# Patient Record
Sex: FEMALE | Race: BLACK OR AFRICAN AMERICAN | NOT HISPANIC OR LATINO | Employment: OTHER | URBAN - METROPOLITAN AREA
[De-identification: names, ages, dates, MRNs, and addresses within clinical notes are randomized per-mention and may not be internally consistent; named-entity substitution may affect disease eponyms.]

---

## 2023-04-04 DIAGNOSIS — I10 PRIMARY HYPERTENSION: Primary | ICD-10-CM

## 2023-04-04 DIAGNOSIS — M54.50 CHRONIC LOW BACK PAIN WITHOUT SCIATICA, UNSPECIFIED BACK PAIN LATERALITY: ICD-10-CM

## 2023-04-04 DIAGNOSIS — J06.9 VIRAL UPPER RESPIRATORY TRACT INFECTION: ICD-10-CM

## 2023-04-04 DIAGNOSIS — R51.9 ACUTE NONINTRACTABLE HEADACHE, UNSPECIFIED HEADACHE TYPE: ICD-10-CM

## 2023-04-04 DIAGNOSIS — R53.83 OTHER FATIGUE: ICD-10-CM

## 2023-04-04 DIAGNOSIS — G89.29 CHRONIC LOW BACK PAIN WITHOUT SCIATICA, UNSPECIFIED BACK PAIN LATERALITY: ICD-10-CM

## 2023-04-04 RX ORDER — IBUPROFEN 400 MG/1
TABLET ORAL EVERY 6 HOURS
COMMUNITY
Start: 2022-09-29 | End: 2023-04-04 | Stop reason: SDUPTHER

## 2023-04-04 RX ORDER — FLUTICASONE PROPIONATE 50 MCG
1 SPRAY, SUSPENSION (ML) NASAL DAILY
COMMUNITY
Start: 2022-03-18 | End: 2023-04-04 | Stop reason: SDUPTHER

## 2023-04-04 RX ORDER — LIDOCAINE 50 MG/G
PATCH TOPICAL
COMMUNITY
Start: 2022-04-26 | End: 2023-04-04 | Stop reason: SDUPTHER

## 2023-04-04 RX ORDER — HYDROCHLOROTHIAZIDE 25 MG/1
1 TABLET ORAL DAILY
COMMUNITY
Start: 2013-01-07 | End: 2023-04-04 | Stop reason: SDUPTHER

## 2023-04-04 RX ORDER — MELATONIN 3 MG
CAPSULE ORAL
COMMUNITY
Start: 2022-04-26 | End: 2023-04-04 | Stop reason: SDUPTHER

## 2023-04-05 RX ORDER — HYDROCHLOROTHIAZIDE 25 MG/1
25 TABLET ORAL DAILY
Qty: 90 TABLET | Refills: 0 | Status: SHIPPED | OUTPATIENT
Start: 2023-04-05 | End: 2023-04-28 | Stop reason: SDUPTHER

## 2023-04-05 RX ORDER — IBUPROFEN 400 MG/1
400 TABLET ORAL EVERY 6 HOURS
Qty: 120 TABLET | Refills: 2 | Status: SHIPPED | OUTPATIENT
Start: 2023-04-05 | End: 2023-07-04

## 2023-04-05 RX ORDER — FLUTICASONE PROPIONATE 50 MCG
1 SPRAY, SUSPENSION (ML) NASAL DAILY
Qty: 16 G | Refills: 2 | Status: SHIPPED | OUTPATIENT
Start: 2023-04-05 | End: 2023-04-28 | Stop reason: SDUPTHER

## 2023-04-05 RX ORDER — MELATONIN 3 MG
3 CAPSULE ORAL NIGHTLY
Qty: 60 CAPSULE | Refills: 1 | Status: SHIPPED | OUTPATIENT
Start: 2023-04-05 | End: 2023-04-28 | Stop reason: SDUPTHER

## 2023-04-05 RX ORDER — LIDOCAINE 50 MG/G
PATCH TOPICAL
Qty: 30 PATCH | Refills: 3 | Status: SHIPPED | OUTPATIENT
Start: 2023-04-05 | End: 2023-04-28 | Stop reason: SDUPTHER

## 2023-04-24 DIAGNOSIS — K21.9 GASTROESOPHAGEAL REFLUX DISEASE WITHOUT ESOPHAGITIS: Primary | ICD-10-CM

## 2023-04-24 RX ORDER — PANTOPRAZOLE SODIUM 40 MG/1
40 TABLET, DELAYED RELEASE ORAL DAILY
Qty: 30 TABLET | Refills: 5 | Status: SHIPPED | OUTPATIENT
Start: 2023-04-24 | End: 2023-04-28 | Stop reason: SDUPTHER

## 2023-04-28 ENCOUNTER — OFFICE VISIT (OUTPATIENT)
Dept: PRIMARY CARE | Facility: CLINIC | Age: 58
End: 2023-04-28
Payer: COMMERCIAL

## 2023-04-28 VITALS
SYSTOLIC BLOOD PRESSURE: 96 MMHG | BODY MASS INDEX: 51.91 KG/M2 | OXYGEN SATURATION: 99 % | HEART RATE: 69 BPM | HEIGHT: 63 IN | TEMPERATURE: 96.7 F | WEIGHT: 293 LBS | DIASTOLIC BLOOD PRESSURE: 61 MMHG

## 2023-04-28 DIAGNOSIS — K21.9 GASTROESOPHAGEAL REFLUX DISEASE WITHOUT ESOPHAGITIS: ICD-10-CM

## 2023-04-28 DIAGNOSIS — I10 PRIMARY HYPERTENSION: ICD-10-CM

## 2023-04-28 DIAGNOSIS — R53.83 OTHER FATIGUE: ICD-10-CM

## 2023-04-28 DIAGNOSIS — G89.29 CHRONIC LOW BACK PAIN WITHOUT SCIATICA, UNSPECIFIED BACK PAIN LATERALITY: ICD-10-CM

## 2023-04-28 DIAGNOSIS — M54.50 CHRONIC LOW BACK PAIN WITHOUT SCIATICA, UNSPECIFIED BACK PAIN LATERALITY: ICD-10-CM

## 2023-04-28 DIAGNOSIS — J06.9 VIRAL UPPER RESPIRATORY TRACT INFECTION: ICD-10-CM

## 2023-04-28 DIAGNOSIS — R35.0 INCREASED URINARY FREQUENCY: Primary | ICD-10-CM

## 2023-04-28 LAB
POC APPEARANCE, URINE: CLEAR
POC BILIRUBIN, URINE: NEGATIVE
POC BLOOD, URINE: NEGATIVE
POC COLOR, URINE: YELLOW
POC GLUCOSE, URINE: NEGATIVE MG/DL
POC KETONES, URINE: NEGATIVE MG/DL
POC LEUKOCYTES, URINE: NEGATIVE
POC NITRITE,URINE: NEGATIVE
POC PH, URINE: 5.5 PH
POC PROTEIN, URINE: NEGATIVE MG/DL
POC SPECIFIC GRAVITY, URINE: 1.02
POC UROBILINOGEN, URINE: 0.2 EU/DL

## 2023-04-28 PROCEDURE — 99213 OFFICE O/P EST LOW 20 MIN: CPT | Performed by: STUDENT IN AN ORGANIZED HEALTH CARE EDUCATION/TRAINING PROGRAM

## 2023-04-28 PROCEDURE — 81003 URINALYSIS AUTO W/O SCOPE: CPT | Performed by: STUDENT IN AN ORGANIZED HEALTH CARE EDUCATION/TRAINING PROGRAM

## 2023-04-28 PROCEDURE — 3074F SYST BP LT 130 MM HG: CPT | Performed by: STUDENT IN AN ORGANIZED HEALTH CARE EDUCATION/TRAINING PROGRAM

## 2023-04-28 PROCEDURE — 3078F DIAST BP <80 MM HG: CPT | Performed by: STUDENT IN AN ORGANIZED HEALTH CARE EDUCATION/TRAINING PROGRAM

## 2023-04-28 PROCEDURE — 1036F TOBACCO NON-USER: CPT | Performed by: STUDENT IN AN ORGANIZED HEALTH CARE EDUCATION/TRAINING PROGRAM

## 2023-04-28 RX ORDER — PANTOPRAZOLE SODIUM 40 MG/1
40 TABLET, DELAYED RELEASE ORAL DAILY
Qty: 30 TABLET | Refills: 5 | Status: SHIPPED | OUTPATIENT
Start: 2023-04-28 | End: 2023-06-09

## 2023-04-28 RX ORDER — LIDOCAINE 50 MG/G
PATCH TOPICAL
Qty: 30 PATCH | Refills: 3 | Status: SHIPPED | OUTPATIENT
Start: 2023-04-28 | End: 2023-11-11 | Stop reason: SDUPTHER

## 2023-04-28 RX ORDER — MELATONIN 3 MG
3 CAPSULE ORAL NIGHTLY
Qty: 60 CAPSULE | Refills: 1 | Status: SHIPPED | OUTPATIENT
Start: 2023-04-28 | End: 2023-07-27

## 2023-04-28 RX ORDER — HYDROCHLOROTHIAZIDE 25 MG/1
12.5 TABLET ORAL DAILY
Qty: 45 TABLET | Refills: 2 | Status: SHIPPED | OUTPATIENT
Start: 2023-04-28 | End: 2023-09-13 | Stop reason: ALTCHOICE

## 2023-04-28 RX ORDER — FLUTICASONE PROPIONATE 50 MCG
1 SPRAY, SUSPENSION (ML) NASAL DAILY
Qty: 16 G | Refills: 2 | Status: SHIPPED | OUTPATIENT
Start: 2023-04-28 | End: 2023-06-09 | Stop reason: SDUPTHER

## 2023-04-28 RX ORDER — DICLOFENAC SODIUM 10 MG/G
4 GEL TOPICAL 4 TIMES DAILY
Qty: 200 G | Refills: 1 | Status: SHIPPED | OUTPATIENT
Start: 2023-04-28 | End: 2023-12-12 | Stop reason: SDUPTHER

## 2023-04-28 ASSESSMENT — PAIN SCALES - GENERAL: PAINLEVEL: 0-NO PAIN

## 2023-04-28 NOTE — PROGRESS NOTES
A 57 y o with hx of HFpEF, Asthma, Morbid obesity, OPAL, GERD, depression and HTN presenting for increase urinary frequency and lightheadedness.     #Increase urinary frequency   - more than bl: usually goes 2-3 per day but now has noticed at lease 4-5 times daily   - no dysuria or other urinary symptoms   - h/o UTI in Aug 2022 with complete resolution s/p abx tx    #lightheadedness/dizziness   - denies vertigo  - denies tinnitus   - drinks mostly gingerale    - h/o of presyncope years ago 2/2 dehydration   - has noticed most of her dizziness spills with position changes  - no change in medications   - no OTC medications     REVIEW OF SYSTEMS:   No fevers, chills  No skin lesions  No HA, SZ, LOC  No CP, chest pressure  No cough, SOB   No ABD pain, nausea, vomiting  No urinary urgency, dysuria, urinary frequency  No BRBPR, melena, hematuria  No bleeding      PHYSICAL EXAMINATION:   Gen: NAD, non-toxic  HEENT: WNL  Chest: no inc WOB, CTABL, no wheeze/crackles/rhonchi   CVS: RRR, no murur appreciated   Abd: soft, NT/ND, +BS  Ext: no edema, nontender  Skin: Dry, warm, good condition  Neuro: grossly normal, CN intact, SELENE x 4  Psy: Mood and affect appropriate    IO UA negative     ASSESSMENT:  Ms. Smith is a  58 y/o F with hx of HFpEF, Asthma, Morbid obesity, OPAL, GERD, depression and HTN presenting for increase urinary frequency and lightheadedness. Reassuring findings on physical examination with stable VS. Plan as below:     PLAN:  - IO UA neg, with no urinary symptoms concerning for UTI  - Will send for Hg A1C  - RFP for electrolyte imbalance check   - Will down titrate hydrochlorothiazide from 25 mg to 12.5 mg   - Medications reviewed and renewed as needed  - Precautionary returns reviewed     RTC: 2-3 week for BP follow up     Discussed with Dr. Plunkett,     Jenna Perkins MD  Family Medicine, PGY3  [facility]

## 2023-05-01 NOTE — PROGRESS NOTES
I reviewed with the resident the medical history and the resident’s findings on physical examination.  I discussed with the resident the patient’s diagnosis and concur with the treatment plan as documented in the resident note.     Edward Plunkett MD

## 2023-05-23 ENCOUNTER — APPOINTMENT (OUTPATIENT)
Dept: PRIMARY CARE | Facility: CLINIC | Age: 58
End: 2023-05-23
Payer: COMMERCIAL

## 2023-06-09 ENCOUNTER — OFFICE VISIT (OUTPATIENT)
Dept: PRIMARY CARE | Facility: CLINIC | Age: 58
End: 2023-06-09
Payer: COMMERCIAL

## 2023-06-09 ENCOUNTER — LAB (OUTPATIENT)
Dept: LAB | Facility: LAB | Age: 58
End: 2023-06-09
Payer: COMMERCIAL

## 2023-06-09 ENCOUNTER — TELEPHONE (OUTPATIENT)
Dept: PRIMARY CARE | Facility: CLINIC | Age: 58
End: 2023-06-09

## 2023-06-09 VITALS
OXYGEN SATURATION: 98 % | WEIGHT: 293 LBS | SYSTOLIC BLOOD PRESSURE: 130 MMHG | HEART RATE: 70 BPM | DIASTOLIC BLOOD PRESSURE: 81 MMHG | BODY MASS INDEX: 54.45 KG/M2

## 2023-06-09 DIAGNOSIS — I10 PRIMARY HYPERTENSION: ICD-10-CM

## 2023-06-09 DIAGNOSIS — I10 HYPERTENSION, UNSPECIFIED TYPE: ICD-10-CM

## 2023-06-09 DIAGNOSIS — J06.9 VIRAL UPPER RESPIRATORY TRACT INFECTION: ICD-10-CM

## 2023-06-09 DIAGNOSIS — K21.9 GASTROESOPHAGEAL REFLUX DISEASE, UNSPECIFIED WHETHER ESOPHAGITIS PRESENT: Primary | ICD-10-CM

## 2023-06-09 DIAGNOSIS — J30.2 SEASONAL ALLERGIES: ICD-10-CM

## 2023-06-09 LAB
ALANINE AMINOTRANSFERASE (SGPT) (U/L) IN SER/PLAS: 6 U/L (ref 7–45)
ALBUMIN (G/DL) IN SER/PLAS: 4.1 G/DL (ref 3.4–5)
ALKALINE PHOSPHATASE (U/L) IN SER/PLAS: 137 U/L (ref 33–110)
ANION GAP IN SER/PLAS: 13 MMOL/L (ref 10–20)
ASPARTATE AMINOTRANSFERASE (SGOT) (U/L) IN SER/PLAS: 9 U/L (ref 9–39)
BILIRUBIN TOTAL (MG/DL) IN SER/PLAS: 0.2 MG/DL (ref 0–1.2)
CALCIUM (MG/DL) IN SER/PLAS: 9.2 MG/DL (ref 8.6–10.6)
CARBON DIOXIDE, TOTAL (MMOL/L) IN SER/PLAS: 21 MMOL/L (ref 21–32)
CHLORIDE (MMOL/L) IN SER/PLAS: 115 MMOL/L (ref 98–107)
CREATININE (MG/DL) IN SER/PLAS: 1.77 MG/DL (ref 0.5–1.05)
ESTIMATED AVERAGE GLUCOSE FOR HBA1C: 111 MG/DL
GFR FEMALE: 33 ML/MIN/1.73M2
GLUCOSE (MG/DL) IN SER/PLAS: 85 MG/DL (ref 74–99)
HEMOGLOBIN A1C/HEMOGLOBIN TOTAL IN BLOOD: 5.5 %
PHOSPHATE (MG/DL) IN SER/PLAS: 3.7 MG/DL (ref 2.5–4.9)
POTASSIUM (MMOL/L) IN SER/PLAS: 6.8 MMOL/L (ref 3.5–5.3)
PROTEIN TOTAL: 7.4 G/DL (ref 6.4–8.2)
SODIUM (MMOL/L) IN SER/PLAS: 142 MMOL/L (ref 136–145)
UREA NITROGEN (MG/DL) IN SER/PLAS: 46 MG/DL (ref 6–23)

## 2023-06-09 PROCEDURE — 36415 COLL VENOUS BLD VENIPUNCTURE: CPT

## 2023-06-09 PROCEDURE — 80053 COMPREHEN METABOLIC PANEL: CPT

## 2023-06-09 PROCEDURE — 84100 ASSAY OF PHOSPHORUS: CPT

## 2023-06-09 PROCEDURE — 83036 HEMOGLOBIN GLYCOSYLATED A1C: CPT

## 2023-06-09 PROCEDURE — 99214 OFFICE O/P EST MOD 30 MIN: CPT | Performed by: STUDENT IN AN ORGANIZED HEALTH CARE EDUCATION/TRAINING PROGRAM

## 2023-06-09 PROCEDURE — 1036F TOBACCO NON-USER: CPT | Performed by: STUDENT IN AN ORGANIZED HEALTH CARE EDUCATION/TRAINING PROGRAM

## 2023-06-09 PROCEDURE — 3075F SYST BP GE 130 - 139MM HG: CPT | Performed by: STUDENT IN AN ORGANIZED HEALTH CARE EDUCATION/TRAINING PROGRAM

## 2023-06-09 PROCEDURE — 3079F DIAST BP 80-89 MM HG: CPT | Performed by: STUDENT IN AN ORGANIZED HEALTH CARE EDUCATION/TRAINING PROGRAM

## 2023-06-09 RX ORDER — FLUTICASONE PROPIONATE 50 MCG
1 SPRAY, SUSPENSION (ML) NASAL DAILY
Qty: 16 G | Refills: 2 | Status: SHIPPED | OUTPATIENT
Start: 2023-06-09 | End: 2023-12-12 | Stop reason: SDUPTHER

## 2023-06-09 RX ORDER — FAMOTIDINE 10 MG/1
10 TABLET ORAL 2 TIMES DAILY
Qty: 60 TABLET | Refills: 11 | Status: SHIPPED | OUTPATIENT
Start: 2023-06-09 | End: 2024-06-08

## 2023-06-09 NOTE — PROGRESS NOTES
Subjective   Patient ID: Jessica Smith is a 58 y.o. female who presents for Follow-up.    HPI     #HTN  -hydrochlorothiazide downed from 25 to 12.5 mg every day at last visit, due for repeat RFP/A1c, had polyuria at last visit, IO UA was negative at last visit, pt has been compliant with meds, took this today as well, BP well controlled  Lab Results   Component Value Date    CREATININE 0.97 10/27/2021    BUN 43 (H) 10/27/2021     10/27/2021    K 4.3 10/27/2021    CL 97 (L) 10/27/2021    CO2 26 10/27/2021        Lab Results   Component Value Date    HGBA1C 5.4 07/08/2021      #Allergic rhinitis  -Takes Flonase PRN which helps control symptoms, needs refill    #GERD  -Takes Pantoprazole 40 mg every day, needs refill, has never tried Pepcid, interested in converting   -Did not know about GERD triggers, educated on foods that can cause this        Review of Systems    Pt denies any current chest pain, SOB, nausea, vomiting     Objective   /81 (BP Location: Right arm, Patient Position: Sitting, BP Cuff Size: Adult)   Pulse 70   Wt 139 kg (307 lb 6.4 oz)   SpO2 98%   BMI 54.45 kg/m²     Physical Exam    Constitutional: Well developed, awake, alert, oriented x3  Head and Face: NCAT  Eyes: Normal external exam, clear sclera bl  ENT: Normal external inspection of ears and nose. Oropharynx normal.  Cardiovascular: RRR, S1/S2, no murmurs, rubs, or gallops, radial pulses +2, no edema of extremities Chest nontender to palpation   Pulmonary: CTAB, no respiratory distress.  Abdomen: +BS, soft, non-tender, nondistended, no guarding or rebound, no masses noted  Neuro: A&O x3, CN II-XII grossly intact, no focal neuro deficits   MSK: No joint swelling, normal movements of all extremities. Range of motion- normal.  Skin- No lesions, contusions, or erythema.  Psychiatric: Judgment intact. Appropriate mood and behavior     Assessment/Plan   Diagnoses and all orders for this visit:  Gastroesophageal reflux disease,  unspecified whether esophagitis present  -     famotidine (Pepcid AC) 10 mg tablet; Take 1 tablet (10 mg) by mouth 2 times a day.  -Educated pt about GERD specific food triggers, convert PPI to Pepcid BID due to better side effect profile   Viral upper respiratory tract infection  -     fluticasone (Flonase) 50 mcg/actuation nasal spray; Administer 1 spray into each nostril once daily.  Hypertension, unspecified type  -     Comprehensive Metabolic Panel; Future  -     Hemoglobin A1C; Future  -BP well controlled today, continue current regimen for now, due for updated labs   Seasonal allergies  -Flonase as above      Discussed with:  Dr. Dodge  Return in : 1-2 months for FUV    Portions of this note were generated using digital voice recognition software, and may contain grammatical errors       Toy Davis MD  PGY-3, Family Medicine

## 2023-06-09 NOTE — TELEPHONE ENCOUNTER
"Recent Results (from the past 24 hour(s))   Comprehensive Metabolic Panel    Collection Time: 06/09/23  1:33 PM   Result Value Ref Range    Glucose 85 74 - 99 mg/dL    Sodium 142 136 - 145 mmol/L    Potassium 6.8 (HH) 3.5 - 5.3 mmol/L    Chloride 115 (H) 98 - 107 mmol/L    Bicarbonate 21 21 - 32 mmol/L    Anion Gap 13 10 - 20 mmol/L    Urea Nitrogen 46 (H) 6 - 23 mg/dL    Creatinine 1.77 (H) 0.50 - 1.05 mg/dL    GFR Female 33 (A) >90 mL/min/1.73m2    Calcium 9.2 8.6 - 10.6 mg/dL    Albumin 4.1 3.4 - 5.0 g/dL    Alkaline Phosphatase 137 (H) 33 - 110 U/L    Total Protein 7.4 6.4 - 8.2 g/dL    AST 9 9 - 39 U/L    Total Bilirubin 0.2 0.0 - 1.2 mg/dL    ALT (SGPT) 6 (L) 7 - 45 U/L   Phosphorus    Collection Time: 06/09/23  1:33 PM   Result Value Ref Range    Phosphorus 3.7 2.5 - 4.9 mg/dL     Inpatient family medicine on-call pager ([phone]) was paged by lab services at 7:38 PM (extension [phone]).  Called at 7:41 PM, was given a critical lab notification of a potassium of 6.8.  Asked if there was any evidence of hemolysis, they said none was noted.  Verified read back.    Called patient, spoke for approximately 7 minutes.  Patient states that she has been feeling some pain in her side for the past week, her doctor had told her that it was probably from \"some spaghetti sauce, it made my gastritis worse.\"  Otherwise at baseline, denies any chest pain, palpitations, syncope.  I communicated that her blood work today showed sudden worsening in her kidney function, as well as a very high potassium level, which could be dangerous.  Recommended that she come into the emergency room today for repeat blood work and further work-up.  Patient states that she would be able to call her daughter to drive her to the ED immediately after getting off the phone with me.  Asked her what her plan B would be if daughter could not drive her, she says she would call the ambulance.  I emphasized the importance of her being seen by a medical " provider today, patient understands.  All questions answered, patient to call daughter.    Loyda Horton MD  Family Medicine PGY2  [facility]

## 2023-06-12 NOTE — PROGRESS NOTES
I reviewed with the resident the medical history and the resident’s findings on physical examination.  I discussed with the resident the patient’s diagnosis and concur with the treatment plan as documented in the resident note.     Hector Dodge MD

## 2023-06-13 DIAGNOSIS — M54.50 CHRONIC LOW BACK PAIN WITHOUT SCIATICA, UNSPECIFIED BACK PAIN LATERALITY: Primary | ICD-10-CM

## 2023-06-13 DIAGNOSIS — G89.29 CHRONIC LOW BACK PAIN WITHOUT SCIATICA, UNSPECIFIED BACK PAIN LATERALITY: Primary | ICD-10-CM

## 2023-06-21 DIAGNOSIS — D50.9 IRON DEFICIENCY ANEMIA, UNSPECIFIED IRON DEFICIENCY ANEMIA TYPE: Primary | ICD-10-CM

## 2023-06-21 RX ORDER — FERROUS SULFATE 325(65) MG
65 TABLET ORAL EVERY OTHER DAY
Qty: 15 TABLET | Refills: 3 | Status: SHIPPED | OUTPATIENT
Start: 2023-06-21 | End: 2023-12-12 | Stop reason: SDUPTHER

## 2023-06-28 DIAGNOSIS — J44.9 CHRONIC OBSTRUCTIVE PULMONARY DISEASE, UNSPECIFIED COPD TYPE (MULTI): Primary | ICD-10-CM

## 2023-06-28 RX ORDER — FLUTICASONE PROPIONATE AND SALMETEROL 250; 50 UG/1; UG/1
1 POWDER RESPIRATORY (INHALATION)
Qty: 60 EACH | Refills: 11 | Status: SHIPPED | OUTPATIENT
Start: 2023-06-28 | End: 2024-03-13 | Stop reason: SDUPTHER

## 2023-07-20 DIAGNOSIS — I10 PRIMARY HYPERTENSION: ICD-10-CM

## 2023-07-20 DIAGNOSIS — M54.50 CHRONIC LOW BACK PAIN WITHOUT SCIATICA, UNSPECIFIED BACK PAIN LATERALITY: Primary | ICD-10-CM

## 2023-07-20 DIAGNOSIS — G89.29 CHRONIC LOW BACK PAIN WITHOUT SCIATICA, UNSPECIFIED BACK PAIN LATERALITY: Primary | ICD-10-CM

## 2023-07-20 RX ORDER — IBUPROFEN 400 MG/1
400 TABLET ORAL EVERY 6 HOURS PRN
Status: CANCELLED | OUTPATIENT
Start: 2023-07-20

## 2023-07-20 RX ORDER — ACETAMINOPHEN 500 MG
1000 TABLET ORAL EVERY 6 HOURS PRN
Qty: 240 TABLET | Refills: 2 | Status: ON HOLD | OUTPATIENT
Start: 2023-07-20 | End: 2023-10-19

## 2023-07-20 RX ORDER — IBUPROFEN 400 MG/1
400 TABLET ORAL EVERY 6 HOURS PRN
COMMUNITY
End: 2023-07-20

## 2023-07-20 NOTE — TELEPHONE ENCOUNTER
No ibuprofen d/t kidney function; sent tylenol instead.    Loyda Horton MD  Family Medicine PGY-3  [facility]

## 2023-07-24 ENCOUNTER — TELEPHONE (OUTPATIENT)
Dept: PRIMARY CARE | Facility: CLINIC | Age: 58
End: 2023-07-24
Payer: COMMERCIAL

## 2023-09-08 ENCOUNTER — TELEPHONE (OUTPATIENT)
Dept: PRIMARY CARE | Facility: CLINIC | Age: 58
End: 2023-09-08
Payer: COMMERCIAL

## 2023-09-08 DIAGNOSIS — R79.89 INCREASE IN SERUM CREATININE FROM PRIOR MEASUREMENT: ICD-10-CM

## 2023-09-08 DIAGNOSIS — J30.2 SEASONAL ALLERGIES: Primary | ICD-10-CM

## 2023-09-08 NOTE — TELEPHONE ENCOUNTER
Patient is requesting refill of ibuprofen and robitussin. Patient made aware notes from previous encounter stating ibuprofen not recommended for pt current kidney function. Pt stated not aware of any kidney issues. Needs provider education related to these issues, and refill, if indicated. Provider notified.

## 2023-09-13 PROBLEM — E87.6 HYPOKALEMIA: Status: RESOLVED | Noted: 2023-09-13 | Resolved: 2023-09-13

## 2023-09-13 PROBLEM — Z98.84 HISTORY OF ROUX-EN-Y GASTRIC BYPASS: Status: ACTIVE | Noted: 2022-04-03

## 2023-09-13 PROBLEM — T81.30XA WOUND DEHISCENCE: Status: RESOLVED | Noted: 2023-09-13 | Resolved: 2023-09-13

## 2023-09-13 PROBLEM — F41.9 ANXIETY AND DEPRESSION: Status: ACTIVE | Noted: 2023-09-13

## 2023-09-13 PROBLEM — U07.1 COVID-19: Status: RESOLVED | Noted: 2021-03-19 | Resolved: 2023-09-13

## 2023-09-13 PROBLEM — K55.9 ISCHEMIC BOWEL DISEASE (MULTI): Status: RESOLVED | Noted: 2021-04-01 | Resolved: 2023-09-13

## 2023-09-13 PROBLEM — K56.609 SBO (SMALL BOWEL OBSTRUCTION) (MULTI): Status: RESOLVED | Noted: 2023-09-13 | Resolved: 2023-09-13

## 2023-09-13 PROBLEM — K66.0 ABDOMINAL ADHESIONS: Status: RESOLVED | Noted: 2021-08-08 | Resolved: 2023-09-13

## 2023-09-13 PROBLEM — L03.311 CELLULITIS OF ABDOMINAL WALL: Status: RESOLVED | Noted: 2023-09-13 | Resolved: 2023-09-13

## 2023-09-13 PROBLEM — K59.09 CHRONIC CONSTIPATION: Status: ACTIVE | Noted: 2023-09-13

## 2023-09-13 PROBLEM — E66.813 OBESITY, CLASS III, BMI 40-49.9 (MORBID OBESITY): Chronic | Status: ACTIVE | Noted: 2018-10-22

## 2023-09-13 PROBLEM — R94.31 ABNORMAL QT INTERVAL PRESENT ON ELECTROCARDIOGRAM: Status: RESOLVED | Noted: 2021-03-18 | Resolved: 2023-09-13

## 2023-09-13 PROBLEM — B97.7 HIGH RISK HPV INFECTION: Status: RESOLVED | Noted: 2023-09-13 | Resolved: 2023-09-13

## 2023-09-13 PROBLEM — E66.01 OBESITY, CLASS III, BMI 40-49.9 (MORBID OBESITY) (MULTI): Chronic | Status: ACTIVE | Noted: 2018-10-22

## 2023-09-13 PROBLEM — M19.90 UNSPECIFIED OSTEOARTHRITIS, UNSPECIFIED SITE: Status: ACTIVE | Noted: 2023-09-13

## 2023-09-13 PROBLEM — L30.4 INTERTRIGO: Status: RESOLVED | Noted: 2023-09-13 | Resolved: 2023-09-13

## 2023-09-13 PROBLEM — I89.0 LYMPHEDEMA OF BOTH LOWER EXTREMITIES: Status: ACTIVE | Noted: 2023-09-13

## 2023-09-13 PROBLEM — K21.00 GASTROESOPHAGEAL REFLUX DISEASE WITH ESOPHAGITIS: Status: ACTIVE | Noted: 2022-04-21

## 2023-09-13 PROBLEM — I50.32 CHRONIC HEART FAILURE WITH PRESERVED EJECTION FRACTION (MULTI): Status: ACTIVE | Noted: 2022-04-21

## 2023-09-13 PROBLEM — G47.00 INSOMNIA: Status: ACTIVE | Noted: 2023-09-13

## 2023-09-13 PROBLEM — M79.2 NEUROPATHIC PAIN: Status: ACTIVE | Noted: 2022-04-21

## 2023-09-13 PROBLEM — J44.89 ASTHMA WITH CHRONIC OBSTRUCTIVE PULMONARY DISEASE (COPD) (MULTI): Status: ACTIVE | Noted: 2022-04-21

## 2023-09-13 PROBLEM — F32.A ANXIETY AND DEPRESSION: Status: ACTIVE | Noted: 2023-09-13

## 2023-09-13 PROBLEM — J30.2 SEASONAL ALLERGIES: Status: ACTIVE | Noted: 2023-09-13

## 2023-09-13 PROBLEM — N39.46 MIXED INCONTINENCE: Status: ACTIVE | Noted: 2023-09-13

## 2023-09-13 PROBLEM — J32.9 CHRONIC SINUSITIS: Status: RESOLVED | Noted: 2023-09-13 | Resolved: 2023-09-13

## 2023-09-13 RX ORDER — GUAIFENESIN 1200 MG/1
1200 TABLET, EXTENDED RELEASE ORAL EVERY 12 HOURS
COMMUNITY
Start: 2021-10-25 | End: 2023-09-13 | Stop reason: SDUPTHER

## 2023-09-13 RX ORDER — SUCRALFATE 1 G/10ML
1 SUSPENSION ORAL
COMMUNITY
Start: 2020-04-13 | End: 2023-12-12 | Stop reason: SDUPTHER

## 2023-09-13 RX ORDER — PANTOPRAZOLE SODIUM 40 MG/1
1 TABLET, DELAYED RELEASE ORAL DAILY
COMMUNITY
Start: 2019-07-11 | End: 2023-11-04 | Stop reason: SDUPTHER

## 2023-09-13 RX ORDER — TALC
1 POWDER (GRAM) TOPICAL NIGHTLY
COMMUNITY
Start: 2022-04-25 | End: 2023-12-12 | Stop reason: SDUPTHER

## 2023-09-13 RX ORDER — ALBUTEROL SULFATE 90 UG/1
2 AEROSOL, METERED RESPIRATORY (INHALATION) EVERY 6 HOURS PRN
COMMUNITY
Start: 2019-04-29 | End: 2023-11-07 | Stop reason: SDUPTHER

## 2023-09-13 RX ORDER — GUAIFENESIN 1200 MG/1
1200 TABLET, EXTENDED RELEASE ORAL EVERY 12 HOURS PRN
Qty: 60 TABLET | Refills: 1 | Status: SHIPPED | OUTPATIENT
Start: 2023-09-13 | End: 2023-10-27

## 2023-09-13 RX ORDER — GABAPENTIN 300 MG/1
300 CAPSULE ORAL NIGHTLY
COMMUNITY
Start: 2022-04-01 | End: 2023-12-12 | Stop reason: SDUPTHER

## 2023-09-13 RX ORDER — AMLODIPINE BESYLATE 10 MG/1
10 TABLET ORAL DAILY
COMMUNITY
Start: 2023-06-13 | End: 2023-12-12 | Stop reason: SDUPTHER

## 2023-10-10 ENCOUNTER — HOSPITAL ENCOUNTER (OUTPATIENT)
Facility: HOSPITAL | Age: 58
Setting detail: OBSERVATION
Discharge: NURSING, REHAB OR LONG-TERM CARE | End: 2023-10-19
Attending: EMERGENCY MEDICINE | Admitting: FAMILY MEDICINE
Payer: COMMERCIAL

## 2023-10-10 ENCOUNTER — APPOINTMENT (OUTPATIENT)
Dept: RADIOLOGY | Facility: HOSPITAL | Age: 58
End: 2023-10-10
Payer: COMMERCIAL

## 2023-10-10 DIAGNOSIS — R10.84 GENERALIZED ABDOMINAL PAIN: Chronic | ICD-10-CM

## 2023-10-10 DIAGNOSIS — E87.5 HYPERKALEMIA: ICD-10-CM

## 2023-10-10 DIAGNOSIS — N17.9 ACUTE RENAL FAILURE SUPERIMPOSED ON CHRONIC KIDNEY DISEASE, UNSPECIFIED ACUTE RENAL FAILURE TYPE, UNSPECIFIED CKD STAGE (CMS-HCC): ICD-10-CM

## 2023-10-10 DIAGNOSIS — N18.9 ACUTE RENAL FAILURE SUPERIMPOSED ON CHRONIC KIDNEY DISEASE, UNSPECIFIED ACUTE RENAL FAILURE TYPE, UNSPECIFIED CKD STAGE (CMS-HCC): ICD-10-CM

## 2023-10-10 DIAGNOSIS — Z98.84 HISTORY OF ROUX-EN-Y GASTRIC BYPASS: ICD-10-CM

## 2023-10-10 DIAGNOSIS — M54.50 CHRONIC LOW BACK PAIN WITHOUT SCIATICA, UNSPECIFIED BACK PAIN LATERALITY: ICD-10-CM

## 2023-10-10 DIAGNOSIS — R10.9 ABDOMINAL PAIN, UNSPECIFIED ABDOMINAL LOCATION: Primary | ICD-10-CM

## 2023-10-10 DIAGNOSIS — G89.29 CHRONIC LOW BACK PAIN WITHOUT SCIATICA, UNSPECIFIED BACK PAIN LATERALITY: ICD-10-CM

## 2023-10-10 DIAGNOSIS — K59.09 CHRONIC CONSTIPATION: ICD-10-CM

## 2023-10-10 LAB
BASOPHILS # BLD AUTO: 0.05 X10*3/UL (ref 0–0.1)
BASOPHILS NFR BLD AUTO: 0.4 %
BNP SERPL-MCNC: 22 PG/ML (ref 0–99)
CARDIAC TROPONIN I PNL SERPL HS: 5 NG/L (ref 0–34)
EOSINOPHIL # BLD AUTO: 0.16 X10*3/UL (ref 0–0.7)
EOSINOPHIL NFR BLD AUTO: 1.3 %
ERYTHROCYTE [DISTWIDTH] IN BLOOD BY AUTOMATED COUNT: 16.2 % (ref 11.5–14.5)
HCT VFR BLD AUTO: 31.8 % (ref 36–46)
HGB BLD-MCNC: 10 G/DL (ref 12–16)
IMM GRANULOCYTES # BLD AUTO: 0.04 X10*3/UL (ref 0–0.7)
IMM GRANULOCYTES NFR BLD AUTO: 0.3 % (ref 0–0.9)
LACTATE SERPL-SCNC: 1.1 MMOL/L (ref 0.4–2)
LYMPHOCYTES # BLD AUTO: 1.91 X10*3/UL (ref 1.2–4.8)
LYMPHOCYTES NFR BLD AUTO: 15.8 %
MCH RBC QN AUTO: 28.7 PG (ref 26–34)
MCHC RBC AUTO-ENTMCNC: 31.4 G/DL (ref 32–36)
MCV RBC AUTO: 91 FL (ref 80–100)
MONOCYTES # BLD AUTO: 1.15 X10*3/UL (ref 0.1–1)
MONOCYTES NFR BLD AUTO: 9.5 %
NEUTROPHILS # BLD AUTO: 8.77 X10*3/UL (ref 1.2–7.7)
NEUTROPHILS NFR BLD AUTO: 72.7 %
NRBC BLD-RTO: 0 /100 WBCS (ref 0–0)
PLATELET # BLD AUTO: 271 X10*3/UL (ref 150–450)
PMV BLD AUTO: 10.8 FL (ref 7.5–11.5)
RBC # BLD AUTO: 3.48 X10*6/UL (ref 4–5.2)
WBC # BLD AUTO: 12.1 X10*3/UL (ref 4.4–11.3)

## 2023-10-10 PROCEDURE — 73590 X-RAY EXAM OF LOWER LEG: CPT | Mod: LT,FY

## 2023-10-10 PROCEDURE — 83880 ASSAY OF NATRIURETIC PEPTIDE: CPT

## 2023-10-10 PROCEDURE — 83690 ASSAY OF LIPASE: CPT

## 2023-10-10 PROCEDURE — 73564 X-RAY EXAM KNEE 4 OR MORE: CPT | Mod: LT,FY

## 2023-10-10 PROCEDURE — 85025 COMPLETE CBC W/AUTO DIFF WBC: CPT

## 2023-10-10 PROCEDURE — 2500000001 HC RX 250 WO HCPCS SELF ADMINISTERED DRUGS (ALT 637 FOR MEDICARE OP): Mod: SE

## 2023-10-10 PROCEDURE — 73522 X-RAY EXAM HIPS BI 3-4 VIEWS: CPT | Mod: FY

## 2023-10-10 PROCEDURE — 99285 EMERGENCY DEPT VISIT HI MDM: CPT | Performed by: EMERGENCY MEDICINE

## 2023-10-10 PROCEDURE — 83735 ASSAY OF MAGNESIUM: CPT

## 2023-10-10 PROCEDURE — 84484 ASSAY OF TROPONIN QUANT: CPT

## 2023-10-10 PROCEDURE — 83605 ASSAY OF LACTIC ACID: CPT

## 2023-10-10 PROCEDURE — 36415 COLL VENOUS BLD VENIPUNCTURE: CPT

## 2023-10-10 PROCEDURE — 81003 URINALYSIS AUTO W/O SCOPE: CPT | Performed by: EMERGENCY MEDICINE

## 2023-10-10 PROCEDURE — 93010 ELECTROCARDIOGRAM REPORT: CPT | Performed by: EMERGENCY MEDICINE

## 2023-10-10 PROCEDURE — 80053 COMPREHEN METABOLIC PANEL: CPT

## 2023-10-10 PROCEDURE — 73522 X-RAY EXAM HIPS BI 3-4 VIEWS: CPT | Mod: BILATERAL PROCEDURE | Performed by: RADIOLOGY

## 2023-10-10 PROCEDURE — 73564 X-RAY EXAM KNEE 4 OR MORE: CPT | Mod: LEFT SIDE | Performed by: RADIOLOGY

## 2023-10-10 PROCEDURE — 73590 X-RAY EXAM OF LOWER LEG: CPT | Mod: LEFT SIDE | Performed by: RADIOLOGY

## 2023-10-10 RX ORDER — LIDOCAINE HYDROCHLORIDE 20 MG/ML
15 SOLUTION OROPHARYNGEAL ONCE
Status: COMPLETED | OUTPATIENT
Start: 2023-10-10 | End: 2023-10-10

## 2023-10-10 RX ORDER — ALUMINUM HYDROXIDE, MAGNESIUM HYDROXIDE, AND SIMETHICONE 1200; 120; 1200 MG/30ML; MG/30ML; MG/30ML
30 SUSPENSION ORAL ONCE
Status: COMPLETED | OUTPATIENT
Start: 2023-10-10 | End: 2023-10-10

## 2023-10-10 RX ORDER — PANTOPRAZOLE SODIUM 40 MG/10ML
40 INJECTION, POWDER, LYOPHILIZED, FOR SOLUTION INTRAVENOUS ONCE
Status: COMPLETED | OUTPATIENT
Start: 2023-10-10 | End: 2023-10-11

## 2023-10-10 RX ADMIN — ALUMINUM HYDROXIDE, MAGNESIUM HYDROXIDE, AND DIMETHICONE 30 ML: 200; 20; 200 SUSPENSION ORAL at 22:15

## 2023-10-10 RX ADMIN — LIDOCAINE HYDROCHLORIDE 15 ML: 20 SOLUTION ORAL at 22:15

## 2023-10-10 ASSESSMENT — COLUMBIA-SUICIDE SEVERITY RATING SCALE - C-SSRS
2. HAVE YOU ACTUALLY HAD ANY THOUGHTS OF KILLING YOURSELF?: NO
1. IN THE PAST MONTH, HAVE YOU WISHED YOU WERE DEAD OR WISHED YOU COULD GO TO SLEEP AND NOT WAKE UP?: NO
6. HAVE YOU EVER DONE ANYTHING, STARTED TO DO ANYTHING, OR PREPARED TO DO ANYTHING TO END YOUR LIFE?: NO

## 2023-10-10 ASSESSMENT — PAIN SCALES - GENERAL: PAINLEVEL_OUTOF10: 8

## 2023-10-10 ASSESSMENT — PAIN - FUNCTIONAL ASSESSMENT: PAIN_FUNCTIONAL_ASSESSMENT: 0-10

## 2023-10-11 ENCOUNTER — APPOINTMENT (OUTPATIENT)
Dept: RADIOLOGY | Facility: HOSPITAL | Age: 58
End: 2023-10-11
Payer: COMMERCIAL

## 2023-10-11 ENCOUNTER — APPOINTMENT (OUTPATIENT)
Dept: CARDIOLOGY | Facility: HOSPITAL | Age: 58
End: 2023-10-11
Payer: COMMERCIAL

## 2023-10-11 PROBLEM — N17.9 ACUTE KIDNEY INJURY (CMS-HCC): Status: ACTIVE | Noted: 2023-10-11

## 2023-10-11 LAB
ALBUMIN SERPL BCP-MCNC: 3.6 G/DL (ref 3.4–5)
ALBUMIN SERPL BCP-MCNC: 4.1 G/DL (ref 3.4–5)
ALP SERPL-CCNC: 124 U/L (ref 33–110)
ALT SERPL W P-5'-P-CCNC: 7 U/L (ref 7–45)
ANION GAP SERPL CALC-SCNC: 11 MMOL/L (ref 10–20)
ANION GAP SERPL CALC-SCNC: 13 MMOL/L (ref 10–20)
APPEARANCE UR: ABNORMAL
AST SERPL W P-5'-P-CCNC: 8 U/L (ref 9–39)
ATRIAL RATE: 67 BPM
ATRIAL RATE: 69 BPM
BILIRUB SERPL-MCNC: 0.3 MG/DL (ref 0–1.2)
BILIRUB UR STRIP.AUTO-MCNC: NEGATIVE MG/DL
BUN SERPL-MCNC: 28 MG/DL (ref 6–23)
BUN SERPL-MCNC: 36 MG/DL (ref 6–23)
CALCIUM SERPL-MCNC: 8.5 MG/DL (ref 8.6–10.6)
CALCIUM SERPL-MCNC: 8.7 MG/DL (ref 8.6–10.6)
CHLORIDE SERPL-SCNC: 111 MMOL/L (ref 98–107)
CHLORIDE SERPL-SCNC: 111 MMOL/L (ref 98–107)
CO2 SERPL-SCNC: 18 MMOL/L (ref 21–32)
CO2 SERPL-SCNC: 19 MMOL/L (ref 21–32)
COLOR UR: YELLOW
CREAT SERPL-MCNC: 1.43 MG/DL (ref 0.5–1.05)
CREAT SERPL-MCNC: 1.65 MG/DL (ref 0.5–1.05)
GFR SERPL CREATININE-BSD FRML MDRD: 36 ML/MIN/1.73M*2
GFR SERPL CREATININE-BSD FRML MDRD: 43 ML/MIN/1.73M*2
GLUCOSE BLD MANUAL STRIP-MCNC: 143 MG/DL (ref 74–99)
GLUCOSE SERPL-MCNC: 112 MG/DL (ref 74–99)
GLUCOSE SERPL-MCNC: 118 MG/DL (ref 74–99)
GLUCOSE UR STRIP.AUTO-MCNC: NEGATIVE MG/DL
HOLD SPECIMEN: NORMAL
KETONES UR STRIP.AUTO-MCNC: NEGATIVE MG/DL
LEUKOCYTE ESTERASE UR QL STRIP.AUTO: NEGATIVE
LIPASE SERPL-CCNC: 29 U/L (ref 9–82)
MAGNESIUM SERPL-MCNC: 2.2 MG/DL (ref 1.6–2.4)
NITRITE UR QL STRIP.AUTO: NEGATIVE
P AXIS: 75 DEGREES
P AXIS: 78 DEGREES
P OFFSET: 170 MS
P OFFSET: 176 MS
P ONSET: 122 MS
P ONSET: 123 MS
PH UR STRIP.AUTO: 5 [PH]
PHOSPHATE SERPL-MCNC: 3.1 MG/DL (ref 2.5–4.9)
POTASSIUM SERPL-SCNC: 5.2 MMOL/L (ref 3.5–5.3)
POTASSIUM SERPL-SCNC: 5.6 MMOL/L (ref 3.5–5.3)
POTASSIUM SERPL-SCNC: 5.7 MMOL/L (ref 3.5–5.3)
POTASSIUM SERPL-SCNC: 6 MMOL/L (ref 3.5–5.3)
PR INTERVAL: 190 MS
PR INTERVAL: 194 MS
PROT SERPL-MCNC: 7.6 G/DL (ref 6.4–8.2)
PROT UR STRIP.AUTO-MCNC: NEGATIVE MG/DL
Q ONSET: 218 MS
Q ONSET: 219 MS
QRS COUNT: 11 BEATS
QRS COUNT: 11 BEATS
QRS DURATION: 82 MS
QRS DURATION: 88 MS
QT INTERVAL: 272 MS
QT INTERVAL: 406 MS
QTC CALCULATION(BAZETT): 291 MS
QTC CALCULATION(BAZETT): 429 MS
QTC FREDERICIA: 285 MS
QTC FREDERICIA: 421 MS
R AXIS: 62 DEGREES
R AXIS: 68 DEGREES
RBC # UR STRIP.AUTO: NEGATIVE /UL
SODIUM SERPL-SCNC: 135 MMOL/L (ref 136–145)
SODIUM SERPL-SCNC: 136 MMOL/L (ref 136–145)
SP GR UR STRIP.AUTO: 1.02
T AXIS: -28 DEGREES
T AXIS: 15 DEGREES
T OFFSET: 355 MS
T OFFSET: 421 MS
UROBILINOGEN UR STRIP.AUTO-MCNC: <2 MG/DL
VENTRICULAR RATE: 67 BPM
VENTRICULAR RATE: 69 BPM

## 2023-10-11 PROCEDURE — C9113 INJ PANTOPRAZOLE SODIUM, VIA: HCPCS | Mod: SE

## 2023-10-11 PROCEDURE — 84132 ASSAY OF SERUM POTASSIUM: CPT | Mod: 59

## 2023-10-11 PROCEDURE — 2500000005 HC RX 250 GENERAL PHARMACY W/O HCPCS: Mod: SE

## 2023-10-11 PROCEDURE — 2580000001 HC RX 258 IV SOLUTIONS: Mod: SE

## 2023-10-11 PROCEDURE — 36415 COLL VENOUS BLD VENIPUNCTURE: CPT

## 2023-10-11 PROCEDURE — 2500000001 HC RX 250 WO HCPCS SELF ADMINISTERED DRUGS (ALT 637 FOR MEDICARE OP): Mod: SE

## 2023-10-11 PROCEDURE — 96365 THER/PROPH/DIAG IV INF INIT: CPT | Performed by: EMERGENCY MEDICINE

## 2023-10-11 PROCEDURE — 2500000002 HC RX 250 W HCPCS SELF ADMINISTERED DRUGS (ALT 637 FOR MEDICARE OP, ALT 636 FOR OP/ED): Mod: SE

## 2023-10-11 PROCEDURE — 96375 TX/PRO/DX INJ NEW DRUG ADDON: CPT | Performed by: EMERGENCY MEDICINE

## 2023-10-11 PROCEDURE — 2500000004 HC RX 250 GENERAL PHARMACY W/ HCPCS (ALT 636 FOR OP/ED): Mod: SE

## 2023-10-11 PROCEDURE — G0378 HOSPITAL OBSERVATION PER HR: HCPCS

## 2023-10-11 PROCEDURE — 80069 RENAL FUNCTION PANEL: CPT

## 2023-10-11 PROCEDURE — 82947 ASSAY GLUCOSE BLOOD QUANT: CPT | Mod: 59

## 2023-10-11 PROCEDURE — 74176 CT ABD & PELVIS W/O CONTRAST: CPT

## 2023-10-11 PROCEDURE — 93005 ELECTROCARDIOGRAM TRACING: CPT

## 2023-10-11 PROCEDURE — 96361 HYDRATE IV INFUSION ADD-ON: CPT | Performed by: EMERGENCY MEDICINE

## 2023-10-11 PROCEDURE — 93005 ELECTROCARDIOGRAM TRACING: CPT | Mod: 59

## 2023-10-11 PROCEDURE — 74176 CT ABD & PELVIS W/O CONTRAST: CPT | Performed by: RADIOLOGY

## 2023-10-11 PROCEDURE — 99222 1ST HOSP IP/OBS MODERATE 55: CPT | Performed by: FAMILY MEDICINE

## 2023-10-11 RX ORDER — FAMOTIDINE 20 MG/1
20 TABLET, FILM COATED ORAL 2 TIMES DAILY
Status: DISCONTINUED | OUTPATIENT
Start: 2023-10-11 | End: 2023-10-12

## 2023-10-11 RX ORDER — FAMOTIDINE 20 MG/1
10 TABLET, FILM COATED ORAL 2 TIMES DAILY
Status: DISCONTINUED | OUTPATIENT
Start: 2023-10-11 | End: 2023-10-11

## 2023-10-11 RX ORDER — PANTOPRAZOLE SODIUM 40 MG/1
40 TABLET, DELAYED RELEASE ORAL DAILY
Status: DISCONTINUED | OUTPATIENT
Start: 2023-10-11 | End: 2023-10-11

## 2023-10-11 RX ORDER — DEXTROSE 50 % IN WATER (D50W) INTRAVENOUS SYRINGE
25 ONCE
Status: COMPLETED | OUTPATIENT
Start: 2023-10-11 | End: 2023-10-11

## 2023-10-11 RX ORDER — CALCIUM GLUCONATE 20 MG/ML
2 INJECTION, SOLUTION INTRAVENOUS ONCE
Status: COMPLETED | OUTPATIENT
Start: 2023-10-11 | End: 2023-10-11

## 2023-10-11 RX ORDER — ESCITALOPRAM OXALATE 20 MG/1
20 TABLET ORAL DAILY
COMMUNITY
End: 2023-11-04 | Stop reason: SDUPTHER

## 2023-10-11 RX ORDER — ALBUTEROL SULFATE 0.83 MG/ML
10 SOLUTION RESPIRATORY (INHALATION) ONCE
Status: COMPLETED | OUTPATIENT
Start: 2023-10-11 | End: 2023-10-11

## 2023-10-11 RX ORDER — TALC
3 POWDER (GRAM) TOPICAL NIGHTLY PRN
Status: DISCONTINUED | OUTPATIENT
Start: 2023-10-11 | End: 2023-10-19 | Stop reason: HOSPADM

## 2023-10-11 RX ORDER — ENOXAPARIN SODIUM 100 MG/ML
60 INJECTION SUBCUTANEOUS EVERY 12 HOURS SCHEDULED
Status: DISCONTINUED | OUTPATIENT
Start: 2023-10-11 | End: 2023-10-11

## 2023-10-11 RX ORDER — ALBUTEROL SULFATE 90 UG/1
2 AEROSOL, METERED RESPIRATORY (INHALATION) EVERY 6 HOURS PRN
Status: DISCONTINUED | OUTPATIENT
Start: 2023-10-11 | End: 2023-10-19 | Stop reason: HOSPADM

## 2023-10-11 RX ORDER — FERROUS SULFATE 325(65) MG
65 TABLET ORAL EVERY OTHER DAY
Status: DISCONTINUED | OUTPATIENT
Start: 2023-10-12 | End: 2023-10-19 | Stop reason: HOSPADM

## 2023-10-11 RX ORDER — PANTOPRAZOLE SODIUM 40 MG/10ML
40 INJECTION, POWDER, LYOPHILIZED, FOR SOLUTION INTRAVENOUS DAILY
Status: DISCONTINUED | OUTPATIENT
Start: 2023-10-11 | End: 2023-10-19 | Stop reason: HOSPADM

## 2023-10-11 RX ORDER — DEXTROSE MONOHYDRATE 25 G/50ML
25 INJECTION, SOLUTION INTRAVENOUS ONCE
Status: DISCONTINUED | OUTPATIENT
Start: 2023-10-11 | End: 2023-10-11

## 2023-10-11 RX ORDER — AMLODIPINE BESYLATE 10 MG/1
10 TABLET ORAL DAILY
Status: DISCONTINUED | OUTPATIENT
Start: 2023-10-11 | End: 2023-10-19 | Stop reason: HOSPADM

## 2023-10-11 RX ORDER — FLUTICASONE PROPIONATE 50 MCG
1 SPRAY, SUSPENSION (ML) NASAL DAILY
Status: DISCONTINUED | OUTPATIENT
Start: 2023-10-11 | End: 2023-10-19 | Stop reason: HOSPADM

## 2023-10-11 RX ORDER — HEPARIN SODIUM 5000 [USP'U]/ML
7500 INJECTION, SOLUTION INTRAVENOUS; SUBCUTANEOUS EVERY 8 HOURS SCHEDULED
Status: DISCONTINUED | OUTPATIENT
Start: 2023-10-12 | End: 2023-10-19 | Stop reason: HOSPADM

## 2023-10-11 RX ORDER — POLYETHYLENE GLYCOL 3350 17 G/17G
17 POWDER, FOR SOLUTION ORAL DAILY
Status: DISCONTINUED | OUTPATIENT
Start: 2023-10-11 | End: 2023-10-14

## 2023-10-11 RX ORDER — SUCRALFATE 1 G/10ML
1 SUSPENSION ORAL
Status: DISCONTINUED | OUTPATIENT
Start: 2023-10-11 | End: 2023-10-19 | Stop reason: HOSPADM

## 2023-10-11 RX ORDER — DEXTROSE 50 % IN WATER (D50W) INTRAVENOUS SYRINGE
Status: COMPLETED
Start: 2023-10-11 | End: 2023-10-11

## 2023-10-11 RX ORDER — GABAPENTIN 300 MG/1
300 CAPSULE ORAL NIGHTLY
Status: DISCONTINUED | OUTPATIENT
Start: 2023-10-11 | End: 2023-10-19 | Stop reason: HOSPADM

## 2023-10-11 RX ORDER — FLUTICASONE FUROATE AND VILANTEROL 200; 25 UG/1; UG/1
1 POWDER RESPIRATORY (INHALATION)
Status: DISCONTINUED | OUTPATIENT
Start: 2023-10-11 | End: 2023-10-19 | Stop reason: HOSPADM

## 2023-10-11 RX ORDER — SODIUM POLYSTYRENE SULFONATE 15 G/60ML
15 SUSPENSION ORAL; RECTAL ONCE
Status: DISCONTINUED | OUTPATIENT
Start: 2023-10-11 | End: 2023-10-17

## 2023-10-11 RX ORDER — ALBUTEROL SULFATE 5 MG/ML
10 SOLUTION RESPIRATORY (INHALATION) ONCE
Status: DISCONTINUED | OUTPATIENT
Start: 2023-10-11 | End: 2023-10-11

## 2023-10-11 RX ORDER — DICLOFENAC SODIUM 10 MG/G
4 GEL TOPICAL 4 TIMES DAILY PRN
Status: DISCONTINUED | OUTPATIENT
Start: 2023-10-11 | End: 2023-10-19 | Stop reason: HOSPADM

## 2023-10-11 RX ORDER — CALCIUM GLUCONATE 20 MG/ML
2 INJECTION, SOLUTION INTRAVENOUS ONCE
Status: DISCONTINUED | OUTPATIENT
Start: 2023-10-11 | End: 2023-10-17

## 2023-10-11 RX ORDER — ALBUTEROL SULFATE 0.83 MG/ML
SOLUTION RESPIRATORY (INHALATION)
Status: COMPLETED
Start: 2023-10-11 | End: 2023-10-11

## 2023-10-11 RX ORDER — LIDOCAINE 50 MG/G
1 PATCH TOPICAL DAILY
Status: DISCONTINUED | OUTPATIENT
Start: 2023-10-11 | End: 2023-10-11

## 2023-10-11 RX ORDER — LIDOCAINE 560 MG/1
1 PATCH PERCUTANEOUS; TOPICAL; TRANSDERMAL DAILY
Status: DISCONTINUED | OUTPATIENT
Start: 2023-10-11 | End: 2023-10-19 | Stop reason: HOSPADM

## 2023-10-11 RX ORDER — SODIUM POLYSTYRENE SULFONATE 15 G/60ML
15 SUSPENSION ORAL; RECTAL ONCE
Status: COMPLETED | OUTPATIENT
Start: 2023-10-11 | End: 2023-10-11

## 2023-10-11 RX ORDER — GUAIFENESIN 600 MG/1
1200 TABLET, EXTENDED RELEASE ORAL EVERY 12 HOURS PRN
Status: DISCONTINUED | OUTPATIENT
Start: 2023-10-11 | End: 2023-10-19 | Stop reason: HOSPADM

## 2023-10-11 RX ORDER — ACETAMINOPHEN 325 MG/1
975 TABLET ORAL EVERY 8 HOURS PRN
Status: DISCONTINUED | OUTPATIENT
Start: 2023-10-11 | End: 2023-10-19 | Stop reason: HOSPADM

## 2023-10-11 RX ADMIN — ALBUTEROL SULFATE 2.5 MG: 0.83 SOLUTION RESPIRATORY (INHALATION) at 02:46

## 2023-10-11 RX ADMIN — PANTOPRAZOLE SODIUM 40 MG: 40 TABLET, DELAYED RELEASE ORAL at 14:24

## 2023-10-11 RX ADMIN — AMLODIPINE BESYLATE 10 MG: 10 TABLET ORAL at 14:24

## 2023-10-11 RX ADMIN — PANTOPRAZOLE SODIUM 40 MG: 40 INJECTION, POWDER, FOR SOLUTION INTRAVENOUS at 02:01

## 2023-10-11 RX ADMIN — ACETAMINOPHEN 975 MG: 325 TABLET ORAL at 14:39

## 2023-10-11 RX ADMIN — ALBUTEROL SULFATE 2 PUFF: 90 AEROSOL, METERED RESPIRATORY (INHALATION) at 14:44

## 2023-10-11 RX ADMIN — CALCIUM GLUCONATE 2 G: 20 INJECTION, SOLUTION INTRAVENOUS at 01:59

## 2023-10-11 RX ADMIN — SODIUM CHLORIDE, POTASSIUM CHLORIDE, SODIUM LACTATE AND CALCIUM CHLORIDE 1000 ML: 600; 310; 30; 20 INJECTION, SOLUTION INTRAVENOUS at 14:23

## 2023-10-11 RX ADMIN — INSULIN HUMAN 5 UNITS: 100 INJECTION, SOLUTION PARENTERAL at 06:20

## 2023-10-11 RX ADMIN — GABAPENTIN 300 MG: 300 CAPSULE ORAL at 20:08

## 2023-10-11 RX ADMIN — FAMOTIDINE 10 MG: 20 TABLET, FILM COATED ORAL at 14:25

## 2023-10-11 RX ADMIN — DICLOFENAC 1 APPLICATION: 10 GEL TOPICAL at 14:08

## 2023-10-11 RX ADMIN — FLUTICASONE PROPIONATE 1 SPRAY: 50 SPRAY, METERED NASAL at 14:24

## 2023-10-11 RX ADMIN — FLUTICASONE FUROATE AND VILANTEROL TRIFENATATE 1 PUFF: 200; 25 POWDER RESPIRATORY (INHALATION) at 14:47

## 2023-10-11 RX ADMIN — ENOXAPARIN SODIUM 60 MG: 60 INJECTION SUBCUTANEOUS at 13:20

## 2023-10-11 RX ADMIN — FAMOTIDINE 20 MG: 40 TABLET ORAL at 21:00

## 2023-10-11 RX ADMIN — SODIUM POLYSTYRENE SULFONATE 15 G: 15 SUSPENSION ORAL; RECTAL at 21:09

## 2023-10-11 RX ADMIN — ALBUTEROL SULFATE 2.5 MG: 2.5 SOLUTION RESPIRATORY (INHALATION) at 02:46

## 2023-10-11 RX ADMIN — LIDOCAINE 1 PATCH: 4 PATCH TOPICAL at 14:25

## 2023-10-11 RX ADMIN — DEXTROSE MONOHYDRATE 50 ML: 25 INJECTION, SOLUTION INTRAVENOUS at 02:46

## 2023-10-11 RX ADMIN — DEXTROSE 50 % IN WATER (D50W) INTRAVENOUS SYRINGE 50 ML: at 02:46

## 2023-10-11 RX ADMIN — INSULIN HUMAN 5 UNITS: 100 INJECTION, SOLUTION PARENTERAL at 02:47

## 2023-10-11 RX ADMIN — SUCRALFATE 1 G: 1 SUSPENSION ORAL at 14:23

## 2023-10-11 RX ADMIN — Medication 3 MG: at 20:08

## 2023-10-11 ASSESSMENT — PAIN DESCRIPTION - LOCATION: LOCATION: KNEE

## 2023-10-11 ASSESSMENT — PAIN DESCRIPTION - ONSET: ONSET: SUDDEN

## 2023-10-11 ASSESSMENT — PAIN DESCRIPTION - ORIENTATION: ORIENTATION: LEFT

## 2023-10-11 ASSESSMENT — PAIN SCALES - GENERAL: PAINLEVEL_OUTOF10: 3

## 2023-10-11 ASSESSMENT — PAIN DESCRIPTION - PAIN TYPE: TYPE: ACUTE PAIN

## 2023-10-11 ASSESSMENT — PAIN DESCRIPTION - FREQUENCY: FREQUENCY: INTERMITTENT

## 2023-10-11 ASSESSMENT — PAIN DESCRIPTION - PROGRESSION: CLINICAL_PROGRESSION: GRADUALLY IMPROVING

## 2023-10-11 ASSESSMENT — PAIN DESCRIPTION - DESCRIPTORS: DESCRIPTORS: ACHING

## 2023-10-11 NOTE — PROGRESS NOTES
Addended by: NATASHA MARSHALL on: 3/13/2020 10:11 AM     Modules accepted: Orders     Pharmacy Medication History Review    Jessica Smith is a 58 y.o. female admitted for Acute kidney injury (CMS/[facility]). Pharmacy reviewed the patient's [id number] medications and allergies for accuracy.    The list below reflects the updated PTA list. Please review each medication in order reconciliation for additional clarification and justification.  Prior to Admission Medications   Prescriptions Last Dose Informant   acetaminophen (Tylenol Extra Strength) 500 mg tablet 10/10/2023 Self   Sig: Take 2 tablets (1,000 mg) by mouth every 6 hours if needed for mild pain (1 - 3).   albuterol 90 mcg/actuation inhaler 10/10/2023 Self   Sig: Inhale 2 puffs every 6 hours if needed for wheezing or shortness of breath.   amLODIPine (Norvasc) 10 mg tablet 10/10/2023 Self   Sig: Take 1 tablet (10 mg) by mouth once daily.   diclofenac sodium (Arthritis Pain, diclofenac,) 1 % gel gel 10/10/2023 Self   Sig: Apply 1 Application topically 4 times a day.   escitalopram (Lexapro) 20 mg tablet  Self   Sig: Take 1 tablet (20 mg) by mouth once daily.   famotidine (Pepcid AC) 10 mg tablet 10/10/2023 Self   Sig: Take 1 tablet (10 mg) by mouth 2 times a day.   ferrous sulfate (iron) 325 (65 Fe) MG tablet 10/10/2023 Self   Sig: Take 1 tablet (65 mg of iron) by mouth every other day.   fluticasone (Flonase) 50 mcg/actuation nasal spray     Sig: Administer 1 spray into each nostril once daily.   fluticasone propion-salmeteroL (Advair Diskus) 250-50 mcg/dose diskus inhaler  Self   Sig: Inhale 1 puff 2 times a day. Rinse mouth with water after use to reduce aftertaste and incidence of candidiasis. Do not swallow.   gabapentin (Neurontin) 300 mg capsule 10/10/2023 Self   Sig: Take 1 capsule (300 mg) by mouth once daily at bedtime.   guaiFENesin (Mucinex) 1,200 mg tablet extended release 12hr 10/10/2023 Self   Sig: Take 1 tablet (1,200 mg) by mouth every 12 hours if needed (coughing).   lidocaine (Lidoderm) 5 % patch 10/10/2023 Self   Sig:  APPLY 1 PATCH TO THE AFFECTED AREA (legs and back) AND LEAVE IN PLACE FOR 12 HOURS, THEN REMOVE AND LEAVE OFF FOR 12 HOURS. Strength: 5 %   melatonin 3 mg tablet 10/10/2023 Self   Sig: Take 1 tablet (3 mg) by mouth once daily at bedtime.   pantoprazole (ProtoNix) 40 mg EC tablet 10/10/2023 Self   Sig: Take 1 tablet (40 mg) by mouth once daily.   sucralfate (Carafate) 100 mg/mL suspension 10/10/2023 Self   Sig: Take 10 mL (1 g) by mouth 4 times a day with meals.             The list below reflects the updated allergy list. Please review each documented allergy for additional clarification and justification.  Allergies  Reviewed by Tegan Pablo PharmD on 10/11/2023        Severity Reactions Comments    Aspirin Not Specified Diarrhea, GI Upset           Sources: PCP telephone encounter 9/13/23,  discharge summary 6/13/23, pharmacy fill history, patient interview (poor historian)     Home Pharmacy: [facility] mail order and [facility] ([facility])     Additional Comments:  Patient could only tell me she takes 6 pills in the morning and 3 pills in the evening   Per 6/13/23 discharge, furosemide 20mg, hydrochlorothiazide 25mg and lisinopril 30mg were stopped and amlodipine 10mg was started   [facility] has been consistently filling the hydrochlorothiazide and lisinopril since June (both last filled 9/25/23 for 30ds) while amlodipine was last filled on 6/13/23 for 30 ds. Patient likely still taking the discontinued meds  Patient states she uses sucralfate suspension BID (written for QID) and it was last filled in February 2023   Patient also reports she uses Advair prn     Tegan Pablo PharmD  Transitions of Care Pharmacist  Medication reconciliation complete  Please reach out via Epic Chat for questions, or if no response call [employer] or [employer]   Meds [facility]

## 2023-10-11 NOTE — ED NOTES
Pt presents to the ED with the complaint of abdominal/back/left knee pain. Pt initially called EMS this morning d/t abdominal pain that radiates to her lower back. Pt states she fell out of the ambulance upon arrival to ED and c/o left knee pain.      Melisa Varela RN  10/10/23 [id number]

## 2023-10-11 NOTE — H&P
History Of Present Illness  Jessica Smith is a 58 y.o. female presenting to the ED with abdominal pain. She woke up on 10/10 with intractable pain that was not resolved with tylenol, for which she called EMS and was brought to the ED. The pain is sharp and located in the epigastric area without radiation. She endorses reduced oral intake for the past several days as she is trying to lose weight. The pain was not resolved by tylenol and she presented to the ED. However, by the time of being seen by the FM team, her pain had resolved. Denies nausea and vomiting, diarrhea, and constipation. No fevers, or close contacts with similar symptoms. On arrival to the ED, she also had a mechanical fall on exiting the ambulance, resulting in persisting knee pain. She does also endorse lower back pain, which is chronic and unchanged.     ED course significant for initial BP of 105/63, but otherwise HDS. Left knee and LE X-ray ordered for fall without concern for acute process.  CT abdomen pelvis without contrast (ordered due to concern for SPENSER) WNL.  Initial potassium of 5.7 with repeat of 6 --> calcium, 5 units of insulin, albuterol and Kayexalate administered, with a repeat of 5.2. EKG wnl. CMP otherwise significant for sCr of 1.65 with previous baseline of 1-1.2. ALP also elevated but appears to be chronic and stable. Lipase, BNP, troponin, and lactate all normal.      Past Medical History  Past Medical History:   Diagnosis Date    Abdominal adhesions 08/08/2021    Abnormal QT interval present on electrocardiogram 03/18/2021    Formatting of this note might be different from the original. Formatting of this note might be different from the original. -EKG: NSR. QTc 500 Formatting of this note might be different from the original. -EKG: NSR. QTc 500    Acute gastric ulcer without hemorrhage or perforation 01/13/2016    Gastric ulcer, acute    Acute gastrojejunal ulcer 09/07/2007    Formatting of this note might be different  from the original. 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    Hypokalemia 2023    Intertrigo 2023    Ischemic bowel disease (CMS/Formerly [facility]) 2021    Personal history of other diseases of the musculoskeletal system and connective tissue 2015    History of arthritis    Personal history of other diseases of the respiratory system 2015    History of bronchitis    Personal history of other diseases of the respiratory system 2015    History of sinusitis    Personal history of other diseases of the respiratory system 2015    History of acute bronchitis    Personal history of other infectious and parasitic diseases 2015    History of Helicobacter infection    Personal history of other specified conditions 2016    History of diarrhea    Personal history of other specified conditions 2015    History of chest pain    Personal history of other specified conditions 01/10/2014    History of chest pain    Personal history of peptic ulcer disease 2015    History of gastric ulcer    SBO (small bowel obstruction) (CMS/Formerly [facility]) 2023    Sebaceous cyst 2002    Tension headache 2009    Urinary tract infection, site not specified 04/15/2015    UTI (lower urinary tract infection)    Wound dehiscence 2023       Surgical History  Past Surgical History:   Procedure Laterality Date    APPENDECTOMY  2014    Appendectomy     SECTION, CLASSIC  2014     Section    COLONOSCOPY   "07/25/2018    Colonoscopy (Fiberoptic)    ESOPHAGOGASTRODUODENOSCOPY  07/25/2018    Diagnostic Esophagogastroduodenoscopy    GASTRIC BYPASS  03/07/2017    Gastric Surgery For Morbid Obesity Gastric Bypass    GASTRIC RESTRICTION SURGERY  06/05/2015    Gastric Surgery For Morbid Obesity    OTHER SURGICAL HISTORY  05/26/2020    Ventral hernia repair    OTHER SURGICAL HISTORY  11/05/2014    Cholecystotomy    TUBAL LIGATION  11/05/2014    Tubal Ligation    UMBILICAL HERNIA REPAIR  11/05/2014    Umbilical Hernia Repair        Social History  She reports that she has quit smoking. Her smoking use included cigarettes. She has never used smokeless tobacco. She reports that she does not currently use alcohol. She reports that she does not use drugs.    Family History  No family history on file.     Allergies  Aspirin    Review of Systems  Ten point review of systems negative unless specified in HPI.     Physical Exam  General:  obese; Alert and no acute distress.  Skin:  Warm, dry. normal skin turgor. no rashes. Diffuse flaky skin  Head: NCAT  EENT: MMM  Cardiovascular:  Regular rate and rhythm, normal S1 and S2, no gallops, no murmurs and no pericardial rub.  Pulmonary:  CTAB in all fields  Abdomen:  (+) BS. soft. Diffuse tenderness without guarding. non-distended, no rigidity. no abdominal masses.   Neurologic:  grossly intact  Musculoskeletal: moves all extremities spontaneously  Extremities: DP pulses 2+ BL, no pitting edema  Psychiatric:  appropriate mood and affect, lesions, or ulcers    Last Recorded Vitals  Blood pressure 105/75, pulse 75, temperature 36.4 °C (97.5 °F), resp. rate 14, height 1.6 m (5' 3\"), weight 143 kg (315 lb), SpO2 91 %.    Relevant Results  Scheduled medications  amLODIPine, 10 mg, oral, Daily  calcium gluconate, 2 g, intravenous, Once  enoxaparin, 60 mg, subcutaneous, q12h SUNSHINE  famotidine, 20 mg, oral, BID  [START ON 10/12/2023] ferrous sulfate, 65 mg of iron, oral, Every other day  fluticasone, " 1 spray, Each Nostril, Daily  fluticasone furoate-vilanteroL, 1 puff, inhalation, Daily  gabapentin, 300 mg, oral, Nightly  lactated Ringer's, 1,000 mL, intravenous, Once  lidocaine, 1 patch, transdermal, Daily  pantoprazole, 40 mg, intravenous, Daily  polyethylene glycol, 17 g, oral, Daily  sodium polystyrene, 15 g, oral, Once  sucralfate, 1 g, oral, With meals & nightly      Continuous medications     PRN medications  PRN medications: acetaminophen, albuterol, diclofenac sodium, guaiFENesin, melatonin    Results for orders placed or performed during the hospital encounter of 10/10/23 (from the past 24 hour(s))   CBC and Auto Differential   Result Value Ref Range    WBC 12.1 (H) 4.4 - 11.3 x10*3/uL    nRBC 0.0 0.0 - 0.0 /100 WBCs    RBC 3.48 (L) 4.00 - 5.20 x10*6/uL    Hemoglobin 10.0 (L) 12.0 - 16.0 g/dL    Hematocrit 31.8 (L) 36.0 - 46.0 %    MCV 91 80 - 100 fL    MCH 28.7 26.0 - 34.0 pg    MCHC 31.4 (L) 32.0 - 36.0 g/dL    RDW 16.2 (H) 11.5 - 14.5 %    Platelets 271 150 - 450 x10*3/uL    MPV 10.8 7.5 - 11.5 fL    Neutrophils % 72.7 40.0 - 80.0 %    Immature Granulocytes %, Automated 0.3 0.0 - 0.9 %    Lymphocytes % 15.8 13.0 - 44.0 %    Monocytes % 9.5 2.0 - 10.0 %    Eosinophils % 1.3 0.0 - 6.0 %    Basophils % 0.4 0.0 - 2.0 %    Neutrophils Absolute 8.77 (H) 1.20 - 7.70 x10*3/uL    Immature Granulocytes Absolute, Automated 0.04 0.00 - 0.70 x10*3/uL    Lymphocytes Absolute 1.91 1.20 - 4.80 x10*3/uL    Monocytes Absolute 1.15 (H) 0.10 - 1.00 x10*3/uL    Eosinophils Absolute 0.16 0.00 - 0.70 x10*3/uL    Basophils Absolute 0.05 0.00 - 0.10 x10*3/uL   Comprehensive metabolic panel   Result Value Ref Range    Glucose 112 (H) 74 - 99 mg/dL    Sodium 136 136 - 145 mmol/L    Potassium 5.7 (H) 3.5 - 5.3 mmol/L    Chloride 111 (H) 98 - 107 mmol/L    Bicarbonate 18 (L) 21 - 32 mmol/L    Anion Gap 13 10 - 20 mmol/L    Urea Nitrogen 36 (H) 6 - 23 mg/dL    Creatinine 1.65 (H) 0.50 - 1.05 mg/dL    eGFR 36 (L) >60  mL/min/1.73m*2    Calcium 8.7 8.6 - 10.6 mg/dL    Albumin 4.1 3.4 - 5.0 g/dL    Alkaline Phosphatase 124 (H) 33 - 110 U/L    Total Protein 7.6 6.4 - 8.2 g/dL    AST 8 (L) 9 - 39 U/L    Bilirubin, Total 0.3 0.0 - 1.2 mg/dL    ALT 7 7 - 45 U/L   Lactate   Result Value Ref Range    Lactate 1.1 0.4 - 2.0 mmol/L   Magnesium   Result Value Ref Range    Magnesium 2.20 1.60 - 2.40 mg/dL   Lipase   Result Value Ref Range    Lipase 29 9 - 82 U/L   Troponin I, High Sensitivity   Result Value Ref Range    Troponin I, High Sensitivity 5 0 - 34 ng/L   B-Type Natriuretic Peptide   Result Value Ref Range    BNP 22 0 - 99 pg/mL   Urinalysis with Reflex Microscopic   Result Value Ref Range    Color, Urine Yellow Straw, Yellow    Appearance, Urine Hazy (N) Clear    Specific Gravity, Urine 1.017 1.005 - 1.035    pH, Urine 5.0 5.0, 5.5, 6.0, 6.5, 7.0, 7.5, 8.0    Protein, Urine NEGATIVE NEGATIVE mg/dL    Glucose, Urine NEGATIVE NEGATIVE mg/dL    Blood, Urine NEGATIVE NEGATIVE    Ketones, Urine NEGATIVE NEGATIVE mg/dL    Bilirubin, Urine NEGATIVE NEGATIVE    Urobilinogen, Urine <2.0 <2.0 mg/dL    Nitrite, Urine NEGATIVE NEGATIVE    Leukocyte Esterase, Urine NEGATIVE NEGATIVE   Urine Gray Tube   Result Value Ref Range    Extra Tube Hold for add-ons.    ECG 12 lead   Result Value Ref Range    Ventricular Rate 69 BPM    Atrial Rate 69 BPM    VT Interval 194 ms    QRS Duration 88 ms    QT Interval 272 ms    QTC Calculation(Bazett) 291 ms    P Axis 78 degrees    R Axis 68 degrees    T Axis 15 degrees    QRS Count 11 beats    Q Onset 219 ms    P Onset 122 ms    P Offset 176 ms    T Offset 355 ms    QTC Fredericia 285 ms   ECG 12 lead   Result Value Ref Range    Ventricular Rate 67 BPM    Atrial Rate 67 BPM    VT Interval 190 ms    QRS Duration 82 ms    QT Interval 406 ms    QTC Calculation(Bazett) 429 ms    P Axis 75 degrees    R Axis 62 degrees    T Axis -28 degrees    QRS Count 11 beats    Q Onset 218 ms    P Onset 123 ms    P Offset 170 ms     T Offset 421 ms    QTC Fredericia 421 ms   Potassium   Result Value Ref Range    Potassium 6.0 (H) 3.5 - 5.3 mmol/L   Potassium   Result Value Ref Range    Potassium 5.2 3.5 - 5.3 mmol/L   POCT GLUCOSE   Result Value Ref Range    POCT Glucose 143 (H) 74 - 99 mg/dL     CT abdomen pelvis wo IV contrast    Result Date: 10/11/2023  Interpreted By:  Chang Mcmillan,  and Rey Wilson STUDY: CT ABDOMEN PELVIS WO IV CONTRAST;  10/11/2023 6:48 am   INDICATION: Signs/Symptoms:hx of javier en y, epigastric pain.   COMPARISON: None   ACCESSION NUMBER(S): [id number]   ORDERING CLINICIAN: LILLIAM HAYWOOD   TECHNIQUE: CT of the abdomen and pelvis was performed. Contiguous axial images were obtained at 3 mm slice thickness through the abdomen and pelvis. Coronal and sagittal reconstructions at 3 mm slice thickness were performed.  No intravenous or oral contrast agents were administered.   FINDINGS: Please note that the evaluation of vessels, lymph nodes and organs is limited without intravenous contrast.   LOWER CHEST: The visualized lung base is unremarkable. The heart is normal in size without evidence of pericardial effusion. No pleural effusion is present. Visualized distal esophagus appears normal.   ABDOMEN:   LIVER: The liver is normal in size without evidence of focal liver lesions.   BILE DUCTS: The intrahepatic and extrahepatic ducts are not dilated.   GALLBLADDER: Surgically absent.   PANCREAS: The pancreas appears unremarkable without evidence of ductal dilatation or masses.   SPLEEN: The spleen is normal in size.   ADRENAL GLANDS: Bilateral adrenal glands appear normal.   KIDNEYS AND URETERS: The kidneys are normal in size and unremarkable in appearance.  No hydroureteronephrosis or nephroureterolithiasis is identified.   PELVIS:   BLADDER: The urinary bladder appears normal without abnormal wall thickening.   REPRODUCTIVE ORGANS: No pelvic masses. Calcified uterine fibroid similar compared to prior imaging.    BOWEL: Postsurgical changes are noted consistent with Sherita-en-Y gastric bypass. Minimal dilatation at the gastrojejunal anastomosis. Otherwise the small and large bowel are normal in caliber and demonstrate no wall thickening.  Normal appendix.   VESSELS: The aorta and IVC appear normal.   PERITONEUM/RETROPERITONEUM/LYMPH NODES: No ascites or free air, no fluid collection.  No enlarged mesenteric lymph nodes.   ABDOMINAL WALL: The abdominal wall soft tissues within normal limits reflecting post laparotomy changes..   BONES: No suspicious osseous lesions are identified. Degenerative discogenic disease is noted in the lower thoracic and lumbar spine.       1.  Postsurgical changes status post Sherita-en-Y gastric bypass with minimal dilatation of the gastrojejunal anastomosis otherwise no significant abnormality involving the gastrointestinal tract. 2. No acute findings involving the abdomen, pelvis. 3. Additional chronic findings as detailed above.   I personally reviewed the images/study and I agree with the findings as stated. This study was interpreted at [place], [place].   Signed by: Chang Mcmillan 10/11/2023 7:06 AM Dictation workstation:   [id number]    ECG 12 lead    Result Date: 10/11/2023  Please see ED Provider Note for formal interpretation Confirmed by Onesimo Lua ([id number]) on 10/11/2023 2:35:52 AM    ECG 12 lead    Result Date: 10/11/2023  Please see ED Provider Note for formal interpretation Confirmed by Onesimo Lua ([id number]) on 10/11/2023 2:06:07 AM    XR hips bilateral 3 or 4 VW w or wo pelvis    Result Date: 10/10/2023  Interpreted By:  Jayesh Church and Stephens Katherine STUDY: XR HIPS BILATERAL 3 OR 4 VW WITH OR WITHOUT PELVIS; ;  10/10/2023 11:09 pm   INDICATION: Signs/Symptoms:fall from ambulance.   COMPARISON: None.   ACCESSION NUMBER(S): [id number]   ORDERING CLINICIAN: LILLIAM HAYWOOD   FINDINGS: AP view of the pelvis and two views of  the hips. Diffuse osteopenia limits evaluation for subtle fractures.   Right hip: No acute fracture or dislocation. Moderate degenerative changes of the femoroacetabular joint with joint space narrowing, osteophytosis, and subchondral sclerosis. No radiopaque foreign body or soft tissue gas.   Left hip: No acute fracture or dislocation. Mild degenerative changes of the femoroacetabular joint with joint space narrowing and osteophytosis. No radiopaque foreign body or soft tissue gas.       1. No acute osseous injury is evident. 2. Moderate right and mild left osteoarthrosis of the hips. 3. Diffuse osteopenia limits evaluation for subtle fractures. If there is strong clinical suspicion for fracture, consider CT or MRI.     I personally reviewed the images/study and I agree with the findings as stated. This study was interpreted at [place], [place].   MACRO: None   Signed by: Jayesh Church 10/10/2023 11:14 PM Dictation workstation:   [id number]    XR knee left 4+ views    Result Date: 10/10/2023  Interpreted By:  Jayesh Church and Stephens Katherine STUDY: XR TIBIA FIBULA LEFT 2 VIEWS; XR KNEE LEFT 4+ VIEWS; ;  10/10/2023 11:09 pm   INDICATION: Signs/Symptoms:fall with LLE pain; Signs/Symptoms:fall from ambulance.   COMPARISON: None.   ACCESSION NUMBER(S): [id number]; [id number]   ORDERING CLINICIAN: MARY BERNARD; LILLIAM HAYWOOD   FINDINGS: Four views of the left knee and four views of the left tibia/fibula.   No acute fracture or dislocation. Moderate tricompartmental degenerative changes of the knee with joint space narrowing and osteophytosis most pronounced in the patellofemoral and medial compartments. Small knee joint effusion. No radiopaque foreign body or soft tissue gas.       1. No acute osseous injury is evident. 2. Small knee joint effusion. 3. Moderate osteoarthrosis of the knee.     I personally reviewed the images/study and I agree with  the findings as stated. This study was interpreted at [place], [place].   MACRO: None   Signed by: Jayesh Church 10/10/2023 11:12 PM Dictation workstation:   [id number]    XR tibia fibula left 2 views    Result Date: 10/10/2023  Interpreted By:  Jayesh Church  and Nery Javier STUDY: XR TIBIA FIBULA LEFT 2 VIEWS; XR KNEE LEFT 4+ VIEWS; ;  10/10/2023 11:09 pm   INDICATION: Signs/Symptoms:fall with LLE pain; Signs/Symptoms:fall from ambulance.   COMPARISON: None.   ACCESSION NUMBER(S): [id number]; [id number]   ORDERING CLINICIAN: MARY HAYWOOD   FINDINGS: Four views of the left knee and four views of the left tibia/fibula.   No acute fracture or dislocation. Moderate tricompartmental degenerative changes of the knee with joint space narrowing and osteophytosis most pronounced in the patellofemoral and medial compartments. Small knee joint effusion. No radiopaque foreign body or soft tissue gas.       1. No acute osseous injury is evident. 2. Small knee joint effusion. 3. Moderate osteoarthrosis of the knee.     I personally reviewed the images/study and I agree with the findings as stated. This study was interpreted at [place], [place].   MACRO: None   Signed by: Jayesh Church 10/10/2023 11:12 PM Dictation workstation:   [id number]     Assessment/Plan   Principal Problem:    Acute kidney injury (CMS/HCC)      59 yo female with h/o HFpEF (EF 60-65% 10/2021), anxiety/depression, asthma, HTN, anemia, javier-en-Y, GERD, morbid obesity, OPAL, arthritis, and vitamin D deficiency presenting to  ED on 10/10 for abdominal pain admitted for SPENSER and abdominal pain. Patient was determined to be hemodynamically stable and appropriate for RNF, admitted to Family Medicine for further management. Problem based assessment and plan as follows:     # Hyperkalemia  # SPENSER  :: baseline in 2021 1.1-1.2,  however admission in 6/2023 for similar presentation  :: K 6.0 on arrival -> calcium gluconate 2g IV x1, 25gm D50 IV x1, regular insulin 5U IV x1, Kayexalate 15g PO x1 -> K 5.2  :: EKG in ED without peaked T waves or other changes  - A1c 6/2023 - 5.5  - 1L of LR ordered over 2h iso HFpEF --> plan for repeat RFP in PM  - CT abd/pel d/t epigastric tenderness and extensive prior abdominal surgeries w/o evidence of acute process  - renally dose medications; avoid nephrotoxic agents  - admit to tele    #Abdominal Pain: resolved  - s/p protonix 40 mg x 1 and oral xylocaine in ED  - CT abd/pelvis without evidence of acute process   - on review with reading room, some evidence of small bowel fat stranding with possible developing inflammatory changes, however unable to adequately interpret without contrast     # Anemia  - baseline Hgb 8-10  - no evidence of acute bleed  - likely ANSHUL based on previous workup   - c/w home ferrous sulfate     # HTN  - continue home amlodipine 10 mg daily     # Anxiety/Depression  - c/w home escitalopram 20mg daily     # Asthma  - c/w home albuterol as needed  - home fluticasone-salmeterol converted to formulary Breo Elipta while inpatient     # Allergic rhinitis  - c/w home flonase daily     # GERD  - c/w home sucralfate 1g QID  - pepcid BID  - pantoprazole 40 mg IV daily     # Arthritis  - continue home gabapentin 300 mg HS  - c/w home diclofenac gel, lidocaine patches, and tylenol     Fluids: bolus as needed  Diet: regular  DVT ppx: heparin SQ q8h  GI ppx: home PPI  Abx: none  Code Status: FULL  NOK: José Acuña (Fiance) [id number]/Rosalba Smith (daughter) [phone]     Attending Supervision: seen and discussed with attending physician, Dr. Maryana Cantu.    Fawn Mathis MD  Family Medicine, PGY-2  [id number]      I saw and evaluated the patient. I personally obtained the key and critical portions of the history and physical exam or was physically present for key and critical portions  performed by the resident/fellow. I reviewed the resident/fellow's documentation and discussed the patient with the resident/fellow. I agree with the resident/fellow's medical decision making as documented in the note.    Maryana Cantu MD

## 2023-10-11 NOTE — PROGRESS NOTES
Emergency Medicine Transition of Care Note.    I received Jessica Smith in signout from outgoing staff.  Please see the previous ED provider note for all HPI, PE and MDM up to the time of signout at 0700. Assumed care for the patient at this time. This is in addition to the primary record.    In brief Jessica Smith is an 58 y.o. female presenting for   Chief Complaint   Patient presents with    Abdominal Pain    Back Pain    Leg Pain     C/o abd pain, back pain that started last night. H/o gastritis and ulcers. Also c/o leg pain after getting out of ambulance      At the time of signout we were awaiting:    ED Course as of 10/11/23 1319   Tue Oct 10, 2023   2208 EKG reviewed.  Limited interpretation secondary to artifact however normal sinus rhythm rate 69, per interval monitor 94 ms, QRS 88 ms, QTc 2091 ms, no overt ST segment elevations or depressions or new bundle branch block [SA]   Wed Oct 11, 2023   0025 Hyperkalemia of 5.7 noted, no overt EKG changes, patient does have a history of asymptomatic hyperkalemia, treated with calcium, 5 units  , Albuterol.  UA negative for infection, BMP normal slight leukocytosis of 12, hemoglobin stable at 10.  Creatinine 1.65 with reduced GFR 36 likely in the setting of known kidney disease [SA]   0027 XR reviewed, neg for acute pathology, no concern for fracture [SA]      ED Course User Index  [SA] Joanna Africawala, DO     CT abdomen pelvis wo IV contrast   Final Result   1.  Postsurgical changes status post Sherita-en-Y gastric bypass with   minimal dilatation of the gastrojejunal anastomosis otherwise no   significant abnormality involving the gastrointestinal tract.   2. No acute findings involving the abdomen, pelvis.   3. Additional chronic findings as detailed above.        I personally reviewed the images/study and I agree with the findings   as stated. This study was interpreted at [facility], [place], [place].        Signed by: Chang  Hipolito 10/11/2023 7:06 AM   Dictation workstation:   [id number]      XR knee left 4+ views   Final Result   1. No acute osseous injury is evident.   2. Small knee joint effusion.   3. Moderate osteoarthrosis of the knee.             I personally reviewed the images/study and I agree with the findings   as stated. This study was interpreted at [place], [place].        MACRO:   None        Signed by: Jayesh Church 10/10/2023 11:12 PM   Dictation workstation:   [id number]      XR hips bilateral 3 or 4 VW w or wo pelvis   Final Result   1. No acute osseous injury is evident.   2. Moderate right and mild left osteoarthrosis of the hips.   3. Diffuse osteopenia limits evaluation for subtle fractures. If   there is strong clinical suspicion for fracture, consider CT or MRI.             I personally reviewed the images/study and I agree with the findings   as stated. This study was interpreted at [place], [place].        MACRO:   None        Signed by: Jayesh Church 10/10/2023 11:14 PM   Dictation workstation:   [id number]      XR tibia fibula left 2 views   Final Result   1. No acute osseous injury is evident.   2. Small knee joint effusion.   3. Moderate osteoarthrosis of the knee.             I personally reviewed the images/study and I agree with the findings   as stated. This study was interpreted at [place], [place].        MACRO:   None        Signed by: Jayesh Church 10/10/2023 11:12 PM   Dictation workstation:   [id number]         Medical Decision Making  Patient had hyperkalemia and was treated with Calcium Gluconate, Humulin R insulin 5 units, Albuterol aerosol, and Kayexalate. Plan was to admit the patient to Family Medicine after repeat potassium was obtained and CT scan of her abdomen and pelvis was results. Repeat potassium was 5.2. CT scan  of her abdomen and pelvis shows postsurgical changes status post Sherita-en-Y gastric bypass with minimal dilatation of the gastrojejunal anastomosis otherwise no significant abnormality involving the gastrointestinal tract. At this point there is no further clinical intervention needed. Case was discussed with Family Medicine, who agrees to admit the patient for further evaluation and care. Will be transferred to the medical floor in stable condition.     Diagnostic Impression:    1. Acute abdominal pain    2. Hyperkalemia    3. Acute n chronic renal failure        Final diagnoses:   None           Procedure  Procedures    Leobardo Plaza, APRN-CNP

## 2023-10-11 NOTE — ED PROVIDER NOTES
HPI   Chief Complaint   Patient presents with    Abdominal Pain    Back Pain    Leg Pain     C/o abd pain, back pain that started last night. H/o gastritis and ulcers. Also c/o leg pain after getting out of ambulance        HPI     Patient is a 58-year-old female with history of morbid obesity, Sherita-en-Y gastric bypass in 2006, gastritis, heart failure with preserved EF who presents with abdominal pain and back pain that started last night.  Patient also states she called the ambulance this morning due to the pain and she reports that while getting out of the ambulance, her left leg gave out and she fell and endorses left knee pain.  She denies any head injury or loss of consciousness. She has had difficulty ambulating since then.  She denies any blood thinner use.  She notes epigastric abdominal pain that radiates to her back but denies any chest pain.  She also denies any fevers, chills, headache, dizziness, vision changes, neck pain, nausea, vomiting, changes in bowel or bladder habits, chest pain, or shortness of breath. No sick contacts.                No data recorded                Patient History   Past Medical History:   Diagnosis Date    Abdominal adhesions 08/08/2021    Abnormal QT interval present on electrocardiogram 03/18/2021    Formatting of this note might be different from the original. Formatting of this note might be different from the original. -EKG: NSR. QTc 500 Formatting of this note might be different from the original. -EKG: NSR. QTc 500    Acute gastric ulcer without hemorrhage or perforation 01/13/2016    Gastric ulcer, acute    Acute gastrojejunal ulcer 09/07/2007    Formatting of this note might be different from the original. 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    Hypokalemia 2023    Intertrigo 2023    Ischemic bowel disease (CMS/Formerly [facility]) 2021    Personal history of other diseases of the musculoskeletal system and connective tissue 2015    History of arthritis    Personal history of other diseases of the respiratory system 2015    History of bronchitis    Personal history of other diseases of the respiratory system 2015    History of sinusitis    Personal history of other diseases of the respiratory system 2015    History of acute bronchitis    Personal history of other infectious and parasitic diseases 2015    History of Helicobacter infection    Personal history of other specified conditions 2016    History of diarrhea    Personal history of other specified conditions 2015    History of chest pain    Personal history of other specified conditions 01/10/2014    History of chest pain    Personal history of peptic ulcer disease 2015    History of gastric ulcer    SBO (small bowel obstruction) (CMS/Formerly [facility]) 2023    Sebaceous cyst 2002    Tension headache 2009    Urinary tract infection, site not specified 04/15/2015    UTI (lower urinary tract infection)    Wound dehiscence 2023     Past Surgical History:   Procedure Laterality Date    APPENDECTOMY  2014    Appendectomy     SECTION, CLASSIC  2014     Section    COLONOSCOPY  2018    Colonoscopy (Fiberoptic)    ESOPHAGOGASTRODUODENOSCOPY  2018    Diagnostic Esophagogastroduodenoscopy    GASTRIC BYPASS  2017    Gastric Surgery For Morbid Obesity Gastric Bypass    GASTRIC RESTRICTION SURGERY  2015    Gastric Surgery For Morbid Obesity    OTHER SURGICAL  HISTORY  05/26/2020    Ventral hernia repair    OTHER SURGICAL HISTORY  11/05/2014    Cholecystotomy    TUBAL LIGATION  11/05/2014    Tubal Ligation    UMBILICAL HERNIA REPAIR  11/05/2014    Umbilical Hernia Repair     No family history on file.  Social History     Tobacco Use    Smoking status: Former     Types: Cigarettes    Smokeless tobacco: Never   Substance Use Topics    Alcohol use: Not Currently    Drug use: Never       Physical Exam   ED Triage Vitals   Temp Heart Rate Resp BP   10/10/23 0925 10/10/23 0925 10/10/23 0925 10/10/23 0926   36.4 °C (97.5 °F) 71 16 105/63      SpO2 Temp src Heart Rate Source Patient Position   10/10/23 0925 -- -- --   96 %         BP Location FiO2 (%)     -- --             Physical Exam  Vitals and nursing note reviewed.   Constitutional:       General: She is not in acute distress.     Appearance: Normal appearance.   HENT:      Head: Normocephalic and atraumatic.   Eyes:      Conjunctiva/sclera: Conjunctivae normal.   Cardiovascular:      Rate and Rhythm: Normal rate and regular rhythm.   Pulmonary:      Effort: Pulmonary effort is normal. No respiratory distress.   Abdominal:      General: Abdomen is flat.      Palpations: Abdomen is soft.   Musculoskeletal:         General: Normal range of motion.      Cervical back: Normal range of motion and neck supple.      Comments: Tenderness to palpation of left knee, negative logroll, no midline C, T, L-spine tenderness palpation, difficult to assess if effusion is present secondary to obesity   Skin:     General: Skin is warm and dry.   Neurological:      General: No focal deficit present.      Mental Status: She is alert and oriented to person, place, and time. Mental status is at baseline.   Psychiatric:         Mood and Affect: Mood normal.         Behavior: Behavior normal.         ED Course & MDM   ED Course as of 10/11/23 0619   Tue Oct 10, 2023   2208 EKG reviewed.  Limited interpretation secondary to artifact however normal  sinus rhythm rate 69, per interval monitor 94 ms, QRS 88 ms, QTc 2091 ms, no overt ST segment elevations or depressions or new bundle branch block [SA]   Wed Oct 11, 2023   0025 Hyperkalemia of 5.7 noted, no overt EKG changes, patient does have a history of asymptomatic hyperkalemia, treated with calcium, 5 units  , Albuterol.  UA negative for infection, BMP normal slight leukocytosis of 12, hemoglobin stable at 10.  Creatinine 1.65 with reduced GFR 36 likely in the setting of known kidney disease [SA]   0027 XR reviewed, neg for acute pathology, no concern for fracture [SA]      ED Course User Index  [SA] Joanna Elias DO       Medical Decision Making  Patient is a 58-year-old female presents with abdominal pain.  Vital signs notable for hypertension, otherwise hemodynamically stable. Patient is afebrile. Patient does not appear septic or peritonitic, but does have abdominal tenderness in the epigastric region. Her abdomen is soft. Given patient's history differential includes but not limited to PUD, GERD, gastritis, biliary disease/infection, ACS, pancreatitis, PNA, bowel ischemia or obstruction, esophageal rupture, atypical appendicitis, or other acute abdominal process.  Lower extremity films obtained as well due to fall to rule out fracture or dislocation.  No signs of overt trauma on examination.  Patient has no pulse deficits and no focal deficits therefore low suspicion for aortic dissection.  On reassessment, CT abdomen pelvis ordered without contrast due to GFR, body habitus to evaluate for underlying intra-abdominal pathology.  Repeat potassium 6, additional calcium, 5 units of insulin, albuterol and Kayexalate administered.  Patient signed out in stable condition to oncoming ED team pending CT results and reassessment.  Patient will likely need to be admitted for hyperkalemia and further management and monitoring.    Procedure  Procedures     Joanna Elias DO  Resident  10/11/23 0621        Kari Falcon MD  10/11/23 [id number]

## 2023-10-11 NOTE — ED PROVIDER NOTES
HPI   Chief Complaint   Patient presents with    Abdominal Pain    Back Pain    Leg Pain     C/o abd pain, back pain that started last night. H/o gastritis and ulcers. Also c/o leg pain after getting out of ambulance        HPI                    [place] Coma Scale Score: 15                  Patient History   Past Medical History:   Diagnosis Date    Abdominal adhesions 08/08/2021    Abnormal QT interval present on electrocardiogram 03/18/2021    Formatting of this note might be different from the original. Formatting of this note might be different from the original. -EKG: NSR. QTc 500 Formatting of this note might be different from the original. -EKG: NSR. QTc 500    Acute gastric ulcer without hemorrhage or perforation 01/13/2016    Gastric ulcer, acute    Acute gastrojejunal ulcer 09/07/2007    Formatting of this note might be different from the original. IMO4.1.23    Acute upper respiratory infection, unspecified 12/15/2016    Viral URI with cough    Carpal tunnel syndrome, unspecified upper limb 07/22/2013    Carpal tunnel syndrome    Cellulitis of abdominal wall 09/13/2023    Chronic sinusitis 09/13/2023    Congenital malformations of corpus callosum (CMS/Formerly [facility]) 08/07/2017    Agenesis of corpus callosum    COVID-19 03/19/2021    Gastritis, unspecified, without bleeding 03/07/2017    Gastritis, Helicobacter pylori    High risk HPV infection 09/13/2023    Hypokalemia 09/13/2023    Intertrigo 09/13/2023    Ischemic bowel disease (CMS/Formerly [facility]) 04/01/2021    Personal history of other diseases of the musculoskeletal system and connective tissue 12/08/2015    History of arthritis    Personal history of other diseases of the respiratory system 12/08/2015    History of bronchitis    Personal history of other diseases of the respiratory system 12/08/2015    History of sinusitis    Personal history of other diseases of the respiratory system 12/08/2015    History of acute bronchitis    Personal history of other infectious and  parasitic diseases 2015    History of Helicobacter infection    Personal history of other specified conditions 2016    History of diarrhea    Personal history of other specified conditions 2015    History of chest pain    Personal history of other specified conditions 01/10/2014    History of chest pain    Personal history of peptic ulcer disease 2015    History of gastric ulcer    SBO (small bowel obstruction) (CMS/HCC) 2023    Sebaceous cyst 2002    Tension headache 2009    Urinary tract infection, site not specified 04/15/2015    UTI (lower urinary tract infection)    Wound dehiscence 2023     Past Surgical History:   Procedure Laterality Date    APPENDECTOMY  2014    Appendectomy     SECTION, CLASSIC  2014     Section    COLONOSCOPY  2018    Colonoscopy (Fiberoptic)    ESOPHAGOGASTRODUODENOSCOPY  2018    Diagnostic Esophagogastroduodenoscopy    GASTRIC BYPASS  2017    Gastric Surgery For Morbid Obesity Gastric Bypass    GASTRIC RESTRICTION SURGERY  2015    Gastric Surgery For Morbid Obesity    OTHER SURGICAL HISTORY  2020    Ventral hernia repair    OTHER SURGICAL HISTORY  2014    Cholecystotomy    TUBAL LIGATION  2014    Tubal Ligation    UMBILICAL HERNIA REPAIR  2014    Umbilical Hernia Repair     No family history on file.  Social History     Tobacco Use    Smoking status: Former     Types: Cigarettes    Smokeless tobacco: Never   Substance Use Topics    Alcohol use: Not Currently    Drug use: Never       Physical Exam   ED Triage Vitals   Temp Heart Rate Resp BP   10/10/23 0925 10/10/23 0925 10/10/23 0925 10/10/23 0926   36.4 °C (97.5 °F) 71 16 105/63      SpO2 Temp src Heart Rate Source Patient Position   10/10/23 0925 -- -- --   96 %         BP Location FiO2 (%)     -- --             Physical Exam    ED Course & MDM   ED Course as of 10/11/23 1259   Tue Oct 10, 2023   2603 EKG  reviewed.  Limited interpretation secondary to artifact however normal sinus rhythm rate 69, per interval monitor 94 ms, QRS 88 ms, QTc 2091 ms, no overt ST segment elevations or depressions or new bundle branch block [SA]   Wed Oct 11, 2023   0025 Hyperkalemia of 5.7 noted, no overt EKG changes, patient does have a history of asymptomatic hyperkalemia, treated with calcium, 5 units  , Albuterol.  UA negative for infection, BMP normal slight leukocytosis of 12, hemoglobin stable at 10.  Creatinine 1.65 with reduced GFR 36 likely in the setting of known kidney disease [SA]   0027 XR reviewed, neg for acute pathology, no concern for fracture [SA]      ED Course User Index  [SA] Joanna Elias,        Medical Decision Making      Procedure  Procedures

## 2023-10-12 LAB
ALBUMIN SERPL BCP-MCNC: 3.9 G/DL (ref 3.4–5)
ALP SERPL-CCNC: 131 U/L (ref 33–110)
ALT SERPL W P-5'-P-CCNC: 5 U/L (ref 7–45)
ANION GAP SERPL CALC-SCNC: 16 MMOL/L (ref 10–20)
AST SERPL W P-5'-P-CCNC: 13 U/L (ref 9–39)
BASOPHILS # BLD AUTO: 0.06 X10*3/UL (ref 0–0.1)
BASOPHILS NFR BLD AUTO: 0.6 %
BILIRUB SERPL-MCNC: 0.3 MG/DL (ref 0–1.2)
BUN SERPL-MCNC: 21 MG/DL (ref 6–23)
CALCIUM SERPL-MCNC: 8.7 MG/DL (ref 8.6–10.6)
CHLORIDE SERPL-SCNC: 109 MMOL/L (ref 98–107)
CO2 SERPL-SCNC: 18 MMOL/L (ref 21–32)
CREAT SERPL-MCNC: 1.49 MG/DL (ref 0.5–1.05)
EOSINOPHIL # BLD AUTO: 0.16 X10*3/UL (ref 0–0.7)
EOSINOPHIL NFR BLD AUTO: 1.7 %
ERYTHROCYTE [DISTWIDTH] IN BLOOD BY AUTOMATED COUNT: 16 % (ref 11.5–14.5)
GFR SERPL CREATININE-BSD FRML MDRD: 41 ML/MIN/1.73M*2
GLUCOSE SERPL-MCNC: 101 MG/DL (ref 74–99)
HCT VFR BLD AUTO: 34.7 % (ref 36–46)
HGB BLD-MCNC: 9.8 G/DL (ref 12–16)
IMM GRANULOCYTES # BLD AUTO: 0.05 X10*3/UL (ref 0–0.7)
IMM GRANULOCYTES NFR BLD AUTO: 0.5 % (ref 0–0.9)
LYMPHOCYTES # BLD AUTO: 1.51 X10*3/UL (ref 1.2–4.8)
LYMPHOCYTES NFR BLD AUTO: 15.9 %
MCH RBC QN AUTO: 29.8 PG (ref 26–34)
MCHC RBC AUTO-ENTMCNC: 28.2 G/DL (ref 32–36)
MCV RBC AUTO: 106 FL (ref 80–100)
MONOCYTES # BLD AUTO: 0.67 X10*3/UL (ref 0.1–1)
MONOCYTES NFR BLD AUTO: 7 %
NEUTROPHILS # BLD AUTO: 7.07 X10*3/UL (ref 1.2–7.7)
NEUTROPHILS NFR BLD AUTO: 74.3 %
NRBC BLD-RTO: 0 /100 WBCS (ref 0–0)
PLATELET # BLD AUTO: 177 X10*3/UL (ref 150–450)
PMV BLD AUTO: 11.2 FL (ref 7.5–11.5)
POTASSIUM SERPL-SCNC: 5.5 MMOL/L (ref 3.5–5.3)
PROT SERPL-MCNC: 6.9 G/DL (ref 6.4–8.2)
RBC # BLD AUTO: 3.29 X10*6/UL (ref 4–5.2)
SODIUM SERPL-SCNC: 137 MMOL/L (ref 136–145)
WBC # BLD AUTO: 9.5 X10*3/UL (ref 4.4–11.3)

## 2023-10-12 PROCEDURE — 80053 COMPREHEN METABOLIC PANEL: CPT | Mod: CMCLAB

## 2023-10-12 PROCEDURE — 2500000001 HC RX 250 WO HCPCS SELF ADMINISTERED DRUGS (ALT 637 FOR MEDICARE OP): Mod: SE

## 2023-10-12 PROCEDURE — 36415 COLL VENOUS BLD VENIPUNCTURE: CPT

## 2023-10-12 PROCEDURE — 2500000005 HC RX 250 GENERAL PHARMACY W/O HCPCS: Mod: SE

## 2023-10-12 PROCEDURE — 96372 THER/PROPH/DIAG INJ SC/IM: CPT

## 2023-10-12 PROCEDURE — G0378 HOSPITAL OBSERVATION PER HR: HCPCS

## 2023-10-12 PROCEDURE — 96375 TX/PRO/DX INJ NEW DRUG ADDON: CPT | Performed by: EMERGENCY MEDICINE

## 2023-10-12 PROCEDURE — 85025 COMPLETE CBC W/AUTO DIFF WBC: CPT

## 2023-10-12 PROCEDURE — 2500000004 HC RX 250 GENERAL PHARMACY W/ HCPCS (ALT 636 FOR OP/ED): Mod: SE,MUE

## 2023-10-12 PROCEDURE — C9113 INJ PANTOPRAZOLE SODIUM, VIA: HCPCS | Mod: SE

## 2023-10-12 PROCEDURE — 96376 TX/PRO/DX INJ SAME DRUG ADON: CPT | Performed by: EMERGENCY MEDICINE

## 2023-10-12 RX ORDER — DEXTROSE, SODIUM CHLORIDE, SODIUM LACTATE, POTASSIUM CHLORIDE, AND CALCIUM CHLORIDE 5; .6; .31; .03; .02 G/100ML; G/100ML; G/100ML; G/100ML; G/100ML
175 INJECTION, SOLUTION INTRAVENOUS CONTINUOUS
Status: DISPENSED | OUTPATIENT
Start: 2023-10-12 | End: 2023-10-13

## 2023-10-12 RX ORDER — ONDANSETRON HYDROCHLORIDE 2 MG/ML
4 INJECTION, SOLUTION INTRAVENOUS EVERY 6 HOURS PRN
Status: DISCONTINUED | OUTPATIENT
Start: 2023-10-12 | End: 2023-10-19 | Stop reason: HOSPADM

## 2023-10-12 RX ORDER — FAMOTIDINE 10 MG/ML
40 INJECTION INTRAVENOUS EVERY 12 HOURS SCHEDULED
Status: DISCONTINUED | OUTPATIENT
Start: 2023-10-12 | End: 2023-10-14

## 2023-10-12 RX ORDER — MORPHINE SULFATE 2 MG/ML
1 INJECTION, SOLUTION INTRAMUSCULAR; INTRAVENOUS EVERY 4 HOURS PRN
Status: DISCONTINUED | OUTPATIENT
Start: 2023-10-12 | End: 2023-10-17

## 2023-10-12 RX ADMIN — FERROUS SULFATE TAB 325 MG (65 MG ELEMENTAL FE) 65 MG OF IRON: 325 (65 FE) TAB at 08:52

## 2023-10-12 RX ADMIN — HEPARIN SODIUM 7500 UNITS: 5000 INJECTION INTRAVENOUS; SUBCUTANEOUS at 15:41

## 2023-10-12 RX ADMIN — POLYETHYLENE GLYCOL 3350 17 G: 17 POWDER, FOR SOLUTION ORAL at 08:52

## 2023-10-12 RX ADMIN — HEPARIN SODIUM 7500 UNITS: 5000 INJECTION INTRAVENOUS; SUBCUTANEOUS at 21:34

## 2023-10-12 RX ADMIN — AMLODIPINE BESYLATE 10 MG: 10 TABLET ORAL at 08:52

## 2023-10-12 RX ADMIN — HEPARIN SODIUM 7500 UNITS: 5000 INJECTION INTRAVENOUS; SUBCUTANEOUS at 08:52

## 2023-10-12 RX ADMIN — FLUTICASONE PROPIONATE 1 SPRAY: 50 SPRAY, METERED NASAL at 08:50

## 2023-10-12 RX ADMIN — GABAPENTIN 300 MG: 300 CAPSULE ORAL at 21:34

## 2023-10-12 RX ADMIN — FAMOTIDINE 40 MG: 10 INJECTION INTRAVENOUS at 21:32

## 2023-10-12 RX ADMIN — FAMOTIDINE 20 MG: 40 TABLET ORAL at 08:54

## 2023-10-12 RX ADMIN — ACETAMINOPHEN 975 MG: 325 TABLET ORAL at 08:50

## 2023-10-12 RX ADMIN — MORPHINE SULFATE 1 MG: 2 INJECTION, SOLUTION INTRAMUSCULAR; INTRAVENOUS at 12:15

## 2023-10-12 RX ADMIN — PANTOPRAZOLE SODIUM 40 MG: 40 INJECTION, POWDER, FOR SOLUTION INTRAVENOUS at 08:54

## 2023-10-12 RX ADMIN — SODIUM CHLORIDE, SODIUM LACTATE, POTASSIUM CHLORIDE, CALCIUM CHLORIDE AND DEXTROSE MONOHYDRATE 175 ML/HR: 5; 600; 310; 30; 20 INJECTION, SOLUTION INTRAVENOUS at 18:22

## 2023-10-12 RX ADMIN — Medication 3 MG: at 21:31

## 2023-10-12 RX ADMIN — SUCRALFATE 1 G: 1 SUSPENSION ORAL at 08:52

## 2023-10-12 RX ADMIN — SUCRALFATE 1 G: 1 SUSPENSION ORAL at 21:32

## 2023-10-12 RX ADMIN — LIDOCAINE 1 PATCH: 4 PATCH TOPICAL at 08:51

## 2023-10-12 SDOH — SOCIAL STABILITY: SOCIAL INSECURITY: WERE YOU ABLE TO COMPLETE ALL THE BEHAVIORAL HEALTH SCREENINGS?: YES

## 2023-10-12 SDOH — SOCIAL STABILITY: SOCIAL INSECURITY: HAS ANYONE EVER THREATENED TO HURT YOUR FAMILY OR YOUR PETS?: NO

## 2023-10-12 SDOH — SOCIAL STABILITY: SOCIAL INSECURITY: DOES ANYONE TRY TO KEEP YOU FROM HAVING/CONTACTING OTHER FRIENDS OR DOING THINGS OUTSIDE YOUR HOME?: NO

## 2023-10-12 SDOH — SOCIAL STABILITY: SOCIAL INSECURITY: ARE THERE ANY APPARENT SIGNS OF INJURIES/BEHAVIORS THAT COULD BE RELATED TO ABUSE/NEGLECT?: NO

## 2023-10-12 SDOH — SOCIAL STABILITY: SOCIAL INSECURITY: ABUSE: ADULT

## 2023-10-12 SDOH — SOCIAL STABILITY: SOCIAL INSECURITY: ARE YOU OR HAVE YOU BEEN THREATENED OR ABUSED PHYSICALLY, EMOTIONALLY, OR SEXUALLY BY ANYONE?: NO

## 2023-10-12 SDOH — SOCIAL STABILITY: SOCIAL INSECURITY: DO YOU FEEL ANYONE HAS EXPLOITED OR TAKEN ADVANTAGE OF YOU FINANCIALLY OR OF YOUR PERSONAL PROPERTY?: NO

## 2023-10-12 SDOH — SOCIAL STABILITY: SOCIAL INSECURITY: HAVE YOU HAD THOUGHTS OF HARMING ANYONE ELSE?: NO

## 2023-10-12 SDOH — SOCIAL STABILITY: SOCIAL INSECURITY: DO YOU FEEL UNSAFE GOING BACK TO THE PLACE WHERE YOU ARE LIVING?: NO

## 2023-10-12 ASSESSMENT — PATIENT HEALTH QUESTIONNAIRE - PHQ9
SUM OF ALL RESPONSES TO PHQ9 QUESTIONS 1 & 2: 4
1. LITTLE INTEREST OR PLEASURE IN DOING THINGS: MORE THAN HALF THE DAYS
2. FEELING DOWN, DEPRESSED OR HOPELESS: MORE THAN HALF THE DAYS

## 2023-10-12 ASSESSMENT — COGNITIVE AND FUNCTIONAL STATUS - GENERAL
MOVING TO AND FROM BED TO CHAIR: A LOT
HELP NEEDED FOR BATHING: A LITTLE
MOVING FROM LYING ON BACK TO SITTING ON SIDE OF FLAT BED WITH BEDRAILS: A LITTLE
PATIENT BASELINE BEDBOUND: NO
MOBILITY SCORE: 12
STANDING UP FROM CHAIR USING ARMS: A LOT
WALKING IN HOSPITAL ROOM: A LITTLE
PERSONAL GROOMING: A LITTLE
STANDING UP FROM CHAIR USING ARMS: A LITTLE
TOILETING: A LITTLE
TURNING FROM BACK TO SIDE WHILE IN FLAT BAD: A LITTLE
DRESSING REGULAR UPPER BODY CLOTHING: A LITTLE
WALKING IN HOSPITAL ROOM: TOTAL
CLIMB 3 TO 5 STEPS WITH RAILING: A LITTLE
MOBILITY SCORE: 20
CLIMB 3 TO 5 STEPS WITH RAILING: TOTAL
DRESSING REGULAR LOWER BODY CLOTHING: A LITTLE
MOVING TO AND FROM BED TO CHAIR: A LITTLE
DAILY ACTIVITIY SCORE: 19

## 2023-10-12 ASSESSMENT — ACTIVITIES OF DAILY LIVING (ADL)
PATIENT'S MEMORY ADEQUATE TO SAFELY COMPLETE DAILY ACTIVITIES?: YES
LACK_OF_TRANSPORTATION: NO
GROOMING: NEEDS ASSISTANCE
ASSISTIVE_DEVICE: WALKER
DRESSING YOURSELF: NEEDS ASSISTANCE
ADEQUATE_TO_COMPLETE_ADL: YES
HEARING - RIGHT EAR: FUNCTIONAL
FEEDING YOURSELF: INDEPENDENT
HEARING - LEFT EAR: FUNCTIONAL
WALKS IN HOME: NEEDS ASSISTANCE
BATHING: NEEDS ASSISTANCE
JUDGMENT_ADEQUATE_SAFELY_COMPLETE_DAILY_ACTIVITIES: YES
TOILETING: NEEDS ASSISTANCE

## 2023-10-12 ASSESSMENT — LIFESTYLE VARIABLES
HOW OFTEN DO YOU HAVE 6 OR MORE DRINKS ON ONE OCCASION: NEVER
HOW MANY STANDARD DRINKS CONTAINING ALCOHOL DO YOU HAVE ON A TYPICAL DAY: PATIENT DOES NOT DRINK
AUDIT-C TOTAL SCORE: 0
SKIP TO QUESTIONS 9-10: 1
HOW OFTEN DO YOU HAVE A DRINK CONTAINING ALCOHOL: NEVER
AUDIT-C TOTAL SCORE: 0

## 2023-10-12 ASSESSMENT — PAIN SCALES - GENERAL
PAINLEVEL_OUTOF10: 3
PAINLEVEL_OUTOF10: 0 - NO PAIN
PAINLEVEL_OUTOF10: 9

## 2023-10-12 NOTE — PROGRESS NOTES
"Jessica Smith is a 58 y.o. female on day 0 of admission presenting with Acute kidney injury (CMS/HCC).    Subjective   Recurrence of abdominal pain in AM with nausea and vomiting, although she did tolerate diet yesterday with BM in the afternoon. Pain is again noted as epigastric and sharp and 10/10 without radiation.      Objective     Physical Exam  General:  Well developed. Alert. No acute distress.  Skin:  Warm, dry. normal skin turgor. no rashes. no lesions.   Head: NCAT  EENT: MMM  Cardiovascular:  Regular rate and rhythm, normal S1 and S2, no gallops, no murmurs and no pericardial rub.  Pulmonary:  CTAB in all fields  Abdomen:  (+) BS. Soft and non-distended. Diffuse tenderness, most notable in epigastric region with some mild unintentional abdominal guarding  Neurologic:  grossly intact  Musculoskeletal: moves all extremities spontaneously  Psychiatric:  appropriate mood and affect    Last Recorded Vitals  Blood pressure 123/62, pulse 68, temperature 36.2 °C (97.2 °F), resp. rate 17, height 1.6 m (5' 3\"), weight 143 kg (315 lb), SpO2 100 %.  Intake/Output last 3 Shifts:  No intake/output data recorded.    Relevant Results  Scheduled medications  amLODIPine, 10 mg, oral, Daily  calcium gluconate, 2 g, intravenous, Once  famotidine, 40 mg, intravenous, q12h SUNSHINE  ferrous sulfate, 65 mg of iron, oral, Every other day  fluticasone, 1 spray, Each Nostril, Daily  fluticasone furoate-vilanteroL, 1 puff, inhalation, Daily  gabapentin, 300 mg, oral, Nightly  heparin (porcine), 7,500 Units, subcutaneous, q8h SUNSHINE  lidocaine, 1 patch, transdermal, Daily  pantoprazole, 40 mg, intravenous, Daily  polyethylene glycol, 17 g, oral, Daily  sodium polystyrene, 15 g, oral, Once  sucralfate, 1 g, oral, With meals & nightly      Continuous medications     PRN medications  PRN medications: acetaminophen, albuterol, diclofenac sodium, guaiFENesin, melatonin, morphine, ondansetron    Results for orders placed or performed during the " hospital encounter of 10/10/23 (from the past 24 hour(s))   Renal function panel   Result Value Ref Range    Glucose 118 (H) 74 - 99 mg/dL    Sodium 135 (L) 136 - 145 mmol/L    Potassium 5.6 (H) 3.5 - 5.3 mmol/L    Chloride 111 (H) 98 - 107 mmol/L    Bicarbonate 19 (L) 21 - 32 mmol/L    Anion Gap 11 10 - 20 mmol/L    Urea Nitrogen 28 (H) 6 - 23 mg/dL    Creatinine 1.43 (H) 0.50 - 1.05 mg/dL    eGFR 43 (L) >60 mL/min/1.73m*2    Calcium 8.5 (L) 8.6 - 10.6 mg/dL    Phosphorus 3.1 2.5 - 4.9 mg/dL    Albumin 3.6 3.4 - 5.0 g/dL   CBC and Auto Differential   Result Value Ref Range    WBC 9.5 4.4 - 11.3 x10*3/uL    nRBC 0.0 0.0 - 0.0 /100 WBCs    RBC 3.29 (L) 4.00 - 5.20 x10*6/uL    Hemoglobin 9.8 (L) 12.0 - 16.0 g/dL    Hematocrit 34.7 (L) 36.0 - 46.0 %     (H) 80 - 100 fL    MCH 29.8 26.0 - 34.0 pg    MCHC 28.2 (L) 32.0 - 36.0 g/dL    RDW 16.0 (H) 11.5 - 14.5 %    Platelets 177 150 - 450 x10*3/uL    MPV 11.2 7.5 - 11.5 fL    Neutrophils % 74.3 40.0 - 80.0 %    Immature Granulocytes %, Automated 0.5 0.0 - 0.9 %    Lymphocytes % 15.9 13.0 - 44.0 %    Monocytes % 7.0 2.0 - 10.0 %    Eosinophils % 1.7 0.0 - 6.0 %    Basophils % 0.6 0.0 - 2.0 %    Neutrophils Absolute 7.07 1.20 - 7.70 x10*3/uL    Immature Granulocytes Absolute, Automated 0.05 0.00 - 0.70 x10*3/uL    Lymphocytes Absolute 1.51 1.20 - 4.80 x10*3/uL    Monocytes Absolute 0.67 0.10 - 1.00 x10*3/uL    Eosinophils Absolute 0.16 0.00 - 0.70 x10*3/uL    Basophils Absolute 0.06 0.00 - 0.10 x10*3/uL   Comprehensive metabolic panel   Result Value Ref Range    Glucose 101 (H) 74 - 99 mg/dL    Sodium 137 136 - 145 mmol/L    Potassium 5.5 (H) 3.5 - 5.3 mmol/L    Chloride 109 (H) 98 - 107 mmol/L    Bicarbonate 18 (L) 21 - 32 mmol/L    Anion Gap 16 10 - 20 mmol/L    Urea Nitrogen 21 6 - 23 mg/dL    Creatinine 1.49 (H) 0.50 - 1.05 mg/dL    eGFR 41 (L) >60 mL/min/1.73m*2    Calcium 8.7 8.6 - 10.6 mg/dL    Albumin 3.9 3.4 - 5.0 g/dL    Alkaline Phosphatase 131 (H) 33 - 110  U/L    Total Protein 6.9 6.4 - 8.2 g/dL    AST 13 9 - 39 U/L    Bilirubin, Total 0.3 0.0 - 1.2 mg/dL    ALT 5 (L) 7 - 45 U/L            This patient currently has cardiac telemetry ordered; if you would like to modify or discontinue the telemetry order, click here to go to the orders activity to modify/discontinue the order.                 Assessment/Plan   Principal Problem:    Acute kidney injury (CMS/HCC)     59 yo female with h/o HFpEF (EF 60-65% 10/2021), anxiety/depression, asthma, HTN, anemia, javier-en-Y, GERD, morbid obesity, OPAL, arthritis, and vitamin D deficiency presenting to  ED on 10/10 for abdominal pain admitted for SPENSER and abdominal pain. Patient was determined to be hemodynamically stable and appropriate for RNF, admitted to Family Medicine for further management. Problem based assessment and plan as follows:    10/12  - sCr 1.3-1.5  - made NPO due to repeated emesis and recurrence of abdominal pain    #Hyperkalemia  #SPENSER  :: baseline in 2021 1.1-1.2, however admission in 6/2023 for similar presentation  :: K 6.0 on arrival -> calcium gluconate 2g IV x1, 25gm D50 IV x1, regular insulin 5U IV x1, Kayexalate 15g PO x1 -> K 5.2  :: EKG in ED without peaked T waves or other changes  - A1c 6/2023 - 5.5  - 1L of LR ordered over 2h iso HFpEF --> sCr 1.3  - CT abd/pel d/t epigastric tenderness and extensive prior abdominal surgeries w/o evidence of acute process  - renally dose medications; avoid nephrotoxic agents  - on tele     #Abdominal Pain  - s/p protonix 40 mg x 1 and oral xylocaine in ED  - CT abd/pelvis without evidence of acute process              - on review with reading room, some evidence of small bowel fat stranding with possible developing inflammatory changes, however unable to adequately interpret without contrast  -      # Anemia  - baseline Hgb 8-10  - no evidence of acute bleed  - likely ANSHUL based on previous workup   - c/w home ferrous sulfate     # HTN  - continue home amlodipine 10  mg daily     # Anxiety/Depression  - c/w home escitalopram 20mg daily     # Asthma  - c/w home albuterol as needed  - home fluticasone-salmeterol converted to formulary Randa Elidinah while inpatient     # Allergic rhinitis  - c/w home flonase daily     # GERD  - c/w home sucralfate 1g QID  - pepcid BID  - pantoprazole 40 mg IV daily     # Arthritis  - continue home gabapentin 300 mg HS  - c/w home diclofenac gel, lidocaine patches, and tylenol     Fluids: bolus as needed  Diet: regular  DVT ppx: heparin SQ q8h  GI ppx: home PPI  Abx: none  Code Status: FULL  NOK: José Domenico (Fiance) [phone]/Rosalba Smith (daughter) [phone]        Attending Supervision: seen and discussed with attending physician, Dr. Maryana Cantu.     Fawn Mathis MD  Family Medicine, PGY-2  [id number]

## 2023-10-13 LAB
ALBUMIN SERPL BCP-MCNC: 3.4 G/DL (ref 3.4–5)
ALP SERPL-CCNC: 108 U/L (ref 33–110)
ALT SERPL W P-5'-P-CCNC: 5 U/L (ref 7–45)
ANION GAP SERPL CALC-SCNC: 13 MMOL/L (ref 10–20)
APPEARANCE UR: ABNORMAL
AST SERPL W P-5'-P-CCNC: 11 U/L (ref 9–39)
BASOPHILS # BLD AUTO: 0.07 X10*3/UL (ref 0–0.1)
BASOPHILS NFR BLD AUTO: 0.8 %
BILIRUB SERPL-MCNC: 0.3 MG/DL (ref 0–1.2)
BILIRUB UR STRIP.AUTO-MCNC: NEGATIVE MG/DL
BUN SERPL-MCNC: 18 MG/DL (ref 6–23)
CALCIUM SERPL-MCNC: 8.5 MG/DL (ref 8.6–10.6)
CHLORIDE SERPL-SCNC: 109 MMOL/L (ref 98–107)
CO2 SERPL-SCNC: 21 MMOL/L (ref 21–32)
COLOR UR: YELLOW
CREAT SERPL-MCNC: 1.2 MG/DL (ref 0.5–1.05)
EOSINOPHIL # BLD AUTO: 0.25 X10*3/UL (ref 0–0.7)
EOSINOPHIL NFR BLD AUTO: 2.9 %
ERYTHROCYTE [DISTWIDTH] IN BLOOD BY AUTOMATED COUNT: 15.9 % (ref 11.5–14.5)
GFR SERPL CREATININE-BSD FRML MDRD: 53 ML/MIN/1.73M*2
GLUCOSE SERPL-MCNC: 91 MG/DL (ref 74–99)
GLUCOSE UR STRIP.AUTO-MCNC: NEGATIVE MG/DL
HCT VFR BLD AUTO: 28.4 % (ref 36–46)
HGB BLD-MCNC: 8.6 G/DL (ref 12–16)
IMM GRANULOCYTES # BLD AUTO: 0.03 X10*3/UL (ref 0–0.7)
IMM GRANULOCYTES NFR BLD AUTO: 0.3 % (ref 0–0.9)
KETONES UR STRIP.AUTO-MCNC: NEGATIVE MG/DL
LEUKOCYTE ESTERASE UR QL STRIP.AUTO: NEGATIVE
LYMPHOCYTES # BLD AUTO: 2.28 X10*3/UL (ref 1.2–4.8)
LYMPHOCYTES NFR BLD AUTO: 26.6 %
MCH RBC QN AUTO: 28.9 PG (ref 26–34)
MCHC RBC AUTO-ENTMCNC: 30.3 G/DL (ref 32–36)
MCV RBC AUTO: 95 FL (ref 80–100)
MONOCYTES # BLD AUTO: 0.85 X10*3/UL (ref 0.1–1)
MONOCYTES NFR BLD AUTO: 9.9 %
NEUTROPHILS # BLD AUTO: 5.1 X10*3/UL (ref 1.2–7.7)
NEUTROPHILS NFR BLD AUTO: 59.5 %
NITRITE UR QL STRIP.AUTO: NEGATIVE
NRBC BLD-RTO: 0 /100 WBCS (ref 0–0)
PH UR STRIP.AUTO: 5 [PH]
PLATELET # BLD AUTO: 254 X10*3/UL (ref 150–450)
PMV BLD AUTO: 10.4 FL (ref 7.5–11.5)
POTASSIUM SERPL-SCNC: 4.7 MMOL/L (ref 3.5–5.3)
PROT SERPL-MCNC: 6.1 G/DL (ref 6.4–8.2)
PROT UR STRIP.AUTO-MCNC: NEGATIVE MG/DL
RBC # BLD AUTO: 2.98 X10*6/UL (ref 4–5.2)
RBC # UR STRIP.AUTO: NEGATIVE /UL
SODIUM SERPL-SCNC: 138 MMOL/L (ref 136–145)
SP GR UR STRIP.AUTO: 1.01
UROBILINOGEN UR STRIP.AUTO-MCNC: <2 MG/DL
WBC # BLD AUTO: 8.6 X10*3/UL (ref 4.4–11.3)

## 2023-10-13 PROCEDURE — 2500000004 HC RX 250 GENERAL PHARMACY W/ HCPCS (ALT 636 FOR OP/ED): Mod: SE

## 2023-10-13 PROCEDURE — 96376 TX/PRO/DX INJ SAME DRUG ADON: CPT | Performed by: EMERGENCY MEDICINE

## 2023-10-13 PROCEDURE — 96372 THER/PROPH/DIAG INJ SC/IM: CPT

## 2023-10-13 PROCEDURE — 81003 URINALYSIS AUTO W/O SCOPE: CPT | Mod: CMCLAB

## 2023-10-13 PROCEDURE — 2500000005 HC RX 250 GENERAL PHARMACY W/O HCPCS: Mod: SE

## 2023-10-13 PROCEDURE — 97161 PT EVAL LOW COMPLEX 20 MIN: CPT | Mod: GP | Performed by: PHYSICAL THERAPIST

## 2023-10-13 PROCEDURE — 97530 THERAPEUTIC ACTIVITIES: CPT | Mod: GO

## 2023-10-13 PROCEDURE — 2500000001 HC RX 250 WO HCPCS SELF ADMINISTERED DRUGS (ALT 637 FOR MEDICARE OP): Mod: SE

## 2023-10-13 PROCEDURE — 97530 THERAPEUTIC ACTIVITIES: CPT | Mod: GP | Performed by: PHYSICAL THERAPIST

## 2023-10-13 PROCEDURE — 36415 COLL VENOUS BLD VENIPUNCTURE: CPT | Mod: CMCLAB

## 2023-10-13 PROCEDURE — 85025 COMPLETE CBC W/AUTO DIFF WBC: CPT | Mod: CMCLAB

## 2023-10-13 PROCEDURE — 99232 SBSQ HOSP IP/OBS MODERATE 35: CPT | Performed by: FAMILY MEDICINE

## 2023-10-13 PROCEDURE — 80053 COMPREHEN METABOLIC PANEL: CPT | Mod: CMCLAB

## 2023-10-13 PROCEDURE — 36415 COLL VENOUS BLD VENIPUNCTURE: CPT

## 2023-10-13 PROCEDURE — G0378 HOSPITAL OBSERVATION PER HR: HCPCS

## 2023-10-13 PROCEDURE — 97165 OT EVAL LOW COMPLEX 30 MIN: CPT | Mod: GO

## 2023-10-13 PROCEDURE — C9113 INJ PANTOPRAZOLE SODIUM, VIA: HCPCS | Mod: SE

## 2023-10-13 RX ADMIN — SUCRALFATE 1 G: 1 SUSPENSION ORAL at 11:58

## 2023-10-13 RX ADMIN — POLYETHYLENE GLYCOL 3350 17 G: 17 POWDER, FOR SOLUTION ORAL at 09:50

## 2023-10-13 RX ADMIN — AMLODIPINE BESYLATE 10 MG: 10 TABLET ORAL at 09:50

## 2023-10-13 RX ADMIN — SUCRALFATE 1 G: 1 SUSPENSION ORAL at 22:16

## 2023-10-13 RX ADMIN — HEPARIN SODIUM 7500 UNITS: 5000 INJECTION INTRAVENOUS; SUBCUTANEOUS at 22:14

## 2023-10-13 RX ADMIN — FLUTICASONE PROPIONATE 1 SPRAY: 50 SPRAY, METERED NASAL at 09:50

## 2023-10-13 RX ADMIN — HEPARIN SODIUM 7500 UNITS: 5000 INJECTION INTRAVENOUS; SUBCUTANEOUS at 15:44

## 2023-10-13 RX ADMIN — SUCRALFATE 1 G: 1 SUSPENSION ORAL at 19:05

## 2023-10-13 RX ADMIN — LIDOCAINE 1 PATCH: 4 PATCH TOPICAL at 09:50

## 2023-10-13 RX ADMIN — ACETAMINOPHEN 975 MG: 325 TABLET ORAL at 22:13

## 2023-10-13 RX ADMIN — SUCRALFATE 1 G: 1 SUSPENSION ORAL at 09:51

## 2023-10-13 RX ADMIN — Medication 3 MG: at 22:13

## 2023-10-13 RX ADMIN — ALBUTEROL SULFATE 2 PUFF: 90 AEROSOL, METERED RESPIRATORY (INHALATION) at 09:50

## 2023-10-13 RX ADMIN — HEPARIN SODIUM 7500 UNITS: 5000 INJECTION INTRAVENOUS; SUBCUTANEOUS at 06:29

## 2023-10-13 RX ADMIN — PANTOPRAZOLE SODIUM 40 MG: 40 INJECTION, POWDER, FOR SOLUTION INTRAVENOUS at 09:50

## 2023-10-13 RX ADMIN — FAMOTIDINE 40 MG: 10 INJECTION INTRAVENOUS at 11:58

## 2023-10-13 RX ADMIN — GABAPENTIN 300 MG: 300 CAPSULE ORAL at 22:14

## 2023-10-13 RX ADMIN — FAMOTIDINE 40 MG: 10 INJECTION INTRAVENOUS at 22:14

## 2023-10-13 ASSESSMENT — COGNITIVE AND FUNCTIONAL STATUS - GENERAL
TURNING FROM BACK TO SIDE WHILE IN FLAT BAD: A LITTLE
CLIMB 3 TO 5 STEPS WITH RAILING: TOTAL
TOILETING: A LITTLE
TURNING FROM BACK TO SIDE WHILE IN FLAT BAD: A LOT
MOVING FROM LYING ON BACK TO SITTING ON SIDE OF FLAT BED WITH BEDRAILS: A LITTLE
PERSONAL GROOMING: A LITTLE
CLIMB 3 TO 5 STEPS WITH RAILING: A LOT
MOVING TO AND FROM BED TO CHAIR: A LITTLE
DAILY ACTIVITIY SCORE: 19
DRESSING REGULAR LOWER BODY CLOTHING: A LITTLE
MOVING FROM LYING ON BACK TO SITTING ON SIDE OF FLAT BED WITH BEDRAILS: A LITTLE
DRESSING REGULAR UPPER BODY CLOTHING: A LITTLE
MOBILITY SCORE: 16
HELP NEEDED FOR BATHING: A LITTLE
STANDING UP FROM CHAIR USING ARMS: A LITTLE
MOBILITY SCORE: 13
MOVING TO AND FROM BED TO CHAIR: A LITTLE
STANDING UP FROM CHAIR USING ARMS: A LITTLE
WALKING IN HOSPITAL ROOM: A LOT
WALKING IN HOSPITAL ROOM: TOTAL

## 2023-10-13 ASSESSMENT — ACTIVITIES OF DAILY LIVING (ADL)
BATHING_ASSISTANCE: MINIMAL
HOME_MANAGEMENT_TIME_ENTRY: 5
ADL_ASSISTANCE: INDEPENDENT
ADL_ASSISTANCE: INDEPENDENT

## 2023-10-13 ASSESSMENT — PAIN - FUNCTIONAL ASSESSMENT
PAIN_FUNCTIONAL_ASSESSMENT: 0-10
PAIN_FUNCTIONAL_ASSESSMENT: 0-10

## 2023-10-13 ASSESSMENT — PAIN SCALES - GENERAL
PAINLEVEL_OUTOF10: 10 - WORST POSSIBLE PAIN
PAINLEVEL_OUTOF10: 9
PAINLEVEL_OUTOF10: 9
PAINLEVEL_OUTOF10: 8

## 2023-10-13 NOTE — PROGRESS NOTES
Occupational Therapy    Evaluation/Treatment    Patient Name: Jessica Smith  MRN: [id number]  : 1965  Today's Date: 10/13/23  Time Calculation  Start Time: 1210  Stop Time: 1238  Time Calculation (min): 28 min       Assessment:  Prognosis: Good  Barriers to Discharge: Inaccessible home environment  Evaluation/Treatment Tolerance: Patient tolerated treatment well  End of Session Communication: PCT/NA/CTA  End of Session Patient Position: Up in chair, Alarm off, not on at start of session  OT Assessment Results: Decreased ADL status, Decreased endurance, Decreased functional mobility, Decreased IADLs (increased L knee pain)  Prognosis: Good  Barriers to Discharge: Inaccessible home environment  Evaluation/Treatment Tolerance: Patient tolerated treatment well  Plan:  Treatment Interventions: ADL retraining, Functional transfer training, Endurance training, Equipment evaluation/education, Compensatory technique education  OT Frequency: 3 times per week  OT Discharge Recommendations: Moderate intensity level of continued care  OT - OK to Discharge: Yes  Treatment Interventions: ADL retraining, Functional transfer training, Endurance training, Equipment evaluation/education, Compensatory technique education    Subjective   Current Problem:  1. Abdominal pain, unspecified abdominal location        2. Hyperkalemia        3. Acute renal failure superimposed on chronic kidney disease, unspecified acute renal failure type, unspecified CKD stage (CMS/HCC)          General:   OT Received On: 10/13/23  General  Reason for Referral: Abdominal pain, SPENSER, L knee pain s/p fall  Past Medical History Relevant to Rehab: HFpEF (EF 60-65% 10/2021), anxiety/depression, asthma, HTN, anemia, javier-en-Y, GERD, morbid obesity, OPAL, arthritis, and vitamin D deficiency  Prior to Session Communication: Bedside nurse  Patient Position Received: Up in chair, Alarm off, not on at start of session  General Comment: Pt recetpive to OT,  participates fully. Limited by L knee pain.  Precautions:  Medical Precautions: Fall precautions  Vital Signs:  Heart Rate: 71  Pain:  Pain Assessment  Pain Assessment: 0-10  Pain Score: 10 - Worst possible pain  Pain Location:  (L knee)    Objective   Cognition:  Overall Cognitive Status: Within Functional Limits  Attention: Within Functional Limits  Insight: Within function limits           Home Living:  Type of Home: House  Lives With: Significant other ((works during the day) +2 dogs and cat)  Home Adaptive Equipment: Cane (shower chair)  Home Layout: Two level (lives on 2nd floor of 2 level home)  Home Access: Stairs to enter with rails  Entrance Stairs-Rails: Both  Entrance Stairs-Number of Steps: 5 ZECHARIAH + 15 steps to 2nd floor  Bathroom Shower/Tub: Tub/shower unit  Bathroom Equipment: Shower chair with back  Prior Function:  Level of [place]: Independent with ADLs and functional transfers, Independent with homemaking with ambulation  ADL Assistance: Independent  Homemaking Assistance: Independent  Ambulatory Assistance:  (Mod I with cane)  Hand Dominance: Right  IADL History:  IADL Comments: - drives  ADL:  Eating Assistance: Independent  Grooming Assistance:  (Set up A)  Bathing Assistance: Minimal  Bathing Deficit: Perineal area, Buttocks  UE Dressing Assistance: Stand by  UE Dressing Deficit: Setup, Supervision/safety  LE Dressing Assistance:  (CGA)  LE Dressing Deficit: Steadying, Don/doff R sock, Don/doff L sock  Toileting Assistance with Device: Minimal (anticipated)  Toileting Deficit: Perineal hygiene  Functional Assistance: Minimal  Functional Deficit: Steadying, Supervision/safety  Activities of Daily Living:         UE Bathing  UE Bathing Level of Assistance: Close supervision  UE Bathing Where Assessed:  (sitting in chair)  UE Bathing Comments: sponge bath       UE Dressing  UE Dressing Level of Assistance:  (SBA)  UE Dressing Where Assessed: Chair  UE Dressing Comments: doff/don hospital  gown    LE Dressing  LE Dressing:  (Pt able to don/doff socks with CGA however reports increased strain at back. Verbal education on AE to increase ease & ind. Pt may benefit from continued education/practice in subsequent sessions.)            Bed Mobility/Transfers:      Transfers  Transfer: Yes  Transfer 1  Transfer From 1: Sit to  Transfer to 1: Stand  Transfer Device 1: Walker  Transfer Level of Assistance 1: Minimum assistance, Minimal verbal cues  Trials/Comments 1: x3 from chair, x from EOB  Transfers 2  Transfer From 2: Stand to  Transfer to 2: Sit  Transfer Device 2: Walker  Transfer Level of Assistance 2: Minimum assistance, Minimal verbal cues  Trials/Comments 2: cued for hand placement  Transfers 3  Transfer From 3: Chair with arms to  Transfer to 3: Bed  Technique 3: Stand pivot  Transfer Device 3: Walker  Transfer Level of Assistance 3: Minimum assistance, Minimal verbal cues  Transfers 4  Transfer From 4: Bed to  Transfer to 4: Chair with arms  Technique 4: Stand pivot  Transfer Device 4: Walker  Transfer Level of Assistance 4: Minimum assistance        Vision:Vision - Basic Assessment  Current Vision: Wears glasses only for reading  Sensation:  Sensation Comment: denies numbness/tingling  Strength:  Strength Comments: YASIR UEs WFL      Hand Function:  Hand Function  Gross Grasp: Functional  Coordination: Functional  Extremities: RUE   RUE : Within Functional Limits and LUE   LUE: Within Functional Limits    Outcome Measures: [facility] Daily Activity  Putting on and taking off regular lower body clothing: A little  Bathing (including washing, rinsing, drying): A little  Putting on and taking off regular upper body clothing: A little  Toileting, which includes using toilet, bedpan or urinal: A little  Taking care of personal grooming such as brushing teeth: A little  Eating Meals: None  Daily Activity - Total Score: 19     and Brief Confusion Assessment Method (bCAM)  Feature 1: Altered Mental Status or  Fluctuating Course: No  CAM Result: CAM -    Education Documentation  Precautions, taught by Sondra Kauffman OT at 10/13/2023  4:14 PM.  Learner: Patient  Readiness: Acceptance  Method: Explanation, Demonstration  Response: Verbalizes Understanding, Needs Reinforcement    ADL Training, taught by Sondra Kauffman OT at 10/13/2023  4:14 PM.  Learner: Patient  Readiness: Acceptance  Method: Explanation, Demonstration  Response: Verbalizes Understanding, Needs Reinforcement    Education Comments  No comments found.      Goals:  Encounter Problems       Encounter Problems (Active)       ADLs       Patient will perform UB and LB bathing with modified independent level of assistance and shower chair and long-handled sponge. (Progressing)       Start:  10/13/23    Expected End:  10/27/23            Patient with complete upper body dressing with independent level of assistance donning and doffing all UE clothes with no adaptive equipment while edge of bed  (Progressing)       Start:  10/13/23    Expected End:  10/27/23            Patient with complete lower body dressing with modified independent level of assistance donning and doffing all LE clothes  with PRN adaptive equipment while edge of bed  and standing (Progressing)       Start:  10/13/23    Expected End:  10/27/23            Patient will complete daily grooming tasks brushing teeth and washing face/hair with independent level of assistance and PRN adaptive equipment while standing. (Progressing)       Start:  10/13/23    Expected End:  10/27/23            Patient will complete toileting including hygiene clothing management/hygiene with modified independent level of assistance and grab bars. (Progressing)       Start:  10/13/23    Expected End:  10/27/23               BALANCE       Pt will maintain dynamic standing balance during ADL task with modified independent level of assistance in order to demonstrate decreased risk of falling and improved postural control.  (Progressing)       Start:  10/13/23    Expected End:  10/27/23               MOBILITY       Patient will perform Functional mobility mod  Household distances/Community Distances with modified independent level of assistance and least restrictive device in order to improve safety and functional mobility. (Progressing)       Start:  10/13/23    Expected End:  10/27/23               TRANSFERS       Patient will complete functional transfers with least restrictive device with modified independent level of assistance. (Progressing)       Start:  10/13/23    Expected End:  10/27/23

## 2023-10-13 NOTE — PROGRESS NOTES
This RN attempted to speak with pt to discuss discharge planning needs, therapist at the bedside.

## 2023-10-13 NOTE — PROGRESS NOTES
"Jessica Smith is a 58 y.o. female on day 0 of admission presenting with Acute kidney injury (CMS/HCC).    Subjective   Doing well today. Denies abdominal pain or N/V. Had BM yesterday and passing flattus. Endorses new burning with urination. No fevers, flank pain, or hematuria.       Objective     Physical Exam  General:  Well developed. Alert. No acute distress.  Skin:  Warm, dry. normal skin turgor. no rashes. no lesions.   Head: NCAT  EENT: MMM  Cardiovascular:  Regular rate and rhythm, normal S1 and S2, no gallops, no murmurs and no pericardial rub.  Pulmonary:  CTAB in all fields  Abdomen:  (+) BS. Soft and non-distended. Suprapubic tenderness  Neurologic:  grossly intact  Musculoskeletal: moves all extremities spontaneously  Psychiatric:  appropriate mood and affect    Last Recorded Vitals  Blood pressure 121/63, pulse 63, temperature 36.9 °C (98.4 °F), resp. rate 17, height 1.6 m (5' 3\"), weight 143 kg (315 lb), SpO2 99 %.  Intake/Output last 3 Shifts:  I/O last 3 completed shifts:  In: - (0 mL/kg)   Out: 175 (1.2 mL/kg) [Emesis/NG output:175]  Weight: 142.9 kg     Relevant Results  Scheduled medications  amLODIPine, 10 mg, oral, Daily  calcium gluconate, 2 g, intravenous, Once  famotidine, 40 mg, intravenous, q12h SUNSHINE  ferrous sulfate, 65 mg of iron, oral, Every other day  fluticasone, 1 spray, Each Nostril, Daily  fluticasone furoate-vilanteroL, 1 puff, inhalation, Daily  gabapentin, 300 mg, oral, Nightly  heparin (porcine), 7,500 Units, subcutaneous, q8h SUNSHINE  lidocaine, 1 patch, transdermal, Daily  pantoprazole, 40 mg, intravenous, Daily  polyethylene glycol, 17 g, oral, Daily  sodium polystyrene, 15 g, oral, Once  sucralfate, 1 g, oral, With meals & nightly      Continuous medications     PRN medications  PRN medications: acetaminophen, albuterol, diclofenac sodium, guaiFENesin, melatonin, morphine, ondansetron    Results for orders placed or performed during the hospital encounter of 10/10/23 (from the " past 24 hour(s))   CBC and Auto Differential   Result Value Ref Range    WBC 8.6 4.4 - 11.3 x10*3/uL    nRBC 0.0 0.0 - 0.0 /100 WBCs    RBC 2.98 (L) 4.00 - 5.20 x10*6/uL    Hemoglobin 8.6 (L) 12.0 - 16.0 g/dL    Hematocrit 28.4 (L) 36.0 - 46.0 %    MCV 95 80 - 100 fL    MCH 28.9 26.0 - 34.0 pg    MCHC 30.3 (L) 32.0 - 36.0 g/dL    RDW 15.9 (H) 11.5 - 14.5 %    Platelets 254 150 - 450 x10*3/uL    MPV 10.4 7.5 - 11.5 fL    Neutrophils % 59.5 40.0 - 80.0 %    Immature Granulocytes %, Automated 0.3 0.0 - 0.9 %    Lymphocytes % 26.6 13.0 - 44.0 %    Monocytes % 9.9 2.0 - 10.0 %    Eosinophils % 2.9 0.0 - 6.0 %    Basophils % 0.8 0.0 - 2.0 %    Neutrophils Absolute 5.10 1.20 - 7.70 x10*3/uL    Immature Granulocytes Absolute, Automated 0.03 0.00 - 0.70 x10*3/uL    Lymphocytes Absolute 2.28 1.20 - 4.80 x10*3/uL    Monocytes Absolute 0.85 0.10 - 1.00 x10*3/uL    Eosinophils Absolute 0.25 0.00 - 0.70 x10*3/uL    Basophils Absolute 0.07 0.00 - 0.10 x10*3/uL   Comprehensive metabolic panel   Result Value Ref Range    Glucose 91 74 - 99 mg/dL    Sodium 138 136 - 145 mmol/L    Potassium 4.7 3.5 - 5.3 mmol/L    Chloride 109 (H) 98 - 107 mmol/L    Bicarbonate 21 21 - 32 mmol/L    Anion Gap 13 10 - 20 mmol/L    Urea Nitrogen 18 6 - 23 mg/dL    Creatinine 1.20 (H) 0.50 - 1.05 mg/dL    eGFR 53 (L) >60 mL/min/1.73m*2    Calcium 8.5 (L) 8.6 - 10.6 mg/dL    Albumin 3.4 3.4 - 5.0 g/dL    Alkaline Phosphatase 108 33 - 110 U/L    Total Protein 6.1 (L) 6.4 - 8.2 g/dL    AST 11 9 - 39 U/L    Bilirubin, Total 0.3 0.0 - 1.2 mg/dL    ALT 5 (L) 7 - 45 U/L       Assessment/Plan   Principal Problem:    Acute kidney injury (CMS/HCC)     59 yo female with h/o HFpEF (EF 60-65% 10/2021), anxiety/depression, asthma, HTN, anemia, javier-en-Y, GERD, morbid obesity, OPAL, arthritis, and vitamin D deficiency presenting to  ED on 10/10 for abdominal pain admitted for SPENSER and abdominal pain. Patient was determined to be hemodynamically stable and appropriate for  RNF, admitted to [facility] for further management. Problem based assessment and plan as follows:    10/13  - sCr 1.2  - K 4.3, bicarb 21  - advanced to full liquid diet, advance as tolerated  - pending PT/OT evaluation, will continue to monitor with RFPs    #Hyperkalemia  #SPENSER  :: baseline in 2021 1.1-1.2, however admission in 6/2023 for similar presentation  :: K 6.0 on arrival -> calcium gluconate 2g IV x1, 25gm D50 IV x1, regular insulin 5U IV x1, Kayexalate 15g PO x1 -> K 5.2  :: EKG in ED without peaked T waves or other changes  - A1c 6/2023 - 5.5  - 1L of LR ordered over 2h iso HFpEF --> sCr 1.3 --> 1.2 10/13  - CT abd/pel d/t epigastric tenderness and extensive prior abdominal surgeries w/o evidence of acute process  - renally dose medications; avoid nephrotoxic agents  - on tele     #Abdominal Pain  - s/p protonix 40 mg x 1 and oral xylocaine in ED  - CT abd/pelvis without evidence of acute process              - on review with reading room, some evidence of small bowel fat stranding with possible developing inflammatory changes, however unable to adequately interpret without contrast  -      # Anemia  - baseline Hgb 8-10  - no evidence of acute bleed  - likely ANSHUL based on previous workup   - c/w home ferrous sulfate     # HTN  - continue home amlodipine 10 mg daily     # Anxiety/Depression  - c/w home escitalopram 20mg daily     # Asthma  - c/w home albuterol as needed  - home fluticasone-salmeterol converted to formulary Breo Elipta while inpatient     # Allergic rhinitis  - c/w home flonase daily     # GERD  - c/w home sucralfate 1g QID  - pepcid BID  - pantoprazole 40 mg IV daily     # Arthritis  - continue home gabapentin 300 mg HS  - c/w home diclofenac gel, lidocaine patches, and tylenol     Fluids: bolus as needed  Diet: regular  DVT ppx: heparin SQ q8h  GI ppx: home PPI  Abx: none  Code Status: FULL  NOK: José Acuña (Fiance) [phone]/Rosalba Smith (daughter) [phone]         Attending Supervision: seen and discussed with attending physician, Dr. Anand Arnett.     Fawn Mathis MD  Family Medicine, PGY-2  [id number]

## 2023-10-13 NOTE — CARE PLAN
Problem: Mobility  Goal: Pt will ambulate 50 ft with SBA with LRAD  Outcome: Progressing  Goal: Pt will ascend/descend 15 steps with 1 handrail with Demetris  Outcome: Progressing     Problem: Transfers  Goal: Patient will complete sit to supine and supine to sit with SBA  Outcome: Progressing  Goal: Patient will transfer sit to stand/stand to sit and bed to chair/chair to bed with SBA with LRAD  Outcome: Progressing

## 2023-10-13 NOTE — PROGRESS NOTES
Physical Therapy    Physical Therapy Evaluation & Treatment    Patient Name: Jessica Smith  MRN: [id number]  Today's Date: 10/13/2023   Time Calculation  Start Time: 1240  Stop Time: 1306  Time Calculation (min): 26 min    Assessment/Plan   PT Assessment  PT Assessment Results: Decreased strength, Decreased range of motion, Decreased endurance, Impaired balance, Decreased mobility, Decreased safety awareness, Pain  Rehab Prognosis: Good  End of Session Communication: Bedside nurse  End of Session Patient Position: Bed, 3 rail up, Alarm off, not on at start of session  IP OR SWING BED PT PLAN  Inpatient or Swing Bed: Inpatient  PT Plan  Treatment/Interventions: Bed mobility, Transfer training, Gait training, Stair training, Balance training, Strengthening, Endurance training, Range of motion, Therapeutic exercise, Therapeutic activity  PT Plan: Skilled PT  PT Frequency: 3 times per week  PT Discharge Recommendations: Moderate intensity level of continued care  Equipment Recommended upon Discharge: Wheeled walker  PT Recommended Transfer Status: Assist x1 (walker)  PT - OK to Discharge: Yes      Subjective     General Visit Information:  General  Reason for Referral: Abdominal pain, SPENSER, L knee pain s/p fall  Past Medical History Relevant to Rehab: HFpEF (EF 60-65% 10/2021), anxiety/depression, asthma, HTN, anemia, javier-en-Y, GERD, morbid obesity, OPAL, arthritis, and vitamin D deficiency  Prior to Session Communication: Bedside nurse  Patient Position Received: Up in chair  General Comment: Pt seated in chair upon arrival and agreeable to participate in Therapy session. Limited by L knee pain  Home Living:  Home Living  Type of Home: House  Lives With: Significant other (+ 2 dogs, cat)  Home Adaptive Equipment: Cane  Home Layout:  (lives on 2nd floor of 2 level home)  Home Access: Stairs to enter with rails  Entrance Stairs-Number of Steps: 5 ZECHARIAH with B rails apart and 15 to second floor with 1 handrail  Bathroom  Shower/Tub: Tub/shower unit  Prior Level of Function:  Prior Function Per Pt/Caregiver Report  Level of [place]:  (Indepdent with all mobility, uses cane for ambulation)  ADL Assistance: Independent  Hand Dominance: Right  Precautions:  Precautions  Medical Precautions: Fall precautions  Vital Signs:  Vital Signs  Heart Rate: 69  SpO2: 97 %  Patient Position: Sitting    Objective   Pain:  Pain Assessment  Pain Assessment: 0-10  Pain Score: 8  Pain Type:  (L knee)  Cognition:  Cognition  Overall Cognitive Status: Within Functional Limits    General Assessments:                     Sensation  Sensation Comment: denies numbness/tingling    Static Sitting Balance  Static Sitting-Level of Assistance:  (Supervision)  Dynamic Sitting Balance  Dynamic Sitting-Balance:  (SBA)    Static Standing Balance  Static Standing-Level of Assistance:  (CGA with walker)  Dynamic Standing Balance  Dynamic Standing-Balance:  (Demetris with walker)  Functional Assessments:  Bed Mobility  Bed Mobility: Yes  Bed Mobility 1  Bed Mobility 1: Sitting to supine  Level of Assistance 1: Moderate assistance  Bed Mobility Comments 1: assist for BLE's    Transfers  Transfer: Yes  Transfer 1  Transfer From 1: Sit to  Transfer to 1: Stand  Transfer Device 1: Walker  Transfer Level of Assistance 1: Minimum assistance  Trials/Comments 1: x2 trials, cues for sequencing and safe hand placement  Transfers 2  Transfer From 2: Stand to  Transfer to 2: Sit  Transfer Device 2: Walker  Trials/Comments 2: cues for safe hand placement  Transfers 3  Transfer From 3: Chair with arms to  Transfer to 3: Bed  Technique 3: Stand pivot  Transfer Device 3: Walker  Transfer Level of Assistance 3: Minimum assistance    Ambulation/Gait Training  Ambulation/Gait Training Performed: Yes  Ambulation/Gait Training 1  Device 1: Rolling walker  Assistance 1: Minimum assistance  Comments/Distance (ft) 1: 3 ft  Ambulation/Gait Training 2  Device 2: Rolling walker  Assistance 2:  Minimum assistance  Comments/Distance (ft) 2: 5 ft. Cues for sequencing increased WB through BUEs and RLE  Extremity/Trunk Assessments:  RLE   RLE : Within Functional Limits  LLE   LLE :  (LLE WFL except L hip F and LAQ, limited 2/2 pain)  Treatments:  Therapeutic Exercise  Therapeutic Exercise Performed: Yes  Therapeutic Exercise Activity 1: Seated BLE AP, Hip Abd 1x10 reps and RLE Hip F and LAQ 1x10 reps. Limited on LLE 2/2 pain. Cued for proper technques.    Ambulation/Gait Training 2  Device 2: Rolling walker  Assistance 2: Minimum assistance  Comments/Distance (ft) 2: 5 ft. Cues for sequencing increased WB through BUEs and RLE  Transfers  Transfer: Yes  Transfer 1  Transfer From 1: Sit to  Transfer to 1: Stand  Transfer Device 1: Walker  Transfer Level of Assistance 1: Minimum assistance  Trials/Comments 1: x2 trials, cues for sequencing and safe hand placement  Transfers 2  Transfer From 2: Stand to  Transfer to 2: Sit  Transfer Device 2: Walker  Trials/Comments 2: cues for safe hand placement    [facility] Basic Mobility  Turning from your back to your side while in a flat bed without using bedrails: A little  Moving from lying on your back to sitting on the side of a flat bed without using bedrails: A lot  Moving to and from bed to chair (including a wheelchair): A little  Standing up from a chair using your arms (e.g. wheelchair or bedside chair): A little  To walk in hospital room: Total  Climbing 3-5 steps with railing: Total  Basic Mobility - Total Score: 13    Encounter Problems       Encounter Problems (Active)       Mobility       Pt will ambulate 50 ft with SBA with LRAD (Progressing)       Start:  10/13/23    Expected End:  10/27/23            Pt will ascend/descend 15 steps with 1 handrail with Demetris (Progressing)       Start:  10/13/23    Expected End:  10/27/23               Transfers       Patient will complete sit to supine and supine to sit with SBA (Progressing)       Start:  10/13/23    Expected  End:  10/27/23            Patient will transfer sit to stand/stand to sit and bed to chair/chair to bed with SBA with LRAD (Progressing)       Start:  10/13/23    Expected End:  10/27/23                   Education Documentation  Precautions, taught by Cris Baeza PT at 10/13/2023  4:41 PM.  Learner: Patient  Readiness: Acceptance  Method: Explanation, Demonstration  Response: Verbalizes Understanding, Needs Reinforcement        Education Comments  No comments found.

## 2023-10-13 NOTE — CARE PLAN
Problem: ADLs  Goal: Patient will perform UB and LB bathing with modified independent level of assistance and shower chair and long-handled sponge.  Outcome: Progressing  Goal: Patient with complete upper body dressing with independent level of assistance donning and doffing all UE clothes with no adaptive equipment while edge of bed   Outcome: Progressing  Goal: Patient with complete lower body dressing with modified independent level of assistance donning and doffing all LE clothes  with PRN adaptive equipment while edge of bed  and standing  Outcome: Progressing  Goal: Patient will complete daily grooming tasks brushing teeth and washing face/hair with independent level of assistance and PRN adaptive equipment while standing.  Outcome: Progressing  Goal: Patient will complete toileting including hygiene clothing management/hygiene with modified independent level of assistance and grab bars.  Outcome: Progressing     Problem: TRANSFERS  Goal: Patient will complete functional transfers with least restrictive device with modified independent level of assistance.  Outcome: Progressing     Problem: MOBILITY  Goal: Patient will perform Functional mobility mod  Household distances/Community Distances with modified independent level of assistance and least restrictive device in order to improve safety and functional mobility.  Outcome: Progressing     Problem: BALANCE  Goal: Pt will maintain dynamic standing balance during ADL task with modified independent level of assistance in order to demonstrate decreased risk of falling and improved postural control.  Outcome: Progressing

## 2023-10-14 LAB
ALBUMIN SERPL BCP-MCNC: 3.4 G/DL (ref 3.4–5)
ALP SERPL-CCNC: 116 U/L (ref 33–110)
ALT SERPL W P-5'-P-CCNC: 5 U/L (ref 7–45)
ANION GAP SERPL CALC-SCNC: 13 MMOL/L (ref 10–20)
AST SERPL W P-5'-P-CCNC: 11 U/L (ref 9–39)
BASOPHILS # BLD AUTO: 0.06 X10*3/UL (ref 0–0.1)
BASOPHILS NFR BLD AUTO: 0.7 %
BILIRUB SERPL-MCNC: 0.3 MG/DL (ref 0–1.2)
BUN SERPL-MCNC: 16 MG/DL (ref 6–23)
CALCIUM SERPL-MCNC: 8.2 MG/DL (ref 8.6–10.6)
CHLORIDE SERPL-SCNC: 106 MMOL/L (ref 98–107)
CO2 SERPL-SCNC: 19 MMOL/L (ref 21–32)
CREAT SERPL-MCNC: 1.22 MG/DL (ref 0.5–1.05)
EOSINOPHIL # BLD AUTO: 0.31 X10*3/UL (ref 0–0.7)
EOSINOPHIL NFR BLD AUTO: 3.8 %
ERYTHROCYTE [DISTWIDTH] IN BLOOD BY AUTOMATED COUNT: 15.7 % (ref 11.5–14.5)
GFR SERPL CREATININE-BSD FRML MDRD: 52 ML/MIN/1.73M*2
GLUCOSE SERPL-MCNC: 88 MG/DL (ref 74–99)
HCT VFR BLD AUTO: 30.1 % (ref 36–46)
HGB BLD-MCNC: 8.9 G/DL (ref 12–16)
IMM GRANULOCYTES # BLD AUTO: 0.02 X10*3/UL (ref 0–0.7)
IMM GRANULOCYTES NFR BLD AUTO: 0.2 % (ref 0–0.9)
LYMPHOCYTES # BLD AUTO: 2.35 X10*3/UL (ref 1.2–4.8)
LYMPHOCYTES NFR BLD AUTO: 28.8 %
MCH RBC QN AUTO: 28.9 PG (ref 26–34)
MCHC RBC AUTO-ENTMCNC: 29.6 G/DL (ref 32–36)
MCV RBC AUTO: 98 FL (ref 80–100)
MONOCYTES # BLD AUTO: 0.75 X10*3/UL (ref 0.1–1)
MONOCYTES NFR BLD AUTO: 9.2 %
NEUTROPHILS # BLD AUTO: 4.67 X10*3/UL (ref 1.2–7.7)
NEUTROPHILS NFR BLD AUTO: 57.3 %
NRBC BLD-RTO: 0 /100 WBCS (ref 0–0)
PLATELET # BLD AUTO: 240 X10*3/UL (ref 150–450)
PMV BLD AUTO: 10.7 FL (ref 7.5–11.5)
POTASSIUM SERPL-SCNC: 5.2 MMOL/L (ref 3.5–5.3)
PROT SERPL-MCNC: 5.9 G/DL (ref 6.4–8.2)
RBC # BLD AUTO: 3.08 X10*6/UL (ref 4–5.2)
SODIUM SERPL-SCNC: 133 MMOL/L (ref 136–145)
WBC # BLD AUTO: 8.2 X10*3/UL (ref 4.4–11.3)

## 2023-10-14 PROCEDURE — 96372 THER/PROPH/DIAG INJ SC/IM: CPT

## 2023-10-14 PROCEDURE — 2500000005 HC RX 250 GENERAL PHARMACY W/O HCPCS: Mod: SE

## 2023-10-14 PROCEDURE — C9113 INJ PANTOPRAZOLE SODIUM, VIA: HCPCS | Mod: SE

## 2023-10-14 PROCEDURE — 2500000002 HC RX 250 W HCPCS SELF ADMINISTERED DRUGS (ALT 637 FOR MEDICARE OP, ALT 636 FOR OP/ED): Mod: SE

## 2023-10-14 PROCEDURE — 2500000004 HC RX 250 GENERAL PHARMACY W/ HCPCS (ALT 636 FOR OP/ED): Mod: SE

## 2023-10-14 PROCEDURE — 94640 AIRWAY INHALATION TREATMENT: CPT

## 2023-10-14 PROCEDURE — 36415 COLL VENOUS BLD VENIPUNCTURE: CPT

## 2023-10-14 PROCEDURE — 80053 COMPREHEN METABOLIC PANEL: CPT

## 2023-10-14 PROCEDURE — 2500000001 HC RX 250 WO HCPCS SELF ADMINISTERED DRUGS (ALT 637 FOR MEDICARE OP): Mod: SE

## 2023-10-14 PROCEDURE — 99232 SBSQ HOSP IP/OBS MODERATE 35: CPT | Performed by: STUDENT IN AN ORGANIZED HEALTH CARE EDUCATION/TRAINING PROGRAM

## 2023-10-14 PROCEDURE — 85025 COMPLETE CBC W/AUTO DIFF WBC: CPT | Mod: CMCLAB

## 2023-10-14 PROCEDURE — G0378 HOSPITAL OBSERVATION PER HR: HCPCS

## 2023-10-14 PROCEDURE — 96376 TX/PRO/DX INJ SAME DRUG ADON: CPT | Performed by: EMERGENCY MEDICINE

## 2023-10-14 RX ORDER — AMOXICILLIN 250 MG
1 CAPSULE ORAL 2 TIMES DAILY
Status: DISCONTINUED | OUTPATIENT
Start: 2023-10-14 | End: 2023-10-19 | Stop reason: HOSPADM

## 2023-10-14 RX ORDER — POLYETHYLENE GLYCOL 3350 17 G/17G
17 POWDER, FOR SOLUTION ORAL 2 TIMES DAILY
Status: DISCONTINUED | OUTPATIENT
Start: 2023-10-14 | End: 2023-10-19 | Stop reason: HOSPADM

## 2023-10-14 RX ORDER — AMOXICILLIN 250 MG
1 CAPSULE ORAL NIGHTLY
Status: DISCONTINUED | OUTPATIENT
Start: 2023-10-14 | End: 2023-10-14

## 2023-10-14 RX ADMIN — HEPARIN SODIUM 10000 UNITS: 5000 INJECTION INTRAVENOUS; SUBCUTANEOUS at 21:53

## 2023-10-14 RX ADMIN — GABAPENTIN 300 MG: 300 CAPSULE ORAL at 20:26

## 2023-10-14 RX ADMIN — LIDOCAINE 1 PATCH: 4 PATCH TOPICAL at 09:06

## 2023-10-14 RX ADMIN — Medication 3 MG: at 20:27

## 2023-10-14 RX ADMIN — HEPARIN SODIUM 7500 UNITS: 5000 INJECTION INTRAVENOUS; SUBCUTANEOUS at 06:35

## 2023-10-14 RX ADMIN — PANTOPRAZOLE SODIUM 40 MG: 40 INJECTION, POWDER, FOR SOLUTION INTRAVENOUS at 09:07

## 2023-10-14 RX ADMIN — SUCRALFATE 1 G: 1 SUSPENSION ORAL at 09:07

## 2023-10-14 RX ADMIN — HEPARIN SODIUM 7500 UNITS: 5000 INJECTION INTRAVENOUS; SUBCUTANEOUS at 14:21

## 2023-10-14 RX ADMIN — AMLODIPINE BESYLATE 10 MG: 10 TABLET ORAL at 09:07

## 2023-10-14 RX ADMIN — SUCRALFATE 1 G: 1 SUSPENSION ORAL at 17:52

## 2023-10-14 RX ADMIN — ACETAMINOPHEN 975 MG: 325 TABLET ORAL at 20:28

## 2023-10-14 RX ADMIN — FLUTICASONE FUROATE AND VILANTEROL TRIFENATATE 1 PUFF: 200; 25 POWDER RESPIRATORY (INHALATION) at 14:42

## 2023-10-14 RX ADMIN — FERROUS SULFATE TAB 325 MG (65 MG ELEMENTAL FE) 65 MG OF IRON: 325 (65 FE) TAB at 09:07

## 2023-10-14 RX ADMIN — POLYETHYLENE GLYCOL 3350 17 G: 17 POWDER, FOR SOLUTION ORAL at 09:07

## 2023-10-14 RX ADMIN — SUCRALFATE 1 G: 1 SUSPENSION ORAL at 20:26

## 2023-10-14 RX ADMIN — FLUTICASONE PROPIONATE 1 SPRAY: 50 SPRAY, METERED NASAL at 09:07

## 2023-10-14 RX ADMIN — SUCRALFATE 1 G: 1 SUSPENSION ORAL at 12:33

## 2023-10-14 ASSESSMENT — COGNITIVE AND FUNCTIONAL STATUS - GENERAL
EATING MEALS: A LITTLE
MOVING FROM LYING ON BACK TO SITTING ON SIDE OF FLAT BED WITH BEDRAILS: A LITTLE
CLIMB 3 TO 5 STEPS WITH RAILING: A LOT
TOILETING: A LITTLE
DRESSING REGULAR UPPER BODY CLOTHING: A LITTLE
HELP NEEDED FOR BATHING: A LITTLE
STANDING UP FROM CHAIR USING ARMS: A LITTLE
MOVING TO AND FROM BED TO CHAIR: A LITTLE
DAILY ACTIVITIY SCORE: 18
DRESSING REGULAR LOWER BODY CLOTHING: A LITTLE
MOBILITY SCORE: 16
WALKING IN HOSPITAL ROOM: A LOT
PERSONAL GROOMING: A LITTLE
TURNING FROM BACK TO SIDE WHILE IN FLAT BAD: A LITTLE

## 2023-10-14 ASSESSMENT — PAIN SCALES - GENERAL
PAINLEVEL_OUTOF10: 9
PAINLEVEL_OUTOF10: 7
PAINLEVEL_OUTOF10: 9

## 2023-10-14 NOTE — PROGRESS NOTES
"Jessica Smith is a 58 y.o. female on day 0 of admission presenting with Acute kidney injury (CMS/HCC).    Subjective   Continues to have sharp shooting abdominal pain, that is intermittent and last 45 seconds in duration at max. NO known triggers or inciting events.    Denies N/V, Passing Flatulence but no BM since Tuesday.       Objective     Physical Exam  Constitutional:       General: She is in acute distress.      Appearance: Normal appearance. She is obese. She is ill-appearing and toxic-appearing.   HENT:      Head: Normocephalic and atraumatic.      Nose: Nose normal.   Cardiovascular:      Rate and Rhythm: Normal rate and regular rhythm.      Pulses: Normal pulses.      Heart sounds: Normal heart sounds.   Pulmonary:      Effort: Pulmonary effort is normal. No respiratory distress.      Breath sounds: Normal breath sounds.   Abdominal:      Tenderness: There is abdominal tenderness in the right upper quadrant, right lower quadrant, epigastric area, periumbilical area, left upper quadrant and left lower quadrant. There is no right CVA tenderness or left CVA tenderness.   Neurological:      Mental Status: She is alert.           Last Recorded Vitals  Blood pressure 123/66, pulse 66, temperature (!) 65.5 °C (149.9 °F), resp. rate 16, height 1.6 m (5' 3\"), weight 143 kg (315 lb), SpO2 97 %.  Intake/Output last 3 Shifts:  I/O last 3 completed shifts:  In: - (0 mL/kg)   Out: 850 (5.9 mL/kg) [Urine:850 (0.2 mL/kg/hr)]  Weight: 142.9 kg     Relevant Results           This patient currently has cardiac telemetry ordered; if you would like to modify or discontinue the telemetry order, click here to go to the orders activity to modify/discontinue the order.         Assessment/Plan   Principal Problem:    Acute kidney injury (CMS/HCC)    59 yo female with h/o HFpEF (EF 60-65% 10/2021), anxiety/depression, asthma, HTN, anemia, javier-en-Y, GERD, morbid obesity, OPAL, arthritis, and vitamin D deficiency presenting to  ED " on 10/10 for abdominal pain admitted for SPENSER and abdominal pain. Patient was determined to be hemodynamically stable and appropriate for RNF, admitted to [facility] for further management. Problem based assessment and plan as follows:     10/14  - sCr 1.2, 1.22  - Abdominal Pain of unknown etiology. See if there is improvement w/ defecation.    - Bowel Regiment started see below     #Hyperkalemia  #SPENSER  :: baseline in 2021 1.1-1.2, however admission in 6/2023 for similar presentation  :: K 6.0 on arrival -> calcium gluconate 2g IV x1, 25gm D50 IV x1, regular insulin 5U IV x1, Kayexalate 15g PO x1 -> K 5.2  :: EKG in ED without peaked T waves or other changes  - A1c 6/2023 - 5.5  - 1L of LR ordered over 2h iso HFpEF --> sCr 1.3 --> 1.2 10/13 -> 1.22 10/14  - CT abd/pel d/t epigastric tenderness and extensive prior abdominal surgeries w/o evidence of acute process  - renally dose medications; avoid nephrotoxic agents  - on tele     #Abdominal Pain  - s/p protonix 40 mg x 1 and oral xylocaine in ED  - CT abd/pelvis without evidence of acute process              - on review with reading room, some evidence of small bowel fat stranding with possible developing inflammatory changes, however unable to adequately interpret without contrast  - NO BM since yesterday  - Bowel Regiment,   -> Miralax BID,   -> Sennosides-docusate 8.6 ->50 mg BID,       # Anemia  - baseline Hgb 8-10  - no evidence of acute bleed  - likely ANSHUL based on previous workup   - c/w home ferrous sulfate     # HTN  - continue home amlodipine 10 mg daily     # Anxiety/Depression  - c/w home escitalopram 20mg daily     # Asthma  - c/w home albuterol as needed  - home fluticasone-salmeterol converted to formulary Kianao Elipta while inpatient     # Allergic rhinitis  - c/w home flonase daily     # GERD  - c/w home sucralfate 1g QID  - pantoprazole 40 mg IV daily  - discontinue pepcid BID,      # Arthritis  - continue home gabapentin 300 mg HS  - c/w home  diclofenac gel, lidocaine patches, and tylenol     Fluids: bolus as needed  Diet: regular  DVT ppx: heparin SQ q8h  GI ppx: home PPI  Abx: none  Code Status: FULL  NOK: José Acuña (Fiance) [id number]/Rosalba Smith (daughter) [phone]     Patient seen and discussed Dr. Arnett.      Wayne Nickerson MD  FM & PM Resident           Wayne Nickerson MD

## 2023-10-15 LAB
ALBUMIN SERPL BCP-MCNC: 3.4 G/DL (ref 3.4–5)
ANION GAP SERPL CALC-SCNC: 14 MMOL/L (ref 10–20)
BUN SERPL-MCNC: 21 MG/DL (ref 6–23)
CALCIUM SERPL-MCNC: 8.4 MG/DL (ref 8.6–10.6)
CHLORIDE SERPL-SCNC: 109 MMOL/L (ref 98–107)
CO2 SERPL-SCNC: 21 MMOL/L (ref 21–32)
CREAT SERPL-MCNC: 1.36 MG/DL (ref 0.5–1.05)
ERYTHROCYTE [DISTWIDTH] IN BLOOD BY AUTOMATED COUNT: 16 % (ref 11.5–14.5)
GFR SERPL CREATININE-BSD FRML MDRD: 45 ML/MIN/1.73M*2
GLUCOSE SERPL-MCNC: 85 MG/DL (ref 74–99)
HCT VFR BLD AUTO: 29 % (ref 36–46)
HGB BLD-MCNC: 8.7 G/DL (ref 12–16)
MAGNESIUM SERPL-MCNC: 1.87 MG/DL (ref 1.6–2.4)
MCH RBC QN AUTO: 29.3 PG (ref 26–34)
MCHC RBC AUTO-ENTMCNC: 30 G/DL (ref 32–36)
MCV RBC AUTO: 98 FL (ref 80–100)
NRBC BLD-RTO: 0 /100 WBCS (ref 0–0)
PHOSPHATE SERPL-MCNC: 3.3 MG/DL (ref 2.5–4.9)
PLATELET # BLD AUTO: 235 X10*3/UL (ref 150–450)
PMV BLD AUTO: 10.9 FL (ref 7.5–11.5)
POTASSIUM SERPL-SCNC: 4.9 MMOL/L (ref 3.5–5.3)
RBC # BLD AUTO: 2.97 X10*6/UL (ref 4–5.2)
SODIUM SERPL-SCNC: 139 MMOL/L (ref 136–145)
WBC # BLD AUTO: 9.6 X10*3/UL (ref 4.4–11.3)

## 2023-10-15 PROCEDURE — 96376 TX/PRO/DX INJ SAME DRUG ADON: CPT | Performed by: EMERGENCY MEDICINE

## 2023-10-15 PROCEDURE — 85027 COMPLETE CBC AUTOMATED: CPT | Mod: CMCLAB | Performed by: STUDENT IN AN ORGANIZED HEALTH CARE EDUCATION/TRAINING PROGRAM

## 2023-10-15 PROCEDURE — 2500000005 HC RX 250 GENERAL PHARMACY W/O HCPCS: Mod: SE

## 2023-10-15 PROCEDURE — 36415 COLL VENOUS BLD VENIPUNCTURE: CPT | Performed by: STUDENT IN AN ORGANIZED HEALTH CARE EDUCATION/TRAINING PROGRAM

## 2023-10-15 PROCEDURE — C9113 INJ PANTOPRAZOLE SODIUM, VIA: HCPCS | Mod: SE

## 2023-10-15 PROCEDURE — 96372 THER/PROPH/DIAG INJ SC/IM: CPT

## 2023-10-15 PROCEDURE — 80069 RENAL FUNCTION PANEL: CPT | Performed by: STUDENT IN AN ORGANIZED HEALTH CARE EDUCATION/TRAINING PROGRAM

## 2023-10-15 PROCEDURE — 83735 ASSAY OF MAGNESIUM: CPT | Performed by: STUDENT IN AN ORGANIZED HEALTH CARE EDUCATION/TRAINING PROGRAM

## 2023-10-15 PROCEDURE — G0378 HOSPITAL OBSERVATION PER HR: HCPCS

## 2023-10-15 PROCEDURE — 2500000004 HC RX 250 GENERAL PHARMACY W/ HCPCS (ALT 636 FOR OP/ED): Mod: SE

## 2023-10-15 PROCEDURE — 2500000001 HC RX 250 WO HCPCS SELF ADMINISTERED DRUGS (ALT 637 FOR MEDICARE OP): Mod: SE

## 2023-10-15 PROCEDURE — 99232 SBSQ HOSP IP/OBS MODERATE 35: CPT

## 2023-10-15 RX ADMIN — ACETAMINOPHEN 975 MG: 325 TABLET ORAL at 17:41

## 2023-10-15 RX ADMIN — SUCRALFATE 1 G: 1 SUSPENSION ORAL at 08:40

## 2023-10-15 RX ADMIN — HEPARIN SODIUM 7500 UNITS: 5000 INJECTION INTRAVENOUS; SUBCUTANEOUS at 14:40

## 2023-10-15 RX ADMIN — PANTOPRAZOLE SODIUM 40 MG: 40 INJECTION, POWDER, FOR SOLUTION INTRAVENOUS at 08:40

## 2023-10-15 RX ADMIN — SUCRALFATE 1 G: 1 SUSPENSION ORAL at 12:18

## 2023-10-15 RX ADMIN — SUCRALFATE 1 G: 1 SUSPENSION ORAL at 17:41

## 2023-10-15 RX ADMIN — SUCRALFATE 1 G: 1 SUSPENSION ORAL at 20:48

## 2023-10-15 RX ADMIN — HEPARIN SODIUM 7500 UNITS: 5000 INJECTION INTRAVENOUS; SUBCUTANEOUS at 22:23

## 2023-10-15 RX ADMIN — ACETAMINOPHEN 975 MG: 325 TABLET ORAL at 08:46

## 2023-10-15 RX ADMIN — MORPHINE SULFATE 1 MG: 2 INJECTION, SOLUTION INTRAMUSCULAR; INTRAVENOUS at 20:54

## 2023-10-15 RX ADMIN — Medication 3 MG: at 20:48

## 2023-10-15 RX ADMIN — HEPARIN SODIUM 10000 UNITS: 5000 INJECTION INTRAVENOUS; SUBCUTANEOUS at 06:01

## 2023-10-15 RX ADMIN — AMLODIPINE BESYLATE 10 MG: 10 TABLET ORAL at 08:40

## 2023-10-15 RX ADMIN — GABAPENTIN 300 MG: 300 CAPSULE ORAL at 20:49

## 2023-10-15 RX ADMIN — LIDOCAINE 1 PATCH: 4 PATCH TOPICAL at 12:18

## 2023-10-15 ASSESSMENT — PAIN - FUNCTIONAL ASSESSMENT
PAIN_FUNCTIONAL_ASSESSMENT: WONG-BAKER FACES
PAIN_FUNCTIONAL_ASSESSMENT: 0-10

## 2023-10-15 ASSESSMENT — PAIN SCALES - GENERAL
PAINLEVEL_OUTOF10: 9
PAINLEVEL_OUTOF10: 10 - WORST POSSIBLE PAIN
PAINLEVEL_OUTOF10: 9
PAINLEVEL_OUTOF10: 7
PAINLEVEL_OUTOF10: 7

## 2023-10-15 ASSESSMENT — PAIN SCALES - WONG BAKER: WONGBAKER_NUMERICALRESPONSE: NO HURT

## 2023-10-15 NOTE — PROGRESS NOTES
"Jessica Smith is a 58 y.o. female on day 0 of admission presenting with Acute kidney injury (CMS/HCC).    Subjective   Continued non-specific abdominal pain. Resolution of n/v, and did have BM yesterday. Denies worsening or improving factors to pain. Tolerating diet.     Objective   General:  Well developed. Alert. No acute distress.  Skin:  Warm, dry. normal skin turgor. no rashes. no lesions.   Head: NCAT  EENT: MMM  Cardiovascular:  Regular rate and rhythm, normal S1 and S2, no gallops, no murmurs and no pericardial rub.  Pulmonary:  CTAB in all fields  Abdomen:  (+) BS. soft. non-tender. non-distended. No abdominal masses. no guarding or rigidity.  Neurologic:  grossly intact  Musculoskeletal: moves all extremities spontaneously  Psychiatric:  appropriate mood and affect      Last Recorded Vitals  Blood pressure 109/64, pulse 68, temperature 36.6 °C (97.9 °F), resp. rate 18, height 1.6 m (5' 3\"), weight 143 kg (315 lb), SpO2 97 %.  Intake/Output last 3 Shifts:  No intake/output data recorded.    Relevant Results  Scheduled medications  amLODIPine, 10 mg, oral, Daily  calcium gluconate, 2 g, intravenous, Once  ferrous sulfate, 65 mg of iron, oral, Every other day  fluticasone, 1 spray, Each Nostril, Daily  fluticasone furoate-vilanteroL, 1 puff, inhalation, Daily  gabapentin, 300 mg, oral, Nightly  heparin (porcine), 7,500 Units, subcutaneous, q8h SUNSHINE  lidocaine, 1 patch, transdermal, Daily  pantoprazole, 40 mg, intravenous, Daily  polyethylene glycol, 17 g, oral, BID  sennosides-docusate sodium, 1 tablet, oral, BID  sodium polystyrene, 15 g, oral, Once  sucralfate, 1 g, oral, With meals & nightly      Continuous medications     PRN medications  PRN medications: acetaminophen, albuterol, diclofenac sodium, guaiFENesin, melatonin, morphine, ondansetron    Results for orders placed or performed during the hospital encounter of 10/10/23 (from the past 24 hour(s))   Magnesium   Result Value Ref Range    Magnesium " 1.87 1.60 - 2.40 mg/dL   Renal Function Panel   Result Value Ref Range    Glucose 85 74 - 99 mg/dL    Sodium 139 136 - 145 mmol/L    Potassium 4.9 3.5 - 5.3 mmol/L    Chloride 109 (H) 98 - 107 mmol/L    Bicarbonate 21 21 - 32 mmol/L    Anion Gap 14 10 - 20 mmol/L    Urea Nitrogen 21 6 - 23 mg/dL    Creatinine 1.36 (H) 0.50 - 1.05 mg/dL    eGFR 45 (L) >60 mL/min/1.73m*2    Calcium 8.4 (L) 8.6 - 10.6 mg/dL    Phosphorus 3.3 2.5 - 4.9 mg/dL    Albumin 3.4 3.4 - 5.0 g/dL   CBC   Result Value Ref Range    WBC 9.6 4.4 - 11.3 x10*3/uL    nRBC 0.0 0.0 - 0.0 /100 WBCs    RBC 2.97 (L) 4.00 - 5.20 x10*6/uL    Hemoglobin 8.7 (L) 12.0 - 16.0 g/dL    Hematocrit 29.0 (L) 36.0 - 46.0 %    MCV 98 80 - 100 fL    MCH 29.3 26.0 - 34.0 pg    MCHC 30.0 (L) 32.0 - 36.0 g/dL    RDW 16.0 (H) 11.5 - 14.5 %    Platelets 235 150 - 450 x10*3/uL    MPV 10.9 7.5 - 11.5 fL     Assessment/Plan   Principal Problem:    Acute kidney injury (CMS/HCC)    59 yo female with h/o HFpEF (EF 60-65% 10/2021), anxiety/depression, asthma, HTN, anemia, javier-en-Y, GERD, morbid obesity, OPAL, arthritis, and vitamin D deficiency presenting to  ED on 10/10 for abdominal pain admitted for SPENSER and abdominal pain. Patient was determined to be hemodynamically stable and appropriate for RNF, admitted to Family Medicine for further management. Problem based assessment and plan as follows:     #Hyperkalemia - resolved  #SPENSER - resolved  :: baseline in 2021 1.1-1.2, however admission in 6/2023 for similar presentation  :: K 6.0 on arrival -> calcium gluconate 2g IV x1, 25gm D50 IV x1, regular insulin 5U IV x1, Kayexalate 15g PO x1 -> K 5.2  :: EKG in ED without peaked T waves or other changes  - A1c 6/2023 - 5.5  - CT abd/pel d/t epigastric tenderness and extensive prior abdominal surgeries w/o evidence of acute process  - renally dose medications; avoid nephrotoxic agents  - daily RFP, bolussed IVF prn   - on tele     #Abdominal Pain  - s/p protonix 40 mg x 1 and oral  xylocaine in ED  - CT abd/pelvis without evidence of acute process              - on review with reading room, some evidence of small bowel fat stranding with possible developing inflammatory changes, however unable to adequately interpret without contrast  - bowel regimen in place  - advance diet as tolerated     # Anemia  - baseline Hgb 8-10  - no evidence of acute bleed  - likely ANSHUL based on previous workup   - c/w home ferrous sulfate     # HTN  - continue home amlodipine 10 mg daily     # Anxiety/Depression  - c/w home escitalopram 20mg daily     # Asthma  - c/w home albuterol as needed  - home fluticasone-salmeterol converted to formulary Breo Elipta while inpatient     # Allergic rhinitis  - c/w home flonase daily     # GERD  - c/w home sucralfate 1g QID  - pantoprazole 40 mg IV daily while inpatient  - plan to discharge with pepcid BID (concern for PPI nephrotoxicity)    # Arthritis  - continue home gabapentin 300 mg HS  - c/w home diclofenac gel, lidocaine patches, and tylenol     Fluids: bolus as needed  Diet: regular  DVT ppx: heparin SQ q8h  GI ppx: home PPI  Abx: none  Code Status: FULL  NOK: Carteretadyd Acuña (Fiance) [phone]/Rosalba Smith (daughter) [phone]     Patient seen and discussed with attending physician, Dr. Arnett.    Fawn Mathis MD  PGY-2, Family Medicine.

## 2023-10-16 LAB
ALBUMIN SERPL BCP-MCNC: 3.4 G/DL (ref 3.4–5)
ANION GAP SERPL CALC-SCNC: 13 MMOL/L (ref 10–20)
BASOPHILS # BLD AUTO: 0.05 X10*3/UL (ref 0–0.1)
BASOPHILS NFR BLD AUTO: 0.5 %
BUN SERPL-MCNC: 19 MG/DL (ref 6–23)
CALCIUM SERPL-MCNC: 8.5 MG/DL (ref 8.6–10.6)
CHLORIDE SERPL-SCNC: 110 MMOL/L (ref 98–107)
CO2 SERPL-SCNC: 21 MMOL/L (ref 21–32)
CREAT SERPL-MCNC: 1.2 MG/DL (ref 0.5–1.05)
EOSINOPHIL # BLD AUTO: 0.21 X10*3/UL (ref 0–0.7)
EOSINOPHIL NFR BLD AUTO: 2.3 %
ERYTHROCYTE [DISTWIDTH] IN BLOOD BY AUTOMATED COUNT: 15.5 % (ref 11.5–14.5)
GFR SERPL CREATININE-BSD FRML MDRD: 53 ML/MIN/1.73M*2
GLUCOSE SERPL-MCNC: 83 MG/DL (ref 74–99)
HCT VFR BLD AUTO: 27.9 % (ref 36–46)
HGB BLD-MCNC: 8.5 G/DL (ref 12–16)
IMM GRANULOCYTES # BLD AUTO: 0.04 X10*3/UL (ref 0–0.7)
IMM GRANULOCYTES NFR BLD AUTO: 0.4 % (ref 0–0.9)
LYMPHOCYTES # BLD AUTO: 2.26 X10*3/UL (ref 1.2–4.8)
LYMPHOCYTES NFR BLD AUTO: 24.2 %
MAGNESIUM SERPL-MCNC: 1.81 MG/DL (ref 1.6–2.4)
MCH RBC QN AUTO: 28.9 PG (ref 26–34)
MCHC RBC AUTO-ENTMCNC: 30.5 G/DL (ref 32–36)
MCV RBC AUTO: 95 FL (ref 80–100)
MONOCYTES # BLD AUTO: 0.79 X10*3/UL (ref 0.1–1)
MONOCYTES NFR BLD AUTO: 8.5 %
NEUTROPHILS # BLD AUTO: 5.98 X10*3/UL (ref 1.2–7.7)
NEUTROPHILS NFR BLD AUTO: 64.1 %
NRBC BLD-RTO: 0 /100 WBCS (ref 0–0)
PHOSPHATE SERPL-MCNC: 3.6 MG/DL (ref 2.5–4.9)
PLATELET # BLD AUTO: 229 X10*3/UL (ref 150–450)
PMV BLD AUTO: 10.7 FL (ref 7.5–11.5)
POTASSIUM SERPL-SCNC: 4.8 MMOL/L (ref 3.5–5.3)
RBC # BLD AUTO: 2.94 X10*6/UL (ref 4–5.2)
SODIUM SERPL-SCNC: 139 MMOL/L (ref 136–145)
WBC # BLD AUTO: 9.3 X10*3/UL (ref 4.4–11.3)

## 2023-10-16 PROCEDURE — 83735 ASSAY OF MAGNESIUM: CPT

## 2023-10-16 PROCEDURE — 2500000004 HC RX 250 GENERAL PHARMACY W/ HCPCS (ALT 636 FOR OP/ED): Mod: SE

## 2023-10-16 PROCEDURE — 2500000001 HC RX 250 WO HCPCS SELF ADMINISTERED DRUGS (ALT 637 FOR MEDICARE OP): Mod: SE

## 2023-10-16 PROCEDURE — 96372 THER/PROPH/DIAG INJ SC/IM: CPT

## 2023-10-16 PROCEDURE — 99232 SBSQ HOSP IP/OBS MODERATE 35: CPT | Performed by: STUDENT IN AN ORGANIZED HEALTH CARE EDUCATION/TRAINING PROGRAM

## 2023-10-16 PROCEDURE — 85025 COMPLETE CBC W/AUTO DIFF WBC: CPT | Mod: CMCLAB

## 2023-10-16 PROCEDURE — C9113 INJ PANTOPRAZOLE SODIUM, VIA: HCPCS | Mod: SE

## 2023-10-16 PROCEDURE — G0378 HOSPITAL OBSERVATION PER HR: HCPCS

## 2023-10-16 PROCEDURE — 36415 COLL VENOUS BLD VENIPUNCTURE: CPT

## 2023-10-16 PROCEDURE — 96376 TX/PRO/DX INJ SAME DRUG ADON: CPT | Performed by: EMERGENCY MEDICINE

## 2023-10-16 PROCEDURE — 2500000002 HC RX 250 W HCPCS SELF ADMINISTERED DRUGS (ALT 637 FOR MEDICARE OP, ALT 636 FOR OP/ED): Mod: SE

## 2023-10-16 PROCEDURE — 2500000005 HC RX 250 GENERAL PHARMACY W/O HCPCS: Mod: SE

## 2023-10-16 PROCEDURE — 80069 RENAL FUNCTION PANEL: CPT | Mod: CMCLAB

## 2023-10-16 RX ADMIN — HEPARIN SODIUM 10000 UNITS: 5000 INJECTION INTRAVENOUS; SUBCUTANEOUS at 23:14

## 2023-10-16 RX ADMIN — FLUTICASONE FUROATE AND VILANTEROL TRIFENATATE 1 PUFF: 200; 25 POWDER RESPIRATORY (INHALATION) at 08:59

## 2023-10-16 RX ADMIN — ACETAMINOPHEN 975 MG: 325 TABLET ORAL at 20:51

## 2023-10-16 RX ADMIN — FERROUS SULFATE TAB 325 MG (65 MG ELEMENTAL FE) 65 MG OF IRON: 325 (65 FE) TAB at 09:20

## 2023-10-16 RX ADMIN — LIDOCAINE 1 PATCH: 4 PATCH TOPICAL at 09:21

## 2023-10-16 RX ADMIN — PANTOPRAZOLE SODIUM 40 MG: 40 INJECTION, POWDER, FOR SOLUTION INTRAVENOUS at 09:21

## 2023-10-16 RX ADMIN — HEPARIN SODIUM 7500 UNITS: 5000 INJECTION INTRAVENOUS; SUBCUTANEOUS at 16:14

## 2023-10-16 RX ADMIN — Medication 3 MG: at 20:44

## 2023-10-16 RX ADMIN — SUCRALFATE 1 G: 1 SUSPENSION ORAL at 17:00

## 2023-10-16 RX ADMIN — AMLODIPINE BESYLATE 10 MG: 10 TABLET ORAL at 09:20

## 2023-10-16 RX ADMIN — SUCRALFATE 1 G: 1 SUSPENSION ORAL at 09:21

## 2023-10-16 RX ADMIN — ACETAMINOPHEN 975 MG: 325 TABLET ORAL at 09:21

## 2023-10-16 RX ADMIN — HEPARIN SODIUM 7500 UNITS: 5000 INJECTION INTRAVENOUS; SUBCUTANEOUS at 06:59

## 2023-10-16 RX ADMIN — GABAPENTIN 300 MG: 300 CAPSULE ORAL at 20:44

## 2023-10-16 RX ADMIN — SUCRALFATE 1 G: 1 SUSPENSION ORAL at 13:31

## 2023-10-16 RX ADMIN — FLUTICASONE PROPIONATE 1 SPRAY: 50 SPRAY, METERED NASAL at 13:31

## 2023-10-16 ASSESSMENT — PAIN SCALES - GENERAL
PAINLEVEL_OUTOF10: 7
PAINLEVEL_OUTOF10: 9
PAINLEVEL_OUTOF10: 9

## 2023-10-16 ASSESSMENT — PAIN - FUNCTIONAL ASSESSMENT: PAIN_FUNCTIONAL_ASSESSMENT: 0-10

## 2023-10-16 NOTE — CARE PLAN
The patient's goals for the shift include To be comfortable and free of pain.    The clinical goals for the shift include Pt will continue to increase her mobility.

## 2023-10-16 NOTE — PROGRESS NOTES
"Jessica Smith is a 58 y.o. female on day 0 of admission presenting with Acute kidney injury (CMS/HCC).    Subjective   Doing well. Denies overnight events. Last BM yesterday. Admits to flatus. Denies nausea, vomiting, diarrhea, fever, chills and chest pain. Is agreeable to rehab. Would like to go to [facility].        Objective     Physical Exam  Vitals and nursing note reviewed.   Constitutional:       Appearance: Normal appearance. She is obese.   HENT:      Head: Normocephalic.   Cardiovascular:      Rate and Rhythm: Normal rate and regular rhythm.   Pulmonary:      Effort: Pulmonary effort is normal.      Breath sounds: Normal breath sounds.   Abdominal:      General: Bowel sounds are normal.      Palpations: Abdomen is soft.      Comments: Mild generalized tenderness to abdomen.    Musculoskeletal:         General: Normal range of motion.      Cervical back: Normal range of motion.   Neurological:      General: No focal deficit present.      Mental Status: She is alert.   Psychiatric:         Mood and Affect: Mood normal.         Last Recorded Vitals  Blood pressure 149/69, pulse 71, temperature 36.6 °C (97.9 °F), resp. rate 20, height 1.6 m (5' 3\"), weight 143 kg (315 lb), SpO2 96 %.  Intake/Output last 3 Shifts:  No intake/output data recorded.    Relevant Results            Scheduled medications  amLODIPine, 10 mg, oral, Daily  calcium gluconate, 2 g, intravenous, Once  ferrous sulfate, 65 mg of iron, oral, Every other day  fluticasone, 1 spray, Each Nostril, Daily  fluticasone furoate-vilanteroL, 1 puff, inhalation, Daily  gabapentin, 300 mg, oral, Nightly  heparin (porcine), 7,500 Units, subcutaneous, q8h SUNSHINE  lidocaine, 1 patch, transdermal, Daily  pantoprazole, 40 mg, intravenous, Daily  polyethylene glycol, 17 g, oral, BID  sennosides-docusate sodium, 1 tablet, oral, BID  sodium polystyrene, 15 g, oral, Once  sucralfate, 1 g, oral, With meals & nightly      Continuous medications     PRN " medications  PRN medications: acetaminophen, albuterol, diclofenac sodium, guaiFENesin, melatonin, morphine, ondansetron   LABS:    Results for orders placed or performed during the hospital encounter of 10/10/23 (from the past 24 hour(s))   CBC and Auto Differential   Result Value Ref Range    WBC 9.3 4.4 - 11.3 x10*3/uL    nRBC 0.0 0.0 - 0.0 /100 WBCs    RBC 2.94 (L) 4.00 - 5.20 x10*6/uL    Hemoglobin 8.5 (L) 12.0 - 16.0 g/dL    Hematocrit 27.9 (L) 36.0 - 46.0 %    MCV 95 80 - 100 fL    MCH 28.9 26.0 - 34.0 pg    MCHC 30.5 (L) 32.0 - 36.0 g/dL    RDW 15.5 (H) 11.5 - 14.5 %    Platelets 229 150 - 450 x10*3/uL    MPV 10.7 7.5 - 11.5 fL    Neutrophils % 64.1 40.0 - 80.0 %    Immature Granulocytes %, Automated 0.4 0.0 - 0.9 %    Lymphocytes % 24.2 13.0 - 44.0 %    Monocytes % 8.5 2.0 - 10.0 %    Eosinophils % 2.3 0.0 - 6.0 %    Basophils % 0.5 0.0 - 2.0 %    Neutrophils Absolute 5.98 1.20 - 7.70 x10*3/uL    Immature Granulocytes Absolute, Automated 0.04 0.00 - 0.70 x10*3/uL    Lymphocytes Absolute 2.26 1.20 - 4.80 x10*3/uL    Monocytes Absolute 0.79 0.10 - 1.00 x10*3/uL    Eosinophils Absolute 0.21 0.00 - 0.70 x10*3/uL    Basophils Absolute 0.05 0.00 - 0.10 x10*3/uL   Renal function panel   Result Value Ref Range    Glucose 83 74 - 99 mg/dL    Sodium 139 136 - 145 mmol/L    Potassium 4.8 3.5 - 5.3 mmol/L    Chloride 110 (H) 98 - 107 mmol/L    Bicarbonate 21 21 - 32 mmol/L    Anion Gap 13 10 - 20 mmol/L    Urea Nitrogen 19 6 - 23 mg/dL    Creatinine 1.20 (H) 0.50 - 1.05 mg/dL    eGFR 53 (L) >60 mL/min/1.73m*2    Calcium 8.5 (L) 8.6 - 10.6 mg/dL    Phosphorus 3.6 2.5 - 4.9 mg/dL    Albumin 3.4 3.4 - 5.0 g/dL   Magnesium   Result Value Ref Range    Magnesium 1.81 1.60 - 2.40 mg/dL                 Assessment/Plan   Principal Problem:    Acute kidney injury (CMS/HCC)    57 yo female with h/o HFpEF (EF 60-65% 10/2021), anxiety/depression, asthma, HTN, anemia, javier-en-Y, GERD, morbid obesity, OPAL, arthritis, and vitamin D  deficiency presenting to  ED on 10/10 for abdominal pain admitted for SPENSER and abdominal pain. Patient was determined to be hemodynamically stable and appropriate for RNF, admitted to Family Medicine for further management. Problem based assessment and plan as follows:     Updates 10/16/23:  -Dispo pending SNF  -Will need outpatient GI follow up (patient aware)  -Cont regular diet (thickened #3)       #Abdominal Pain (likely functional vs scar tissue (due to multiple C-sections, abdominal surgeries, chronic pain that comes and goes)  - s/p protonix 40 mg x 1 and oral xylocaine in ED  - CT abd/pelvis without evidence of acute process              - on review with reading room, some evidence of small bowel fat stranding with possible developing inflammatory changes, however unable to adequately interpret without contrast  - bowel regimen in place  - advance diet as tolerated (on regular diet thickened #3)  - refer to GI on discharge for further evaluation and management    #Hyperkalemia - resolved  #SPENSER - resolved  :: baseline in 2021 1.1-1.2, however admission in 6/2023 for similar presentation  :: K 6.0 on arrival -> s/p hyperK cocktail -> K 5.2  :: EKG in ED without peaked T waves or other changes  - A1c 6/2023 - 5.5  - renally dose medications; avoid nephrotoxic agents  - daily RFP, bolus IVF prn      # Anemia  - baseline Hgb 8-10 w/o evidence of acute bleed  - likely ANSHUL based on previous workup   - c/w home ferrous sulfate     # HTN  - continue home amlodipine 10 mg daily     # Anxiety/Depression  - c/w home escitalopram 20mg daily     # Asthma  - c/w home albuterol as needed  - home fluticasone-salmeterol converted to formulary Breo Elipta while inpatient     # Allergic rhinitis  - c/w home flonase daily     # GERD  - c/w home sucralfate 1g QID  - pantoprazole 40 mg IV daily while inpatient  - plan to discharge with pepcid BID (concern for PPI nephrotoxicity)     # Arthritis  - continue home gabapentin 300 mg  HS  - c/w home diclofenac gel, lidocaine patches, and tylenol     Fluids: bolus as needed  Diet: regular  DVT ppx: heparin SQ q8h  GI ppx: home PPI  Abx: none  Code Status: FULL  NOK: José Acuña (Fiance) [id number]/Rosalba Smith (daughter) [phone]     Patient seen and discussed with attending Dr Arnett.     Paulette Dubois MD   Family Medicine, PGY1  [place]            Paulette Dubois MD

## 2023-10-16 NOTE — PROGRESS NOTES
10/16/23 PCN note:   per [facility] pt's [facility] is [address], PCN asked for precert to be initiated and asked CNC to complete PASRR in HENS for precert.     Yasmine Crabtree

## 2023-10-17 LAB
ALBUMIN SERPL BCP-MCNC: 3.5 G/DL (ref 3.4–5)
ANION GAP SERPL CALC-SCNC: 11 MMOL/L (ref 10–20)
BASOPHILS # BLD AUTO: 0.04 X10*3/UL (ref 0–0.1)
BASOPHILS NFR BLD AUTO: 0.4 %
BUN SERPL-MCNC: 20 MG/DL (ref 6–23)
CALCIUM SERPL-MCNC: 8.3 MG/DL (ref 8.6–10.6)
CHLORIDE SERPL-SCNC: 110 MMOL/L (ref 98–107)
CO2 SERPL-SCNC: 21 MMOL/L (ref 21–32)
CREAT SERPL-MCNC: 1.18 MG/DL (ref 0.5–1.05)
EOSINOPHIL # BLD AUTO: 0.21 X10*3/UL (ref 0–0.7)
EOSINOPHIL NFR BLD AUTO: 2.1 %
ERYTHROCYTE [DISTWIDTH] IN BLOOD BY AUTOMATED COUNT: 15.9 % (ref 11.5–14.5)
GFR SERPL CREATININE-BSD FRML MDRD: 54 ML/MIN/1.73M*2
GLUCOSE BLD MANUAL STRIP-MCNC: 102 MG/DL (ref 74–99)
GLUCOSE SERPL-MCNC: 138 MG/DL (ref 74–99)
HCT VFR BLD AUTO: 28.6 % (ref 36–46)
HGB BLD-MCNC: 8.7 G/DL (ref 12–16)
IMM GRANULOCYTES # BLD AUTO: 0.03 X10*3/UL (ref 0–0.7)
IMM GRANULOCYTES NFR BLD AUTO: 0.3 % (ref 0–0.9)
LYMPHOCYTES # BLD AUTO: 1.92 X10*3/UL (ref 1.2–4.8)
LYMPHOCYTES NFR BLD AUTO: 19.6 %
MAGNESIUM SERPL-MCNC: 1.86 MG/DL (ref 1.6–2.4)
MCH RBC QN AUTO: 29.4 PG (ref 26–34)
MCHC RBC AUTO-ENTMCNC: 30.4 G/DL (ref 32–36)
MCV RBC AUTO: 97 FL (ref 80–100)
MONOCYTES # BLD AUTO: 0.56 X10*3/UL (ref 0.1–1)
MONOCYTES NFR BLD AUTO: 5.7 %
NEUTROPHILS # BLD AUTO: 7.06 X10*3/UL (ref 1.2–7.7)
NEUTROPHILS NFR BLD AUTO: 71.9 %
NRBC BLD-RTO: 0 /100 WBCS (ref 0–0)
PHOSPHATE SERPL-MCNC: 3 MG/DL (ref 2.5–4.9)
PLATELET # BLD AUTO: 233 X10*3/UL (ref 150–450)
PMV BLD AUTO: 10.4 FL (ref 7.5–11.5)
POTASSIUM SERPL-SCNC: 4.4 MMOL/L (ref 3.5–5.3)
RBC # BLD AUTO: 2.96 X10*6/UL (ref 4–5.2)
SODIUM SERPL-SCNC: 138 MMOL/L (ref 136–145)
WBC # BLD AUTO: 9.8 X10*3/UL (ref 4.4–11.3)

## 2023-10-17 PROCEDURE — 83735 ASSAY OF MAGNESIUM: CPT | Performed by: STUDENT IN AN ORGANIZED HEALTH CARE EDUCATION/TRAINING PROGRAM

## 2023-10-17 PROCEDURE — C9113 INJ PANTOPRAZOLE SODIUM, VIA: HCPCS | Mod: SE | Performed by: STUDENT IN AN ORGANIZED HEALTH CARE EDUCATION/TRAINING PROGRAM

## 2023-10-17 PROCEDURE — 97530 THERAPEUTIC ACTIVITIES: CPT | Mod: GO

## 2023-10-17 PROCEDURE — 96376 TX/PRO/DX INJ SAME DRUG ADON: CPT | Performed by: EMERGENCY MEDICINE

## 2023-10-17 PROCEDURE — 96372 THER/PROPH/DIAG INJ SC/IM: CPT

## 2023-10-17 PROCEDURE — 2500000001 HC RX 250 WO HCPCS SELF ADMINISTERED DRUGS (ALT 637 FOR MEDICARE OP): Mod: SE | Performed by: STUDENT IN AN ORGANIZED HEALTH CARE EDUCATION/TRAINING PROGRAM

## 2023-10-17 PROCEDURE — 96372 THER/PROPH/DIAG INJ SC/IM: CPT | Performed by: STUDENT IN AN ORGANIZED HEALTH CARE EDUCATION/TRAINING PROGRAM

## 2023-10-17 PROCEDURE — 2500000002 HC RX 250 W HCPCS SELF ADMINISTERED DRUGS (ALT 637 FOR MEDICARE OP, ALT 636 FOR OP/ED): Mod: SE | Performed by: STUDENT IN AN ORGANIZED HEALTH CARE EDUCATION/TRAINING PROGRAM

## 2023-10-17 PROCEDURE — 97116 GAIT TRAINING THERAPY: CPT | Mod: GP,CQ

## 2023-10-17 PROCEDURE — 85025 COMPLETE CBC W/AUTO DIFF WBC: CPT | Performed by: STUDENT IN AN ORGANIZED HEALTH CARE EDUCATION/TRAINING PROGRAM

## 2023-10-17 PROCEDURE — 99232 SBSQ HOSP IP/OBS MODERATE 35: CPT | Performed by: STUDENT IN AN ORGANIZED HEALTH CARE EDUCATION/TRAINING PROGRAM

## 2023-10-17 PROCEDURE — 2500000004 HC RX 250 GENERAL PHARMACY W/ HCPCS (ALT 636 FOR OP/ED): Mod: SE | Performed by: STUDENT IN AN ORGANIZED HEALTH CARE EDUCATION/TRAINING PROGRAM

## 2023-10-17 PROCEDURE — 80069 RENAL FUNCTION PANEL: CPT | Mod: CMCLAB | Performed by: STUDENT IN AN ORGANIZED HEALTH CARE EDUCATION/TRAINING PROGRAM

## 2023-10-17 PROCEDURE — G0378 HOSPITAL OBSERVATION PER HR: HCPCS

## 2023-10-17 PROCEDURE — 36415 COLL VENOUS BLD VENIPUNCTURE: CPT | Performed by: STUDENT IN AN ORGANIZED HEALTH CARE EDUCATION/TRAINING PROGRAM

## 2023-10-17 PROCEDURE — 94640 AIRWAY INHALATION TREATMENT: CPT

## 2023-10-17 PROCEDURE — 2500000005 HC RX 250 GENERAL PHARMACY W/O HCPCS: Mod: SE | Performed by: STUDENT IN AN ORGANIZED HEALTH CARE EDUCATION/TRAINING PROGRAM

## 2023-10-17 PROCEDURE — 82947 ASSAY GLUCOSE BLOOD QUANT: CPT | Mod: 59,CMCLAB

## 2023-10-17 PROCEDURE — 97535 SELF CARE MNGMENT TRAINING: CPT | Mod: GO

## 2023-10-17 PROCEDURE — 2500000004 HC RX 250 GENERAL PHARMACY W/ HCPCS (ALT 636 FOR OP/ED): Mod: SE

## 2023-10-17 RX ORDER — ESCITALOPRAM OXALATE 10 MG/1
20 TABLET ORAL DAILY
Status: DISCONTINUED | OUTPATIENT
Start: 2023-10-17 | End: 2023-10-19 | Stop reason: HOSPADM

## 2023-10-17 RX ADMIN — SUCRALFATE 1 G: 1 SUSPENSION ORAL at 17:56

## 2023-10-17 RX ADMIN — ACETAMINOPHEN 975 MG: 325 TABLET ORAL at 19:23

## 2023-10-17 RX ADMIN — LIDOCAINE 1 PATCH: 4 PATCH TOPICAL at 08:28

## 2023-10-17 RX ADMIN — AMLODIPINE BESYLATE 10 MG: 10 TABLET ORAL at 08:28

## 2023-10-17 RX ADMIN — FLUTICASONE FUROATE AND VILANTEROL TRIFENATATE 1 PUFF: 200; 25 POWDER RESPIRATORY (INHALATION) at 15:20

## 2023-10-17 RX ADMIN — Medication 3 MG: at 21:02

## 2023-10-17 RX ADMIN — SUCRALFATE 1 G: 1 SUSPENSION ORAL at 08:28

## 2023-10-17 RX ADMIN — SUCRALFATE 1 G: 1 SUSPENSION ORAL at 12:14

## 2023-10-17 RX ADMIN — ACETAMINOPHEN 975 MG: 325 TABLET ORAL at 08:28

## 2023-10-17 RX ADMIN — HEPARIN SODIUM 7500 UNITS: 5000 INJECTION INTRAVENOUS; SUBCUTANEOUS at 06:00

## 2023-10-17 RX ADMIN — HEPARIN SODIUM 7500 UNITS: 5000 INJECTION INTRAVENOUS; SUBCUTANEOUS at 14:12

## 2023-10-17 RX ADMIN — FLUTICASONE PROPIONATE 1 SPRAY: 50 SPRAY, METERED NASAL at 08:30

## 2023-10-17 RX ADMIN — PANTOPRAZOLE SODIUM 40 MG: 40 INJECTION, POWDER, FOR SOLUTION INTRAVENOUS at 08:28

## 2023-10-17 RX ADMIN — GABAPENTIN 300 MG: 300 CAPSULE ORAL at 20:58

## 2023-10-17 RX ADMIN — ESCITALOPRAM OXALATE 20 MG: 10 TABLET ORAL at 08:28

## 2023-10-17 RX ADMIN — SUCRALFATE 1 G: 1 SUSPENSION ORAL at 20:58

## 2023-10-17 RX ADMIN — HEPARIN SODIUM 10000 UNITS: 5000 INJECTION INTRAVENOUS; SUBCUTANEOUS at 21:45

## 2023-10-17 RX ADMIN — DICLOFENAC 1 APPLICATION: 10 GEL TOPICAL at 14:12

## 2023-10-17 ASSESSMENT — ACTIVITIES OF DAILY LIVING (ADL)
HOME_MANAGEMENT_TIME_ENTRY: 12
BATHING_LEVEL_OF_ASSISTANCE: CONTACT GUARD
LACK_OF_TRANSPORTATION: NO

## 2023-10-17 ASSESSMENT — PAIN SCALES - GENERAL
PAINLEVEL_OUTOF10: 9
PAINLEVEL_OUTOF10: 5 - MODERATE PAIN
PAINLEVEL_OUTOF10: 9
PAINLEVEL_OUTOF10: 9

## 2023-10-17 ASSESSMENT — COGNITIVE AND FUNCTIONAL STATUS - GENERAL
MOVING TO AND FROM BED TO CHAIR: A LITTLE
WALKING IN HOSPITAL ROOM: A LITTLE
MOBILITY SCORE: 19
CLIMB 3 TO 5 STEPS WITH RAILING: A LITTLE
PERSONAL GROOMING: A LITTLE
DAILY ACTIVITIY SCORE: 20
HELP NEEDED FOR BATHING: A LOT
DRESSING REGULAR LOWER BODY CLOTHING: A LITTLE
TURNING FROM BACK TO SIDE WHILE IN FLAT BAD: A LITTLE
STANDING UP FROM CHAIR USING ARMS: A LITTLE
TURNING FROM BACK TO SIDE WHILE IN FLAT BAD: A LITTLE
DRESSING REGULAR UPPER BODY CLOTHING: A LITTLE
DRESSING REGULAR UPPER BODY CLOTHING: A LITTLE
STANDING UP FROM CHAIR USING ARMS: A LITTLE
TOILETING: A LITTLE
CLIMB 3 TO 5 STEPS WITH RAILING: A LOT
HELP NEEDED FOR BATHING: A LITTLE
TOILETING: A LOT
WALKING IN HOSPITAL ROOM: A LITTLE
MOVING TO AND FROM BED TO CHAIR: A LITTLE
MOBILITY SCORE: 18
DRESSING REGULAR LOWER BODY CLOTHING: A LOT
DAILY ACTIVITIY SCORE: 16

## 2023-10-17 ASSESSMENT — PAIN - FUNCTIONAL ASSESSMENT
PAIN_FUNCTIONAL_ASSESSMENT: 0-10

## 2023-10-17 ASSESSMENT — PAIN DESCRIPTION - DESCRIPTORS: DESCRIPTORS: ACHING

## 2023-10-17 NOTE — PROGRESS NOTES
"Jessica Smiht is a 58 y.o. female on day 7 of admission presenting with Acute kidney injury (CMS/HCC).    Subjective   Doing well. Denies overnight events. Eating well. Denies fever, chills, nausea and vomiting.        Objective     Physical Exam  Vitals and nursing note reviewed.   Constitutional:       Appearance: Normal appearance.   HENT:      Head: Normocephalic.   Cardiovascular:      Rate and Rhythm: Normal rate and regular rhythm.   Pulmonary:      Effort: Pulmonary effort is normal.      Breath sounds: Normal breath sounds.   Abdominal:      General: Bowel sounds are normal.      Palpations: Abdomen is soft.      Comments: Slight tender to palpation Right lower and upper quadrants.    Musculoskeletal:         General: Normal range of motion.      Cervical back: Normal range of motion.   Skin:     General: Skin is warm.   Neurological:      General: No focal deficit present.      Mental Status: She is alert and oriented to person, place, and time.   Psychiatric:         Mood and Affect: Mood normal.       Last Recorded Vitals  Blood pressure 133/65, pulse 71, temperature 36.1 °C (97 °F), resp. rate 18, height 1.6 m (5' 3\"), weight 143 kg (315 lb), SpO2 96 %.  Intake/Output last 3 Shifts:  No intake/output data recorded.    Relevant Results              Scheduled medications  amLODIPine, 10 mg, oral, Daily  escitalopram, 20 mg, oral, Daily  ferrous sulfate, 65 mg of iron, oral, Every other day  fluticasone, 1 spray, Each Nostril, Daily  fluticasone furoate-vilanteroL, 1 puff, inhalation, Daily  gabapentin, 300 mg, oral, Nightly  heparin (porcine), 7,500 Units, subcutaneous, q8h SUNSHINE  lidocaine, 1 patch, transdermal, Daily  pantoprazole, 40 mg, intravenous, Daily  polyethylene glycol, 17 g, oral, BID  sennosides-docusate sodium, 1 tablet, oral, BID  sucralfate, 1 g, oral, With meals & nightly      Continuous medications     PRN medications  PRN medications: acetaminophen, albuterol, diclofenac sodium, " guaiFENesin, melatonin, ondansetron     Results for orders placed or performed during the hospital encounter of 10/10/23 (from the past 24 hour(s))   CBC and Auto Differential   Result Value Ref Range    WBC 9.8 4.4 - 11.3 x10*3/uL    nRBC 0.0 0.0 - 0.0 /100 WBCs    RBC 2.96 (L) 4.00 - 5.20 x10*6/uL    Hemoglobin 8.7 (L) 12.0 - 16.0 g/dL    Hematocrit 28.6 (L) 36.0 - 46.0 %    MCV 97 80 - 100 fL    MCH 29.4 26.0 - 34.0 pg    MCHC 30.4 (L) 32.0 - 36.0 g/dL    RDW 15.9 (H) 11.5 - 14.5 %    Platelets 233 150 - 450 x10*3/uL    MPV 10.4 7.5 - 11.5 fL    Neutrophils % 71.9 40.0 - 80.0 %    Immature Granulocytes %, Automated 0.3 0.0 - 0.9 %    Lymphocytes % 19.6 13.0 - 44.0 %    Monocytes % 5.7 2.0 - 10.0 %    Eosinophils % 2.1 0.0 - 6.0 %    Basophils % 0.4 0.0 - 2.0 %    Neutrophils Absolute 7.06 1.20 - 7.70 x10*3/uL    Immature Granulocytes Absolute, Automated 0.03 0.00 - 0.70 x10*3/uL    Lymphocytes Absolute 1.92 1.20 - 4.80 x10*3/uL    Monocytes Absolute 0.56 0.10 - 1.00 x10*3/uL    Eosinophils Absolute 0.21 0.00 - 0.70 x10*3/uL    Basophils Absolute 0.04 0.00 - 0.10 x10*3/uL   Renal function panel   Result Value Ref Range    Glucose 138 (H) 74 - 99 mg/dL    Sodium 138 136 - 145 mmol/L    Potassium 4.4 3.5 - 5.3 mmol/L    Chloride 110 (H) 98 - 107 mmol/L    Bicarbonate 21 21 - 32 mmol/L    Anion Gap 11 10 - 20 mmol/L    Urea Nitrogen 20 6 - 23 mg/dL    Creatinine 1.18 (H) 0.50 - 1.05 mg/dL    eGFR 54 (L) >60 mL/min/1.73m*2    Calcium 8.3 (L) 8.6 - 10.6 mg/dL    Phosphorus 3.0 2.5 - 4.9 mg/dL    Albumin 3.5 3.4 - 5.0 g/dL   Magnesium   Result Value Ref Range    Magnesium 1.86 1.60 - 2.40 mg/dL             Assessment/Plan   Principal Problem:    Acute kidney injury (CMS/HCC)    59 yo female with h/o HFpEF (EF 60-65% 10/2021), anxiety/depression, asthma, HTN, anemia, javier-en-Y, GERD, morbid obesity, OPAL, arthritis, and vitamin D deficiency presenting to  ED on 10/10 for abdominal pain admitted for SPENSER and abdominal pain.  Patient was determined to be hemodynamically stable and appropriate for RNF, admitted to [facility] for further management. Problem based assessment and plan as follows:     Updates 10/17/23:  -Dispo pending SNF ([facility]); awaiting pre-cert  -Will need outpatient GI follow up (patient aware)  -Added NutraSource fiber to patient's meals to increase fiber intake     #Abdominal Pain (likely functional vs scar tissue (due to multiple C-sections, abdominal surgeries, chronic pain that comes and goes)  - CT abd/pelvis without evidence of acute process  - bowel regimen in place  - refer to GI on discharge for further evaluation and management  - increase fiber with Nutra[employer] fiber supplement     #Hyperkalemia - resolved  #SPENSER - resolved  :: baseline in 2021 1.1-1.2, however admission in 6/2023 for similar presentation  :: K 6.0 on arrival -> s/p hyperK cocktail -> K 5.2  :: EKG in ED without peaked T waves or other changes  - renally dose medications; avoid nephrotoxic agents  - daily RFP, bolus IVF prn     # Anemia  - baseline Hgb 8-10 w/o evidence of acute bleed  - likely ANSHUL based on previous workup  - c/w home ferrous sulfate     # HTN  - continue home amlodipine 10 mg daily     # Anxiety/Depression  - c/w home escitalopram 20mg daily     # Asthma  - c/w home albuterol as needed  - home fluticasone-salmeterol converted to formulary Breo Elipta while inpatient     # Allergic rhinitis  - c/w home flonase daily     # GERD  - c/w home sucralfate 1g QID  - pantoprazole 40 mg IV daily while inpatient  - plan to discharge with pepcid BID (concern for PPI nephrotoxicity)     # Arthritis  - continue home gabapentin 300 mg HS  - c/w home diclofenac gel, lidocaine patches, and tylenol     Fluids: bolus as needed  Diet: regular  DVT ppx: heparin SQ q8h  GI ppx: IV protonix 40 mg daily   Abx: none  Code Status: FULL  NOK: José Acuña (Fiance) [phone]/Rosalba Smith (daughter) [phone]     Patient  seen and discussed with attending Dr Arnett.      Paulette Dubois MD   Family Medicine, PGY1  [facility]      Patient seen and evaluated with intern. Agree with assessment above, edits made within text.    Minor Johnson MD   PGY-3

## 2023-10-17 NOTE — PROGRESS NOTES
10/17/23 0844   Discharge Planning   Living Arrangements Spouse/significant other   Support Systems Spouse/significant other;Children   Assistance Needed Yes, assistance needed   Type of Residence Private residence   Number of Stairs Within Residence 15   Do you have animals or pets at home? No   Who is requesting discharge planning? Provider   Home or Post Acute Services Post acute facilities (Rehab/SNF/etc)   Type of Post Acute Facility Services Skilled nursing   Patient expects to be discharged to: SNF   Does the patient need discharge transport arranged? Yes   Financial Resource Strain   How hard is it for you to pay for the very basics like food, housing, medical care, and heating? Not very   Housing Stability   In the last 12 months, was there a time when you were not able to pay the mortgage or rent on time? N   In the last 12 months, how many places have you lived? 1   In the last 12 months, was there a time when you did not have a steady place to sleep or slept in a shelter (including now)? N   Transportation Needs   In the past 12 months, has lack of transportation kept you from medical appointments or from getting medications? no   In the past 12 months, has lack of transportation kept you from meetings, work, or from getting things needed for daily living? No   Patient Choice   Provider Choice list and CMS website (https://medicare.gov/care-compare#search) for post-acute Quality and Resource Measure Data were provided and reviewed with: Patient   Patient / Family choosing to utilize agency / facility established prior to hospitalization Yes       Transitional Care Coordination Progress Note:  Plan per Medical/Surgical team: Pt admitted with abdominal pain, now medically stable.  Payor source: [facility]  Discharge disposition: ?[facility]  Potential Barriers: precert  ADOD: 10/18  Met with pt and introduced myself as Care Coordinator with Care Transitions Team for Discharge Planning. Pt made  aware of pt/ot recommendation or SNF to which she is agreeable.  Per pt she has been to [facility] and this is her FOC. This RN requested that PCN submit referral.   Lourdes Noyola RN Transitional Care Coordinator

## 2023-10-17 NOTE — PROGRESS NOTES
Occupational Therapy    Occupational Therapy Treatment    Name: Jessica Smith  MRN: [id number]  : 1965  Date: 10/17/23  Time Calculation  Start Time: 1124  Stop Time: 1148  Time Calculation (min): 24 min    Assessment:  Prognosis: Good  Barriers to Discharge: Inaccessible home environment  Evaluation/Treatment Tolerance: Patient limited by pain  Medical Staff Made Aware: Yes (RN)  End of Session Communication: Bedside nurse  End of Session Patient Position: Bed, 3 rail up, Alarm off, not on at start of session  Plan:  Treatment Interventions: ADL retraining, Functional transfer training, Endurance training, Equipment evaluation/education, Compensatory technique education  OT Frequency: 3 times per week  OT Discharge Recommendations: Moderate intensity level of continued care  OT - OK to Discharge: Yes    Subjective   General:  OT Last Visit  OT Received On: 10/17/23  General  Reason for Referral: Abdominal pain, SPENSER, L knee pain s/p fall  Past Medical History Relevant to Rehab: HFpEF (EF 60-65% 10/2021), anxiety/depression, asthma, HTN, anemia, javier-en-Y, GERD, morbid obesity, OPAL, arthritis, and vitamin D deficiency  Prior to Session Communication: Bedside nurse  Patient Position Received: Bed, 2 rail up, Alarm off, not on at start of session  General Comment: Pt supine in bed asleep upon arrival. Pt woke easily and agreeable to therapy after encouragement.    Pain Assessment:  Pain Assessment  Pain Assessment: 0-10  Pain Score: 9  Pain Location: Knee  Pain Orientation: Left     Objective   Activities of Daily Living: Grooming  Grooming Level of Assistance: Close supervision  Grooming Where Assessed: Edge of bed  Grooming Comments:  (face hygiene, supervision for safety at EOB)    UE Bathing  UE Bathing Level of Assistance: Close supervision  UE Bathing Where Assessed: Edge of bed  UE Bathing Comments: sponge bath    LE Bathing  LE Bathing Level of Assistance: Contact guard  LE Bathing Where Assessed:  (EOB,  standing at FWW)  LE Bathing Comments: score for dread/buttocks region only. Completed in both sitting/standing, CGA for steadying assist/safety in standing.    UE Dressing  UE Dressing Level of Assistance: Close supervision  UE Dressing Where Assessed: Edge of bed  UE Dressing Comments: don/[facility] gowns x2 (front and back)     Bed Mobility/Transfers: Bed Mobility  Bed Mobility: Yes  Bed Mobility 1  Bed Mobility 1: Sitting to supine  Level of Assistance 1: Close supervision  Bed Mobility Comments 1: HOB elevated  Bed Mobility 2  Bed Mobility  2: Sitting to supine  Level of Assistance 2: Moderate assistance  Bed Mobility Comments 2: assist to elevate BLEs onto bed    Transfers  Transfer: Yes  Transfer 1  Transfer From 1: Sit to  Transfer to 1: Stand  Transfer Device 1: Walker  Transfer Level of Assistance 1: Minimum assistance  Trials/Comments 1: x3 from EOB, cued for hand placement and walker safety  Transfers 2  Transfer From 2: Stand to  Transfer to 2: Sit  Transfer Device 2: Walker  Transfer Level of Assistance 2: Contact guard  Trials/Comments 2: cued for hand placement  Transfers 3  Transfer From 3: Chair with arms to  Transfer to 3: Bed  Technique 3: Stand pivot  Transfer Device 3: Walker  Transfer Level of Assistance 3: Minimum assistance, Minimal verbal cues  Transfers 4  Transfer From 4: Bed to  Transfer to 4: Chair with arms  Technique 4: Stand pivot  Transfer Device 4: Walker  Transfer Level of Assistance 4: Minimum assistance    Therapy/Activity: Therapeutic Activity  Therapeutic Activity Performed: Yes  Therapeutic Activity 1: To increase functional balance/walker safety needed for ADLs, patient performed R lateral side-steps at EOB using FWW with Min Ax1.       Outcome Measures:  [facility] Daily Activity  Putting on and taking off regular lower body clothing: A lot  Bathing (including washing, rinsing, drying): A lot  Putting on and taking off regular upper body clothing: A little  Toileting, which  includes using toilet, bedpan or urinal: A lot  Taking care of personal grooming such as brushing teeth: A little  Eating Meals: None  Daily Activity - Total Score: 16      Education Documentation  Body Mechanics, taught by Sondra Kauffman OT at 10/17/2023  2:01 PM.  Learner: Patient  Readiness: Acceptance  Method: Explanation  Response: Verbalizes Understanding, Needs Reinforcement    Precautions, taught by Sondra Kauffman OT at 10/17/2023  2:01 PM.  Learner: Patient  Readiness: Acceptance  Method: Explanation  Response: Verbalizes Understanding, Needs Reinforcement    ADL Training, taught by Sondra Kauffman OT at 10/17/2023  2:01 PM.  Learner: Patient  Readiness: Acceptance  Method: Explanation  Response: Verbalizes Understanding, Needs Reinforcement    Education Comments  No comments found.      Goals:  Encounter Problems       Encounter Problems (Active)       ADLs       Patient will perform UB and LB bathing with modified independent level of assistance and shower chair and long-handled sponge. (Progressing)       Start:  10/13/23    Expected End:  10/27/23            Patient with complete upper body dressing with independent level of assistance donning and doffing all UE clothes with no adaptive equipment while edge of bed  (Progressing)       Start:  10/13/23    Expected End:  10/27/23            Patient with complete lower body dressing with modified independent level of assistance donning and doffing all LE clothes  with PRN adaptive equipment while edge of bed  and standing (Progressing)       Start:  10/13/23    Expected End:  10/27/23            Patient will complete daily grooming tasks brushing teeth and washing face/hair with independent level of assistance and PRN adaptive equipment while standing. (Progressing)       Start:  10/13/23    Expected End:  10/27/23            Patient will complete toileting including hygiene clothing management/hygiene with modified independent level of assistance and  grab bars. (Progressing)       Start:  10/13/23    Expected End:  10/27/23               BALANCE       Pt will maintain dynamic standing balance during ADL task with modified independent level of assistance in order to demonstrate decreased risk of falling and improved postural control. (Progressing)       Start:  10/13/23    Expected End:  10/27/23               MOBILITY       Patient will perform Functional mobility mod  Household distances/Community Distances with modified independent level of assistance and least restrictive device in order to improve safety and functional mobility. (Progressing)       Start:  10/13/23    Expected End:  10/27/23                 TRANSFERS       Patient will complete functional transfers with least restrictive device with modified independent level of assistance. (Progressing)       Start:  10/13/23    Expected End:  10/27/23

## 2023-10-17 NOTE — PROGRESS NOTES
10/17/23 PCN note:   updated PT/OT notes sent to [facility] for initiation of precgraciela, PASRR also completed in [facility] by [facility].    Yasmine Crabtree

## 2023-10-18 PROBLEM — N17.9 ACUTE KIDNEY INJURY (CMS-HCC): Status: RESOLVED | Noted: 2023-10-11 | Resolved: 2023-10-18

## 2023-10-18 LAB
ALBUMIN SERPL BCP-MCNC: 3.5 G/DL (ref 3.4–5)
ANION GAP SERPL CALC-SCNC: 12 MMOL/L (ref 10–20)
BASOPHILS # BLD AUTO: 0.05 X10*3/UL (ref 0–0.1)
BASOPHILS NFR BLD AUTO: 0.5 %
BUN SERPL-MCNC: 20 MG/DL (ref 6–23)
CALCIUM SERPL-MCNC: 8.5 MG/DL (ref 8.6–10.6)
CHLORIDE SERPL-SCNC: 110 MMOL/L (ref 98–107)
CO2 SERPL-SCNC: 22 MMOL/L (ref 21–32)
CREAT SERPL-MCNC: 1.16 MG/DL (ref 0.5–1.05)
EOSINOPHIL # BLD AUTO: 0.19 X10*3/UL (ref 0–0.7)
EOSINOPHIL NFR BLD AUTO: 2 %
ERYTHROCYTE [DISTWIDTH] IN BLOOD BY AUTOMATED COUNT: 15.5 % (ref 11.5–14.5)
GFR SERPL CREATININE-BSD FRML MDRD: 55 ML/MIN/1.73M*2
GLUCOSE SERPL-MCNC: 94 MG/DL (ref 74–99)
HCT VFR BLD AUTO: 27.4 % (ref 36–46)
HGB BLD-MCNC: 8.2 G/DL (ref 12–16)
IMM GRANULOCYTES # BLD AUTO: 0.03 X10*3/UL (ref 0–0.7)
IMM GRANULOCYTES NFR BLD AUTO: 0.3 % (ref 0–0.9)
LYMPHOCYTES # BLD AUTO: 1.98 X10*3/UL (ref 1.2–4.8)
LYMPHOCYTES NFR BLD AUTO: 21.2 %
MAGNESIUM SERPL-MCNC: 1.88 MG/DL (ref 1.6–2.4)
MCH RBC QN AUTO: 28.4 PG (ref 26–34)
MCHC RBC AUTO-ENTMCNC: 29.9 G/DL (ref 32–36)
MCV RBC AUTO: 95 FL (ref 80–100)
MONOCYTES # BLD AUTO: 0.74 X10*3/UL (ref 0.1–1)
MONOCYTES NFR BLD AUTO: 7.9 %
NEUTROPHILS # BLD AUTO: 6.35 X10*3/UL (ref 1.2–7.7)
NEUTROPHILS NFR BLD AUTO: 68.1 %
NRBC BLD-RTO: 0 /100 WBCS (ref 0–0)
PHOSPHATE SERPL-MCNC: 3.4 MG/DL (ref 2.5–4.9)
PLATELET # BLD AUTO: 235 X10*3/UL (ref 150–450)
PMV BLD AUTO: 10.6 FL (ref 7.5–11.5)
POTASSIUM SERPL-SCNC: 4.8 MMOL/L (ref 3.5–5.3)
RBC # BLD AUTO: 2.89 X10*6/UL (ref 4–5.2)
SODIUM SERPL-SCNC: 139 MMOL/L (ref 136–145)
WBC # BLD AUTO: 9.3 X10*3/UL (ref 4.4–11.3)

## 2023-10-18 PROCEDURE — 2500000001 HC RX 250 WO HCPCS SELF ADMINISTERED DRUGS (ALT 637 FOR MEDICARE OP): Mod: SE | Performed by: STUDENT IN AN ORGANIZED HEALTH CARE EDUCATION/TRAINING PROGRAM

## 2023-10-18 PROCEDURE — 85025 COMPLETE CBC W/AUTO DIFF WBC: CPT | Mod: CMCLAB | Performed by: PODIATRIST

## 2023-10-18 PROCEDURE — 2500000005 HC RX 250 GENERAL PHARMACY W/O HCPCS: Mod: SE | Performed by: STUDENT IN AN ORGANIZED HEALTH CARE EDUCATION/TRAINING PROGRAM

## 2023-10-18 PROCEDURE — 36415 COLL VENOUS BLD VENIPUNCTURE: CPT | Performed by: PODIATRIST

## 2023-10-18 PROCEDURE — 83735 ASSAY OF MAGNESIUM: CPT | Mod: CMCLAB | Performed by: PODIATRIST

## 2023-10-18 PROCEDURE — 2500000002 HC RX 250 W HCPCS SELF ADMINISTERED DRUGS (ALT 637 FOR MEDICARE OP, ALT 636 FOR OP/ED): Mod: SE | Performed by: STUDENT IN AN ORGANIZED HEALTH CARE EDUCATION/TRAINING PROGRAM

## 2023-10-18 PROCEDURE — G0378 HOSPITAL OBSERVATION PER HR: HCPCS

## 2023-10-18 PROCEDURE — 96372 THER/PROPH/DIAG INJ SC/IM: CPT | Performed by: STUDENT IN AN ORGANIZED HEALTH CARE EDUCATION/TRAINING PROGRAM

## 2023-10-18 PROCEDURE — 80069 RENAL FUNCTION PANEL: CPT | Mod: CMCLAB | Performed by: PODIATRIST

## 2023-10-18 PROCEDURE — C9113 INJ PANTOPRAZOLE SODIUM, VIA: HCPCS | Mod: SE | Performed by: STUDENT IN AN ORGANIZED HEALTH CARE EDUCATION/TRAINING PROGRAM

## 2023-10-18 PROCEDURE — 2500000004 HC RX 250 GENERAL PHARMACY W/ HCPCS (ALT 636 FOR OP/ED): Mod: SE | Performed by: STUDENT IN AN ORGANIZED HEALTH CARE EDUCATION/TRAINING PROGRAM

## 2023-10-18 PROCEDURE — 99232 SBSQ HOSP IP/OBS MODERATE 35: CPT | Performed by: STUDENT IN AN ORGANIZED HEALTH CARE EDUCATION/TRAINING PROGRAM

## 2023-10-18 PROCEDURE — 96376 TX/PRO/DX INJ SAME DRUG ADON: CPT | Performed by: EMERGENCY MEDICINE

## 2023-10-18 RX ORDER — POLYETHYLENE GLYCOL 3350 17 G/17G
17 POWDER, FOR SOLUTION ORAL 2 TIMES DAILY
Start: 2023-10-18 | End: 2023-12-12 | Stop reason: SDUPTHER

## 2023-10-18 RX ORDER — AMOXICILLIN 250 MG
1 CAPSULE ORAL 2 TIMES DAILY
Start: 2023-10-18 | End: 2023-12-12 | Stop reason: SDUPTHER

## 2023-10-18 RX ADMIN — SUCRALFATE 1 G: 1 SUSPENSION ORAL at 17:09

## 2023-10-18 RX ADMIN — FLUTICASONE PROPIONATE 1 SPRAY: 50 SPRAY, METERED NASAL at 08:42

## 2023-10-18 RX ADMIN — SUCRALFATE 1 G: 1 SUSPENSION ORAL at 21:04

## 2023-10-18 RX ADMIN — HEPARIN SODIUM 7500 UNITS: 5000 INJECTION INTRAVENOUS; SUBCUTANEOUS at 14:10

## 2023-10-18 RX ADMIN — FLUTICASONE FUROATE AND VILANTEROL TRIFENATATE 1 PUFF: 200; 25 POWDER RESPIRATORY (INHALATION) at 10:21

## 2023-10-18 RX ADMIN — Medication 3 MG: at 20:17

## 2023-10-18 RX ADMIN — AMLODIPINE BESYLATE 10 MG: 10 TABLET ORAL at 08:41

## 2023-10-18 RX ADMIN — GABAPENTIN 300 MG: 300 CAPSULE ORAL at 20:18

## 2023-10-18 RX ADMIN — LIDOCAINE 1 PATCH: 4 PATCH TOPICAL at 08:41

## 2023-10-18 RX ADMIN — ACETAMINOPHEN 975 MG: 325 TABLET ORAL at 08:41

## 2023-10-18 RX ADMIN — ACETAMINOPHEN 975 MG: 325 TABLET ORAL at 20:17

## 2023-10-18 RX ADMIN — HEPARIN SODIUM 10000 UNITS: 5000 INJECTION INTRAVENOUS; SUBCUTANEOUS at 06:35

## 2023-10-18 RX ADMIN — SUCRALFATE 1 G: 1 SUSPENSION ORAL at 12:00

## 2023-10-18 RX ADMIN — PANTOPRAZOLE SODIUM 40 MG: 40 INJECTION, POWDER, FOR SOLUTION INTRAVENOUS at 08:42

## 2023-10-18 RX ADMIN — HEPARIN SODIUM 7500 UNITS: 5000 INJECTION INTRAVENOUS; SUBCUTANEOUS at 21:04

## 2023-10-18 RX ADMIN — ESCITALOPRAM OXALATE 20 MG: 10 TABLET ORAL at 08:41

## 2023-10-18 RX ADMIN — FERROUS SULFATE TAB 325 MG (65 MG ELEMENTAL FE) 65 MG OF IRON: 325 (65 FE) TAB at 08:41

## 2023-10-18 RX ADMIN — SUCRALFATE 1 G: 1 SUSPENSION ORAL at 08:00

## 2023-10-18 ASSESSMENT — COGNITIVE AND FUNCTIONAL STATUS - GENERAL
DAILY ACTIVITIY SCORE: 17
CLIMB 3 TO 5 STEPS WITH RAILING: A LOT
TOILETING: A LOT
MOVING TO AND FROM BED TO CHAIR: A LITTLE
HELP NEEDED FOR BATHING: A LITTLE
MOBILITY SCORE: 18
WALKING IN HOSPITAL ROOM: A LITTLE
DRESSING REGULAR LOWER BODY CLOTHING: A LOT
DRESSING REGULAR UPPER BODY CLOTHING: A LITTLE
STANDING UP FROM CHAIR USING ARMS: A LITTLE
TURNING FROM BACK TO SIDE WHILE IN FLAT BAD: A LITTLE
PERSONAL GROOMING: A LITTLE

## 2023-10-18 ASSESSMENT — PAIN SCALES - GENERAL
PAINLEVEL_OUTOF10: 9
PAINLEVEL_OUTOF10: 9
PAINLEVEL_OUTOF10: 7
PAINLEVEL_OUTOF10: 3
PAINLEVEL_OUTOF10: 2

## 2023-10-18 ASSESSMENT — PAIN - FUNCTIONAL ASSESSMENT
PAIN_FUNCTIONAL_ASSESSMENT: 0-10
PAIN_FUNCTIONAL_ASSESSMENT: 0-10

## 2023-10-18 ASSESSMENT — PAIN DESCRIPTION - DESCRIPTORS: DESCRIPTORS: ACHING

## 2023-10-18 NOTE — PROGRESS NOTES
10/18/23 PCN note:   request sent to direct submit team for precert to be escalated for [facility].     Yasmine Crabtree

## 2023-10-18 NOTE — PROGRESS NOTES
"Jessica Smith is a 58 y.o. female on day 7 of admission presenting with Acute kidney injury (CMS/HCC).    Subjective   Doing well. Denies overnight events. Eating well. Denies fever, chills, nausea and vomiting.        Objective     Physical Exam  Vitals and nursing note reviewed.   Constitutional:       Appearance: Normal appearance.   HENT:      Head: Normocephalic.   Cardiovascular:      Rate and Rhythm: Normal rate and regular rhythm.   Pulmonary:      Effort: Pulmonary effort is normal.      Breath sounds: Normal breath sounds.   Abdominal:      General: Bowel sounds are normal.      Palpations: Abdomen is soft.      Comments: Slight tender to palpation Right lower and upper quadrants.    Musculoskeletal:         General: Normal range of motion.      Cervical back: Normal range of motion.   Skin:     General: Skin is warm.   Neurological:      General: No focal deficit present.      Mental Status: She is alert and oriented to person, place, and time.   Psychiatric:         Mood and Affect: Mood normal.       Last Recorded Vitals  Blood pressure 118/65, pulse 59, temperature 36.5 °C (97.7 °F), resp. rate 18, height 1.6 m (5' 3\"), weight 143 kg (315 lb), SpO2 100 %.  Intake/Output last 3 Shifts:  No intake/output data recorded.    Relevant Results              Scheduled medications  amLODIPine, 10 mg, oral, Daily  escitalopram, 20 mg, oral, Daily  ferrous sulfate, 65 mg of iron, oral, Every other day  fluticasone, 1 spray, Each Nostril, Daily  fluticasone furoate-vilanteroL, 1 puff, inhalation, Daily  gabapentin, 300 mg, oral, Nightly  heparin (porcine), 7,500 Units, subcutaneous, q8h SUNSHINE  lidocaine, 1 patch, transdermal, Daily  pantoprazole, 40 mg, intravenous, Daily  polyethylene glycol, 17 g, oral, BID  sennosides-docusate sodium, 1 tablet, oral, BID  sucralfate, 1 g, oral, With meals & nightly      Continuous medications     PRN medications  PRN medications: acetaminophen, albuterol, diclofenac sodium, " guaiFENesin, melatonin, ondansetron     Results for orders placed or performed during the hospital encounter of 10/10/23 (from the past 24 hour(s))   POCT GLUCOSE   Result Value Ref Range    POCT Glucose 102 (H) 74 - 99 mg/dL   CBC and Auto Differential   Result Value Ref Range    WBC 9.3 4.4 - 11.3 x10*3/uL    nRBC 0.0 0.0 - 0.0 /100 WBCs    RBC 2.89 (L) 4.00 - 5.20 x10*6/uL    Hemoglobin 8.2 (L) 12.0 - 16.0 g/dL    Hematocrit 27.4 (L) 36.0 - 46.0 %    MCV 95 80 - 100 fL    MCH 28.4 26.0 - 34.0 pg    MCHC 29.9 (L) 32.0 - 36.0 g/dL    RDW 15.5 (H) 11.5 - 14.5 %    Platelets 235 150 - 450 x10*3/uL    MPV 10.6 7.5 - 11.5 fL    Neutrophils % 68.1 40.0 - 80.0 %    Immature Granulocytes %, Automated 0.3 0.0 - 0.9 %    Lymphocytes % 21.2 13.0 - 44.0 %    Monocytes % 7.9 2.0 - 10.0 %    Eosinophils % 2.0 0.0 - 6.0 %    Basophils % 0.5 0.0 - 2.0 %    Neutrophils Absolute 6.35 1.20 - 7.70 x10*3/uL    Immature Granulocytes Absolute, Automated 0.03 0.00 - 0.70 x10*3/uL    Lymphocytes Absolute 1.98 1.20 - 4.80 x10*3/uL    Monocytes Absolute 0.74 0.10 - 1.00 x10*3/uL    Eosinophils Absolute 0.19 0.00 - 0.70 x10*3/uL    Basophils Absolute 0.05 0.00 - 0.10 x10*3/uL   Renal function panel   Result Value Ref Range    Glucose 94 74 - 99 mg/dL    Sodium 139 136 - 145 mmol/L    Potassium 4.8 3.5 - 5.3 mmol/L    Chloride 110 (H) 98 - 107 mmol/L    Bicarbonate 22 21 - 32 mmol/L    Anion Gap 12 10 - 20 mmol/L    Urea Nitrogen 20 6 - 23 mg/dL    Creatinine 1.16 (H) 0.50 - 1.05 mg/dL    eGFR 55 (L) >60 mL/min/1.73m*2    Calcium 8.5 (L) 8.6 - 10.6 mg/dL    Phosphorus 3.4 2.5 - 4.9 mg/dL    Albumin 3.5 3.4 - 5.0 g/dL   Magnesium   Result Value Ref Range    Magnesium 1.88 1.60 - 2.40 mg/dL             Assessment/Plan   Active Problems:  There are no active Hospital Problems.    57 yo female with h/o HFpEF (EF 60-65% 10/2021), anxiety/depression, asthma, HTN, anemia, javier-en-Y, GERD, morbid obesity, OPAL, arthritis, and vitamin D deficiency  presenting to  ED on 10/10 for abdominal pain admitted for SPENSER and abdominal pain. Patient was determined to be hemodynamically stable and appropriate for RNF, admitted to Family Medicine for further management. Problem based assessment and plan as follows:     Updates 10/18/23:  -Dispo pending SNF ([facility]); awaiting pre-cert  -Will need outpatient GI follow up (patient aware)  -Added NutraSource fiber to patient's meals to increase fiber intake     #Abdominal Pain (likely functional vs scar tissue (due to multiple C-sections, abdominal surgeries, chronic pain that comes and goes)  - CT abd/pelvis without evidence of acute process  - bowel regimen in place  - refer to GI on discharge for further evaluation and management  - increase fiber with NutraSource fiber supplement     #Hyperkalemia - resolved  #SPENSER - resolved  :: baseline in 2021 1.1-1.2, however admission in 6/2023 for similar presentation  :: K 6.0 on arrival -> s/p hyperK cocktail -> K 5.2  :: EKG in ED without peaked T waves or other changes  - renally dose medications; avoid nephrotoxic agents  - daily RFP, bolus IVF prn     # Anemia  - baseline Hgb 8-10 w/o evidence of acute bleed  - likely ANSHUL based on previous workup  - c/w home ferrous sulfate     # HTN  - continue home amlodipine 10 mg daily     # Anxiety/Depression  - c/w home escitalopram 20mg daily     # Asthma  - c/w home albuterol as needed  - home fluticasone-salmeterol converted to formulary Breo Elipta while inpatient     # Allergic rhinitis  - c/w home flonase daily     # GERD  - c/w home sucralfate 1g QID  - pantoprazole 40 mg IV daily while inpatient  - plan to discharge with pepcid BID (concern for PPI nephrotoxicity)     # Arthritis  - continue home gabapentin 300 mg HS  - c/w home diclofenac gel, lidocaine patches, and tylenol     Fluids: bolus as needed  Diet: regular  DVT ppx: heparin SQ q8h  GI ppx: IV protonix 40 mg daily   Abx: none  Code Status: FULL  NOK: José  Domenico (Fiance) [phone]/Rosalba Smith (daughter) [phone]     Patient seen and discussed with attending Dr Arnett.      Erasto Xiong  Family Medicine, PGY-3  [place]

## 2023-10-19 VITALS
RESPIRATION RATE: 18 BRPM | HEART RATE: 60 BPM | OXYGEN SATURATION: 100 % | DIASTOLIC BLOOD PRESSURE: 67 MMHG | HEIGHT: 63 IN | SYSTOLIC BLOOD PRESSURE: 128 MMHG | WEIGHT: 293 LBS | BODY MASS INDEX: 51.91 KG/M2 | TEMPERATURE: 97.7 F

## 2023-10-19 PROBLEM — E87.5 HYPERKALEMIA: Status: RESOLVED | Noted: 2023-10-19 | Resolved: 2023-10-19

## 2023-10-19 PROBLEM — N18.9 ACUTE RENAL FAILURE SUPERIMPOSED ON CHRONIC KIDNEY DISEASE (CMS-HCC): Status: ACTIVE | Noted: 2023-10-11

## 2023-10-19 PROBLEM — N17.9 ACUTE RENAL FAILURE SUPERIMPOSED ON CHRONIC KIDNEY DISEASE (CMS-HCC): Status: RESOLVED | Noted: 2023-10-11 | Resolved: 2023-10-19

## 2023-10-19 PROBLEM — E87.5 HYPERKALEMIA: Status: ACTIVE | Noted: 2023-10-19

## 2023-10-19 PROBLEM — N18.9 ACUTE RENAL FAILURE SUPERIMPOSED ON CHRONIC KIDNEY DISEASE (CMS-HCC): Status: RESOLVED | Noted: 2023-10-11 | Resolved: 2023-10-19

## 2023-10-19 PROBLEM — R10.9 ABDOMINAL PAIN: Chronic | Status: ACTIVE | Noted: 2023-10-19

## 2023-10-19 LAB
ALBUMIN SERPL BCP-MCNC: 3.7 G/DL (ref 3.4–5)
ANION GAP SERPL CALC-SCNC: 11 MMOL/L (ref 10–20)
BASOPHILS # BLD AUTO: 0.05 X10*3/UL (ref 0–0.1)
BASOPHILS NFR BLD AUTO: 0.5 %
BUN SERPL-MCNC: 22 MG/DL (ref 6–23)
CALCIUM SERPL-MCNC: 8.6 MG/DL (ref 8.6–10.6)
CHLORIDE SERPL-SCNC: 110 MMOL/L (ref 98–107)
CO2 SERPL-SCNC: 22 MMOL/L (ref 21–32)
CREAT SERPL-MCNC: 1.13 MG/DL (ref 0.5–1.05)
EOSINOPHIL # BLD AUTO: 0.2 X10*3/UL (ref 0–0.7)
EOSINOPHIL NFR BLD AUTO: 2 %
ERYTHROCYTE [DISTWIDTH] IN BLOOD BY AUTOMATED COUNT: 15.7 % (ref 11.5–14.5)
GFR SERPL CREATININE-BSD FRML MDRD: 57 ML/MIN/1.73M*2
GLUCOSE SERPL-MCNC: 89 MG/DL (ref 74–99)
HCT VFR BLD AUTO: 28.4 % (ref 36–46)
HGB BLD-MCNC: 8.5 G/DL (ref 12–16)
IMM GRANULOCYTES # BLD AUTO: 0.03 X10*3/UL (ref 0–0.7)
IMM GRANULOCYTES NFR BLD AUTO: 0.3 % (ref 0–0.9)
LYMPHOCYTES # BLD AUTO: 2.14 X10*3/UL (ref 1.2–4.8)
LYMPHOCYTES NFR BLD AUTO: 21.6 %
MCH RBC QN AUTO: 29.1 PG (ref 26–34)
MCHC RBC AUTO-ENTMCNC: 29.9 G/DL (ref 32–36)
MCV RBC AUTO: 97 FL (ref 80–100)
MONOCYTES # BLD AUTO: 0.81 X10*3/UL (ref 0.1–1)
MONOCYTES NFR BLD AUTO: 8.2 %
NEUTROPHILS # BLD AUTO: 6.67 X10*3/UL (ref 1.2–7.7)
NEUTROPHILS NFR BLD AUTO: 67.4 %
NRBC BLD-RTO: 0 /100 WBCS (ref 0–0)
PHOSPHATE SERPL-MCNC: 3.3 MG/DL (ref 2.5–4.9)
PLATELET # BLD AUTO: 241 X10*3/UL (ref 150–450)
PMV BLD AUTO: 10.7 FL (ref 7.5–11.5)
POTASSIUM SERPL-SCNC: 4.9 MMOL/L (ref 3.5–5.3)
RBC # BLD AUTO: 2.92 X10*6/UL (ref 4–5.2)
SODIUM SERPL-SCNC: 138 MMOL/L (ref 136–145)
WBC # BLD AUTO: 9.9 X10*3/UL (ref 4.4–11.3)

## 2023-10-19 PROCEDURE — 2500000001 HC RX 250 WO HCPCS SELF ADMINISTERED DRUGS (ALT 637 FOR MEDICARE OP): Mod: SE | Performed by: STUDENT IN AN ORGANIZED HEALTH CARE EDUCATION/TRAINING PROGRAM

## 2023-10-19 PROCEDURE — 85025 COMPLETE CBC W/AUTO DIFF WBC: CPT | Mod: CMCLAB | Performed by: PODIATRIST

## 2023-10-19 PROCEDURE — 96376 TX/PRO/DX INJ SAME DRUG ADON: CPT | Performed by: EMERGENCY MEDICINE

## 2023-10-19 PROCEDURE — 36415 COLL VENOUS BLD VENIPUNCTURE: CPT | Mod: CMCLAB | Performed by: PODIATRIST

## 2023-10-19 PROCEDURE — 2500000004 HC RX 250 GENERAL PHARMACY W/ HCPCS (ALT 636 FOR OP/ED): Mod: SE | Performed by: STUDENT IN AN ORGANIZED HEALTH CARE EDUCATION/TRAINING PROGRAM

## 2023-10-19 PROCEDURE — 99238 HOSP IP/OBS DSCHRG MGMT 30/<: CPT | Performed by: STUDENT IN AN ORGANIZED HEALTH CARE EDUCATION/TRAINING PROGRAM

## 2023-10-19 PROCEDURE — G0378 HOSPITAL OBSERVATION PER HR: HCPCS

## 2023-10-19 PROCEDURE — 2500000005 HC RX 250 GENERAL PHARMACY W/O HCPCS: Mod: SE | Performed by: STUDENT IN AN ORGANIZED HEALTH CARE EDUCATION/TRAINING PROGRAM

## 2023-10-19 PROCEDURE — 94640 AIRWAY INHALATION TREATMENT: CPT

## 2023-10-19 PROCEDURE — 2500000002 HC RX 250 W HCPCS SELF ADMINISTERED DRUGS (ALT 637 FOR MEDICARE OP, ALT 636 FOR OP/ED): Mod: SE | Performed by: STUDENT IN AN ORGANIZED HEALTH CARE EDUCATION/TRAINING PROGRAM

## 2023-10-19 PROCEDURE — C9113 INJ PANTOPRAZOLE SODIUM, VIA: HCPCS | Mod: SE | Performed by: STUDENT IN AN ORGANIZED HEALTH CARE EDUCATION/TRAINING PROGRAM

## 2023-10-19 PROCEDURE — 96372 THER/PROPH/DIAG INJ SC/IM: CPT | Performed by: STUDENT IN AN ORGANIZED HEALTH CARE EDUCATION/TRAINING PROGRAM

## 2023-10-19 PROCEDURE — 80069 RENAL FUNCTION PANEL: CPT | Performed by: PODIATRIST

## 2023-10-19 PROCEDURE — 97110 THERAPEUTIC EXERCISES: CPT | Mod: GP,CQ

## 2023-10-19 PROCEDURE — 36415 COLL VENOUS BLD VENIPUNCTURE: CPT | Performed by: PODIATRIST

## 2023-10-19 PROCEDURE — 97116 GAIT TRAINING THERAPY: CPT | Mod: GP,CQ

## 2023-10-19 RX ORDER — ACETAMINOPHEN 500 MG
1000 TABLET ORAL EVERY 6 HOURS PRN
Qty: 30 TABLET | Refills: 0
Start: 2023-10-19 | End: 2023-12-12 | Stop reason: SDUPTHER

## 2023-10-19 RX ORDER — GUAR GUM
1 PACKET (EA) ORAL 3 TIMES DAILY
Refills: 0
Start: 2023-10-19 | End: 2024-05-31

## 2023-10-19 RX ORDER — ONDANSETRON 4 MG/1
4 TABLET, ORALLY DISINTEGRATING ORAL EVERY 8 HOURS PRN
Status: DISCONTINUED | OUTPATIENT
Start: 2023-10-19 | End: 2023-10-19 | Stop reason: HOSPADM

## 2023-10-19 RX ADMIN — FLUTICASONE FUROATE AND VILANTEROL TRIFENATATE 1 PUFF: 200; 25 POWDER RESPIRATORY (INHALATION) at 08:25

## 2023-10-19 RX ADMIN — ACETAMINOPHEN 975 MG: 325 TABLET ORAL at 09:39

## 2023-10-19 RX ADMIN — SUCRALFATE 1 G: 1 SUSPENSION ORAL at 09:38

## 2023-10-19 RX ADMIN — SUCRALFATE 1 G: 1 SUSPENSION ORAL at 12:58

## 2023-10-19 RX ADMIN — LIDOCAINE 1 PATCH: 4 PATCH TOPICAL at 10:30

## 2023-10-19 RX ADMIN — AMLODIPINE BESYLATE 10 MG: 10 TABLET ORAL at 09:42

## 2023-10-19 RX ADMIN — GUAIFENESIN 1200 MG: 600 TABLET ORAL at 09:41

## 2023-10-19 RX ADMIN — PANTOPRAZOLE SODIUM 40 MG: 40 INJECTION, POWDER, FOR SOLUTION INTRAVENOUS at 09:42

## 2023-10-19 RX ADMIN — ACETAMINOPHEN 975 MG: 325 TABLET ORAL at 15:38

## 2023-10-19 RX ADMIN — SENNOSIDES AND DOCUSATE SODIUM 1 TABLET: 50; 8.6 TABLET ORAL at 09:40

## 2023-10-19 RX ADMIN — HEPARIN SODIUM 7500 UNITS: 5000 INJECTION INTRAVENOUS; SUBCUTANEOUS at 05:33

## 2023-10-19 RX ADMIN — FLUTICASONE PROPIONATE 1 SPRAY: 50 SPRAY, METERED NASAL at 09:48

## 2023-10-19 RX ADMIN — DICLOFENAC 1 APPLICATION: 10 GEL TOPICAL at 09:48

## 2023-10-19 RX ADMIN — ESCITALOPRAM OXALATE 20 MG: 10 TABLET ORAL at 09:40

## 2023-10-19 ASSESSMENT — COGNITIVE AND FUNCTIONAL STATUS - GENERAL
MOVING TO AND FROM BED TO CHAIR: A LITTLE
STANDING UP FROM CHAIR USING ARMS: A LITTLE
WALKING IN HOSPITAL ROOM: A LITTLE
MOVING FROM LYING ON BACK TO SITTING ON SIDE OF FLAT BED WITH BEDRAILS: A LITTLE
TURNING FROM BACK TO SIDE WHILE IN FLAT BAD: A LOT
CLIMB 3 TO 5 STEPS WITH RAILING: A LOT
MOBILITY SCORE: 16

## 2023-10-19 ASSESSMENT — PAIN SCALES - GENERAL
PAINLEVEL_OUTOF10: 9
PAINLEVEL_OUTOF10: 9

## 2023-10-19 NOTE — NURSING NOTE
Assumed care at approximately 1940. PT resting in bed, claims to have ABD pain of 10 out of 10 and requests Tylenol. V/S stable, on R/A. PT appears to be free of distress watching TV and eating snacks. PT denies needs at this time. Call light and belongings within reach. TM.

## 2023-10-19 NOTE — PROGRESS NOTES
10/19/23 PCN note:   Precert has been obtained for [facility], pt is medically ready for dc, [facility] transport set for 2:30pm , TCC, pt and bed side RN notified and DC slip with report # provided.     Yasmine Crabtree

## 2023-10-19 NOTE — DISCHARGE SUMMARY
Discharge Diagnosis  Acute renal failure superimposed on chronic kidney disease (CMS/HCC)    Issues Requiring Follow-Up  Consult to gastroenterology for evaluation and management of chronic abdominal pain.    Test Results Pending At Discharge  Pending Labs       Order Current Status    Urine culture Collected (10/13/23 1209)            Hospital Course     Ms Jessica Smith is a 57 yo female with a PMHx of  HFpEF (EF 60-65% 10/2021), anxiety/depression, asthma, HTN, anemia, javier-en-Y, GERD, morbid obesity, OPAL, arthritis, and vitamin D deficiency who presented to the ED on 10/10 for abdominal pain admitted for SPENSER and abdominal pain. CT abd/pelvis showed Postsurgical changes status post Javier-en-Y gastric bypass with minimal dilatation of the gastrojejunal anastomosis otherwise no significant abnormality involving the gastrointestinal tract. No acute findings involving the abdomen, pelvis. Baseline Cr around 1.1 -1.2, admitted Cr was 1.3-1.5. Her SPENSER resolved with lactated ringers. She was started on a bowel regimen for abdominal pain (Miralax BID and sennosides-docusate BID). She was started on NutraSource fiber supplements with meals. Her abdominal pain (likely functional vs adhesions (due to multiple C-sections, abdominal surgeries)) is chronic pain that comes and goes and didn't appear to be caused by anything specific nor any certain movement. Patient agreeable to discharge with outpatient GI consult. PT/OT saw the patient and recommended moderate intensity rehab. Patient was agreeable to go to [facility] for rehab.     DAY OF DISCHARGE:    On the day of discharge, the patient was seen and evaluated by the Family Medicine team and deemed suitable for discharge to SNF/[facility].   There were no significant events overnight.  Vitals were reviewed and within normal limits. Labs were stable at discharge.  Plan of care for the day included ensuring that the patient had good p.o. intake, and was stable.            Pertinent Physical Exam At Time of Discharge  Physical Exam  Constitutional:       Appearance: Normal appearance. She is obese.   HENT:      Head: Normocephalic.   Cardiovascular:      Rate and Rhythm: Normal rate and regular rhythm.   Pulmonary:      Effort: Pulmonary effort is normal.      Breath sounds: Normal breath sounds.   Abdominal:      General: Bowel sounds are normal. There is no distension.      Palpations: Abdomen is soft. There is no mass.      Tenderness: There is no guarding or rebound.      Comments: Slight tender to palpation central/mid-epigastric region.    Musculoskeletal:         General: Normal range of motion.      Cervical back: Normal range of motion.   Skin:     General: Skin is warm and dry.   Neurological:      General: No focal deficit present.      Mental Status: She is alert and oriented to person, place, and time.   Psychiatric:         Mood and Affect: Mood normal.         Behavior: Behavior normal.         Home Medications     Medication List      START taking these medications     Nutrisource Fiber packet; Generic drug: guar gum; Take 1 packet by mouth   3 times a day.   polyethylene glycol packet; Commonly known as: Glycolax, Miralax; Take   17 g by mouth 2 times a day.   sennosides-docusate sodium 8.6-50 mg tablet; Commonly known as:   Marilee-Colace; Take 1 tablet by mouth 2 times a day.     CONTINUE taking these medications     acetaminophen 500 mg tablet; Commonly known as: Tylenol Extra Strength;   Take 2 tablets (1,000 mg) by mouth every 6 hours if needed for mild pain   (1 - 3).   albuterol 90 mcg/actuation inhaler   amLODIPine 10 mg tablet; Commonly known as: Norvasc   diclofenac sodium 1 % gel gel; Commonly known as: Arthritis Pain   (diclofenac); Apply 1 Application topically 4 times a day.   escitalopram 20 mg tablet; Commonly known as: Lexapro   famotidine 10 mg tablet; Commonly known as: Pepcid AC; Take 1 tablet (10   mg) by mouth 2 times a day.   ferrous sulfate  325 (65 Fe) MG tablet; Commonly known as: iron; Take 1   tablet (65 mg of iron) by mouth every other day.   fluticasone 50 mcg/actuation nasal spray; Commonly known as: Flonase;   Administer 1 spray into each nostril once daily.   fluticasone propion-salmeteroL 250-50 mcg/dose diskus inhaler; Commonly   known as: Advair Diskus; Inhale 1 puff 2 times a day. Rinse mouth with   water after use to reduce aftertaste and incidence of candidiasis. Do not   swallow.   gabapentin 300 mg capsule; Commonly known as: Neurontin   guaiFENesin 1,200 mg tablet extended release 12hr; Commonly known as:   Mucinex; Take 1 tablet (1,200 mg) by mouth every 12 hours if needed   (coughing).   lidocaine 5 % patch; Commonly known as: Lidoderm; APPLY 1 PATCH TO THE   AFFECTED AREA AND LEAVE IN PLACE FOR 12 HOURS, THEN REMOVE AND LEAVE OFF   FOR 12 HOURS. Strength: 5 %   melatonin 3 mg tablet   pantoprazole 40 mg EC tablet; Commonly known as: ProtoNix   sucralfate 100 mg/mL suspension; Commonly known as: Carafate   walker misc; 1 each once daily. Use daily as needed for ambulation   assistance       Outpatient Follow-Up  Gastroenterology - referral placed; appointment to be scheduled after discharge    Paulette Dubois MD    Patient seen and evaluated with intern. Agree with assessment above, edits made within text.    Minor Johnson MD   PGY-3

## 2023-10-19 NOTE — PROGRESS NOTES
Physical Therapy    Physical Therapy Treatment    Patient Name: Jessica Smith  MRN: [id number]  Today's Date: 10/19/2023  Time Calculation  Start Time: 1205  Stop Time: 1238  Time Calculation (min): 33 min       Assessment/Plan   PT Assessment  PT Assessment Results: Decreased strength, Decreased endurance, Impaired balance, Decreased mobility  Rehab Prognosis: Good  End of Session Communication: Bedside nurse  Assessment Comment: Pt able to ambulate increased distances, but limited by overall weakness and fatigue. Pt remains appropriate for continued therapy upon discharge at a moderate intensity level to focus on improved functional mobility, balance and activity tolerance.  End of Session Patient Position:  (sitting on EOB)     PT Plan  Treatment/Interventions: Bed mobility, Transfer training, Gait training, Stair training, Balance training, Strengthening, Endurance training, Range of motion, Therapeutic exercise, Therapeutic activity  PT Plan: Skilled PT  PT Frequency: 3 times per week  PT Discharge Recommendations: Moderate intensity level of continued care  Equipment Recommended upon Discharge: Wheeled walker  PT Recommended Transfer Status: Assist x1 (walker)  PT - OK to Discharge: Yes    General Visit Information:   PT  Visit  PT Received On: 10/19/23  General  Reason for Referral: Abdominal pain, SPENSER, L knee pain s/p fall  Past Medical History Relevant to Rehab: HFpEF (EF 60-65% 10/2021), anxiety/depression, asthma, HTN, anemia, javier-en-Y, GERD, morbid obesity, OPAL, arthritis, and vitamin D deficiency  Patient Position Received: Bed, 3 rail up  General Comment: Pt pleasant and agreeable to engage in PT session.    Subjective   Precautions:  Precautions  Medical Precautions: Fall precautions    Objective     Cognition:  Cognition  Overall Cognitive Status: Within Functional Limits  Activity Tolerance:  Activity Tolerance  Endurance: Tolerates less than 10 min exercise, no significant change in vital  signs  Treatments:  Therapeutic Exercise  Therapeutic Exercise Performed: Yes  Therapeutic Exercise Activity 1: Seated Bilateral LE: ankle pumps, LAQ, marching, Hip ABD/ADD, GS x10 each    Bed Mobility 1  Bed Mobility 1: Supine to sitting  Level of Assistance 1: Close supervision    Ambulation/Gait Training  Ambulation/Gait Training Performed: Yes  Ambulation/Gait Training 1  Surface 1: Level tile  Device 1: Rolling walker  Assistance 1: Contact guard  Comments/Distance (ft) 1: 25 feet (Pt complained of fatigue [facility] through bout.)  Transfers  Transfer: Yes  Transfer 1  Transfer From 1: Sit to  Transfer to 1: Stand  Transfer Device 1: Walker  Transfer Level of Assistance 1: Contact guard  Transfers 2  Transfer From 2: Stand to  Transfer to 2: Sit  Transfer Device 2: Walker  Transfer Level of Assistance 2: Contact guard    Outcome Measures:  [facility] Basic Mobility  Turning from your back to your side while in a flat bed without using bedrails: A little  Moving from lying on your back to sitting on the side of a flat bed without using bedrails: A lot  Moving to and from bed to chair (including a wheelchair): A little  Standing up from a chair using your arms (e.g. wheelchair or bedside chair): A little  To walk in hospital room: A little  Climbing 3-5 steps with railing: A lot  Basic Mobility - Total Score: 16    Education Documentation  Mobility Training, taught by Suma Sam PTA at 10/19/2023 12:53 PM.  Learner: Patient  Readiness: Acceptance  Method: Explanation  Response: Verbalizes Understanding    Education Comments  No comments found.        OP EDUCATION:       Encounter Problems       Encounter Problems (Active)       Mobility       Pt will ambulate 50 ft with SBA with LRAD (Progressing)       Start:  10/13/23    Expected End:  10/27/23            Pt will ascend/descend 15 steps with 1 handrail with Demetris (Progressing)       Start:  10/13/23    Expected End:  10/27/23               Transfers       Patient  will complete sit to supine and supine to sit with SBA (Progressing)       Start:  10/13/23    Expected End:  10/27/23            Patient will transfer sit to stand/stand to sit and bed to chair/chair to bed with SBA with LRAD (Progressing)       Start:  10/13/23    Expected End:  10/27/23

## 2023-10-25 DIAGNOSIS — J30.2 SEASONAL ALLERGIES: ICD-10-CM

## 2023-10-27 RX ORDER — GUAIFENESIN 1200 MG/1
TABLET, EXTENDED RELEASE ORAL
Qty: 60 TABLET | Refills: 10 | Status: SHIPPED | OUTPATIENT
Start: 2023-10-27 | End: 2024-06-03 | Stop reason: HOSPADM

## 2023-11-04 DIAGNOSIS — K21.00 GASTROESOPHAGEAL REFLUX DISEASE WITH ESOPHAGITIS WITHOUT HEMORRHAGE: Primary | ICD-10-CM

## 2023-11-04 DIAGNOSIS — F32.A ANXIETY AND DEPRESSION: ICD-10-CM

## 2023-11-04 DIAGNOSIS — F41.9 ANXIETY AND DEPRESSION: ICD-10-CM

## 2023-11-04 RX ORDER — PANTOPRAZOLE SODIUM 40 MG/1
40 TABLET, DELAYED RELEASE ORAL DAILY
Qty: 90 TABLET | Refills: 0 | Status: SHIPPED | OUTPATIENT
Start: 2023-11-04 | End: 2023-12-12 | Stop reason: SDUPTHER

## 2023-11-04 RX ORDER — ESCITALOPRAM OXALATE 20 MG/1
20 TABLET ORAL DAILY
Qty: 90 TABLET | Refills: 0 | Status: SHIPPED | OUTPATIENT
Start: 2023-11-04 | End: 2023-12-12 | Stop reason: SDUPTHER

## 2023-11-07 DIAGNOSIS — F32.A ANXIETY AND DEPRESSION: ICD-10-CM

## 2023-11-07 DIAGNOSIS — J44.89 ASTHMA WITH CHRONIC OBSTRUCTIVE PULMONARY DISEASE (COPD) (MULTI): Primary | ICD-10-CM

## 2023-11-07 DIAGNOSIS — F41.9 ANXIETY AND DEPRESSION: ICD-10-CM

## 2023-11-07 RX ORDER — ALBUTEROL SULFATE 90 UG/1
2 AEROSOL, METERED RESPIRATORY (INHALATION) EVERY 6 HOURS PRN
Qty: 18 G | Refills: 1 | Status: SHIPPED | OUTPATIENT
Start: 2023-11-07 | End: 2023-12-12 | Stop reason: SDUPTHER

## 2023-11-11 DIAGNOSIS — G89.29 CHRONIC LOW BACK PAIN WITHOUT SCIATICA, UNSPECIFIED BACK PAIN LATERALITY: ICD-10-CM

## 2023-11-11 DIAGNOSIS — M54.50 CHRONIC LOW BACK PAIN WITHOUT SCIATICA, UNSPECIFIED BACK PAIN LATERALITY: ICD-10-CM

## 2023-11-11 RX ORDER — LIDOCAINE 50 MG/G
PATCH TOPICAL
Qty: 30 PATCH | Refills: 1 | Status: SHIPPED | OUTPATIENT
Start: 2023-11-11 | End: 2023-12-12 | Stop reason: SDUPTHER

## 2023-12-12 DIAGNOSIS — M54.50 CHRONIC LOW BACK PAIN WITHOUT SCIATICA, UNSPECIFIED BACK PAIN LATERALITY: ICD-10-CM

## 2023-12-12 DIAGNOSIS — I10 BENIGN ESSENTIAL HYPERTENSION: Chronic | ICD-10-CM

## 2023-12-12 DIAGNOSIS — F32.A ANXIETY AND DEPRESSION: ICD-10-CM

## 2023-12-12 DIAGNOSIS — J44.89 ASTHMA WITH CHRONIC OBSTRUCTIVE PULMONARY DISEASE (COPD) (MULTI): ICD-10-CM

## 2023-12-12 DIAGNOSIS — F41.9 ANXIETY AND DEPRESSION: ICD-10-CM

## 2023-12-12 DIAGNOSIS — G89.29 CHRONIC LOW BACK PAIN WITHOUT SCIATICA, UNSPECIFIED BACK PAIN LATERALITY: ICD-10-CM

## 2023-12-12 DIAGNOSIS — J06.9 VIRAL UPPER RESPIRATORY TRACT INFECTION: ICD-10-CM

## 2023-12-12 DIAGNOSIS — K21.00 GASTROESOPHAGEAL REFLUX DISEASE WITH ESOPHAGITIS WITHOUT HEMORRHAGE: ICD-10-CM

## 2023-12-12 DIAGNOSIS — R10.84 GENERALIZED ABDOMINAL PAIN: Chronic | ICD-10-CM

## 2023-12-12 DIAGNOSIS — D50.9 IRON DEFICIENCY ANEMIA, UNSPECIFIED IRON DEFICIENCY ANEMIA TYPE: ICD-10-CM

## 2023-12-12 DIAGNOSIS — G47.00 INSOMNIA, UNSPECIFIED TYPE: ICD-10-CM

## 2023-12-12 DIAGNOSIS — M79.2 NEUROPATHIC PAIN: Primary | ICD-10-CM

## 2023-12-12 DIAGNOSIS — K59.09 CHRONIC CONSTIPATION: ICD-10-CM

## 2023-12-12 RX ORDER — GUAR GUM
1 PACKET (EA) ORAL 3 TIMES DAILY
Refills: 0 | Status: CANCELLED | OUTPATIENT
Start: 2023-12-12

## 2023-12-12 NOTE — TELEPHONE ENCOUNTER
Pt called stating [facility] spoke with her and informed her all meds need refills. Noted refills remain for mucinex, Advair, famotidine. All other meds pended and routed to provider for refills.

## 2023-12-13 RX ORDER — LIDOCAINE 50 MG/G
PATCH TOPICAL
Qty: 30 PATCH | Refills: 1 | Status: SHIPPED | OUTPATIENT
Start: 2023-12-13 | End: 2024-03-13 | Stop reason: SDUPTHER

## 2023-12-13 RX ORDER — FLUTICASONE PROPIONATE 50 MCG
1 SPRAY, SUSPENSION (ML) NASAL DAILY
Qty: 16 G | Refills: 0 | Status: SHIPPED | OUTPATIENT
Start: 2023-12-13 | End: 2024-01-12

## 2023-12-13 RX ORDER — FERROUS SULFATE 325(65) MG
65 TABLET ORAL EVERY OTHER DAY
Qty: 15 TABLET | Refills: 0 | Status: SHIPPED | OUTPATIENT
Start: 2023-12-13 | End: 2024-01-03

## 2023-12-13 RX ORDER — ACETAMINOPHEN 500 MG
1000 TABLET ORAL EVERY 6 HOURS PRN
Qty: 30 TABLET | Refills: 0 | Status: SHIPPED | OUTPATIENT
Start: 2023-12-13 | End: 2024-01-03

## 2023-12-13 RX ORDER — POLYETHYLENE GLYCOL 3350 17 G/17G
17 POWDER, FOR SOLUTION ORAL 2 TIMES DAILY
Qty: 30 PACKET | Refills: 0 | Status: SHIPPED | OUTPATIENT
Start: 2023-12-13 | End: 2024-03-13 | Stop reason: SDUPTHER

## 2023-12-13 RX ORDER — GABAPENTIN 300 MG/1
300 CAPSULE ORAL NIGHTLY
Qty: 30 CAPSULE | Refills: 0 | Status: SHIPPED | OUTPATIENT
Start: 2023-12-13 | End: 2024-03-28 | Stop reason: SDUPTHER

## 2023-12-13 RX ORDER — TALC
3 POWDER (GRAM) TOPICAL NIGHTLY
Qty: 30 TABLET | Refills: 0 | Status: SHIPPED | OUTPATIENT
Start: 2023-12-13 | End: 2024-03-13 | Stop reason: SDUPTHER

## 2023-12-13 RX ORDER — PANTOPRAZOLE SODIUM 40 MG/1
40 TABLET, DELAYED RELEASE ORAL DAILY
Qty: 30 TABLET | Refills: 0 | Status: SHIPPED | OUTPATIENT
Start: 2023-12-13 | End: 2024-03-13 | Stop reason: SDUPTHER

## 2023-12-13 RX ORDER — ESCITALOPRAM OXALATE 20 MG/1
20 TABLET ORAL DAILY
Qty: 90 TABLET | Refills: 0 | Status: SHIPPED | OUTPATIENT
Start: 2023-12-13 | End: 2024-03-13 | Stop reason: SDUPTHER

## 2023-12-13 RX ORDER — ALBUTEROL SULFATE 90 UG/1
2 AEROSOL, METERED RESPIRATORY (INHALATION) EVERY 6 HOURS PRN
Qty: 18 G | Refills: 11 | Status: SHIPPED | OUTPATIENT
Start: 2023-12-13 | End: 2024-03-13 | Stop reason: SDUPTHER

## 2023-12-13 RX ORDER — SUCRALFATE 1 G/10ML
1 SUSPENSION ORAL
Qty: 1200 ML | Refills: 0 | Status: SHIPPED | OUTPATIENT
Start: 2023-12-13

## 2023-12-13 RX ORDER — DICLOFENAC SODIUM 10 MG/G
4 GEL TOPICAL 4 TIMES DAILY
Qty: 200 G | Refills: 11 | Status: SHIPPED | OUTPATIENT
Start: 2023-12-13 | End: 2024-05-08 | Stop reason: SDUPTHER

## 2023-12-13 RX ORDER — AMOXICILLIN 250 MG
1 CAPSULE ORAL 2 TIMES DAILY
Qty: 60 TABLET | Refills: 0 | Status: SHIPPED | OUTPATIENT
Start: 2023-12-13 | End: 2024-01-03

## 2023-12-13 RX ORDER — AMLODIPINE BESYLATE 10 MG/1
10 TABLET ORAL DAILY
Qty: 30 TABLET | Refills: 0 | Status: SHIPPED | OUTPATIENT
Start: 2023-12-13 | End: 2024-01-10

## 2023-12-27 ENCOUNTER — APPOINTMENT (OUTPATIENT)
Dept: PRIMARY CARE | Facility: CLINIC | Age: 58
End: 2023-12-27
Payer: COMMERCIAL

## 2023-12-28 DIAGNOSIS — M54.50 CHRONIC LOW BACK PAIN WITHOUT SCIATICA, UNSPECIFIED BACK PAIN LATERALITY: ICD-10-CM

## 2023-12-28 DIAGNOSIS — K59.09 CHRONIC CONSTIPATION: ICD-10-CM

## 2023-12-28 DIAGNOSIS — G89.29 CHRONIC LOW BACK PAIN WITHOUT SCIATICA, UNSPECIFIED BACK PAIN LATERALITY: ICD-10-CM

## 2023-12-28 DIAGNOSIS — D50.9 IRON DEFICIENCY ANEMIA, UNSPECIFIED IRON DEFICIENCY ANEMIA TYPE: ICD-10-CM

## 2024-01-03 DIAGNOSIS — K59.09 CHRONIC CONSTIPATION: ICD-10-CM

## 2024-01-03 DIAGNOSIS — D50.9 IRON DEFICIENCY ANEMIA, UNSPECIFIED IRON DEFICIENCY ANEMIA TYPE: ICD-10-CM

## 2024-01-03 DIAGNOSIS — G89.29 CHRONIC LOW BACK PAIN WITHOUT SCIATICA, UNSPECIFIED BACK PAIN LATERALITY: ICD-10-CM

## 2024-01-03 DIAGNOSIS — M54.50 CHRONIC LOW BACK PAIN WITHOUT SCIATICA, UNSPECIFIED BACK PAIN LATERALITY: ICD-10-CM

## 2024-01-03 RX ORDER — ACETAMINOPHEN 500 MG
1000 TABLET ORAL EVERY 6 HOURS PRN
Qty: 30 TABLET | Refills: 0 | Status: SHIPPED | OUTPATIENT
Start: 2024-01-03 | End: 2024-05-08

## 2024-01-03 RX ORDER — SUCRALFATE 1 G/10ML
1 SUSPENSION ORAL
Refills: 10 | OUTPATIENT
Start: 2024-01-03

## 2024-01-03 RX ORDER — DOCUSATE SODIUM 50MG AND SENNOSIDES 8.6MG 8.6; 5 MG/1; MG/1
1 TABLET, FILM COATED ORAL 2 TIMES DAILY
Qty: 60 TABLET | Refills: 0 | Status: SHIPPED | OUTPATIENT
Start: 2024-01-03 | End: 2024-05-30 | Stop reason: SDUPTHER

## 2024-01-03 RX ORDER — FERROUS SULFATE TAB 325 MG (65 MG ELEMENTAL FE) 325 (65 FE) MG
1 TAB ORAL EVERY OTHER DAY
Qty: 15 TABLET | Refills: 0 | Status: SHIPPED | OUTPATIENT
Start: 2024-01-03 | End: 2024-03-13 | Stop reason: SDUPTHER

## 2024-01-09 DIAGNOSIS — I10 BENIGN ESSENTIAL HYPERTENSION: Chronic | ICD-10-CM

## 2024-01-09 RX ORDER — SUCRALFATE 1 G/10ML
1 SUSPENSION ORAL
Refills: 10 | OUTPATIENT
Start: 2024-01-09

## 2024-01-09 RX ORDER — ACETAMINOPHEN 500 MG
1000 TABLET ORAL EVERY 6 HOURS PRN
Qty: 30 TABLET | Refills: 10 | OUTPATIENT
Start: 2024-01-09

## 2024-01-09 RX ORDER — DOCUSATE SODIUM 50MG AND SENNOSIDES 8.6MG 8.6; 5 MG/1; MG/1
1 TABLET, FILM COATED ORAL 2 TIMES DAILY
Qty: 60 TABLET | Refills: 10 | OUTPATIENT
Start: 2024-01-09

## 2024-01-09 RX ORDER — FERROUS SULFATE TAB 325 MG (65 MG ELEMENTAL FE) 325 (65 FE) MG
1 TAB ORAL EVERY OTHER DAY
Qty: 15 TABLET | Refills: 10 | OUTPATIENT
Start: 2024-01-09

## 2024-01-10 RX ORDER — AMLODIPINE BESYLATE 10 MG/1
10 TABLET ORAL DAILY
Qty: 60 TABLET | Refills: 0 | Status: SHIPPED | OUTPATIENT
Start: 2024-01-10 | End: 2024-03-13 | Stop reason: SDUPTHER

## 2024-03-13 ENCOUNTER — TELEPHONE (OUTPATIENT)
Dept: PRIMARY CARE | Facility: CLINIC | Age: 59
End: 2024-03-13

## 2024-03-13 ENCOUNTER — LAB (OUTPATIENT)
Dept: LAB | Facility: LAB | Age: 59
End: 2024-03-13
Payer: COMMERCIAL

## 2024-03-13 ENCOUNTER — OFFICE VISIT (OUTPATIENT)
Dept: PRIMARY CARE | Facility: CLINIC | Age: 59
End: 2024-03-13
Payer: COMMERCIAL

## 2024-03-13 VITALS
DIASTOLIC BLOOD PRESSURE: 77 MMHG | HEIGHT: 63 IN | SYSTOLIC BLOOD PRESSURE: 124 MMHG | OXYGEN SATURATION: 96 % | BODY MASS INDEX: 51.91 KG/M2 | HEART RATE: 74 BPM | TEMPERATURE: 96.6 F | WEIGHT: 293 LBS

## 2024-03-13 DIAGNOSIS — R79.89 INCREASE IN SERUM CREATININE FROM PRIOR MEASUREMENT: ICD-10-CM

## 2024-03-13 DIAGNOSIS — Z13.1 DIABETES MELLITUS SCREENING: ICD-10-CM

## 2024-03-13 DIAGNOSIS — M54.50 CHRONIC LOW BACK PAIN WITHOUT SCIATICA, UNSPECIFIED BACK PAIN LATERALITY: ICD-10-CM

## 2024-03-13 DIAGNOSIS — Z13.1 DIABETES MELLITUS SCREENING: Primary | ICD-10-CM

## 2024-03-13 DIAGNOSIS — I10 BENIGN ESSENTIAL HYPERTENSION: Chronic | ICD-10-CM

## 2024-03-13 DIAGNOSIS — K59.09 CHRONIC CONSTIPATION: ICD-10-CM

## 2024-03-13 DIAGNOSIS — J44.9 CHRONIC OBSTRUCTIVE PULMONARY DISEASE, UNSPECIFIED COPD TYPE (MULTI): ICD-10-CM

## 2024-03-13 DIAGNOSIS — F41.9 ANXIETY AND DEPRESSION: ICD-10-CM

## 2024-03-13 DIAGNOSIS — K21.00 GASTROESOPHAGEAL REFLUX DISEASE WITH ESOPHAGITIS WITHOUT HEMORRHAGE: ICD-10-CM

## 2024-03-13 DIAGNOSIS — J30.2 SEASONAL ALLERGIES: ICD-10-CM

## 2024-03-13 DIAGNOSIS — F32.A ANXIETY AND DEPRESSION: ICD-10-CM

## 2024-03-13 DIAGNOSIS — D50.9 IRON DEFICIENCY ANEMIA, UNSPECIFIED IRON DEFICIENCY ANEMIA TYPE: ICD-10-CM

## 2024-03-13 DIAGNOSIS — G47.00 INSOMNIA, UNSPECIFIED TYPE: ICD-10-CM

## 2024-03-13 DIAGNOSIS — J44.89 ASTHMA WITH CHRONIC OBSTRUCTIVE PULMONARY DISEASE (COPD) (MULTI): ICD-10-CM

## 2024-03-13 DIAGNOSIS — M19.91 PRIMARY OSTEOARTHRITIS, UNSPECIFIED SITE: ICD-10-CM

## 2024-03-13 DIAGNOSIS — G89.29 CHRONIC LOW BACK PAIN WITHOUT SCIATICA, UNSPECIFIED BACK PAIN LATERALITY: ICD-10-CM

## 2024-03-13 LAB
ALBUMIN SERPL BCP-MCNC: 4.5 G/DL (ref 3.4–5)
ANION GAP SERPL CALC-SCNC: 17 MMOL/L (ref 10–20)
BASOPHILS # BLD AUTO: 0.07 X10*3/UL (ref 0–0.1)
BASOPHILS NFR BLD AUTO: 0.6 %
BUN SERPL-MCNC: 18 MG/DL (ref 6–23)
CALCIUM SERPL-MCNC: 8.4 MG/DL (ref 8.6–10.6)
CHLORIDE SERPL-SCNC: 106 MMOL/L (ref 98–107)
CO2 SERPL-SCNC: 21 MMOL/L (ref 21–32)
CREAT SERPL-MCNC: 0.9 MG/DL (ref 0.5–1.05)
EGFRCR SERPLBLD CKD-EPI 2021: 74 ML/MIN/1.73M*2
EOSINOPHIL # BLD AUTO: 0.24 X10*3/UL (ref 0–0.7)
EOSINOPHIL NFR BLD AUTO: 2 %
ERYTHROCYTE [DISTWIDTH] IN BLOOD BY AUTOMATED COUNT: 14.6 % (ref 11.5–14.5)
EST. AVERAGE GLUCOSE BLD GHB EST-MCNC: 103 MG/DL
GLUCOSE SERPL-MCNC: 100 MG/DL (ref 74–99)
HBA1C MFR BLD: 5.2 %
HCT VFR BLD AUTO: 35.8 % (ref 36–46)
HGB BLD-MCNC: 11 G/DL (ref 12–16)
IMM GRANULOCYTES # BLD AUTO: 0.03 X10*3/UL (ref 0–0.7)
IMM GRANULOCYTES NFR BLD AUTO: 0.3 % (ref 0–0.9)
LYMPHOCYTES # BLD AUTO: 2.38 X10*3/UL (ref 1.2–4.8)
LYMPHOCYTES NFR BLD AUTO: 20.2 %
MCH RBC QN AUTO: 28 PG (ref 26–34)
MCHC RBC AUTO-ENTMCNC: 30.7 G/DL (ref 32–36)
MCV RBC AUTO: 91 FL (ref 80–100)
MONOCYTES # BLD AUTO: 0.77 X10*3/UL (ref 0.1–1)
MONOCYTES NFR BLD AUTO: 6.5 %
NEUTROPHILS # BLD AUTO: 8.29 X10*3/UL (ref 1.2–7.7)
NEUTROPHILS NFR BLD AUTO: 70.4 %
NRBC BLD-RTO: 0 /100 WBCS (ref 0–0)
PHOSPHATE SERPL-MCNC: 4.3 MG/DL (ref 2.5–4.9)
PLATELET # BLD AUTO: 296 X10*3/UL (ref 150–450)
POTASSIUM SERPL-SCNC: 7 MMOL/L (ref 3.5–5.3)
RBC # BLD AUTO: 3.93 X10*6/UL (ref 4–5.2)
SODIUM SERPL-SCNC: 137 MMOL/L (ref 136–145)
WBC # BLD AUTO: 11.8 X10*3/UL (ref 4.4–11.3)

## 2024-03-13 PROCEDURE — 3008F BODY MASS INDEX DOCD: CPT

## 2024-03-13 PROCEDURE — 1036F TOBACCO NON-USER: CPT

## 2024-03-13 PROCEDURE — 3074F SYST BP LT 130 MM HG: CPT

## 2024-03-13 PROCEDURE — 85025 COMPLETE CBC W/AUTO DIFF WBC: CPT

## 2024-03-13 PROCEDURE — 99214 OFFICE O/P EST MOD 30 MIN: CPT

## 2024-03-13 PROCEDURE — 83036 HEMOGLOBIN GLYCOSYLATED A1C: CPT

## 2024-03-13 PROCEDURE — 36415 COLL VENOUS BLD VENIPUNCTURE: CPT

## 2024-03-13 PROCEDURE — 3078F DIAST BP <80 MM HG: CPT

## 2024-03-13 PROCEDURE — 80069 RENAL FUNCTION PANEL: CPT

## 2024-03-13 RX ORDER — HYDROCHLOROTHIAZIDE 25 MG/1
TABLET ORAL
COMMUNITY
Start: 2023-10-24 | End: 2024-03-13 | Stop reason: SDUPTHER

## 2024-03-13 RX ORDER — FERROUS SULFATE 325(65) MG
1 TABLET ORAL EVERY OTHER DAY
Qty: 15 TABLET | Refills: 0 | Status: SHIPPED | OUTPATIENT
Start: 2024-03-13 | End: 2024-05-08 | Stop reason: SDUPTHER

## 2024-03-13 RX ORDER — SENNOSIDES 8.6 MG
TABLET ORAL
COMMUNITY
Start: 2023-11-28

## 2024-03-13 RX ORDER — ESCITALOPRAM OXALATE 20 MG/1
20 TABLET ORAL DAILY
Qty: 90 TABLET | Refills: 0 | Status: SHIPPED | OUTPATIENT
Start: 2024-03-13 | End: 2024-06-11

## 2024-03-13 RX ORDER — ALBUTEROL SULFATE 90 UG/1
2 AEROSOL, METERED RESPIRATORY (INHALATION) EVERY 6 HOURS PRN
Qty: 18 G | Refills: 11 | Status: SHIPPED | OUTPATIENT
Start: 2024-03-13

## 2024-03-13 RX ORDER — POLYETHYLENE GLYCOL 3350 17 G/17G
17 POWDER, FOR SOLUTION ORAL 2 TIMES DAILY
Qty: 30 PACKET | Refills: 0 | Status: SHIPPED | OUTPATIENT
Start: 2024-03-13

## 2024-03-13 RX ORDER — TALC
3 POWDER (GRAM) TOPICAL NIGHTLY
Qty: 30 TABLET | Refills: 0 | Status: SHIPPED | OUTPATIENT
Start: 2024-03-13 | End: 2024-05-08 | Stop reason: SDUPTHER

## 2024-03-13 RX ORDER — PANTOPRAZOLE SODIUM 40 MG/1
40 TABLET, DELAYED RELEASE ORAL DAILY
Qty: 30 TABLET | Refills: 0 | Status: SHIPPED | OUTPATIENT
Start: 2024-03-13 | End: 2024-05-08 | Stop reason: SDUPTHER

## 2024-03-13 RX ORDER — LISINOPRIL 30 MG/1
TABLET ORAL
COMMUNITY
Start: 2023-10-24 | End: 2024-05-08 | Stop reason: SDUPTHER

## 2024-03-13 RX ORDER — FLUTICASONE PROPIONATE AND SALMETEROL 500; 50 UG/1; UG/1
1 POWDER RESPIRATORY (INHALATION)
Qty: 14 EACH | Refills: 0 | Status: SHIPPED | OUTPATIENT
Start: 2024-03-13 | End: 2024-05-31

## 2024-03-13 RX ORDER — AMLODIPINE BESYLATE 10 MG/1
10 TABLET ORAL DAILY
Qty: 60 TABLET | Refills: 0 | Status: SHIPPED | OUTPATIENT
Start: 2024-03-13 | End: 2024-04-29 | Stop reason: SDUPTHER

## 2024-03-13 RX ORDER — GUAIFENESIN 1200 MG/1
1200 TABLET, EXTENDED RELEASE ORAL
Qty: 60 TABLET | Refills: 10 | Status: CANCELLED | OUTPATIENT
Start: 2024-03-13

## 2024-03-13 RX ORDER — LIDOCAINE 50 MG/G
PATCH TOPICAL
Qty: 30 PATCH | Refills: 1 | Status: SHIPPED | OUTPATIENT
Start: 2024-03-13 | End: 2024-05-08 | Stop reason: SDUPTHER

## 2024-03-13 RX ORDER — HYDROCHLOROTHIAZIDE 25 MG/1
TABLET ORAL
Qty: 60 TABLET | Refills: 1 | Status: SHIPPED | OUTPATIENT
Start: 2024-03-13 | End: 2024-05-08 | Stop reason: SDUPTHER

## 2024-03-13 ASSESSMENT — PAIN SCALES - GENERAL: PAINLEVEL: 0-NO PAIN

## 2024-03-14 ENCOUNTER — CLINICAL SUPPORT (OUTPATIENT)
Dept: EMERGENCY MEDICINE | Facility: HOSPITAL | Age: 59
End: 2024-03-14
Payer: COMMERCIAL

## 2024-03-14 ENCOUNTER — HOSPITAL ENCOUNTER (EMERGENCY)
Facility: HOSPITAL | Age: 59
Discharge: HOME | End: 2024-03-14
Attending: STUDENT IN AN ORGANIZED HEALTH CARE EDUCATION/TRAINING PROGRAM
Payer: COMMERCIAL

## 2024-03-14 VITALS
OXYGEN SATURATION: 95 % | HEIGHT: 63 IN | TEMPERATURE: 97.5 F | SYSTOLIC BLOOD PRESSURE: 162 MMHG | WEIGHT: 293 LBS | BODY MASS INDEX: 51.91 KG/M2 | HEART RATE: 71 BPM | RESPIRATION RATE: 16 BRPM | DIASTOLIC BLOOD PRESSURE: 87 MMHG

## 2024-03-14 DIAGNOSIS — E87.8 ABNORMAL BLOOD ELECTROLYTE LEVEL: Primary | ICD-10-CM

## 2024-03-14 LAB
ALBUMIN SERPL BCP-MCNC: 3.9 G/DL (ref 3.4–5)
ALP SERPL-CCNC: 154 U/L (ref 33–110)
ALT SERPL W P-5'-P-CCNC: 4 U/L (ref 7–45)
ANION GAP SERPL CALC-SCNC: 13 MMOL/L (ref 10–20)
AST SERPL W P-5'-P-CCNC: 11 U/L (ref 9–39)
BASOPHILS # BLD AUTO: 0.05 X10*3/UL (ref 0–0.1)
BASOPHILS NFR BLD AUTO: 0.5 %
BILIRUB SERPL-MCNC: 0.3 MG/DL (ref 0–1.2)
BUN SERPL-MCNC: 19 MG/DL (ref 6–23)
CALCIUM SERPL-MCNC: 8.6 MG/DL (ref 8.6–10.6)
CHLORIDE SERPL-SCNC: 108 MMOL/L (ref 98–107)
CO2 SERPL-SCNC: 23 MMOL/L (ref 21–32)
CREAT SERPL-MCNC: 0.87 MG/DL (ref 0.5–1.05)
EGFRCR SERPLBLD CKD-EPI 2021: 77 ML/MIN/1.73M*2
EOSINOPHIL # BLD AUTO: 0.21 X10*3/UL (ref 0–0.7)
EOSINOPHIL NFR BLD AUTO: 1.9 %
ERYTHROCYTE [DISTWIDTH] IN BLOOD BY AUTOMATED COUNT: 14.2 % (ref 11.5–14.5)
GLUCOSE SERPL-MCNC: 89 MG/DL (ref 74–99)
HCT VFR BLD AUTO: 30.4 % (ref 36–46)
HGB BLD-MCNC: 10.3 G/DL (ref 12–16)
IMM GRANULOCYTES # BLD AUTO: 0.04 X10*3/UL (ref 0–0.7)
IMM GRANULOCYTES NFR BLD AUTO: 0.4 % (ref 0–0.9)
LYMPHOCYTES # BLD AUTO: 1.72 X10*3/UL (ref 1.2–4.8)
LYMPHOCYTES NFR BLD AUTO: 15.9 %
MAGNESIUM SERPL-MCNC: 2.03 MG/DL (ref 1.6–2.4)
MCH RBC QN AUTO: 28.5 PG (ref 26–34)
MCHC RBC AUTO-ENTMCNC: 33.9 G/DL (ref 32–36)
MCV RBC AUTO: 84 FL (ref 80–100)
MONOCYTES # BLD AUTO: 0.82 X10*3/UL (ref 0.1–1)
MONOCYTES NFR BLD AUTO: 7.6 %
NEUTROPHILS # BLD AUTO: 8 X10*3/UL (ref 1.2–7.7)
NEUTROPHILS NFR BLD AUTO: 73.7 %
NRBC BLD-RTO: 0 /100 WBCS (ref 0–0)
PLATELET # BLD AUTO: 234 X10*3/UL (ref 150–450)
POTASSIUM SERPL-SCNC: 4.5 MMOL/L (ref 3.5–5.3)
PROT SERPL-MCNC: 7 G/DL (ref 6.4–8.2)
RBC # BLD AUTO: 3.62 X10*6/UL (ref 4–5.2)
SODIUM SERPL-SCNC: 139 MMOL/L (ref 136–145)
WBC # BLD AUTO: 10.8 X10*3/UL (ref 4.4–11.3)

## 2024-03-14 PROCEDURE — 99283 EMERGENCY DEPT VISIT LOW MDM: CPT | Mod: 25

## 2024-03-14 PROCEDURE — 85025 COMPLETE CBC W/AUTO DIFF WBC: CPT

## 2024-03-14 PROCEDURE — 99284 EMERGENCY DEPT VISIT MOD MDM: CPT | Performed by: STUDENT IN AN ORGANIZED HEALTH CARE EDUCATION/TRAINING PROGRAM

## 2024-03-14 PROCEDURE — 36415 COLL VENOUS BLD VENIPUNCTURE: CPT

## 2024-03-14 PROCEDURE — 83735 ASSAY OF MAGNESIUM: CPT

## 2024-03-14 PROCEDURE — 93005 ELECTROCARDIOGRAM TRACING: CPT

## 2024-03-14 PROCEDURE — 80053 COMPREHEN METABOLIC PANEL: CPT

## 2024-03-14 ASSESSMENT — PAIN - FUNCTIONAL ASSESSMENT: PAIN_FUNCTIONAL_ASSESSMENT: 0-10

## 2024-03-14 ASSESSMENT — LIFESTYLE VARIABLES
HAVE YOU EVER FELT YOU SHOULD CUT DOWN ON YOUR DRINKING: NO
EVER HAD A DRINK FIRST THING IN THE MORNING TO STEADY YOUR NERVES TO GET RID OF A HANGOVER: NO
HAVE PEOPLE ANNOYED YOU BY CRITICIZING YOUR DRINKING: NO
EVER FELT BAD OR GUILTY ABOUT YOUR DRINKING: NO

## 2024-03-14 ASSESSMENT — PAIN SCALES - GENERAL: PAINLEVEL_OUTOF10: 0 - NO PAIN

## 2024-03-14 NOTE — ED PROVIDER NOTES
CC: Other (Abnormal lab work K elevated)     HPI:  Patient is a 58-year-old female with a past medical history of HFpEF, anxiety/depression, asthma, HTN, anemia, javier-en-Y, GERD, morbid obesity, OPAL, arthritis, and vitamin D deficiency who presents to the ED for hyperkalemia on outpatient labs.  Patient was noted to have outpatient labs yesterday significant for a potassium of 7 with mild hemolysis.  Patient was advised presents to the ED or have her potassium rechecked in lab.  Patient notes that she is not having any symptoms.  Denied any new medications.  Denies fevers, chills, numbness, tingling, weakness, chest pain, difficulty breathing, abdominal pain, dysuria, nausea, vomiting, and abnormal bowel movements.    Limitations to history: None  Independent historian(s): None  Records Reviewed: Recent available ED and inpatient notes reviewed in EMR.    PMHx/PSHx:  Per HPI.   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), COVID-19 (03/19/2021), Gastritis, unspecified, without bleeding (03/07/2017), High risk HPV infection (09/13/2023), Hypokalemia (09/13/2023), Intertrigo (09/13/2023), Ischemic bowel disease (CMS/HCC) (04/01/2021), Personal history of other diseases of the musculoskeletal system and connective tissue (12/08/2015), Personal history of other diseases of the respiratory system (12/08/2015), Personal history of other diseases of the respiratory system (12/08/2015), Personal history of other diseases of the respiratory system (12/08/2015), Personal  history of other infectious and parasitic diseases (2015), Personal history of other specified conditions (2016), Personal history of other specified conditions (2015), Personal history of other specified conditions (01/10/2014), Personal history of peptic ulcer disease (2015), SBO (small bowel obstruction) (CMS/HCC) (2023), Sebaceous cyst (2002), Tension headache (2009), Urinary tract infection, site not specified (04/15/2015), and Wound dehiscence (2023).  - has a past surgical history that includes Esophagogastroduodenoscopy (2018); Colonoscopy (2018); Other surgical history (2020); Umbilical hernia repair (2014); Tubal ligation (2014); Appendectomy (2014);  section, classic (2014); Other surgical history (2014); Gastric restriction surgery (2015); and Gastric bypass (2017).    Medications:  Reviewed in EMR. See EMR for complete list of medications and doses.    Allergies:  Aspirin    Social History:  - Tobacco:  reports that she has quit smoking. Her smoking use included cigarettes. She has never used smokeless tobacco.   - Alcohol:  reports that she does not currently use alcohol.   - Illicit Drugs:  reports no history of drug use.     ROS:  Per HPI.       ???????????????????????????????????????????????????????????????  Triage Vitals:  T 36.4 °C (97.5 °F)  HR 78  /56  RR 18  O2 97 %      Physical Exam    General: Patient resting comfortably in bed, no acute distress, breathing easily, well appearing, and appropriately conversational without confusion or gross mental status changes.  Head: Normocephalic. Atraumatic.    Neck: FROM. No gross masses.   Eyes: EOMI. No scleral icterus or injection.  ENT: Moist mucous membranes, no apparent trauma or lesions.  CV: Regular rhythm. No murmurs, rubs, gallops appreciated. 2+ radial pulses bilaterally.  Resp: Clear to auscultation bilaterally. No  respiratory distress.   GI: Soft, non-distended. No tenderness with palpation. No rebound tenderness or guarding. No palpable masses.  : No suprapubic or CVA tenderness.  MSK: No gross step offs or deformities.  EXT: No peripheral edema, contusions, or wounds.  Skin: Warm and dry, no rashes or lesions.  Neuro: No focal neurological deficits from stated baseline. Speech fluent.  Psych: Appropriate mood and behavior, converses and responds appropriately.    ???????????????????????????????????????????????????????????????  Labs:   Labs Reviewed   MAGNESIUM   COMPREHENSIVE METABOLIC PANEL   CBC WITH AUTO DIFFERENTIAL        Imaging:   No orders to display        EKG:  Rate is 68, sinus rhythm, normal axis, no interval prolongation, no st elevation or depression.  When compared to EKG on 10/10/2023 review of EKG does not show any signs of STEMI, complete heart block, asystole, V-fib.    MDM:  Patient is a 58-year-old female with a past medical history of HFpEF, anxiety/depression, asthma, HTN, anemia, javier-en-Y, GERD, morbid obesity, OPAL, arthritis, and vitamin D deficiency who presents to the ED for hyperkalemia on outpatient labs.  Patient is HDS.  Physical exam findings significant for a 58-year-old female in no acute distress.  Low clinical concern for an acute cardiac, intra-abdominal, intrathoracic, neurologic, or vascular process.  Low clinical concern for ACS with unremarkable EKG no chest pain.  No dysrrhythmia or peaked T waves noted on EKG.  Basic labs and magnesium are ordered.  Potassium was noted to be normal at 4.5.  Remainder labs are unremarkable.  Patient's previous hyperkalemia 7.0 was likely lab error.  Upon reevaluation, patient noted being in no acute distress.  Discussed ED findings, plan for outpatient primary care follow-up within the next week, and strict return precautions for any new or worsening symptoms.  Patient was also asked to follow-up with her primary care physician in regards to  elevated blood pressure in the ED.  All questions were answered.  Patient discharged in stable condition.    ED Course:  Diagnoses as of 03/15/24 0653   Abnormal blood electrolyte level       Social Determinants Limiting Care:  None identified    Disposition:  Discharge    Minor Pereira MD   Emergency Medicine PGY-2  [facility]    Comment: Please note this report has been produced using speech recognition software and may contain errors related to that system including errors in grammar, punctuation, and spelling as well as words and phrases that may be inappropriate.  If there are any questions or concerns please feel free to contact the dictating provider for clarification.    Procedures ? Smart[employer] last updated 3/14/2024 9:12 AM        Minor Pereira MD  Resident  03/15/24 0747

## 2024-03-14 NOTE — DISCHARGE INSTRUCTIONS
You presented to the ED for hyperkalemia on outpatient labs.  Your potassium in the ED was noted to be normal.  Follow-up with primary care within the next week.  The phone number for the primary care clinic at [facility] is [phone]. Please return the emergency department for any new or worsening symptoms including but not limited to numbness, weakness, and tingling.

## 2024-03-14 NOTE — TELEPHONE ENCOUNTER
I received a page from the lab for a critical result for this patient's potassium of 7, with mild hemolysis detected. I called her to advise her to return to an ED or lab to have it re-checked ASAP due to the potential risk of cardiac arrythmias and death. She stated that she did not have symptoms at this time and would need to call an ambulance as she did not have access to transportation until Saturday.     I told her that I would update Dr. Amor.     Fawn Mathis MD  PGY-2, Family Medicine.

## 2024-03-16 LAB
ATRIAL RATE: 68 BPM
P AXIS: 73 DEGREES
P OFFSET: 172 MS
P ONSET: 125 MS
PR INTERVAL: 184 MS
Q ONSET: 217 MS
QRS COUNT: 11 BEATS
QRS DURATION: 90 MS
QT INTERVAL: 428 MS
QTC CALCULATION(BAZETT): 455 MS
QTC FREDERICIA: 446 MS
R AXIS: 7 DEGREES
T AXIS: 39 DEGREES
T OFFSET: 431 MS
VENTRICULAR RATE: 68 BPM

## 2024-03-24 NOTE — PROGRESS NOTES
Patient ID: Jessica Smith is a 58 y.o. female with PMH of HFpEF (EF 60-65% 10/2021), anxiety/depression, asthma, HTN, anemia, javier-en-Y, GERD, morbid obesity, OPAL, arthritis, and vitamin D deficiency who presents for Establish Care and Med Refill.     Subjective   Med Refill     Ms. Smith reports experiencing ongoing knee and neck pain, for which she currently takes Tylenol. However, she indicates that this medication is insufficient in managing her symptoms. Additionally, she utilizes lidocaine on an as-needed basis for severe pain. She also reports experiencing heartburn and takes pantoprazole to address this. Furthermore, she mentions having sleeping problems and uses melatonin as a remedy. Regarding her mood, she acknowledges feeling depressed and finds relief with Lexapro. She also mentions using Advair and albuterol on an as-needed basis for her asthma. To manage occasional constipation, she takes Miralax. Her blood pressure is well-controlled with hydrochlorothiazide.     Review of Systems   Constitutional:  Positive for fatigue. Negative for activity change, appetite change, chills, diaphoresis, fever and unexpected weight change.   HENT:  Negative for hearing loss, nosebleeds and trouble swallowing.    Eyes:  Negative for visual disturbance.   Respiratory:  Positive for shortness of breath. Negative for cough, chest tightness and wheezing.         SOB on exertion   Cardiovascular:  Negative for chest pain, palpitations and leg swelling.   Gastrointestinal:  Positive for abdominal pain. Negative for abdominal distention, anal bleeding, blood in stool, constipation and diarrhea.   Endocrine: Positive for polyuria.   Genitourinary:  Positive for frequency. Negative for dysuria, vaginal bleeding and vaginal discharge.   Musculoskeletal:  Positive for arthralgias, gait problem and neck pain.        Pt endorses chronic diffuse abdominal pain and knee and neck pain attribute to osteoarthritis    Allergic/Immunologic: Positive for environmental allergies.   Neurological:  Positive for headaches. Negative for numbness.        Pt endorses headaches 2x/week   Psychiatric/Behavioral:  Positive for dysphoric mood and sleep disturbance.         Pt endorses depressed mood due to problems with her family members and health problems, but importantly endorses feeling safe at home         Medical History        Past Medical History:   Diagnosis Date    Abdominal adhesions 08/08/2021    Abnormal QT interval present on electrocardiogram 03/18/2021     Formatting of this note might be different from the original. Formatting of this note might be different from the original. -EKG: NSR. QTc 500 Formatting of this note might be different from the original. -EKG: NSR. QTc 500    Acute gastric ulcer without hemorrhage or perforation 01/13/2016     Gastric ulcer, acute    Acute gastrojejunal ulcer 09/07/2007     Formatting of this note might be different from the original. [id number]    Acute upper respiratory infection, unspecified 12/15/2016     Viral URI with cough    Carpal tunnel syndrome, unspecified upper limb 07/22/2013     Carpal tunnel syndrome    Cellulitis of abdominal wall 09/13/2023    Chronic sinusitis 09/13/2023    Congenital malformations of corpus callosum (CMS/[facility]) 08/07/2017     Agenesis of corpus callosum    COVID-19 03/19/2021    Gastritis, unspecified, without bleeding 03/07/2017     Gastritis, Helicobacter pylori    High risk HPV infection 09/13/2023    Hypokalemia 09/13/2023    Intertrigo 09/13/2023    Ischemic bowel disease (CMS/[facility]) 04/01/2021    Personal history of other diseases of the musculoskeletal system and connective tissue 12/08/2015     History of arthritis    Personal history of other diseases of the respiratory system 12/08/2015     History of bronchitis    Personal history of other diseases of the respiratory system 12/08/2015     History of sinusitis    Personal history of other  diseases of the respiratory system 2015     History of acute bronchitis    Personal history of other infectious and parasitic diseases 2015     History of Helicobacter infection    Personal history of other specified conditions 2016     History of diarrhea    Personal history of other specified conditions 2015     History of chest pain    Personal history of other specified conditions 01/10/2014     History of chest pain    Personal history of peptic ulcer disease 2015     History of gastric ulcer    SBO (small bowel obstruction) (CMS/HCC) 2023    Sebaceous cyst 2002    Tension headache 2009    Urinary tract infection, site not specified 04/15/2015     UTI (lower urinary tract infection)    Wound dehiscence 2023         Surgical History         Past Surgical History:   Procedure Laterality Date    APPENDECTOMY   2014     Appendectomy     SECTION, CLASSIC   2014      Section    COLONOSCOPY   2018     Colonoscopy (Fiberoptic)    ESOPHAGOGASTRODUODENOSCOPY   2018     Diagnostic Esophagogastroduodenoscopy    GASTRIC BYPASS   2017     Gastric Surgery For Morbid Obesity Gastric Bypass    GASTRIC RESTRICTION SURGERY   2015     Gastric Surgery For Morbid Obesity    OTHER SURGICAL HISTORY   2020     Ventral hernia repair    OTHER SURGICAL HISTORY   2014     Cholecystotomy    TUBAL LIGATION   2014     Tubal Ligation    UMBILICAL HERNIA REPAIR   2014     Umbilical Hernia Repair         Family History   No family history on file.     Aspirin   Social History            Tobacco Use    Smoking status: Former       Types: Cigarettes    Smokeless tobacco: Never   Substance Use Topics    Alcohol use: Not Currently                Current Outpatient Medications on File Prior to Visit   Medication Sig Dispense Refill    lisinopril 30 mg tablet          senna 8.6 mg tablet          [DISCONTINUED]  hydroCHLOROthiazide (HYDRODiuril) 25 mg tablet          acetaminophen (Tylenol) 500 mg tablet TAKE 2 TABLETS (1,000 MG) BY MOUTH EVERY 6 HOURS IF NEEDED FOR MILD PAIN (1 - 3). 30 tablet 0    diclofenac sodium (Arthritis Pain, diclofenac,) 1 % gel gel Apply 1 Application topically 4 times a day. 200 g 11    famotidine (Pepcid AC) 10 mg tablet Take 1 tablet (10 mg) by mouth 2 times a day. 60 tablet 11    fluticasone (Flonase) 50 mcg/actuation nasal spray Administer 1 spray into each nostril once daily. 16 g 0    gabapentin (Neurontin) 300 mg capsule Take 1 capsule (300 mg) by mouth once daily at bedtime. 30 capsule 0    guar gum (Nutrisource Fiber) packet Take 1 packet by mouth 3 times a day.   0    Mucinex 1,200 mg tablet extended release 12hr TAKE 1 TABLET BY MOUTH EVERY 12 HOURS IF NEEDED FOR COUGHING 60 tablet 10    sennosides-docusate sodium (Senexon-S) 8.6-50 mg tablet TAKE 1 TABLET BY MOUTH 2 TIMES A DAY. 60 tablet 0    sucralfate (Carafate) 100 mg/mL suspension Take 10 mL (1 g) by mouth 4 times a day with meals. 1200 mL 0    walker misc 1 each once daily. Use daily as needed for ambulation assistance 1 each 0    [DISCONTINUED] albuterol 90 mcg/actuation inhaler Inhale 2 puffs every 6 hours if needed for wheezing or shortness of breath. 18 g 11    [DISCONTINUED] amLODIPine (Norvasc) 10 mg tablet TAKE 1 TABLET (10 MG) BY MOUTH ONCE DAILY. 60 tablet 0    [DISCONTINUED] escitalopram (Lexapro) 20 mg tablet Take 1 tablet (20 mg) by mouth once daily. 90 tablet 0    [DISCONTINUED] ferrous sulfate, 325 mg ferrous sulfate, (FeroSuL) tablet TAKE 1 TABLET BY MOUTH EVERY OTHER DAY. 15 tablet 0    [DISCONTINUED] fluticasone propion-salmeteroL (Advair Diskus) 250-50 mcg/dose diskus inhaler Inhale 1 puff 2 times a day. Rinse mouth with water after use to reduce aftertaste and incidence of candidiasis. Do not swallow. 60 each 11    [DISCONTINUED] lidocaine (Lidoderm) 5 % patch APPLY 1 PATCH TO THE AFFECTED AREA AND LEAVE IN  "PLACE FOR 12 HOURS, THEN REMOVE AND LEAVE OFF FOR 12 HOURS. Strength: 5 % 30 patch 1    [DISCONTINUED] melatonin 3 mg tablet Take 1 tablet (3 mg) by mouth once daily at bedtime. 30 tablet 0    [DISCONTINUED] pantoprazole (ProtoNix) 40 mg EC tablet Take 1 tablet (40 mg) by mouth once daily. 30 tablet 0    [DISCONTINUED] polyethylene glycol (Glycolax, Miralax) 17 gram packet Take 17 g by mouth 2 times a day. 30 packet 0      No current facility-administered medications on file prior to visit.         Objective []Expand by Default  Vitals: /77 (BP Location: Right arm, Patient Position: Sitting)   Pulse 74   Temp 35.9 °C (96.6 °F) (Temporal)   Ht 1.6 m (5' 3\")   Wt 144 kg (318 lb)   SpO2 96%   BMI 56.33 kg/m²       Physical Exam  Constitutional:       Appearance: Normal appearance. She is morbidly obese.   HENT:      Head: Normocephalic and atraumatic.      Mouth/Throat:      Mouth: Mucous membranes are moist.   Eyes:      Extraocular Movements: Extraocular movements intact.      Pupils: Pupils are equal, round, and reactive to light.   Cardiovascular:      Rate and Rhythm: Normal rate and regular rhythm.   Pulmonary:      Effort: Pulmonary effort is normal.      Breath sounds: Normal breath sounds.   Abdominal:      General: Bowel sounds are normal. There is no distension.      Palpations: Abdomen is soft.      Tenderness: There is generalized abdominal tenderness.   Musculoskeletal:         General: No swelling.   Skin:     General: Skin is dry.   Neurological:      General: No focal deficit present.      Mental Status: She is alert and oriented to person, place, and time.      Gait: Gait abnormal.    Psychiatric:         Thought Content: Thought content normal.       Assessment/Plan   58 yr old female who presents to establish care and medication refill    Problem List Items Addressed This Visit         All medications reviewed and refilled. Side effects and any concerns were addressed     escitalopram " (Lexapro) 20 mg tablet     albuterol 90 mcg/actuation inhaler     hydroCHLOROthiazide (HYDRODiuril) 25 mg tablet     amLODIPine (Norvasc) 10 mg tablet     polyethylene glycol (Glycolax, Miralax) 17 gram packet     pantoprazole (ProtoNix) 40 mg EC tablet     melatonin 3 mg tablet     ferrous sulfate, 325 mg ferrous sulfate, (FeroSuL) tablet     Unspecified osteoarthritis, unspecified site     Relevant Orders     Referral to Physical Therapy        Diabetes mellitus screening    -  Primary     Relevant Orders     CMP, CBC, and Hgb A1c sent will fu lab     Chronic low back pain without sciatica, unspecified back pain laterality         Relevant Medications     lidocaine (Lidoderm) 5 % patch     Chronic obstructive pulmonary disease, unspecified COPD type (CMS/HCC)         Relevant Medications     fluticasone propion-salmeteroL (Advair Diskus) 500-50 mcg/dose diskus inhaler        HPI obtained by Justyn Wasserman, MS3. I saw the patient and reviewed the key components of the HPI and performed my own neccessary physical exam. I reviewed the assessment/plan and made changes within the above text.    Patient seen and d/w Dr. Marie     RTC in 1 month, or earlier as needed    Terri Amor MD   PGY1, [facility]

## 2024-03-24 NOTE — PROGRESS NOTES
Patient ID: Jessica Smith is a 58 y.o. female with PMH of HFpEF (EF 60-65% 10/2021), anxiety/depression, asthma, HTN, anemia, javier-en-Y, GERD, morbid obesity, OPAL, arthritis, and vitamin D deficiency who presents for Establish Care and Med Refill.     Subjective   Med Refill  Associated symptoms include abdominal pain, arthralgias, fatigue, headaches and neck pain. Pertinent negatives include no chest pain, chills, coughing, diaphoresis, fever or numbness.      Ms. Smith reports experiencing ongoing knee and neck pain, for which she currently takes Tylenol. However, she indicates that this medication is insufficient in managing her symptoms. Additionally, she utilizes lidocaine on an as-needed basis for severe pain. She also reports experiencing heartburn and takes pantoprazole to address this. Furthermore, she mentions having sleeping problems and uses melatonin as a remedy. Regarding her mood, she acknowledges feeling depressed and finds relief with Lexapro. She also mentions using Advair and albuterol on an as-needed basis for her asthma. To manage occasional constipation, she takes Miralax. Her blood pressure is well-controlled with hydrochlorothiazide.     Review of Systems   Constitutional:  Positive for fatigue. Negative for activity change, appetite change, chills, diaphoresis, fever and unexpected weight change.   HENT:  Negative for hearing loss, nosebleeds and trouble swallowing.    Eyes:  Negative for visual disturbance.   Respiratory:  Positive for shortness of breath. Negative for cough, chest tightness and wheezing.         SOB on exertion   Cardiovascular:  Negative for chest pain, palpitations and leg swelling.   Gastrointestinal:  Positive for abdominal pain. Negative for abdominal distention, anal bleeding, blood in stool, constipation and diarrhea.   Endocrine: Positive for polyuria.   Genitourinary:  Positive for frequency. Negative for dysuria, vaginal bleeding and vaginal discharge.    Musculoskeletal:  Positive for arthralgias, gait problem and neck pain.        Pt endorses chronic diffuse abdominal pain and knee and neck pain attribute to osteoarthritis   Allergic/Immunologic: Positive for environmental allergies.   Neurological:  Positive for headaches. Negative for numbness.        Pt endorses headaches 2x/week   Psychiatric/Behavioral:  Positive for dysphoric mood and sleep disturbance.         Pt endorses depressed mood due to problems with her family members and health problems, but importantly endorses feeling safe at home         Medical History        Past Medical History:   Diagnosis Date    Abdominal adhesions 08/08/2021    Abnormal QT interval present on electrocardiogram 03/18/2021     Formatting of this note might be different from the original. Formatting of this note might be different from the original. -EKG: NSR. QTc 500 Formatting of this note might be different from the original. -EKG: NSR. QTc 500    Acute gastric ulcer without hemorrhage or perforation 01/13/2016     Gastric ulcer, acute    Acute gastrojejunal ulcer 09/07/2007     Formatting of this note might be different from the original. 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     Agenesis of corpus callosum    COVID-19 03/19/2021    Gastritis, unspecified, without bleeding 03/07/2017     Gastritis, Helicobacter pylori    High risk HPV infection 09/13/2023    Hypokalemia 09/13/2023    Intertrigo 09/13/2023    Ischemic bowel disease (CMS/[facility]) 04/01/2021    Personal history of other diseases of the musculoskeletal system and connective tissue 12/08/2015     History of arthritis    Personal history of other diseases of the respiratory  system 2015     History of bronchitis    Personal history of other diseases of the respiratory system 2015     History of sinusitis    Personal history of other diseases of the respiratory system 2015     History of acute bronchitis    Personal history of other infectious and parasitic diseases 2015     History of Helicobacter infection    Personal history of other specified conditions 2016     History of diarrhea    Personal history of other specified conditions 2015     History of chest pain    Personal history of other specified conditions 01/10/2014     History of chest pain    Personal history of peptic ulcer disease 2015     History of gastric ulcer    SBO (small bowel obstruction) (CMS/[facility]) 2023    Sebaceous cyst 2002    Tension headache 2009    Urinary tract infection, site not specified 04/15/2015     UTI (lower urinary tract infection)    Wound dehiscence 2023         Surgical History         Past Surgical History:   Procedure Laterality Date    APPENDECTOMY   2014     Appendectomy     SECTION, CLASSIC   2014      Section    COLONOSCOPY   2018     Colonoscopy (Fiberoptic)    ESOPHAGOGASTRODUODENOSCOPY   2018     Diagnostic Esophagogastroduodenoscopy    GASTRIC BYPASS   2017     Gastric Surgery For Morbid Obesity Gastric Bypass    GASTRIC RESTRICTION SURGERY   2015     Gastric Surgery For Morbid Obesity    OTHER SURGICAL HISTORY   2020     Ventral hernia repair    OTHER SURGICAL HISTORY   2014     Cholecystotomy    TUBAL LIGATION   2014     Tubal Ligation    UMBILICAL HERNIA REPAIR   2014     Umbilical Hernia Repair         Family History   No family history on file.     Aspirin   Social History            Tobacco Use    Smoking status: Former       Types: Cigarettes    Smokeless tobacco: Never   Substance Use Topics    Alcohol use: Not Currently                 Current Outpatient Medications on File Prior to Visit   Medication Sig Dispense Refill    lisinopril 30 mg tablet          senna 8.6 mg tablet          [DISCONTINUED] hydroCHLOROthiazide (HYDRODiuril) 25 mg tablet          acetaminophen (Tylenol) 500 mg tablet TAKE 2 TABLETS (1,000 MG) BY MOUTH EVERY 6 HOURS IF NEEDED FOR MILD PAIN (1 - 3). 30 tablet 0    diclofenac sodium (Arthritis Pain, diclofenac,) 1 % gel gel Apply 1 Application topically 4 times a day. 200 g 11    famotidine (Pepcid AC) 10 mg tablet Take 1 tablet (10 mg) by mouth 2 times a day. 60 tablet 11    fluticasone (Flonase) 50 mcg/actuation nasal spray Administer 1 spray into each nostril once daily. 16 g 0    gabapentin (Neurontin) 300 mg capsule Take 1 capsule (300 mg) by mouth once daily at bedtime. 30 capsule 0    guar gum (Nutrisource Fiber) packet Take 1 packet by mouth 3 times a day.   0    Mucinex 1,200 mg tablet extended release 12hr TAKE 1 TABLET BY MOUTH EVERY 12 HOURS IF NEEDED FOR COUGHING 60 tablet 10    sennosides-docusate sodium (Senexon-S) 8.6-50 mg tablet TAKE 1 TABLET BY MOUTH 2 TIMES A DAY. 60 tablet 0    sucralfate (Carafate) 100 mg/mL suspension Take 10 mL (1 g) by mouth 4 times a day with meals. 1200 mL 0    walker misc 1 each once daily. Use daily as needed for ambulation assistance 1 each 0    [DISCONTINUED] albuterol 90 mcg/actuation inhaler Inhale 2 puffs every 6 hours if needed for wheezing or shortness of breath. 18 g 11    [DISCONTINUED] amLODIPine (Norvasc) 10 mg tablet TAKE 1 TABLET (10 MG) BY MOUTH ONCE DAILY. 60 tablet 0    [DISCONTINUED] escitalopram (Lexapro) 20 mg tablet Take 1 tablet (20 mg) by mouth once daily. 90 tablet 0    [DISCONTINUED] ferrous sulfate, 325 mg ferrous sulfate, (FeroSuL) tablet TAKE 1 TABLET BY MOUTH EVERY OTHER DAY. 15 tablet 0    [DISCONTINUED] fluticasone propion-salmeteroL (Advair Diskus) 250-50 mcg/dose diskus inhaler Inhale 1 puff 2 times a day. Rinse mouth with water after use to  "reduce aftertaste and incidence of candidiasis. Do not swallow. 60 each 11    [DISCONTINUED] lidocaine (Lidoderm) 5 % patch APPLY 1 PATCH TO THE AFFECTED AREA AND LEAVE IN PLACE FOR 12 HOURS, THEN REMOVE AND LEAVE OFF FOR 12 HOURS. Strength: 5 % 30 patch 1    [DISCONTINUED] melatonin 3 mg tablet Take 1 tablet (3 mg) by mouth once daily at bedtime. 30 tablet 0    [DISCONTINUED] pantoprazole (ProtoNix) 40 mg EC tablet Take 1 tablet (40 mg) by mouth once daily. 30 tablet 0    [DISCONTINUED] polyethylene glycol (Glycolax, Miralax) 17 gram packet Take 17 g by mouth 2 times a day. 30 packet 0      No current facility-administered medications on file prior to visit.               Objective []Expand by Default  Vitals: /77 (BP Location: Right arm, Patient Position: Sitting)   Pulse 74   Temp 35.9 °C (96.6 °F) (Temporal)   Ht 1.6 m (5' 3\")   Wt 144 kg (318 lb)   SpO2 96%   BMI 56.33 kg/m²       Physical Exam  Constitutional:       Appearance: Normal appearance. She is morbidly obese.   HENT:      Head: Normocephalic and atraumatic.      Mouth/Throat:      Mouth: Mucous membranes are moist.   Eyes:      Extraocular Movements: Extraocular movements intact.      Pupils: Pupils are equal, round, and reactive to light.   Cardiovascular:      Rate and Rhythm: Normal rate and regular rhythm.      Heart sounds:      Gallop present. S3 sounds present.   Pulmonary:      Effort: Pulmonary effort is normal.      Breath sounds: Normal breath sounds.   Abdominal:      General: Bowel sounds are normal. There is no distension.      Palpations: Abdomen is soft.      Tenderness: There is generalized abdominal tenderness.   Musculoskeletal:         General: No swelling.   Skin:     General: Skin is dry.   Neurological:      General: No focal deficit present.      Mental Status: She is alert and oriented to person, place, and time.      Gait: Gait abnormal.      Comments: Patient endorses having a walker at home d/t imbalance and " required a wheelchair to go to the outpatient lab   Psychiatric:         Thought Content: Thought content normal.           Assessment/Plan   Problem List Items Addressed This Visit         Anxiety and depression     Relevant Medications     escitalopram (Lexapro) 20 mg tablet     Asthma with chronic obstructive pulmonary disease (COPD)     Relevant Medications     albuterol 90 mcg/actuation inhaler     Benign essential hypertension (Chronic)     Relevant Medications     hydroCHLOROthiazide (HYDRODiuril) 25 mg tablet     amLODIPine (Norvasc) 10 mg tablet     Other Relevant Orders     Renal function panel     Chronic constipation     Relevant Medications     polyethylene glycol (Glycolax, Miralax) 17 gram packet     Gastroesophageal reflux disease with esophagitis     Relevant Medications     pantoprazole (ProtoNix) 40 mg EC tablet     Insomnia     Relevant Medications     melatonin 3 mg tablet     Iron deficiency anemia     Relevant Medications     ferrous sulfate, 325 mg ferrous sulfate, (FeroSuL) tablet     Other Relevant Orders     CBC and Auto Differential     Seasonal allergies     Unspecified osteoarthritis, unspecified site     Relevant Orders     Referral to Physical Therapy      Other Visit Diagnoses         Diabetes mellitus screening    -  Primary     Relevant Orders     Hemoglobin A1C     Chronic low back pain without sciatica, unspecified back pain laterality         Relevant Medications     lidocaine (Lidoderm) 5 % patch     Chronic obstructive pulmonary disease, unspecified COPD type (CMS/HCC)         Relevant Medications     fluticasone propion-salmeteroL (Advair Diskus) 500-50 mcg/dose diskus inhaler                Attending Supervision: seen and discussed with attending physician (cosigner listed on this note).     RTC in 1 month, or earlier as needed.     Justyn Wasserman, MS3  [facility]

## 2024-03-26 DIAGNOSIS — K21.00 GASTROESOPHAGEAL REFLUX DISEASE WITH ESOPHAGITIS WITHOUT HEMORRHAGE: ICD-10-CM

## 2024-03-26 DIAGNOSIS — D50.9 IRON DEFICIENCY ANEMIA, UNSPECIFIED IRON DEFICIENCY ANEMIA TYPE: ICD-10-CM

## 2024-03-26 DIAGNOSIS — J44.9 CHRONIC OBSTRUCTIVE PULMONARY DISEASE, UNSPECIFIED COPD TYPE (MULTI): ICD-10-CM

## 2024-03-26 DIAGNOSIS — K59.09 CHRONIC CONSTIPATION: ICD-10-CM

## 2024-03-26 DIAGNOSIS — G47.00 INSOMNIA, UNSPECIFIED TYPE: ICD-10-CM

## 2024-03-26 NOTE — TELEPHONE ENCOUNTER
[facility] called requesting refills of pt ferrous sulfate 325mg, melatonin 3mg, pantoprazole 40mg, polyethylene glycol 17G packets, and Advair 500-50mcg diskus inhaler. Orders pended and routed to provider.

## 2024-03-26 NOTE — PROGRESS NOTES
I saw and evaluated the patient. I personally obtained the key and critical portions of the history and physical exam or was physically present for key and critical portions performed by the resident/fellow. I reviewed the resident/fellow's documentation and discussed the patient with the resident/fellow. I agree with the resident/fellow's medical decision making as documented in the note with the exception/addition of the following:  Agree with plan to re-assess depression/anxiety at next visit.     Shruthi Marie MD

## 2024-03-27 RX ORDER — POLYETHYLENE GLYCOL 3350 17 G/17G
17 POWDER, FOR SOLUTION ORAL 2 TIMES DAILY
Qty: 30 PACKET | Refills: 0 | OUTPATIENT
Start: 2024-03-27

## 2024-03-27 RX ORDER — FERROUS SULFATE 325(65) MG
1 TABLET ORAL EVERY OTHER DAY
Qty: 15 TABLET | Refills: 0 | OUTPATIENT
Start: 2024-03-27

## 2024-03-27 RX ORDER — PANTOPRAZOLE SODIUM 40 MG/1
40 TABLET, DELAYED RELEASE ORAL DAILY
Qty: 30 TABLET | Refills: 0 | OUTPATIENT
Start: 2024-03-27 | End: 2024-06-25

## 2024-03-27 RX ORDER — FLUTICASONE PROPIONATE AND SALMETEROL 500; 50 UG/1; UG/1
1 POWDER RESPIRATORY (INHALATION)
Qty: 14 EACH | Refills: 0 | OUTPATIENT
Start: 2024-03-27 | End: 2025-03-27

## 2024-03-27 RX ORDER — TALC
3 POWDER (GRAM) TOPICAL NIGHTLY
Qty: 30 TABLET | Refills: 0 | OUTPATIENT
Start: 2024-03-27

## 2024-03-28 DIAGNOSIS — M79.2 NEUROPATHIC PAIN: ICD-10-CM

## 2024-03-30 RX ORDER — GABAPENTIN 300 MG/1
300 CAPSULE ORAL NIGHTLY
Qty: 30 CAPSULE | Refills: 0 | Status: SHIPPED | OUTPATIENT
Start: 2024-03-30 | End: 2024-04-02 | Stop reason: SDUPTHER

## 2024-04-02 DIAGNOSIS — M79.2 NEUROPATHIC PAIN: ICD-10-CM

## 2024-04-02 RX ORDER — GABAPENTIN 300 MG/1
300 CAPSULE ORAL NIGHTLY
Qty: 30 CAPSULE | Refills: 3 | Status: SHIPPED | OUTPATIENT
Start: 2024-04-02

## 2024-04-12 NOTE — PROGRESS NOTES
I saw MS. Smith with the resident, and agree with the findings, assessment, and plan documented in 3/13/24 notes.

## 2024-04-29 DIAGNOSIS — I10 BENIGN ESSENTIAL HYPERTENSION: Chronic | ICD-10-CM

## 2024-04-29 NOTE — TELEPHONE ENCOUNTER
[facility] rep called requesting follow-up of e-refill request. Provider receipt of request not noted at this time. Order pended and routed to provider, and rep informed to call back if request not fulfilled within 3 days. Rep confirmed understanding.

## 2024-05-02 RX ORDER — AMLODIPINE BESYLATE 10 MG/1
10 TABLET ORAL DAILY
Qty: 60 TABLET | Refills: 0 | Status: SHIPPED | OUTPATIENT
Start: 2024-05-02 | End: 2024-05-08 | Stop reason: SDUPTHER

## 2024-05-05 DIAGNOSIS — K21.00 GASTROESOPHAGEAL REFLUX DISEASE WITH ESOPHAGITIS WITHOUT HEMORRHAGE: ICD-10-CM

## 2024-05-05 DIAGNOSIS — D50.9 IRON DEFICIENCY ANEMIA, UNSPECIFIED IRON DEFICIENCY ANEMIA TYPE: ICD-10-CM

## 2024-05-06 DIAGNOSIS — M54.50 CHRONIC LOW BACK PAIN WITHOUT SCIATICA, UNSPECIFIED BACK PAIN LATERALITY: ICD-10-CM

## 2024-05-06 DIAGNOSIS — G89.29 CHRONIC LOW BACK PAIN WITHOUT SCIATICA, UNSPECIFIED BACK PAIN LATERALITY: ICD-10-CM

## 2024-05-06 DIAGNOSIS — I10 BENIGN ESSENTIAL HYPERTENSION: Chronic | ICD-10-CM

## 2024-05-08 ENCOUNTER — OFFICE VISIT (OUTPATIENT)
Dept: PRIMARY CARE | Facility: CLINIC | Age: 59
End: 2024-05-08
Payer: COMMERCIAL

## 2024-05-08 VITALS
BODY MASS INDEX: 51.91 KG/M2 | SYSTOLIC BLOOD PRESSURE: 147 MMHG | WEIGHT: 293 LBS | HEART RATE: 79 BPM | HEIGHT: 63 IN | DIASTOLIC BLOOD PRESSURE: 81 MMHG | OXYGEN SATURATION: 95 % | TEMPERATURE: 96.7 F

## 2024-05-08 DIAGNOSIS — G47.00 INSOMNIA, UNSPECIFIED TYPE: ICD-10-CM

## 2024-05-08 DIAGNOSIS — K21.00 GASTROESOPHAGEAL REFLUX DISEASE WITH ESOPHAGITIS WITHOUT HEMORRHAGE: ICD-10-CM

## 2024-05-08 DIAGNOSIS — Z98.84 H/O GASTRIC BYPASS: Primary | ICD-10-CM

## 2024-05-08 DIAGNOSIS — G89.29 CHRONIC LOW BACK PAIN WITHOUT SCIATICA, UNSPECIFIED BACK PAIN LATERALITY: ICD-10-CM

## 2024-05-08 DIAGNOSIS — M54.50 CHRONIC LOW BACK PAIN WITHOUT SCIATICA, UNSPECIFIED BACK PAIN LATERALITY: ICD-10-CM

## 2024-05-08 DIAGNOSIS — I10 BENIGN ESSENTIAL HYPERTENSION: Chronic | ICD-10-CM

## 2024-05-08 DIAGNOSIS — D50.9 IRON DEFICIENCY ANEMIA, UNSPECIFIED IRON DEFICIENCY ANEMIA TYPE: ICD-10-CM

## 2024-05-08 PROCEDURE — 3008F BODY MASS INDEX DOCD: CPT

## 2024-05-08 PROCEDURE — 3077F SYST BP >= 140 MM HG: CPT

## 2024-05-08 PROCEDURE — 3079F DIAST BP 80-89 MM HG: CPT

## 2024-05-08 PROCEDURE — 99213 OFFICE O/P EST LOW 20 MIN: CPT

## 2024-05-08 RX ORDER — LANOLIN ALCOHOL/MO/W.PET/CERES
1000 CREAM (GRAM) TOPICAL DAILY
Qty: 30 TABLET | Refills: 11 | Status: SHIPPED | OUTPATIENT
Start: 2024-05-08 | End: 2025-05-08

## 2024-05-08 RX ORDER — TALC
3 POWDER (GRAM) TOPICAL NIGHTLY
Qty: 30 TABLET | Refills: 0 | Status: SHIPPED | OUTPATIENT
Start: 2024-05-08 | End: 2024-05-30 | Stop reason: SDUPTHER

## 2024-05-08 RX ORDER — DICLOFENAC SODIUM 10 MG/G
4 GEL TOPICAL 4 TIMES DAILY
Qty: 200 G | Refills: 11 | Status: SHIPPED | OUTPATIENT
Start: 2024-05-08

## 2024-05-08 RX ORDER — LISINOPRIL 30 MG/1
TABLET ORAL
Qty: 30 TABLET | Refills: 0 | Status: SHIPPED | OUTPATIENT
Start: 2024-05-08 | End: 2024-06-03 | Stop reason: HOSPADM

## 2024-05-08 RX ORDER — FERROUS SULFATE 325(65) MG
1 TABLET ORAL EVERY OTHER DAY
Qty: 15 TABLET | Refills: 0 | Status: SHIPPED | OUTPATIENT
Start: 2024-05-08 | End: 2024-06-03 | Stop reason: HOSPADM

## 2024-05-08 RX ORDER — ACETAMINOPHEN 325 MG/1
650 TABLET ORAL EVERY 6 HOURS PRN
Qty: 30 TABLET | Refills: 0 | Status: SHIPPED | OUTPATIENT
Start: 2024-05-08 | End: 2024-05-18

## 2024-05-08 RX ORDER — HYDROCHLOROTHIAZIDE 25 MG/1
TABLET ORAL
Qty: 60 TABLET | Refills: 1 | Status: SHIPPED | OUTPATIENT
Start: 2024-05-08

## 2024-05-08 RX ORDER — AMLODIPINE BESYLATE 10 MG/1
10 TABLET ORAL DAILY
Qty: 60 TABLET | Refills: 0 | Status: SHIPPED | OUTPATIENT
Start: 2024-05-08

## 2024-05-08 RX ORDER — PANTOPRAZOLE SODIUM 40 MG/1
40 TABLET, DELAYED RELEASE ORAL DAILY
Qty: 30 TABLET | Refills: 0 | Status: SHIPPED | OUTPATIENT
Start: 2024-05-08 | End: 2024-05-30 | Stop reason: SDUPTHER

## 2024-05-08 RX ORDER — LIDOCAINE 50 MG/G
PATCH TOPICAL
Qty: 30 PATCH | Refills: 1 | Status: SHIPPED | OUTPATIENT
Start: 2024-05-08

## 2024-05-08 ASSESSMENT — PAIN SCALES - GENERAL: PAINLEVEL: 10-WORST PAIN EVER

## 2024-05-08 NOTE — PROGRESS NOTES
Patient ID: Jessica Smith is a 58 y.o. female with PMH of HFpEF (EF 60-65% 10/2021), anxiety/depression, asthma, HTN, anemia, javier-en-Y, GERD, morbid obesity, OPAL, arthritis, and vitamin D deficiency who presents who presents for Follow-up, Abdominal Pain, and Med Refill.    Subjective     HPI  Patient presents for med refills. She is compliant with her medications and denies any current adverse effects. She also reports abdominal pain that occurs after eating and is somewhat burning in nature. She reports the pantoprazole does help with her symptoms. Denies early satiety, n/v, diarrhea, constipation, bloody stools, or pain with stools. She reports she had her gastric bypass done at [facility] and does not follow with a bariatric surgery. She denies taking multivitamins or b12 supplements.     Review of Systems   All other systems reviewed and are negative.    Past Medical History:   Diagnosis Date    Abdominal adhesions 08/08/2021    Abnormal QT interval present on electrocardiogram 03/18/2021    Formatting of this note might be different from the original. Formatting of this note might be different from the original. -EKG: NSR. QTc 500 Formatting of this note might be different from the original. -EKG: NSR. QTc 500    Acute gastric ulcer without hemorrhage or perforation 01/13/2016    Gastric ulcer, acute    Acute gastrojejunal ulcer 09/07/2007    Formatting of this note might be different from the original. 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    Hypokalemia  2023    Intertrigo 2023    Ischemic bowel disease (Multi) 2021    Personal history of other diseases of the musculoskeletal system and connective tissue 2015    History of arthritis    Personal history of other diseases of the respiratory system 2015    History of bronchitis    Personal history of other diseases of the respiratory system 2015    History of sinusitis    Personal history of other diseases of the respiratory system 2015    History of acute bronchitis    Personal history of other infectious and parasitic diseases 2015    History of Helicobacter infection    Personal history of other specified conditions 2016    History of diarrhea    Personal history of other specified conditions 2015    History of chest pain    Personal history of other specified conditions 01/10/2014    History of chest pain    Personal history of peptic ulcer disease 2015    History of gastric ulcer    SBO (small bowel obstruction) (Multi) 2023    Sebaceous cyst 2002    Tension headache 2009    Urinary tract infection, site not specified 04/15/2015    UTI (lower urinary tract infection)    Wound dehiscence 2023     Past Surgical History:   Procedure Laterality Date    APPENDECTOMY  2014    Appendectomy     SECTION, CLASSIC  2014     Section    COLONOSCOPY  2018    Colonoscopy (Fiberoptic)    ESOPHAGOGASTRODUODENOSCOPY  2018    Diagnostic Esophagogastroduodenoscopy    GASTRIC BYPASS  2017    Gastric Surgery For Morbid Obesity Gastric Bypass    GASTRIC RESTRICTION SURGERY  2015    Gastric Surgery For Morbid Obesity    OTHER SURGICAL HISTORY  2020    Ventral hernia repair    OTHER SURGICAL HISTORY  2014    Cholecystotomy    TUBAL LIGATION  2014    Tubal Ligation    UMBILICAL HERNIA REPAIR  2014    Umbilical Hernia Repair     No family history on file.  Aspirin   Social  "History     Tobacco Use    Smoking status: Former     Types: Cigarettes    Smokeless tobacco: Never   Substance Use Topics    Alcohol use: Not Currently       Current Outpatient Medications on File Prior to Visit   Medication Sig Dispense Refill    albuterol 90 mcg/actuation inhaler Inhale 2 puffs every 6 hours if needed for wheezing or shortness of breath. 18 g 11    escitalopram (Lexapro) 20 mg tablet Take 1 tablet (20 mg) by mouth once daily. 90 tablet 0    famotidine (Pepcid AC) 10 mg tablet Take 1 tablet (10 mg) by mouth 2 times a day. 60 tablet 11    fluticasone propion-salmeteroL (Advair Diskus) 500-50 mcg/dose diskus inhaler Inhale 1 puff 2 times a day. Rinse mouth with water after use to reduce aftertaste and incidence of candidiasis. Do not swallow. 14 each 0    gabapentin (Neurontin) 300 mg capsule Take 1 capsule (300 mg) by mouth once daily at bedtime. 30 capsule 3    guar gum (Nutrisource Fiber) packet Take 1 packet by mouth 3 times a day.  0    Mucinex 1,200 mg tablet extended release 12hr TAKE 1 TABLET BY MOUTH EVERY 12 HOURS IF NEEDED FOR COUGHING 60 tablet 10    polyethylene glycol (Glycolax, Miralax) 17 gram packet Take 17 g by mouth 2 times a day. 30 packet 0    senna 8.6 mg tablet       sennosides-docusate sodium (Senexon-S) 8.6-50 mg tablet TAKE 1 TABLET BY MOUTH 2 TIMES A DAY. 60 tablet 0    sucralfate (Carafate) 100 mg/mL suspension Take 10 mL (1 g) by mouth 4 times a day with meals. 1200 mL 0    walker misc 1 each once daily. Use daily as needed for ambulation assistance 1 each 0    fluticasone (Flonase) 50 mcg/actuation nasal spray Administer 1 spray into each nostril once daily. 16 g 0     No current facility-administered medications on file prior to visit.        Objective   Vitals: /81 (BP Location: Left arm, Patient Position: Sitting, BP Cuff Size: Small adult)   Pulse 79   Temp 35.9 °C (96.7 °F) (Temporal)   Ht 1.6 m (5' 3\")   Wt 147 kg (323 lb)   SpO2 95%   BMI 57.22 kg/m²  "     Physical Exam  Vitals reviewed.   Constitutional:       General: She is not in acute distress.     Appearance: Normal appearance.   HENT:      Head: Atraumatic.      Right Ear: External ear normal.      Left Ear: External ear normal.      Mouth/Throat:      Mouth: Mucous membranes are moist.      Pharynx: Oropharynx is clear.   Eyes:      Extraocular Movements: Extraocular movements intact.      Conjunctiva/sclera: Conjunctivae normal.      Pupils: Pupils are equal, round, and reactive to light.   Cardiovascular:      Rate and Rhythm: Normal rate and regular rhythm.      Pulses: Normal pulses.      Heart sounds: Normal heart sounds. No murmur heard.     No gallop.   Pulmonary:      Effort: No respiratory distress.      Breath sounds: Normal breath sounds. No wheezing or rales.   Abdominal:      General: Bowel sounds are normal.      Palpations: There is no mass.      Tenderness: There is no abdominal tenderness. There is no guarding.   Musculoskeletal:         General: No swelling. Normal range of motion.      Cervical back: Normal range of motion. No tenderness.   Lymphadenopathy:      Cervical: No cervical adenopathy.   Skin:     General: Skin is warm and dry.      Capillary Refill: Capillary refill takes less than 2 seconds.   Neurological:      Mental Status: She is alert.       Assessment/Plan   58 yr old female who presents for medication refill   Problem List Items Addressed This Visit       Benign essential hypertension (Chronic)    Relevant Medications    lisinopril 30 mg tablet    hydroCHLOROthiazide (HYDRODiuril) 25 mg tablet    amLODIPine (Norvasc) 10 mg tablet    Gastroesophageal reflux disease with esophagitis    Relevant Medications    pantoprazole (ProtoNix) 40 mg EC tablet    Insomnia    Relevant Medications    melatonin 3 mg tablet    Iron deficiency anemia    Relevant Medications    ferrous sulfate, 325 mg ferrous sulfate, (FeroSuL) tablet     Other Visit Diagnoses       H/O gastric bypass     -  Primary    Relevant Medications    cyanocobalamin (Vitamin B-12) 1,000 mcg tablet    multivit, iron, min. cmb. 5-FA 10-1 mg tablet    Chronic low back pain without sciatica, unspecified back pain laterality        Relevant Medications    acetaminophen (TylenoL) 325 mg tablet    lidocaine (Lidoderm) 5 % patch    diclofenac sodium (Arthritis Pain, diclofenac,) 1 % gel          #Med refill   - All medications reviewed and side effects discussed   - Patient denied any concerns at this time and adverse medications effects   - Refills sent to pharmacy     #Abdominal pain  - Currently w/o any red flag signs, burning epigastric pain relieved with pantoprazole   - PE unremarkable, abdomen soft nontender  - last colonoscopy was completed over 10 years ago, will completed an HM during next visit    D/w Dr. Damian and senior Dr. Quevedo     RTC in 3 months for HM, or earlier as needed.    Terri Amor MD   PGY1, [facility]

## 2024-05-10 RX ORDER — PANTOPRAZOLE SODIUM 40 MG/1
40 TABLET, DELAYED RELEASE ORAL DAILY
Qty: 30 TABLET | Refills: 10 | OUTPATIENT
Start: 2024-05-10

## 2024-05-10 RX ORDER — LIDOCAINE 50 MG/G
PATCH TOPICAL
Qty: 30 PATCH | Refills: 10 | OUTPATIENT
Start: 2024-05-10

## 2024-05-10 RX ORDER — HYDROCHLOROTHIAZIDE 25 MG/1
TABLET ORAL
Qty: 30 TABLET | Refills: 10 | OUTPATIENT
Start: 2024-05-10

## 2024-05-10 RX ORDER — FERROUS SULFATE TAB 325 MG (65 MG ELEMENTAL FE) 325 (65 FE) MG
1 TAB ORAL EVERY OTHER DAY
Qty: 15 TABLET | Refills: 10 | OUTPATIENT
Start: 2024-05-10

## 2024-05-13 DIAGNOSIS — J44.89 ASTHMA WITH CHRONIC OBSTRUCTIVE PULMONARY DISEASE (COPD) (MULTI): Primary | ICD-10-CM

## 2024-05-16 DIAGNOSIS — I10 BENIGN ESSENTIAL HYPERTENSION: Chronic | ICD-10-CM

## 2024-05-16 DIAGNOSIS — G47.00 INSOMNIA, UNSPECIFIED TYPE: ICD-10-CM

## 2024-05-16 DIAGNOSIS — K21.00 GASTROESOPHAGEAL REFLUX DISEASE WITH ESOPHAGITIS WITHOUT HEMORRHAGE: ICD-10-CM

## 2024-05-16 DIAGNOSIS — D50.9 IRON DEFICIENCY ANEMIA, UNSPECIFIED IRON DEFICIENCY ANEMIA TYPE: ICD-10-CM

## 2024-05-17 RX ORDER — LISINOPRIL 30 MG/1
TABLET ORAL
Qty: 30 TABLET | Refills: 10 | OUTPATIENT
Start: 2024-05-17

## 2024-05-17 RX ORDER — FERROUS SULFATE TAB 325 MG (65 MG ELEMENTAL FE) 325 (65 FE) MG
1 TAB ORAL EVERY OTHER DAY
Qty: 15 TABLET | Refills: 10 | OUTPATIENT
Start: 2024-05-17

## 2024-05-17 RX ORDER — TALC
3 POWDER (GRAM) TOPICAL NIGHTLY
Qty: 30 TABLET | Refills: 10 | OUTPATIENT
Start: 2024-05-17

## 2024-05-17 RX ORDER — PANTOPRAZOLE SODIUM 40 MG/1
40 TABLET, DELAYED RELEASE ORAL DAILY
Qty: 30 TABLET | Refills: 10 | OUTPATIENT
Start: 2024-05-17

## 2024-05-17 RX ORDER — FLUTICASONE PROPIONATE AND SALMETEROL 100; 50 UG/1; UG/1
1 POWDER RESPIRATORY (INHALATION)
Qty: 60 EACH | Refills: 11 | Status: SHIPPED | OUTPATIENT
Start: 2024-05-17 | End: 2025-05-17

## 2024-05-17 NOTE — PROGRESS NOTES
I reviewed the resident/fellow's documentation and discussed the patient with the resident/fellow. I agree with the resident/fellow's medical decision making as documented in the note.    Kristine Damian MD

## 2024-05-29 DIAGNOSIS — I10 BENIGN ESSENTIAL HYPERTENSION: Chronic | ICD-10-CM

## 2024-05-29 DIAGNOSIS — K59.09 CHRONIC CONSTIPATION: ICD-10-CM

## 2024-05-29 DIAGNOSIS — G47.00 INSOMNIA, UNSPECIFIED TYPE: ICD-10-CM

## 2024-05-29 DIAGNOSIS — K21.00 GASTROESOPHAGEAL REFLUX DISEASE WITH ESOPHAGITIS WITHOUT HEMORRHAGE: ICD-10-CM

## 2024-05-29 DIAGNOSIS — J44.9 CHRONIC OBSTRUCTIVE PULMONARY DISEASE, UNSPECIFIED COPD TYPE (MULTI): ICD-10-CM

## 2024-05-29 DIAGNOSIS — D50.9 IRON DEFICIENCY ANEMIA, UNSPECIFIED IRON DEFICIENCY ANEMIA TYPE: ICD-10-CM

## 2024-05-29 DIAGNOSIS — J06.9 VIRAL UPPER RESPIRATORY TRACT INFECTION: ICD-10-CM

## 2024-05-30 NOTE — TELEPHONE ENCOUNTER
[facility] called requesting refill of pt sucralfate, polyethylene glycol, Flonase, pantoprazole, Senexon, ferrous sulfate, lisinopril, melatonin 3mg, and Advair Diskus. Noted Advair sent over May 17th. Other orders pended and routed to provider.

## 2024-05-31 ENCOUNTER — APPOINTMENT (OUTPATIENT)
Dept: RADIOLOGY | Facility: HOSPITAL | Age: 59
End: 2024-05-31
Payer: COMMERCIAL

## 2024-05-31 ENCOUNTER — HOSPITAL ENCOUNTER (INPATIENT)
Facility: HOSPITAL | Age: 59
LOS: 3 days | Discharge: HOME | End: 2024-06-03
Attending: EMERGENCY MEDICINE | Admitting: INTERNAL MEDICINE
Payer: COMMERCIAL

## 2024-05-31 ENCOUNTER — CLINICAL SUPPORT (OUTPATIENT)
Dept: EMERGENCY MEDICINE | Facility: HOSPITAL | Age: 59
End: 2024-05-31
Payer: COMMERCIAL

## 2024-05-31 DIAGNOSIS — N39.46 MIXED INCONTINENCE: ICD-10-CM

## 2024-05-31 DIAGNOSIS — K52.9 GASTROENTERITIS: ICD-10-CM

## 2024-05-31 DIAGNOSIS — E55.9 VITAMIN D DEFICIENCY: ICD-10-CM

## 2024-05-31 DIAGNOSIS — E66.01 OBESITY, CLASS III, BMI 40-49.9 (MORBID OBESITY) (MULTI): Chronic | ICD-10-CM

## 2024-05-31 DIAGNOSIS — J30.2 SEASONAL ALLERGIES: ICD-10-CM

## 2024-05-31 DIAGNOSIS — I89.0 LYMPHEDEMA OF BOTH LOWER EXTREMITIES: ICD-10-CM

## 2024-05-31 DIAGNOSIS — I50.32 CHRONIC HEART FAILURE WITH PRESERVED EJECTION FRACTION (MULTI): ICD-10-CM

## 2024-05-31 DIAGNOSIS — M79.2 NEUROPATHIC PAIN: ICD-10-CM

## 2024-05-31 DIAGNOSIS — R10.84 GENERALIZED ABDOMINAL PAIN: Primary | ICD-10-CM

## 2024-05-31 DIAGNOSIS — E87.5 HYPERKALEMIA: ICD-10-CM

## 2024-05-31 DIAGNOSIS — K59.09 CHRONIC CONSTIPATION: ICD-10-CM

## 2024-05-31 DIAGNOSIS — F32.A ANXIETY AND DEPRESSION: ICD-10-CM

## 2024-05-31 DIAGNOSIS — F41.9 ANXIETY AND DEPRESSION: ICD-10-CM

## 2024-05-31 DIAGNOSIS — J44.89 ASTHMA WITH CHRONIC OBSTRUCTIVE PULMONARY DISEASE (COPD) (MULTI): ICD-10-CM

## 2024-05-31 DIAGNOSIS — Z98.84 HISTORY OF ROUX-EN-Y GASTRIC BYPASS: ICD-10-CM

## 2024-05-31 DIAGNOSIS — I10 BENIGN ESSENTIAL HYPERTENSION: Chronic | ICD-10-CM

## 2024-05-31 DIAGNOSIS — G47.33 OBSTRUCTIVE SLEEP APNEA SYNDROME: Chronic | ICD-10-CM

## 2024-05-31 LAB
ALBUMIN SERPL BCP-MCNC: 3.8 G/DL (ref 3.4–5)
ALBUMIN SERPL BCP-MCNC: 4 G/DL (ref 3.4–5)
ALP SERPL-CCNC: 143 U/L (ref 33–110)
ALT SERPL W P-5'-P-CCNC: 5 U/L (ref 7–45)
AMYLASE SERPL-CCNC: 68 U/L (ref 29–103)
ANION GAP SERPL CALC-SCNC: 12 MMOL/L (ref 10–20)
ANION GAP SERPL CALC-SCNC: 14 MMOL/L (ref 10–20)
APPEARANCE UR: CLEAR
AST SERPL W P-5'-P-CCNC: 7 U/L (ref 9–39)
BASOPHILS # BLD AUTO: 0.07 X10*3/UL (ref 0–0.1)
BASOPHILS NFR BLD AUTO: 0.7 %
BILIRUB SERPL-MCNC: 0.3 MG/DL (ref 0–1.2)
BILIRUB UR STRIP.AUTO-MCNC: NEGATIVE MG/DL
BNP SERPL-MCNC: 10 PG/ML (ref 0–99)
BUN SERPL-MCNC: 24 MG/DL (ref 6–23)
BUN SERPL-MCNC: 30 MG/DL (ref 6–23)
CALCIUM SERPL-MCNC: 8.6 MG/DL (ref 8.6–10.6)
CALCIUM SERPL-MCNC: 8.6 MG/DL (ref 8.6–10.6)
CARDIAC TROPONIN I PNL SERPL HS: 3 NG/L (ref 0–34)
CARDIAC TROPONIN I PNL SERPL HS: 4 NG/L (ref 0–34)
CHLORIDE SERPL-SCNC: 107 MMOL/L (ref 98–107)
CHLORIDE SERPL-SCNC: 107 MMOL/L (ref 98–107)
CO2 SERPL-SCNC: 21 MMOL/L (ref 21–32)
CO2 SERPL-SCNC: 24 MMOL/L (ref 21–32)
COLOR UR: NORMAL
CREAT SERPL-MCNC: 1.2 MG/DL (ref 0.5–1.05)
CREAT SERPL-MCNC: 1.22 MG/DL (ref 0.5–1.05)
EGFRCR SERPLBLD CKD-EPI 2021: 51 ML/MIN/1.73M*2
EGFRCR SERPLBLD CKD-EPI 2021: 52 ML/MIN/1.73M*2
EOSINOPHIL # BLD AUTO: 0.18 X10*3/UL (ref 0–0.7)
EOSINOPHIL NFR BLD AUTO: 1.8 %
ERYTHROCYTE [DISTWIDTH] IN BLOOD BY AUTOMATED COUNT: 15.3 % (ref 11.5–14.5)
FERRITIN SERPL-MCNC: 99 NG/ML (ref 8–150)
GLUCOSE BLD MANUAL STRIP-MCNC: 91 MG/DL (ref 74–99)
GLUCOSE SERPL-MCNC: 101 MG/DL (ref 74–99)
GLUCOSE SERPL-MCNC: 98 MG/DL (ref 74–99)
GLUCOSE UR STRIP.AUTO-MCNC: NORMAL MG/DL
HCT VFR BLD AUTO: 30.8 % (ref 36–46)
HGB BLD-MCNC: 10.1 G/DL (ref 12–16)
HOLD SPECIMEN: NORMAL
IMM GRANULOCYTES # BLD AUTO: 0.04 X10*3/UL (ref 0–0.7)
IMM GRANULOCYTES NFR BLD AUTO: 0.4 % (ref 0–0.9)
IRON SATN MFR SERPL: 13 % (ref 25–45)
IRON SERPL-MCNC: 41 UG/DL (ref 35–150)
KETONES UR STRIP.AUTO-MCNC: NEGATIVE MG/DL
LACTATE BLDV-SCNC: 0.6 MMOL/L (ref 0.4–2)
LEUKOCYTE ESTERASE UR QL STRIP.AUTO: NEGATIVE
LIPASE SERPL-CCNC: 33 U/L (ref 9–82)
LIPASE SERPL-CCNC: 34 U/L (ref 9–82)
LYMPHOCYTES # BLD AUTO: 2.02 X10*3/UL (ref 1.2–4.8)
LYMPHOCYTES NFR BLD AUTO: 19.8 %
MAGNESIUM SERPL-MCNC: 2.56 MG/DL (ref 1.6–2.4)
MCH RBC QN AUTO: 28.1 PG (ref 26–34)
MCHC RBC AUTO-ENTMCNC: 32.8 G/DL (ref 32–36)
MCV RBC AUTO: 86 FL (ref 80–100)
MONOCYTES # BLD AUTO: 0.77 X10*3/UL (ref 0.1–1)
MONOCYTES NFR BLD AUTO: 7.5 %
NEUTROPHILS # BLD AUTO: 7.13 X10*3/UL (ref 1.2–7.7)
NEUTROPHILS NFR BLD AUTO: 69.8 %
NITRITE UR QL STRIP.AUTO: NEGATIVE
NRBC BLD-RTO: 0 /100 WBCS (ref 0–0)
PH UR STRIP.AUTO: 6 [PH]
PHOSPHATE SERPL-MCNC: 3.8 MG/DL (ref 2.5–4.9)
PLATELET # BLD AUTO: 265 X10*3/UL (ref 150–450)
POTASSIUM SERPL-SCNC: 5.4 MMOL/L (ref 3.5–5.3)
POTASSIUM SERPL-SCNC: 6 MMOL/L (ref 3.5–5.3)
POTASSIUM SERPL-SCNC: 6.3 MMOL/L (ref 3.5–5.3)
POTASSIUM SERPL-SCNC: 6.4 MMOL/L (ref 3.5–5.3)
PROT SERPL-MCNC: 7 G/DL (ref 6.4–8.2)
PROT UR STRIP.AUTO-MCNC: NEGATIVE MG/DL
RBC # BLD AUTO: 3.59 X10*6/UL (ref 4–5.2)
RBC # UR STRIP.AUTO: NEGATIVE /UL
SODIUM SERPL-SCNC: 136 MMOL/L (ref 136–145)
SODIUM SERPL-SCNC: 137 MMOL/L (ref 136–145)
SP GR UR STRIP.AUTO: 1.02
TIBC SERPL-MCNC: 319 UG/DL (ref 240–445)
UIBC SERPL-MCNC: 278 UG/DL (ref 110–370)
UROBILINOGEN UR STRIP.AUTO-MCNC: NORMAL MG/DL
WBC # BLD AUTO: 10.2 X10*3/UL (ref 4.4–11.3)

## 2024-05-31 PROCEDURE — 2500000005 HC RX 250 GENERAL PHARMACY W/O HCPCS

## 2024-05-31 PROCEDURE — 71045 X-RAY EXAM CHEST 1 VIEW: CPT

## 2024-05-31 PROCEDURE — 96375 TX/PRO/DX INJ NEW DRUG ADDON: CPT

## 2024-05-31 PROCEDURE — 84484 ASSAY OF TROPONIN QUANT: CPT | Performed by: STUDENT IN AN ORGANIZED HEALTH CARE EDUCATION/TRAINING PROGRAM

## 2024-05-31 PROCEDURE — 83605 ASSAY OF LACTIC ACID: CPT

## 2024-05-31 PROCEDURE — 74018 RADEX ABDOMEN 1 VIEW: CPT

## 2024-05-31 PROCEDURE — A9698 NON-RAD CONTRAST MATERIALNOC: HCPCS | Mod: SE | Performed by: STUDENT IN AN ORGANIZED HEALTH CARE EDUCATION/TRAINING PROGRAM

## 2024-05-31 PROCEDURE — 2550000001 HC RX 255 CONTRASTS: Mod: SE

## 2024-05-31 PROCEDURE — 2500000004 HC RX 250 GENERAL PHARMACY W/ HCPCS (ALT 636 FOR OP/ED)

## 2024-05-31 PROCEDURE — 2550000001 HC RX 255 CONTRASTS: Mod: SE | Performed by: STUDENT IN AN ORGANIZED HEALTH CARE EDUCATION/TRAINING PROGRAM

## 2024-05-31 PROCEDURE — 99223 1ST HOSP IP/OBS HIGH 75: CPT

## 2024-05-31 PROCEDURE — 99285 EMERGENCY DEPT VISIT HI MDM: CPT | Performed by: EMERGENCY MEDICINE

## 2024-05-31 PROCEDURE — 74177 CT ABD & PELVIS W/CONTRAST: CPT | Mod: FOREIGN READ | Performed by: RADIOLOGY

## 2024-05-31 PROCEDURE — 83690 ASSAY OF LIPASE: CPT

## 2024-05-31 PROCEDURE — 93005 ELECTROCARDIOGRAM TRACING: CPT

## 2024-05-31 PROCEDURE — 36415 COLL VENOUS BLD VENIPUNCTURE: CPT | Performed by: STUDENT IN AN ORGANIZED HEALTH CARE EDUCATION/TRAINING PROGRAM

## 2024-05-31 PROCEDURE — 84132 ASSAY OF SERUM POTASSIUM: CPT

## 2024-05-31 PROCEDURE — 2500000005 HC RX 250 GENERAL PHARMACY W/O HCPCS: Mod: SE | Performed by: STUDENT IN AN ORGANIZED HEALTH CARE EDUCATION/TRAINING PROGRAM

## 2024-05-31 PROCEDURE — 83880 ASSAY OF NATRIURETIC PEPTIDE: CPT

## 2024-05-31 PROCEDURE — 99285 EMERGENCY DEPT VISIT HI MDM: CPT | Mod: 25

## 2024-05-31 PROCEDURE — 82728 ASSAY OF FERRITIN: CPT

## 2024-05-31 PROCEDURE — 74018 RADEX ABDOMEN 1 VIEW: CPT | Performed by: RADIOLOGY

## 2024-05-31 PROCEDURE — 96366 THER/PROPH/DIAG IV INF ADDON: CPT

## 2024-05-31 PROCEDURE — 80307 DRUG TEST PRSMV CHEM ANLYZR: CPT | Performed by: INTERNAL MEDICINE

## 2024-05-31 PROCEDURE — 83540 ASSAY OF IRON: CPT

## 2024-05-31 PROCEDURE — 82947 ASSAY GLUCOSE BLOOD QUANT: CPT

## 2024-05-31 PROCEDURE — 82436 ASSAY OF URINE CHLORIDE: CPT | Performed by: INTERNAL MEDICINE

## 2024-05-31 PROCEDURE — 2500000004 HC RX 250 GENERAL PHARMACY W/ HCPCS (ALT 636 FOR OP/ED): Mod: JZ,SE

## 2024-05-31 PROCEDURE — 83690 ASSAY OF LIPASE: CPT | Performed by: STUDENT IN AN ORGANIZED HEALTH CARE EDUCATION/TRAINING PROGRAM

## 2024-05-31 PROCEDURE — 83735 ASSAY OF MAGNESIUM: CPT | Performed by: STUDENT IN AN ORGANIZED HEALTH CARE EDUCATION/TRAINING PROGRAM

## 2024-05-31 PROCEDURE — 2500000001 HC RX 250 WO HCPCS SELF ADMINISTERED DRUGS (ALT 637 FOR MEDICARE OP)

## 2024-05-31 PROCEDURE — 71045 X-RAY EXAM CHEST 1 VIEW: CPT | Mod: FOREIGN READ | Performed by: RADIOLOGY

## 2024-05-31 PROCEDURE — 96376 TX/PRO/DX INJ SAME DRUG ADON: CPT

## 2024-05-31 PROCEDURE — 96365 THER/PROPH/DIAG IV INF INIT: CPT

## 2024-05-31 PROCEDURE — 1210000001 HC SEMI-PRIVATE ROOM DAILY

## 2024-05-31 PROCEDURE — 2500000005 HC RX 250 GENERAL PHARMACY W/O HCPCS: Mod: SE

## 2024-05-31 PROCEDURE — 2500000002 HC RX 250 W HCPCS SELF ADMINISTERED DRUGS (ALT 637 FOR MEDICARE OP, ALT 636 FOR OP/ED): Mod: SE

## 2024-05-31 PROCEDURE — 2500000001 HC RX 250 WO HCPCS SELF ADMINISTERED DRUGS (ALT 637 FOR MEDICARE OP): Mod: SE

## 2024-05-31 PROCEDURE — 84156 ASSAY OF PROTEIN URINE: CPT | Performed by: INTERNAL MEDICINE

## 2024-05-31 PROCEDURE — 93010 ELECTROCARDIOGRAM REPORT: CPT | Performed by: EMERGENCY MEDICINE

## 2024-05-31 PROCEDURE — 82150 ASSAY OF AMYLASE: CPT

## 2024-05-31 PROCEDURE — 2500000002 HC RX 250 W HCPCS SELF ADMINISTERED DRUGS (ALT 637 FOR MEDICARE OP, ALT 636 FOR OP/ED): Mod: SE | Performed by: STUDENT IN AN ORGANIZED HEALTH CARE EDUCATION/TRAINING PROGRAM

## 2024-05-31 PROCEDURE — 80053 COMPREHEN METABOLIC PANEL: CPT | Performed by: STUDENT IN AN ORGANIZED HEALTH CARE EDUCATION/TRAINING PROGRAM

## 2024-05-31 PROCEDURE — 2500000004 HC RX 250 GENERAL PHARMACY W/ HCPCS (ALT 636 FOR OP/ED): Mod: JZ,SE | Performed by: STUDENT IN AN ORGANIZED HEALTH CARE EDUCATION/TRAINING PROGRAM

## 2024-05-31 PROCEDURE — 81003 URINALYSIS AUTO W/O SCOPE: CPT | Performed by: STUDENT IN AN ORGANIZED HEALTH CARE EDUCATION/TRAINING PROGRAM

## 2024-05-31 PROCEDURE — 2500000002 HC RX 250 W HCPCS SELF ADMINISTERED DRUGS (ALT 637 FOR MEDICARE OP, ALT 636 FOR OP/ED)

## 2024-05-31 PROCEDURE — 74177 CT ABD & PELVIS W/CONTRAST: CPT

## 2024-05-31 PROCEDURE — 84132 ASSAY OF SERUM POTASSIUM: CPT | Performed by: STUDENT IN AN ORGANIZED HEALTH CARE EDUCATION/TRAINING PROGRAM

## 2024-05-31 PROCEDURE — 85025 COMPLETE CBC W/AUTO DIFF WBC: CPT | Performed by: STUDENT IN AN ORGANIZED HEALTH CARE EDUCATION/TRAINING PROGRAM

## 2024-05-31 RX ORDER — DEXTROSE 50 % IN WATER (D50W) INTRAVENOUS SYRINGE
25 ONCE
Status: COMPLETED | OUTPATIENT
Start: 2024-05-31 | End: 2024-05-31

## 2024-05-31 RX ORDER — ONDANSETRON HYDROCHLORIDE 2 MG/ML
4 INJECTION, SOLUTION INTRAVENOUS ONCE
Status: COMPLETED | OUTPATIENT
Start: 2024-05-31 | End: 2024-05-31

## 2024-05-31 RX ORDER — ESCITALOPRAM OXALATE 10 MG/1
20 TABLET ORAL DAILY
Status: DISCONTINUED | OUTPATIENT
Start: 2024-05-31 | End: 2024-06-03 | Stop reason: HOSPADM

## 2024-05-31 RX ORDER — DICLOFENAC SODIUM 10 MG/G
4 GEL TOPICAL 4 TIMES DAILY
Status: DISCONTINUED | OUTPATIENT
Start: 2024-05-31 | End: 2024-06-03 | Stop reason: HOSPADM

## 2024-05-31 RX ORDER — PANTOPRAZOLE SODIUM 40 MG/1
40 TABLET, DELAYED RELEASE ORAL DAILY
Status: DISCONTINUED | OUTPATIENT
Start: 2024-05-31 | End: 2024-06-01

## 2024-05-31 RX ORDER — FAMOTIDINE 20 MG/1
10 TABLET, FILM COATED ORAL 2 TIMES DAILY
Status: DISCONTINUED | OUTPATIENT
Start: 2024-05-31 | End: 2024-06-03 | Stop reason: HOSPADM

## 2024-05-31 RX ORDER — ACETAMINOPHEN 650 MG/1
650 SUPPOSITORY RECTAL EVERY 4 HOURS PRN
Status: DISCONTINUED | OUTPATIENT
Start: 2024-05-31 | End: 2024-06-03 | Stop reason: HOSPADM

## 2024-05-31 RX ORDER — FLUTICASONE FUROATE AND VILANTEROL 100; 25 UG/1; UG/1
1 POWDER RESPIRATORY (INHALATION)
Status: DISCONTINUED | OUTPATIENT
Start: 2024-05-31 | End: 2024-06-03 | Stop reason: HOSPADM

## 2024-05-31 RX ORDER — AMLODIPINE BESYLATE 10 MG/1
10 TABLET ORAL DAILY
Status: DISCONTINUED | OUTPATIENT
Start: 2024-05-31 | End: 2024-06-03 | Stop reason: HOSPADM

## 2024-05-31 RX ORDER — ACETAMINOPHEN 325 MG/1
650 TABLET ORAL EVERY 4 HOURS PRN
Status: DISCONTINUED | OUTPATIENT
Start: 2024-05-31 | End: 2024-06-03 | Stop reason: HOSPADM

## 2024-05-31 RX ORDER — LIDOCAINE 560 MG/1
2 PATCH PERCUTANEOUS; TOPICAL; TRANSDERMAL DAILY
Status: DISCONTINUED | OUTPATIENT
Start: 2024-05-31 | End: 2024-06-03 | Stop reason: HOSPADM

## 2024-05-31 RX ORDER — ALBUTEROL SULFATE 90 UG/1
2 AEROSOL, METERED RESPIRATORY (INHALATION) EVERY 6 HOURS PRN
Status: DISCONTINUED | OUTPATIENT
Start: 2024-05-31 | End: 2024-06-03 | Stop reason: HOSPADM

## 2024-05-31 RX ORDER — LANOLIN ALCOHOL/MO/W.PET/CERES
1000 CREAM (GRAM) TOPICAL DAILY
Status: DISCONTINUED | OUTPATIENT
Start: 2024-05-31 | End: 2024-06-03 | Stop reason: HOSPADM

## 2024-05-31 RX ORDER — MULTIVIT-MIN/IRON FUM/FOLIC AC 7.5 MG-4
1 TABLET ORAL DAILY
Status: DISCONTINUED | OUTPATIENT
Start: 2024-05-31 | End: 2024-06-03 | Stop reason: HOSPADM

## 2024-05-31 RX ORDER — CALCIUM GLUCONATE 20 MG/ML
2 INJECTION, SOLUTION INTRAVENOUS ONCE
Status: COMPLETED | OUTPATIENT
Start: 2024-05-31 | End: 2024-05-31

## 2024-05-31 RX ORDER — FLUTICASONE PROPIONATE 50 MCG
1 SPRAY, SUSPENSION (ML) NASAL DAILY
Status: DISCONTINUED | OUTPATIENT
Start: 2024-05-31 | End: 2024-06-03 | Stop reason: HOSPADM

## 2024-05-31 RX ORDER — ENOXAPARIN SODIUM 100 MG/ML
60 INJECTION SUBCUTANEOUS EVERY 12 HOURS SCHEDULED
Status: DISCONTINUED | OUTPATIENT
Start: 2024-05-31 | End: 2024-06-03 | Stop reason: HOSPADM

## 2024-05-31 RX ORDER — GABAPENTIN 300 MG/1
300 CAPSULE ORAL NIGHTLY
Status: DISCONTINUED | OUTPATIENT
Start: 2024-05-31 | End: 2024-06-03 | Stop reason: HOSPADM

## 2024-05-31 RX ORDER — FAMOTIDINE 10 MG/ML
40 INJECTION INTRAVENOUS ONCE
Status: COMPLETED | OUTPATIENT
Start: 2024-05-31 | End: 2024-05-31

## 2024-05-31 RX ORDER — TALC
3 POWDER (GRAM) TOPICAL NIGHTLY
Status: DISCONTINUED | OUTPATIENT
Start: 2024-05-31 | End: 2024-06-03 | Stop reason: HOSPADM

## 2024-05-31 RX ORDER — MORPHINE SULFATE 4 MG/ML
4 INJECTION INTRAVENOUS ONCE
Status: COMPLETED | OUTPATIENT
Start: 2024-05-31 | End: 2024-05-31

## 2024-05-31 RX ORDER — SUCRALFATE 1 G/10ML
1 SUSPENSION ORAL
Status: DISCONTINUED | OUTPATIENT
Start: 2024-05-31 | End: 2024-06-01

## 2024-05-31 RX ORDER — SODIUM POLYSTYRENE SULFONATE 15 G/60ML
15 SUSPENSION ORAL; RECTAL ONCE
Status: COMPLETED | OUTPATIENT
Start: 2024-05-31 | End: 2024-05-31

## 2024-05-31 RX ORDER — ACETAMINOPHEN 160 MG/5ML
650 SOLUTION ORAL EVERY 4 HOURS PRN
Status: DISCONTINUED | OUTPATIENT
Start: 2024-05-31 | End: 2024-06-03 | Stop reason: HOSPADM

## 2024-05-31 RX ORDER — HYDROCHLOROTHIAZIDE 25 MG/1
25 TABLET ORAL DAILY
Status: DISCONTINUED | OUTPATIENT
Start: 2024-05-31 | End: 2024-06-03 | Stop reason: HOSPADM

## 2024-05-31 RX ORDER — FERROUS SULFATE 325(65) MG
1 TABLET ORAL EVERY OTHER DAY
Status: DISCONTINUED | OUTPATIENT
Start: 2024-06-01 | End: 2024-06-01

## 2024-05-31 RX ADMIN — INSULIN HUMAN 5 UNITS: 100 INJECTION, SOLUTION PARENTERAL at 12:42

## 2024-05-31 RX ADMIN — DICLOFENAC 4 G: 10 GEL TOPICAL at 21:00

## 2024-05-31 RX ADMIN — MORPHINE SULFATE 4 MG: 4 INJECTION INTRAVENOUS at 05:14

## 2024-05-31 RX ADMIN — IOHEXOL 100 ML: 350 INJECTION, SOLUTION INTRAVENOUS at 08:26

## 2024-05-31 RX ADMIN — INSULIN HUMAN 5 UNITS: 100 INJECTION, SOLUTION PARENTERAL at 06:45

## 2024-05-31 RX ADMIN — SODIUM CHLORIDE 1000 ML: 9 INJECTION, SOLUTION INTRAVENOUS at 10:29

## 2024-05-31 RX ADMIN — MELATONIN 3 MG: 3 TAB ORAL at 20:42

## 2024-05-31 RX ADMIN — SODIUM ZIRCONIUM CYCLOSILICATE 10 G: 10 POWDER, FOR SUSPENSION ORAL at 20:42

## 2024-05-31 RX ADMIN — DEXTROSE MONOHYDRATE 25 G: 25 INJECTION, SOLUTION INTRAVENOUS at 06:42

## 2024-05-31 RX ADMIN — DICLOFENAC 4 G: 10 GEL TOPICAL at 17:00

## 2024-05-31 RX ADMIN — DEXTROSE MONOHYDRATE 25 G: 25 INJECTION, SOLUTION INTRAVENOUS at 13:14

## 2024-05-31 RX ADMIN — HYDROCHLOROTHIAZIDE 25 MG: 25 TABLET ORAL at 17:27

## 2024-05-31 RX ADMIN — CALCIUM GLUCONATE 2 G: 20 INJECTION, SOLUTION INTRAVENOUS at 06:42

## 2024-05-31 RX ADMIN — AMLODIPINE BESYLATE 10 MG: 10 TABLET ORAL at 17:27

## 2024-05-31 RX ADMIN — IOHEXOL 500 ML: 12 SOLUTION ORAL at 06:39

## 2024-05-31 RX ADMIN — ACETAMINOPHEN 650 MG: 325 TABLET ORAL at 15:45

## 2024-05-31 RX ADMIN — FAMOTIDINE 40 MG: 10 INJECTION INTRAVENOUS at 05:14

## 2024-05-31 RX ADMIN — CALCIUM GLUCONATE 2 G: 20 INJECTION, SOLUTION INTRAVENOUS at 12:20

## 2024-05-31 RX ADMIN — CYANOCOBALAMIN TAB 1000 MCG 1000 MCG: 1000 TAB at 17:29

## 2024-05-31 RX ADMIN — ENOXAPARIN SODIUM 60 MG: 100 INJECTION SUBCUTANEOUS at 20:42

## 2024-05-31 RX ADMIN — ONDANSETRON 4 MG: 2 INJECTION INTRAMUSCULAR; INTRAVENOUS at 05:14

## 2024-05-31 RX ADMIN — PANTOPRAZOLE SODIUM 40 MG: 40 TABLET, DELAYED RELEASE ORAL at 16:49

## 2024-05-31 RX ADMIN — SODIUM POLYSTYRENE SULFONATE 15 G: 15 SUSPENSION ORAL; RECTAL at 13:14

## 2024-05-31 RX ADMIN — SODIUM ZIRCONIUM CYCLOSILICATE 10 G: 10 POWDER, FOR SUSPENSION ORAL at 06:42

## 2024-05-31 RX ADMIN — Medication 1 TABLET: at 16:49

## 2024-05-31 RX ADMIN — FAMOTIDINE 10 MG: 20 TABLET, FILM COATED ORAL at 20:42

## 2024-05-31 RX ADMIN — FLUTICASONE FUROATE AND VILANTEROL TRIFENATATE 1 PUFF: 100; 25 POWDER RESPIRATORY (INHALATION) at 16:49

## 2024-05-31 RX ADMIN — ESCITALOPRAM OXALATE 20 MG: 10 TABLET ORAL at 16:49

## 2024-05-31 RX ADMIN — LIDOCAINE 2 PATCH: 4 PATCH TOPICAL at 16:50

## 2024-05-31 RX ADMIN — FLUTICASONE PROPIONATE 1 SPRAY: 50 SPRAY, METERED NASAL at 17:29

## 2024-05-31 RX ADMIN — SUCRALFATE 1 G: 1 SUSPENSION ORAL at 16:49

## 2024-05-31 RX ADMIN — GABAPENTIN 300 MG: 300 CAPSULE ORAL at 20:42

## 2024-05-31 RX ADMIN — SODIUM CHLORIDE, POTASSIUM CHLORIDE, SODIUM LACTATE AND CALCIUM CHLORIDE 1000 ML: 600; 310; 30; 20 INJECTION, SOLUTION INTRAVENOUS at 06:39

## 2024-05-31 RX ADMIN — SUCRALFATE 1 G: 1 SUSPENSION ORAL at 20:42

## 2024-05-31 SDOH — SOCIAL STABILITY: SOCIAL INSECURITY: HAVE YOU HAD THOUGHTS OF HARMING ANYONE ELSE?: YES

## 2024-05-31 SDOH — SOCIAL STABILITY: SOCIAL INSECURITY: WERE YOU ABLE TO COMPLETE ALL THE BEHAVIORAL HEALTH SCREENINGS?: YES

## 2024-05-31 SDOH — SOCIAL STABILITY: SOCIAL INSECURITY: DO YOU FEEL ANYONE HAS EXPLOITED OR TAKEN ADVANTAGE OF YOU FINANCIALLY OR OF YOUR PERSONAL PROPERTY?: NO

## 2024-05-31 SDOH — SOCIAL STABILITY: SOCIAL INSECURITY: DOES ANYONE TRY TO KEEP YOU FROM HAVING/CONTACTING OTHER FRIENDS OR DOING THINGS OUTSIDE YOUR HOME?: NO

## 2024-05-31 SDOH — SOCIAL STABILITY: SOCIAL INSECURITY: HAS ANYONE EVER THREATENED TO HURT YOUR FAMILY OR YOUR PETS?: NO

## 2024-05-31 SDOH — SOCIAL STABILITY: SOCIAL INSECURITY: ARE YOU OR HAVE YOU BEEN THREATENED OR ABUSED PHYSICALLY, EMOTIONALLY, OR SEXUALLY BY ANYONE?: NO

## 2024-05-31 SDOH — SOCIAL STABILITY: SOCIAL INSECURITY: ARE THERE ANY APPARENT SIGNS OF INJURIES/BEHAVIORS THAT COULD BE RELATED TO ABUSE/NEGLECT?: NO

## 2024-05-31 SDOH — SOCIAL STABILITY: SOCIAL INSECURITY: DO YOU FEEL UNSAFE GOING BACK TO THE PLACE WHERE YOU ARE LIVING?: NO

## 2024-05-31 SDOH — SOCIAL STABILITY: SOCIAL INSECURITY: ABUSE: ADULT

## 2024-05-31 ASSESSMENT — ACTIVITIES OF DAILY LIVING (ADL)
ADEQUATE_TO_COMPLETE_ADL: YES
FEEDING YOURSELF: INDEPENDENT
TOILETING: INDEPENDENT
LACK_OF_TRANSPORTATION: NO
FEEDING YOURSELF: INDEPENDENT
TOILETING: INDEPENDENT
DRESSING YOURSELF: INDEPENDENT
HEARING - LEFT EAR: FUNCTIONAL
ADEQUATE_TO_COMPLETE_ADL: YES
JUDGMENT_ADEQUATE_SAFELY_COMPLETE_DAILY_ACTIVITIES: YES
ASSISTIVE_DEVICE: CANE;WALKER
HEARING - RIGHT EAR: FUNCTIONAL
HEARING - LEFT EAR: FUNCTIONAL
PATIENT'S MEMORY ADEQUATE TO SAFELY COMPLETE DAILY ACTIVITIES?: YES
PATIENT'S MEMORY ADEQUATE TO SAFELY COMPLETE DAILY ACTIVITIES?: YES
JUDGMENT_ADEQUATE_SAFELY_COMPLETE_DAILY_ACTIVITIES: YES
DRESSING YOURSELF: INDEPENDENT
HEARING - RIGHT EAR: FUNCTIONAL
WALKS IN HOME: INDEPENDENT
GROOMING: INDEPENDENT
BATHING: INDEPENDENT
WALKS IN HOME: INDEPENDENT
BATHING: INDEPENDENT
GROOMING: INDEPENDENT

## 2024-05-31 ASSESSMENT — COLUMBIA-SUICIDE SEVERITY RATING SCALE - C-SSRS
1. IN THE PAST MONTH, HAVE YOU WISHED YOU WERE DEAD OR WISHED YOU COULD GO TO SLEEP AND NOT WAKE UP?: NO
2. HAVE YOU ACTUALLY HAD ANY THOUGHTS OF KILLING YOURSELF?: NO
6. HAVE YOU EVER DONE ANYTHING, STARTED TO DO ANYTHING, OR PREPARED TO DO ANYTHING TO END YOUR LIFE?: NO

## 2024-05-31 ASSESSMENT — PAIN SCALES - GENERAL
PAINLEVEL_OUTOF10: 5 - MODERATE PAIN
PAINLEVEL_OUTOF10: 0 - NO PAIN
PAINLEVEL_OUTOF10: 8
PAINLEVEL_OUTOF10: 7

## 2024-05-31 ASSESSMENT — LIFESTYLE VARIABLES
HAVE PEOPLE ANNOYED YOU BY CRITICIZING YOUR DRINKING: NO
HOW OFTEN DO YOU HAVE A DRINK CONTAINING ALCOHOL: NEVER
HOW MANY STANDARD DRINKS CONTAINING ALCOHOL DO YOU HAVE ON A TYPICAL DAY: PATIENT DOES NOT DRINK
EVER HAD A DRINK FIRST THING IN THE MORNING TO STEADY YOUR NERVES TO GET RID OF A HANGOVER: NO
SKIP TO QUESTIONS 9-10: 1
AUDIT-C TOTAL SCORE: 0
TOTAL SCORE: 0
EVER FELT BAD OR GUILTY ABOUT YOUR DRINKING: NO
HOW OFTEN DO YOU HAVE 6 OR MORE DRINKS ON ONE OCCASION: NEVER
AUDIT-C TOTAL SCORE: 0
HAVE YOU EVER FELT YOU SHOULD CUT DOWN ON YOUR DRINKING: NO

## 2024-05-31 ASSESSMENT — COGNITIVE AND FUNCTIONAL STATUS - GENERAL
PATIENT BASELINE BEDBOUND: NO
MOBILITY SCORE: 24
DAILY ACTIVITIY SCORE: 24

## 2024-05-31 ASSESSMENT — PAIN - FUNCTIONAL ASSESSMENT
PAIN_FUNCTIONAL_ASSESSMENT: 0-10

## 2024-05-31 ASSESSMENT — PAIN DESCRIPTION - PROGRESSION
CLINICAL_PROGRESSION: NOT CHANGED
CLINICAL_PROGRESSION: GRADUALLY IMPROVING

## 2024-05-31 ASSESSMENT — PAIN DESCRIPTION - ORIENTATION
ORIENTATION: RIGHT
ORIENTATION: RIGHT

## 2024-05-31 ASSESSMENT — PATIENT HEALTH QUESTIONNAIRE - PHQ9
SUM OF ALL RESPONSES TO PHQ9 QUESTIONS 1 & 2: 0
1. LITTLE INTEREST OR PLEASURE IN DOING THINGS: NOT AT ALL
2. FEELING DOWN, DEPRESSED OR HOPELESS: NOT AT ALL

## 2024-05-31 ASSESSMENT — PAIN DESCRIPTION - PAIN TYPE
TYPE: ACUTE PAIN
TYPE: ACUTE PAIN

## 2024-05-31 ASSESSMENT — PAIN DESCRIPTION - LOCATION
LOCATION: ABDOMEN
LOCATION: ABDOMEN

## 2024-05-31 ASSESSMENT — PAIN DESCRIPTION - DESCRIPTORS
DESCRIPTORS: SHARP
DESCRIPTORS: SHARP

## 2024-05-31 NOTE — H&P
History Of Present Illness  Jessica Smith is a 59 y.o. female presenting with PMHX of HFpEF (EF 60-65% 10/2021), anxiety/depression, asthma, HTN, anemia, javier-en-Y, GERD, morbid obesity, OPAL, arthritis, and vitamin D deficiency who presented to the ED with 1 day history of severe 10 out of 10 bilateral flank and mid abdominal pain.  She reported that sharp severe pain radiating to the back, consistent not episodic without exacerbating or relieving factors.  She reported 1 episode of vomiting yesterday stomach contents no blood.  No history of diarrhea, she reported history of 3 bowel movement since yesterday, will form.  No history of nausea or loss of appetite.  No history of change in the stool color or hematemesis.  No history of weight loss recently.  Review of system otherwise is negative for chest pain shortness of breath fever or night sweat.  No history of urinary symptoms.    Social history: No history of smoking drinking or illicit drug use    Of note she was discharged from the hospital 2/2023 when she was admitted for intractable epigastric abdominal pain and SPENSER. CT abd/pelvis showed Postsurgical changes status post Javier-en-Y gastric bypass with minimal dilatation of the gastrojejunal anastomosis otherwise no significant abnormality involving the gastrointestinal tract. No acute findings involving the abdomen, pelvis. She was started on a bowel regimen for abdominal pain (Miralax BID and sennosides-docusate BID). She was started on [employer] fiber supplements with meals.  Thought process behind the abdominal pain was likely secondary to  functional vs adhesions (due to multiple C-sections, abdominal surgeries)).  Discharged with outpatient GI follow-up.      ECHO 10/2021:  CONCLUSIONS:   1. The left ventricular systolic function is normal with a 60-65% estimated ejection fraction.   2. Spectral Doppler shows an impaired relaxation pattern of left ventricular diastolic filling.    EGD  "8/2017:  Impression: - Normal esophagus.  - Z-line regular.  - Sherita-en-Y gastrojejunostomy with gastrojejunal  anastomosis characterized by healthy appearing mucosa.  - The gastric pouch was large and measured 7 cm in  length. The mucosa appeared normal. No ulcer was seen.  Biopsied.  - Normal examined jejunum.  Estimated Blood Loss:  Estimated blood loss was minimal.  Recommendation: - Discharge patient to home (with escort).  - Await pathology results.  - Resume previous diet.  - Continue present medications.    PE:  /90   Pulse 75   Temp 36 °C (96.8 °F) (Temporal)   Resp 15   Ht 1.6 m (5' 3\")   Wt 147 kg (323 lb)   SpO2 98%   BMI 57.22 kg/m²      Gen: A&O NAD  HEENT: NCAT, EOMI, not icteric. External ears normal. No rhinorrhea. Moist mucous membranes.  Neck: Supple, full range of motion, no observable masses, No meningeal sign.  Lungs: No Respiratory distress.  CV: RRR, no edema.  Abdomen: Distended in the epigastric area, tenderness in the mid abdomen.  No guarding or rigidity.    MSK: No joint swelling, no redness.  Skin: Scratch marks were reported in the right upper extremity secondary to CAT  scratches   neuro: Normal Gait, Grossly intact.  Psych: Appropriate for situation.    ED course:  VS: /78, afebrile, RR 14, HR 80  , K 6, , CREA 1.2, MAG 2.56, LACTATE 0.6  LFTs wnl  WBC 10.2, HB 10.1,   , HCO3 21, BUN 30  TROPS WNL  UA NEG    CT AP W IV C:  IMPRESSION:  1. Findings compatible with a mild to moderate gastroenteritis.  2. Mild hepatic steatosis.  3. Calcified uterine fibroid.    EKG: T wave inversion in lead III and lead aVF.  Same finding was present in the EKG from October 2023.  No hyperacute T waves.  No other ischemic changes.    Intervention in the ED:  Lokelma  CA gluconate 2 g       Past Medical History  Past Medical History:   Diagnosis Date    Abdominal adhesions 08/08/2021    Abnormal QT interval present on electrocardiogram 03/18/2021    Formatting " of this note might be different from the original. Formatting of this note might be different from the original. -EKG: NSR. QTc 500 Formatting of this note might be different from the original. -EKG: NSR. QTc 500    Acute gastric ulcer without hemorrhage or perforation 01/13/2016    Gastric ulcer, acute    Acute gastrojejunal ulcer 09/07/2007    Formatting of this note might be different from the original. 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    Hypokalemia 09/13/2023    Intertrigo 09/13/2023    Ischemic bowel disease (Multi) 04/01/2021    Personal history of other diseases of the musculoskeletal system and connective tissue 12/08/2015    History of arthritis    Personal history of other diseases of the respiratory system 12/08/2015    History of bronchitis    Personal history of other diseases of the respiratory system 12/08/2015    History of sinusitis    Personal history of other diseases of the respiratory system 12/08/2015    History of acute bronchitis    Personal history of other infectious and parasitic diseases 05/08/2015    History of Helicobacter infection    Personal history of other specified conditions 11/17/2016    History of diarrhea    Personal history of other specified conditions 04/27/2015    History of chest pain    Personal history of other specified conditions 01/10/2014    History of chest pain    Personal history of peptic ulcer disease 05/04/2015    History of gastric ulcer    SBO (small bowel obstruction) (Multi) 09/13/2023     Sebaceous cyst 2002    Tension headache 2009    Urinary tract infection, site not specified 04/15/2015    UTI (lower urinary tract infection)    Wound dehiscence 2023       Surgical History  Past Surgical History:   Procedure Laterality Date    APPENDECTOMY  2014    Appendectomy     SECTION, CLASSIC  2014     Section    COLONOSCOPY  2018    Colonoscopy (Fiberoptic)    ESOPHAGOGASTRODUODENOSCOPY  2018    Diagnostic Esophagogastroduodenoscopy    GASTRIC BYPASS  2017    Gastric Surgery For Morbid Obesity Gastric Bypass    GASTRIC RESTRICTION SURGERY  2015    Gastric Surgery For Morbid Obesity    OTHER SURGICAL HISTORY  2020    Ventral hernia repair    OTHER SURGICAL HISTORY  2014    Cholecystotomy    TUBAL LIGATION  2014    Tubal Ligation    UMBILICAL HERNIA REPAIR  2014    Umbilical Hernia Repair        Social History  She reports that she has quit smoking. Her smoking use included cigarettes. She has never used smokeless tobacco. She reports that she does not currently use alcohol. She reports that she does not use drugs.    Family History  No family history on file.     Allergies  Aspirin         Assessment/Plan   Principal Problem:    Generalized abdominal pain      57 yo female with h/o HFpEF (EF 60-65% 10/2021), anxiety/depression, asthma, HTN, anemia, javier-en-Y, GERD, morbid obesity, OPAL, arthritis, and vitamin D deficiency presenting to  ED with a history of 1 day of bilateral flank and central abdominal pain radiating to the back 10 out of 10 without relieving factors associated with 1 episode of vomiting.  She denies any history of constitutional symptoms.  Stable vital signs.  Exam was significant for tenderness and bilateral flank and mid gastric area without any guarding or rigidity.  Labs were significant for hyperkalemia of 6 along with normocytic anemia.  No leukocytosis.  Normal lactate.  Images were  significant for multiple thickened small bowel loops in the anterior aspect of the abdomen.  Abdominal pain is concerning for gastroenteritis in the setting of 1 episode of vomiting versus food poisoning versus acute pancreatitis given the typical history of mid abdominal pain radiating to the back.  No concern for pyonephritis given the negative UA.  concern for small bowel obstruction given the history of multiple surgeries in the past.    #Central abd pain  :: Patient presented with 1 day history of central bilateral flank abdominal pain radiating to the back, sharp and severe  :: Abdominal exam was significant for mid abdominal tenderness but no guarding or rigidity  :: No significant finding of leukocytosis or elevating lactate.  Imaging were concerning for thickened small bowel  -Will admit the patient as a case of gastroenteritis versus food poisoning versus acute pancreatitis.  No concern for pyonephritis given the negative UA and negative costophrenic angle tenderness. concern for small bowel obstruction given the history of multiple surgeries in the past.  -Will get abdominal x-ray given the distention in the epigastric area  -Will add lipase and amylase  -Will keep the patient on clear liquid diet for now given the severe pain  -Pantoprazole 40 once daily  -Given the absence of diarrhea for now we will hold off for stool PCR   -Will give Tylenol for now for pain    #Hepatic steatosis and concern for cirrhosis on the CT scan  -Follow-up with hematology as outpatient    #Morbid obesity  -Consider follow-up with bariatric surgery as outpatient given the evidence of failure of the Sherita-en-Y before (weight regain)    # Hyperkalemia  :: K 6.0 on arrival -> calcium gluconate 2g IV x1, Kayexalate 15g PO x1  :: EKG in ED without peaked T waves or other changes  - admit to tele  -continue with [facility] for more 3 doses  -RFP evening draw     # Anemia  - baseline Hgb 8-10  - no evidence of acute bleed  - likely ANSHUL  given the history of Sherita-en-Y  -Will get iron studies  -Will continue with oral ferrous sulfate  -Consider adding IV iron given the history of Sherita-en-Y      # HTN  - continue home amlodipine 10 mg daily  -Continue with hydrochlorothiazide 25 to help with hyperkalemia as well  -Hold off lisinopril for now  -Will keep monitoring     #HFpEF  :: Clear lung exam and no evidence of bilateral lower extremity swelling  :: No concern for heart failure exacerbation at this point  -Will add BNP     # Anxiety/Depression  - c/w home escitalopram 20mg daily     # Asthma  - c/w home albuterol as needed  - home fluticasone-salmeterol converted to formulary Breo Elipta while inpatient     # Allergic rhinitis  - c/w home flonase daily     # GERD  - c/w home sucralfate 1g QID  - pepcid BID  - pantoprazole 40 mg PO     # Arthritis  - continue home gabapentin 300 mg HS  - c/w home diclofenac gel, lidocaine patches, and tylenol     Fluids: bolus as needed  Diet: regular  DVT ppx: levonx  GI ppx: home PPI  Abx: none  Code Status: FULL  NOK: José Acuña (Fiance) [phone]/Rosalba Smith (daughter) [phone]        Lucho Hope MD

## 2024-05-31 NOTE — ED PROVIDER NOTES
CC: Abdominal Pain     HPI:  Patient is a 59-year-old female with PMH of obesity status post gastric bypass, chronic lower back pain, GERD, iron deficiency anemia, HTN, HFpEF, asthma, presenting to the ED with abdominal pain.  Patient states since yesterday she has been experiencing worsening left upper quadrant abdominal pain.  Nonradiating not exertional.  No pain in the chest or shortness of breath.  Patient states she is also having pain in her right side but this has been going on for quite a time longer.  Endorses some mild nausea and 1 episode of nonbloody nonbilious vomiting.  No diarrhea or constipation.  No urinary symptoms. No fevers or chills.  No known initiating factor.  States she has had pain similar to this in the past but not as severe.      Limitations to History: None    Additional History Obtained from: Documentation    Records Reviewed: Recent available ED and inpatient notes reviewed in EMR.    PMHx/PSHx:  Per HPI.   - has a past medical history of Abdominal adhesions (08/08/2021), Abnormal QT interval present on electrocardiogram (03/18/2021), Acute gastric ulcer without hemorrhage or perforation (01/13/2016), Acute gastrojejunal ulcer (09/07/2007), Acute upper respiratory infection, unspecified (12/15/2016), Carpal tunnel syndrome, unspecified upper limb (07/22/2013), Cellulitis of abdominal wall (09/13/2023), Chronic sinusitis (09/13/2023), Congenital malformations of corpus callosum (Multi) (08/07/2017), COVID-19 (03/19/2021), Gastritis, unspecified, without bleeding (03/07/2017), High risk HPV infection (09/13/2023), Hypokalemia (09/13/2023), Intertrigo (09/13/2023), Ischemic bowel disease (Multi) (04/01/2021), Personal history of other diseases of the musculoskeletal system and connective tissue (12/08/2015), Personal history of other diseases of the respiratory system (12/08/2015), Personal history of other diseases of the respiratory system (12/08/2015), Personal history of other  diseases of the respiratory system (2015), Personal history of other infectious and parasitic diseases (2015), Personal history of other specified conditions (2016), Personal history of other specified conditions (2015), Personal history of other specified conditions (01/10/2014), Personal history of peptic ulcer disease (2015), SBO (small bowel obstruction) (Multi) (2023), Sebaceous cyst (2002), Tension headache (2009), Urinary tract infection, site not specified (04/15/2015), and Wound dehiscence (2023).  - has a past surgical history that includes Esophagogastroduodenoscopy (2018); Colonoscopy (2018); Other surgical history (2020); Umbilical hernia repair (2014); Tubal ligation (2014); Appendectomy (2014);  section, classic (2014); Other surgical history (2014); Gastric restriction surgery (2015); and Gastric bypass (2017).    Medications: Reviewed in EMR. See EMR for complete list of medications and doses.    Allergies:  Aspirin    Social History:  - Tobacco:  reports that she has quit smoking. Her smoking use included cigarettes. She has never used smokeless tobacco.   - Alcohol:  reports that she does not currently use alcohol.   - Illicit Drugs:  reports no history of drug use.   ???????????????????????????????????????????????????????????????  Triage Vitals:  T 35.8 °C (96.5 °F)  HR 80  /78  RR 14  O2 96 %      PHYSICAL EXAM:   VS: As documented in the triage note and EMR flowsheet from this visit were reviewed.  Gen: Well developed. Well nourished.  Appears comfortable.  Eyes: PERRL, EOMI. Clear scerla.  HENT: NC/AT, Mucosal membranes moist.   Resp: Non-labored breathing on RA, CTAB, no wheezes or crackles  CV: RRR, nl S1, S2  Abd: Soft, non-distended, TTP to palpation in RUQ, no rebound or guarding  Ext: no LE edema, pulses full and equal  Skin: WWP. No systemic rashes or  lesions.  Neuro:  AAOx3, speech fluent, MAEx4, no focal deficit  Psych:  Appropriate mood and affect  ???????????????????????????????????????????????????????????????    ED Labs/Imaging:   Labs Reviewed   CBC WITH AUTO DIFFERENTIAL   COMPREHENSIVE METABOLIC PANEL   MAGNESIUM   LACTATE   LIPASE   URINALYSIS WITH REFLEX CULTURE AND MICROSCOPIC    Narrative:     The following orders were created for panel order Urinalysis with Reflex Culture and Microscopic.  Procedure                               Abnormality         Status                     ---------                               -----------         ------                     Urinalysis with Reflex C...[[id number]]                                                 Extra Urine Gray Tube[[id number]]                                                         Please view results for these tests on the individual orders.   URINALYSIS WITH REFLEX CULTURE AND MICROSCOPIC   EXTRA URINE GRAY TUBE   TROPONIN SERIES- (INITIAL, 1 HR)    Narrative:     The following orders were created for panel order Troponin Series, (0, 1 HR).  Procedure                               Abnormality         Status                     ---------                               -----------         ------                     Troponin I, High Sensiti...[[id number]]                                                   Please view results for these tests on the individual orders.   SERIAL TROPONIN-INITIAL     CT abdomen pelvis w IV contrast    (Results Pending)       Medical Decision Making:  ED Course & MDM   ED Course as of 06/02/24 1643   Fri May 31, 2024   1879 I independently interpreted the EKG:  Regular rate. Regular rhythm. Normal axis.   Normal SD, QRS, and Qtc intervals.   No acute ST segment elevations, depressions, or T wave inversions. T wave inversions in III, aVF unchanged from previous.   Impression: NSR, no STEMI. [KR]   0700 Received signout from co-resident, Dr. Posadas, pending repeat potassium  and CT abdomen and pelvis with oral and IV contrast for abdominal pain.  Patient has hyperkalemia and plan is for admission. [ES]      ED Course User Index  [ES] Estevan Tillman MD  [KR] Marii Posadas MD         Diagnoses as of 06/02/24 1643   Generalized abdominal pain   Hyperkalemia   Gastroenteritis     Patient is a 58yo female with above stated history including gastric bypass presenting to the ED with abdominal pain. Pt HDS and NAD. Pt does have diffuse TTP. Workup initiated including labs, and CT abdomen/pelvic with oral and IV contrast to assess for acute intra-abdominal pathology. Pt labs notable for mild hyperkalemia, which patient has had before in the setting of SPENSER. Pt with no EKG changes. Given hyperkalemia cocktail and nephrology consult placed. Patient handed off to oncoming team pending remainder of labs, CT imaging and admission. Pt resting comfortable at time of handoff.     Social Determinants Limiting Care:  None identified    Disposition:  Patient was signed out at 0700 to Dr. Tillman pending completion of their work-up.  Please see the next provider's transition of care note for the remainder of the patient's care.    Patient seen and discussed with attending physician.    Marii Posadas MD PGY3  Emergency Medicine      Procedures ? [employer] last updated 5/31/2024 4:47 AM        Marii Posadas MD  Resident  06/02/24 [id number]

## 2024-05-31 NOTE — PROGRESS NOTES
Emergency Medicine Transition of Care Note.    I received Jessica Smith in signout from Dr. Posadas.  Please see the previous ED provider note for all HPI, PE and MDM up to the time of signout at 0700. This is in addition to the primary record.    In brief Jessica Smith is an 59 y.o. female presenting for   Chief Complaint   Patient presents with    Abdominal Pain     At the time of signout we were awaiting: CT abdomen w oral and IV contrast    ED Course as of 05/31/24 1234   Fri May 31, 2024   0449 I independently interpreted the EKG:  Regular rate. Regular rhythm. Normal axis.   Normal [place], QRS, and Qtc intervals.   No acute ST segment elevations, depressions, or T wave inversions. T wave inversions in III, aVF unchanged from previous.   Impression: NSR, no STEMI. [KR]   0700 Received signout from co-resident, Dr. Posadas, pending repeat potassium and CT abdomen and pelvis with oral and IV contrast for abdominal pain.  Patient has hyperkalemia and plan is for admission. [ES]      ED Course User Index  [ES] Estevan Tillman MD  [KR] Marii Posadas MD         Diagnoses as of 05/31/24 1234   Generalized abdominal pain   Hyperkalemia   Gastroenteritis       Medical Decision Making  59-year-old female with a PMHx of  HFpEF (EF 60-65% 10/2021), anxiety/depression, asthma, HTN, anemia, javier-en-Y, GERD, morbid obesity, OPAL, arthritis, and vitamin D deficiency presenting to the ED with a complaint of abdominal pain.  Patient is afebrile, hemodynamically stable on room air, and in no acute distress.  Patient reports no pain at this time but was having mid-left and epigastric pain at a 9/10 as a soreness pain.  Spoke with acute care surgery who recommended consulting [facility] who reviewed CT imaging and was unimpressed for complication of Javier-en-Y gastric bypass.  Patient denies vomiting, patient had a bowel movement yesterday and is passing gas.  Was nauseous yesterday but denies vomiting.  Patient has no pain at this  time and tolerated a sandwich.  After potassium cocktail, potassium returned at 5.4.  1 hour of NS provided and potassium repeated returning at 6.4.  Repeat potassium cocktail provided and patient admitted to the medicine service.        Final diagnoses:   [R10.84] Generalized abdominal pain   [E87.5] Hyperkalemia           Procedure  Procedures    Estevan Tillman MD

## 2024-06-01 ENCOUNTER — APPOINTMENT (OUTPATIENT)
Dept: CARDIOLOGY | Facility: HOSPITAL | Age: 59
End: 2024-06-01
Payer: COMMERCIAL

## 2024-06-01 LAB
AMPHETAMINES UR QL SCN: NORMAL
ANION GAP SERPL CALC-SCNC: 12 MMOL/L (ref 10–20)
APPEARANCE UR: ABNORMAL
ATRIAL RATE: 76 BPM
BARBITURATES UR QL SCN: NORMAL
BENZODIAZ UR QL SCN: NORMAL
BILIRUB UR STRIP.AUTO-MCNC: NEGATIVE MG/DL
BZE UR QL SCN: NORMAL
CANNABINOIDS UR QL SCN: NORMAL
CHLORIDE SERPL-SCNC: 102 MMOL/L (ref 98–107)
CHLORIDE UR-SCNC: 134 MMOL/L
CHLORIDE/CREATININE (MMOL/G) IN URINE: 152 MMOL/G CREAT (ref 38–318)
CK SERPL-CCNC: 49 U/L (ref 0–215)
CO2 SERPL-SCNC: 24 MMOL/L (ref 21–32)
COLOR UR: ABNORMAL
CREAT UR-MCNC: 88.3 MG/DL (ref 20–320)
FENTANYL+NORFENTANYL UR QL SCN: NORMAL
GLUCOSE UR STRIP.AUTO-MCNC: NORMAL MG/DL
KETONES UR STRIP.AUTO-MCNC: NEGATIVE MG/DL
LEUKOCYTE ESTERASE UR QL STRIP.AUTO: ABNORMAL
METHADONE UR QL SCN: NORMAL
MUCOUS THREADS #/AREA URNS AUTO: ABNORMAL /LPF
NITRITE UR QL STRIP.AUTO: NEGATIVE
OPIATES UR QL SCN: NORMAL
OXYCODONE+OXYMORPHONE UR QL SCN: NORMAL
P AXIS: 85 DEGREES
P OFFSET: 172 MS
P ONSET: 120 MS
PCP UR QL SCN: NORMAL
PH UR STRIP.AUTO: 6 [PH]
POTASSIUM SERPL-SCNC: 5.1 MMOL/L (ref 3.5–5.3)
POTASSIUM UR-SCNC: 28 MMOL/L
POTASSIUM/CREAT UR-RTO: 32 MMOL/G CREAT
PR INTERVAL: 198 MS
PROT UR STRIP.AUTO-MCNC: NEGATIVE MG/DL
PROT UR-ACNC: 15 MG/DL (ref 5–24)
PROT/CREAT UR: 0.17 MG/MG CREAT (ref 0–0.17)
Q ONSET: 219 MS
QRS COUNT: 13 BEATS
QRS DURATION: 88 MS
QT INTERVAL: 388 MS
QTC CALCULATION(BAZETT): 436 MS
QTC FREDERICIA: 419 MS
R AXIS: 51 DEGREES
RBC # UR STRIP.AUTO: NEGATIVE /UL
RBC #/AREA URNS AUTO: ABNORMAL /HPF
SODIUM SERPL-SCNC: 133 MMOL/L (ref 136–145)
SODIUM UR-SCNC: 157 MMOL/L
SODIUM/CREAT UR-RTO: 178 MMOL/G CREAT
SP GR UR STRIP.AUTO: 1.01
SQUAMOUS #/AREA URNS AUTO: ABNORMAL /HPF
T AXIS: -3 DEGREES
T OFFSET: 413 MS
UROBILINOGEN UR STRIP.AUTO-MCNC: NORMAL MG/DL
VENTRICULAR RATE: 76 BPM
WBC #/AREA URNS AUTO: ABNORMAL /HPF

## 2024-06-01 PROCEDURE — 87449 NOS EACH ORGANISM AG IA: CPT | Performed by: INTERNAL MEDICINE

## 2024-06-01 PROCEDURE — 93005 ELECTROCARDIOGRAM TRACING: CPT

## 2024-06-01 PROCEDURE — 81003 URINALYSIS AUTO W/O SCOPE: CPT | Performed by: INTERNAL MEDICINE

## 2024-06-01 PROCEDURE — 99232 SBSQ HOSP IP/OBS MODERATE 35: CPT | Performed by: INTERNAL MEDICINE

## 2024-06-01 PROCEDURE — 1210000001 HC SEMI-PRIVATE ROOM DAILY

## 2024-06-01 PROCEDURE — 87086 URINE CULTURE/COLONY COUNT: CPT | Performed by: INTERNAL MEDICINE

## 2024-06-01 PROCEDURE — 2500000001 HC RX 250 WO HCPCS SELF ADMINISTERED DRUGS (ALT 637 FOR MEDICARE OP)

## 2024-06-01 PROCEDURE — 2500000005 HC RX 250 GENERAL PHARMACY W/O HCPCS

## 2024-06-01 PROCEDURE — 80051 ELECTROLYTE PANEL: CPT | Performed by: INTERNAL MEDICINE

## 2024-06-01 PROCEDURE — 82550 ASSAY OF CK (CPK): CPT | Performed by: INTERNAL MEDICINE

## 2024-06-01 PROCEDURE — 2500000004 HC RX 250 GENERAL PHARMACY W/ HCPCS (ALT 636 FOR OP/ED)

## 2024-06-01 PROCEDURE — 87506 IADNA-DNA/RNA PROBE TQ 6-11: CPT | Performed by: INTERNAL MEDICINE

## 2024-06-01 PROCEDURE — 36415 COLL VENOUS BLD VENIPUNCTURE: CPT | Performed by: INTERNAL MEDICINE

## 2024-06-01 PROCEDURE — 2500000002 HC RX 250 W HCPCS SELF ADMINISTERED DRUGS (ALT 637 FOR MEDICARE OP, ALT 636 FOR OP/ED)

## 2024-06-01 RX ORDER — SODIUM CHLORIDE 450 MG/100ML
50 INJECTION, SOLUTION INTRAVENOUS CONTINUOUS
Status: DISPENSED | OUTPATIENT
Start: 2024-06-01 | End: 2024-06-01

## 2024-06-01 RX ORDER — PANTOPRAZOLE SODIUM 40 MG/10ML
40 INJECTION, POWDER, LYOPHILIZED, FOR SOLUTION INTRAVENOUS DAILY
Status: DISCONTINUED | OUTPATIENT
Start: 2024-06-01 | End: 2024-06-03 | Stop reason: HOSPADM

## 2024-06-01 RX ADMIN — GABAPENTIN 300 MG: 300 CAPSULE ORAL at 21:20

## 2024-06-01 RX ADMIN — SUCRALFATE 1 G: 1 SUSPENSION ORAL at 09:02

## 2024-06-01 RX ADMIN — DICLOFENAC 4 G: 10 GEL TOPICAL at 06:00

## 2024-06-01 RX ADMIN — FERROUS SULFATE TAB 325 MG (65 MG ELEMENTAL FE) 1 TABLET: 325 (65 FE) TAB at 09:02

## 2024-06-01 RX ADMIN — AMLODIPINE BESYLATE 10 MG: 10 TABLET ORAL at 09:02

## 2024-06-01 RX ADMIN — ENOXAPARIN SODIUM 60 MG: 100 INJECTION SUBCUTANEOUS at 09:02

## 2024-06-01 RX ADMIN — Medication 1 TABLET: at 09:02

## 2024-06-01 RX ADMIN — HYDROCHLOROTHIAZIDE 25 MG: 25 TABLET ORAL at 09:02

## 2024-06-01 RX ADMIN — LIDOCAINE 2 PATCH: 4 PATCH TOPICAL at 09:02

## 2024-06-01 RX ADMIN — ESCITALOPRAM OXALATE 20 MG: 10 TABLET ORAL at 09:02

## 2024-06-01 RX ADMIN — DICLOFENAC 4 G: 10 GEL TOPICAL at 21:21

## 2024-06-01 RX ADMIN — PANTOPRAZOLE SODIUM 40 MG: 40 TABLET, DELAYED RELEASE ORAL at 09:02

## 2024-06-01 RX ADMIN — DICLOFENAC 4 G: 10 GEL TOPICAL at 17:00

## 2024-06-01 RX ADMIN — SODIUM ZIRCONIUM CYCLOSILICATE 10 G: 10 POWDER, FOR SUSPENSION ORAL at 14:59

## 2024-06-01 RX ADMIN — ACETAMINOPHEN 650 MG: 325 TABLET ORAL at 18:07

## 2024-06-01 RX ADMIN — SODIUM ZIRCONIUM CYCLOSILICATE 10 G: 10 POWDER, FOR SUSPENSION ORAL at 09:02

## 2024-06-01 RX ADMIN — CYANOCOBALAMIN TAB 1000 MCG 1000 MCG: 1000 TAB at 09:02

## 2024-06-01 RX ADMIN — ENOXAPARIN SODIUM 60 MG: 100 INJECTION SUBCUTANEOUS at 21:20

## 2024-06-01 RX ADMIN — DICLOFENAC 4 G: 10 GEL TOPICAL at 14:59

## 2024-06-01 RX ADMIN — FAMOTIDINE 10 MG: 20 TABLET, FILM COATED ORAL at 09:02

## 2024-06-01 RX ADMIN — MELATONIN 3 MG: 3 TAB ORAL at 21:20

## 2024-06-01 ASSESSMENT — COGNITIVE AND FUNCTIONAL STATUS - GENERAL
MOBILITY SCORE: 24
DAILY ACTIVITIY SCORE: 24

## 2024-06-01 ASSESSMENT — PAIN SCALES - GENERAL
PAINLEVEL_OUTOF10: 10 - WORST POSSIBLE PAIN
PAINLEVEL_OUTOF10: 4
PAINLEVEL_OUTOF10: 0 - NO PAIN
PAINLEVEL_OUTOF10: 7
PAINLEVEL_OUTOF10: 0 - NO PAIN
PAINLEVEL_OUTOF10: 3

## 2024-06-01 ASSESSMENT — PAIN DESCRIPTION - LOCATION
LOCATION: LEG
LOCATION: ABDOMEN

## 2024-06-01 ASSESSMENT — PAIN - FUNCTIONAL ASSESSMENT
PAIN_FUNCTIONAL_ASSESSMENT: 0-10

## 2024-06-01 ASSESSMENT — PAIN DESCRIPTION - ORIENTATION: ORIENTATION: LEFT;RIGHT

## 2024-06-01 NOTE — PROGRESS NOTES
Jessica Smith is a 59 y.o. female on day 1 of admission presenting with Generalized abdominal pain.      Dietary Orders (From admission, onward)               Adult diet Clear Liquid  Diet effective now        Question:  Diet type  Answer:  Clear Liquid                      Objective     Vitals  Temp:  [36 °C (96.8 °F)-36.7 °C (98.1 °F)] 36.5 °C (97.7 °F)  Heart Rate:  [69-83] 83  Resp:  [15-18] 18  BP: (110-149)/(8-90) 126/66       Pain Score: 0 - No pain         Peripheral IV 05/31/24 18 G Right Antecubital (Active)   Number of days: 1       Vent Settings     No intake or output data in the 24 hours ending 06/01/24 0917    Relevant Results       Scheduled medications  amLODIPine, 10 mg, oral, Daily  cyanocobalamin, 1,000 mcg, oral, Daily  diclofenac sodium, 4 g, Topical, 4x daily  enoxaparin, 60 mg, subcutaneous, q12h SUNSHINE  escitalopram, 20 mg, oral, Daily  famotidine, 10 mg, oral, BID  ferrous sulfate (325 mg ferrous sulfate), 1 tablet, oral, Every other day  fluticasone, 1 spray, Each Nostril, Daily  fluticasone furoate-vilanteroL, 1 puff, inhalation, Daily  gabapentin, 300 mg, oral, Nightly  hydroCHLOROthiazide, 25 mg, oral, Daily  lidocaine, 2 patch, transdermal, Daily  melatonin, 3 mg, oral, Nightly  multivitamin with minerals, 1 tablet, oral, Daily  pantoprazole, 40 mg, oral, Daily  pneumoc 20-lois conj-dip cr(PF), 0.5 mL, intramuscular, During hospitalization  sodium zirconium cyclosilicate, 10 g, oral, TID  sucralfate, 1 g, oral, With meals & nightly      Continuous medications     PRN medications  PRN medications: acetaminophen **OR** acetaminophen **OR** acetaminophen, albuterol  Results for orders placed or performed during the hospital encounter of 05/31/24 (from the past 24 hour(s))   POCT GLUCOSE   Result Value Ref Range    POCT Glucose 91 74 - 99 mg/dL   Potassium   Result Value Ref Range    Potassium 6.4 (HH) 3.5 - 5.3 mmol/L   Lipase   Result Value Ref Range    Lipase 34 9 - 82 U/L   Amylase    Result Value Ref Range    Amylase 68 29 - 103 U/L   Iron and TIBC   Result Value Ref Range    Iron 41 35 - 150 ug/dL    UIBC 278 110 - 370 ug/dL    TIBC 319 240 - 445 ug/dL    % Saturation 13 (L) 25 - 45 %   Ferritin   Result Value Ref Range    Ferritin 99 8 - 150 ng/mL   Renal function panel   Result Value Ref Range    Glucose 98 74 - 99 mg/dL    Sodium 137 136 - 145 mmol/L    Potassium 6.3 (HH) 3.5 - 5.3 mmol/L    Chloride 107 98 - 107 mmol/L    Bicarbonate 24 21 - 32 mmol/L    Anion Gap 12 10 - 20 mmol/L    Urea Nitrogen 24 (H) 6 - 23 mg/dL    Creatinine 1.20 (H) 0.50 - 1.05 mg/dL    eGFR 52 (L) >60 mL/min/1.73m*2    Calcium 8.6 8.6 - 10.6 mg/dL    Phosphorus 3.8 2.5 - 4.9 mg/dL    Albumin 3.8 3.4 - 5.0 g/dL              Assessment/Plan         S/  See and examined  No new complaints no new event over the night   ROS: Negative    O/   General Appearance: Alert, Oriented X3, Cooperative, Not in Acute Distress  HEENT: no eye redness, not pale, no jaundice, Throat: not congested, no ulcers    Chest: Good AE bilateral, No crackles, no ronchies, no wheezes.   Heart: RHR, Normal heart sounds S1, S2, no murmur or gallop,   Abdomen: Soft, lax, no hepatosplenomegaly, intact hernia orifices, negative ambrose sign, no tenderness, old scars.  Genitalia: no abnormal discharge, no ulcers, no swelling.  Lymphatics: no lymphadenopathy, supraclavicular, inguinal trochlear, or axilla.   Neurology: Intact cranial nerves 2 to 12, intact sensation, intact motor function (Power, tone and reflexes). Normal DTR reflexes.   Extremities: no signs of PAD, no swelling no ulcers   Back: no deformity, no SI tenderness, no ulcers.   Skin: no signs of dehydration, no Rash, Bruises, or purpura.      AS:  Ms. Jessica Smith is a 59 y.o. female presenting with PMHX of HFpEF (EF 60-65% ), anxiety/depression, asthma, HTN, anemia, obesity, OPAL, arthritis, and vitamin D deficiency, GERD, (recurrent abdominal pain due to functional vs adhesions s/p  sherita-en-Y, multiple C-sections, abdominal surgeries) who presented to the ED for severe bilateral flank and mid abdominal pain with one episode of non bloody gastric content vomiting she reported history of 3 bowel movement prior to admission.   Pending liver US, repeat labs, U/a negative, Abd/pelvic CT signs of gastroenteritis pending stool/ag.   Hyperkalemia, not clear if that's due to lisinopril Vs RTA IV was given one Lokalmia.   C/w IVF hydration, symptomatic care    #. Abdominal  pain  - abdomen soft lax  - Pending liver US,   - repeat labs,   - U/a negative,   - Abd/pelvic CT signs of gastroenteritis   - pending stool/ag.   -Pantoprazole 40 daily  -Tylenol for now for pain      #Morbid obesity  -Consider follow-up with bariatric surgery as outpatient   - s/p Sherita-en-Y       # Hyperkalemia  - K 6.0 on arrival  s/p calcium gluconate 2g IV x1, Kayexalate 15g PO x1  - EKG in ED without peaked T waves or other changes  - admit to tele  -[facility]  -RFP  - hold ACE  - R/o RTA IV      # Anemia  - baseline Hgb 8-10 mg/dl   - no evidence of acute bleed  -Consider adding IV iron given the history of Sherita-en-Y      # HTN  - continue home amlodipine 10 mg daily  -hold  hydrochlorothiazide 25 to help with hyperkalemia as well  -Hold off lisinopril for now  -Will keep monitoring     #HFpEF  - Clear lung exam   - No concern for heart failure exacerbation at this point  -Will add BNP   - monitoring as she is on IVF for dehydration     # Anxiety/Depression  - c/w home escitalopram 20mg daily     # Asthma  - c/w home albuterol as needed  - home fluticasone-salmeterol converted to formulary Breo Elipta while inpatient     # Allergic rhinitis  - c/w home flonase daily     # GERD  - Dc home sucralfate 1g QID,  dc pepcid  given hyperkalemia possible RTA IV   - pantoprazole 40 mg PO     # Arthritis  - continue home gabapentin 300 mg HS  - c/w home diclofenac gel, lidocaine patches, and tylenol     Code  Status: FULL  NOK: José Acuña (Fiance) [phone]/Rosalba Smith (daughter) [phone]  Disp: PT/ot     Spoke to my patient, Discussed the medical, social conditions, the reason for this admission explained our plan and recommendations.  Time spent on the assessment of patient, gathering and interpreting data, review of medical record/patient history, personally reviewing radiographic imaging. With greater than 50% spent in personal discussion with patient.  Time > 45 min         Brandon Brice MD

## 2024-06-01 NOTE — CARE PLAN
Problem: Pain - Adult  Goal: Verbalizes/displays adequate comfort level or baseline comfort level  6/1/2024 1906 by Sara Saez RN  Outcome: Progressing  6/1/2024 1905 by Sara Saez RN  Outcome: Progressing     Problem: Discharge Planning  Goal: Discharge to home or other facility with appropriate resources  Outcome: Progressing     Problem: Pain  Goal: Takes deep breaths with improved pain control throughout the shift  6/1/2024 1906 by Sara Saez RN  Outcome: Progressing  6/1/2024 1905 by Sara Saez RN  Outcome: Progressing  Goal: Walks with improved pain control throughout the shift  Outcome: Progressing   The patient's goals for the shift include      The clinical goals for the shift include pt will be free of falls

## 2024-06-01 NOTE — NURSING NOTE
Patient admitted to [place] [address] this evening from the ER. She was oriented to the new environment and her admission screenings were completed. She lives at home with her fiance, his brother, and her pets. She stated she is independent with ADLs. She does have a walker at home as well. She anticipates discharging to home when medically ready. TCC/SW consult placed from admission screenings because she would like to speak to SW about advance directives.

## 2024-06-01 NOTE — CARE PLAN
The patient's goals for the shift include      The clinical goals for the shift include pt will be free of falls      Problem: Pain - Adult  Goal: Verbalizes/displays adequate comfort level or baseline comfort level  Outcome: Progressing     Problem: Pain  Goal: Takes deep breaths with improved pain control throughout the shift  Outcome: Progressing

## 2024-06-01 NOTE — CARE PLAN
The patient's goals for the shift include      The clinical goals for the shift include patient will be free from injury and falls this shift      Problem: Safety - Adult  Goal: Free from fall injury  Outcome: Met     Problem: Pain  Goal: Turns in bed with improved pain control throughout the shift  Outcome: Met

## 2024-06-02 ENCOUNTER — APPOINTMENT (OUTPATIENT)
Dept: RADIOLOGY | Facility: HOSPITAL | Age: 59
End: 2024-06-02
Payer: COMMERCIAL

## 2024-06-02 LAB
C COLI+JEJ+UPSA DNA STL QL NAA+PROBE: NOT DETECTED
EC STX1 GENE STL QL NAA+PROBE: NOT DETECTED
EC STX2 GENE STL QL NAA+PROBE: NOT DETECTED
HOLD SPECIMEN: NORMAL
NOROVIRUS GI + GII RNA STL NAA+PROBE: NOT DETECTED
RV RNA STL NAA+PROBE: NOT DETECTED
SALMONELLA DNA STL QL NAA+PROBE: NOT DETECTED
SHIGELLA DNA SPEC QL NAA+PROBE: NOT DETECTED
V CHOLERAE DNA STL QL NAA+PROBE: NOT DETECTED
Y ENTEROCOL DNA STL QL NAA+PROBE: NOT DETECTED

## 2024-06-02 PROCEDURE — 1210000001 HC SEMI-PRIVATE ROOM DAILY

## 2024-06-02 PROCEDURE — 2500000004 HC RX 250 GENERAL PHARMACY W/ HCPCS (ALT 636 FOR OP/ED): Performed by: INTERNAL MEDICINE

## 2024-06-02 PROCEDURE — 2500000001 HC RX 250 WO HCPCS SELF ADMINISTERED DRUGS (ALT 637 FOR MEDICARE OP)

## 2024-06-02 PROCEDURE — C9113 INJ PANTOPRAZOLE SODIUM, VIA: HCPCS | Performed by: INTERNAL MEDICINE

## 2024-06-02 PROCEDURE — 2500000002 HC RX 250 W HCPCS SELF ADMINISTERED DRUGS (ALT 637 FOR MEDICARE OP, ALT 636 FOR OP/ED)

## 2024-06-02 PROCEDURE — 2500000001 HC RX 250 WO HCPCS SELF ADMINISTERED DRUGS (ALT 637 FOR MEDICARE OP): Performed by: INTERNAL MEDICINE

## 2024-06-02 PROCEDURE — 76705 ECHO EXAM OF ABDOMEN: CPT

## 2024-06-02 PROCEDURE — 76705 ECHO EXAM OF ABDOMEN: CPT | Performed by: RADIOLOGY

## 2024-06-02 PROCEDURE — 2500000002 HC RX 250 W HCPCS SELF ADMINISTERED DRUGS (ALT 637 FOR MEDICARE OP, ALT 636 FOR OP/ED): Performed by: INTERNAL MEDICINE

## 2024-06-02 PROCEDURE — 2500000004 HC RX 250 GENERAL PHARMACY W/ HCPCS (ALT 636 FOR OP/ED)

## 2024-06-02 PROCEDURE — 94640 AIRWAY INHALATION TREATMENT: CPT

## 2024-06-02 RX ORDER — SUCRALFATE 1 G/10ML
1 SUSPENSION ORAL EVERY 6 HOURS SCHEDULED
Status: DISCONTINUED | OUTPATIENT
Start: 2024-06-02 | End: 2024-06-03 | Stop reason: HOSPADM

## 2024-06-02 RX ADMIN — FLUTICASONE FUROATE AND VILANTEROL TRIFENATATE 1 PUFF: 100; 25 POWDER RESPIRATORY (INHALATION) at 07:55

## 2024-06-02 RX ADMIN — ENOXAPARIN SODIUM 60 MG: 100 INJECTION SUBCUTANEOUS at 20:03

## 2024-06-02 RX ADMIN — DICLOFENAC 4 G: 10 GEL TOPICAL at 07:12

## 2024-06-02 RX ADMIN — CYANOCOBALAMIN TAB 1000 MCG 1000 MCG: 1000 TAB at 08:43

## 2024-06-02 RX ADMIN — FAMOTIDINE 10 MG: 20 TABLET, FILM COATED ORAL at 20:03

## 2024-06-02 RX ADMIN — ACETAMINOPHEN 650 MG: 325 TABLET ORAL at 07:12

## 2024-06-02 RX ADMIN — AMLODIPINE BESYLATE 10 MG: 10 TABLET ORAL at 08:43

## 2024-06-02 RX ADMIN — ESCITALOPRAM OXALATE 20 MG: 10 TABLET ORAL at 08:43

## 2024-06-02 RX ADMIN — ENOXAPARIN SODIUM 60 MG: 100 INJECTION SUBCUTANEOUS at 08:43

## 2024-06-02 RX ADMIN — GABAPENTIN 300 MG: 300 CAPSULE ORAL at 20:03

## 2024-06-02 RX ADMIN — ACETAMINOPHEN 650 MG: 325 TABLET ORAL at 20:03

## 2024-06-02 RX ADMIN — DICLOFENAC 4 G: 10 GEL TOPICAL at 12:30

## 2024-06-02 RX ADMIN — DICLOFENAC 4 G: 10 GEL TOPICAL at 20:05

## 2024-06-02 RX ADMIN — HYDROCHLOROTHIAZIDE 25 MG: 25 TABLET ORAL at 08:56

## 2024-06-02 RX ADMIN — Medication 1 TABLET: at 08:43

## 2024-06-02 RX ADMIN — PANTOPRAZOLE SODIUM 40 MG: 40 INJECTION, POWDER, FOR SOLUTION INTRAVENOUS at 08:43

## 2024-06-02 RX ADMIN — SUCRALFATE 1 G: 1 SUSPENSION ORAL at 17:07

## 2024-06-02 RX ADMIN — FLUTICASONE PROPIONATE 1 SPRAY: 50 SPRAY, METERED NASAL at 08:46

## 2024-06-02 RX ADMIN — MELATONIN 3 MG: 3 TAB ORAL at 20:03

## 2024-06-02 RX ADMIN — SUCRALFATE 1 G: 1 SUSPENSION ORAL at 13:10

## 2024-06-02 ASSESSMENT — PAIN - FUNCTIONAL ASSESSMENT
PAIN_FUNCTIONAL_ASSESSMENT: 0-10

## 2024-06-02 ASSESSMENT — COGNITIVE AND FUNCTIONAL STATUS - GENERAL
TOILETING: A LITTLE
DAILY ACTIVITIY SCORE: 20
CLIMB 3 TO 5 STEPS WITH RAILING: A LOT
STANDING UP FROM CHAIR USING ARMS: A LITTLE
MOVING FROM LYING ON BACK TO SITTING ON SIDE OF FLAT BED WITH BEDRAILS: A LITTLE
MOVING TO AND FROM BED TO CHAIR: A LITTLE
DRESSING REGULAR LOWER BODY CLOTHING: A LITTLE
DRESSING REGULAR UPPER BODY CLOTHING: A LITTLE
MOBILITY SCORE: 17
TURNING FROM BACK TO SIDE WHILE IN FLAT BAD: A LITTLE
HELP NEEDED FOR BATHING: A LITTLE
WALKING IN HOSPITAL ROOM: A LITTLE

## 2024-06-02 ASSESSMENT — PAIN SCALES - GENERAL
PAINLEVEL_OUTOF10: 6
PAINLEVEL_OUTOF10: 0 - NO PAIN
PAINLEVEL_OUTOF10: 0 - NO PAIN
PAINLEVEL_OUTOF10: 3

## 2024-06-02 ASSESSMENT — PAIN DESCRIPTION - LOCATION: LOCATION: ABDOMEN

## 2024-06-02 NOTE — PROGRESS NOTES
Jessica Smith is a 59 y.o. female on day 2 of admission presenting with Generalized abdominal pain.      Dietary Orders (From admission, onward)               Adult diet Regular, 2-3 grams sodium  Diet effective now        Question Answer Comment   Diet type Regular    Diet type 2-3 grams sodium                          Objective     Vitals  Temp:  [36.4 °C (97.5 °F)] 36.4 °C (97.5 °F)  Heart Rate:  [74-80] 77  Resp:  [18] 18  BP: (122-140)/(67-79) 137/78       Pain Score: 6         Peripheral IV 05/31/24 18 G Right Antecubital (Active)   Number of days: 1       Vent Settings       Intake/Output Summary (Last 24 hours) at 6/2/2024 0851  Last data filed at 6/1/2024 1147  Gross per 24 hour   Intake 240 ml   Output --   Net 240 ml       Relevant Results       Scheduled medications  amLODIPine, 10 mg, oral, Daily  cyanocobalamin, 1,000 mcg, oral, Daily  diclofenac sodium, 4 g, Topical, 4x daily  enoxaparin, 60 mg, subcutaneous, q12h SUNSHINE  escitalopram, 20 mg, oral, Daily  [Held by provider] famotidine, 10 mg, oral, BID  fluticasone, 1 spray, Each Nostril, Daily  fluticasone furoate-vilanteroL, 1 puff, inhalation, Daily  gabapentin, 300 mg, oral, Nightly  [Held by provider] hydroCHLOROthiazide, 25 mg, oral, Daily  lidocaine, 2 patch, transdermal, Daily  melatonin, 3 mg, oral, Nightly  multivitamin with minerals, 1 tablet, oral, Daily  pantoprazole, 40 mg, intravenous, Daily  pneumoc 20-lois conj-dip cr(PF), 0.5 mL, intramuscular, During hospitalization      Continuous medications     PRN medications  PRN medications: acetaminophen **OR** acetaminophen **OR** acetaminophen, albuterol  Results for orders placed or performed during the hospital encounter of 05/31/24 (from the past 24 hour(s))   Electrolyte panel   Result Value Ref Range    Sodium 133 (L) 136 - 145 mmol/L    Potassium 5.1 3.5 - 5.3 mmol/L    Chloride 102 98 - 107 mmol/L    Bicarbonate 24 21 - 32 mmol/L    Anion Gap 12 10 - 20 mmol/L   Creatine Kinase   Result  Value Ref Range    Creatine Kinase 49 0 - 215 U/L   Urinalysis with Reflex Culture and Microscopic   Result Value Ref Range    Color, Urine Light-Yellow Light-Yellow, Yellow, Dark-Yellow    Appearance, Urine Turbid (N) Clear    Specific Gravity, Urine 1.013 1.005 - 1.035    pH, Urine 6.0 5.0, 5.5, 6.0, 6.5, 7.0, 7.5, 8.0    Protein, Urine NEGATIVE NEGATIVE, 10 (TRACE), 20 (TRACE) mg/dL    Glucose, Urine Normal Normal mg/dL    Blood, Urine NEGATIVE NEGATIVE    Ketones, Urine NEGATIVE NEGATIVE mg/dL    Bilirubin, Urine NEGATIVE NEGATIVE    Urobilinogen, Urine Normal Normal mg/dL    Nitrite, Urine NEGATIVE NEGATIVE    Leukocyte Esterase, Urine 500 Zayda/µL (A) NEGATIVE   Extra Urine Gray Tube   Result Value Ref Range    Extra Tube Hold for add-ons.    Microscopic Only, Urine   Result Value Ref Range    WBC, Urine 21-50 (A) 1-5, NONE /HPF    RBC, Urine 3-5 NONE, 1-2, 3-5 /HPF    Squamous Epithelial Cells, Urine 10-25 (FEW) Reference range not established. /HPF    Mucus, Urine FEW Reference range not established. /LPF   Stool Pathogen Panel, PCR    Specimen: Stool   Result Value Ref Range    Campylobacter Group Not Detected Not Detected    Salmonella species Not Detected Not Detected    Shigella species Not Detected Not Detected    Vibrio Group Not Detected Not Detected    Yersinia Enterocolitica Not Detected Not Detected    Shiga Toxin 1 Not Detected Not Detected    Shiga Toxin 2 Not Detected Not Detected    Norovirus GI/GII Not Detected Not Detected    Rotavirus A Not Detected Not Detected              Assessment/Plan         S/  See and examined  No new complaints no new event over the night   ROS: Negative    O/   General Appearance: Alert, Oriented X3, Cooperative, Not in Acute Distress  HEENT: no eye redness, not pale, no jaundice, Throat: not congested, no ulcers    Chest: Good AE bilateral, No crackles, no ronchies, no wheezes.   Heart: RHR, Normal heart sounds S1, S2, no murmur or gallop,   Abdomen: Soft, lax, no  "hepatosplenomegaly, intact hernia orifices, negative ambrose sign, no tenderness, old scars.  Genitalia: no abnormal discharge, no ulcers, no swelling.  Lymphatics: no lymphadenopathy, supraclavicular, inguinal trochlear, or axilla.   Neurology: Intact cranial nerves 2 to 12, intact sensation, intact motor function (Power, tone and reflexes). Normal DTR reflexes.   Extremities: no signs of PAD, no swelling no ulcers   Back: no deformity, no SI tenderness, no ulcers.   Skin: no signs of dehydration, no Rash, Bruises, or purpura.      AS:  Ms. Jessica Smith is a 59 y.o. female presenting with PMHX of HFpEF (EF 60-65% ), anxiety/depression, asthma, HTN, anemia, obesity, OPAL, arthritis, and vitamin D deficiency, GERD, (recurrent abdominal pain due to functional vs adhesions s/p sherita-en-Y, multiple C-sections, abdominal surgeries) who presented to the ED for severe bilateral flank and mid abdominal pain with one episode of non bloody gastric content vomiting she reported history of 3 bowel movement prior to admission.   Pending liver US, repeat labs, U/a negative, Abd/pelvic CT signs of gastroenteritis pending stool/ag.   Hyperkalemia, not clear if that's due to lisinopril Vs RTA IV was given one Lokalmia.   C/w IVF hydration, symptomatic care.  Improving  Pending PT/OT for placement and discharge     #. Abdominal  pain \" improved  - abdomen soft lax  - Pending liver US,   - repeat labs,   - U/a negative,   - Abd/pelvic CT signs of gastroenteritis   - pending stool/ag.   -Pantoprazole 40 daily  -Tylenol for now for pain      #Morbid obesity  -Consider follow-up with bariatric surgery as outpatient   - s/p Sherita-en-Y       # Hyperkalemia: improving   - K 6.0 on arrival  s/p calcium gluconate 2g IV x1, Kayexalate 15g PO x1  - EKG in ED without peaked T waves or other changes  - admit to tele  -continue with [facility] for more 3 doses  -RFP  - R/o RTA IV - medication induce - hold ACE  - close f/p with nephrology    # " Anemia  - baseline Hgb 8-10 mg/dl   - no evidence of acute bleed  -Consider adding IV iron given the history of Sherita-en-Y      # HTN  - continue home amlodipine 10 mg daily  -hold  hydrochlorothiazide 25 to help with hyperkalemia as well  -Hold off lisinopril for now  -Will keep monitoring     #HFpEF  - Clear lung exam   - No concern for heart failure exacerbation at this point  -Will add BNP   - monitoring as she is on IVF for dehydration     # Anxiety/Depression  - c/w home escitalopram 20mg daily     # Asthma  - c/w home albuterol as needed  - home fluticasone-salmeterol converted to formulary Breo Elipta while inpatient     # Allergic rhinitis  - c/w home flonase daily     # GERD  - Dc home sucralfate 1g QID,  dc pepcid  given hyperkalemia possible RTA IV   - pantoprazole 40 mg PO     # Arthritis  - continue home gabapentin 300 mg HS  - c/w home diclofenac gel, lidocaine patches, and tylenol     Code Status: FULL  NOK: José Acuña (Fiance) [phone]/Rosalba Smith (daughter) [phone]  Disp: PT/ot     Spoke to my patient, Discussed the medical, social conditions, the reason for this admission explained our plan and recommendations.  Time spent on the assessment of patient, gathering and interpreting data, review of medical record/patient history, personally reviewing radiographic imaging. With greater than 50% spent in personal discussion with patient.  Time > 25  min         Brandon Brice MD

## 2024-06-02 NOTE — CARE PLAN
Problem: Pain - Adult  Goal: Verbalizes/displays adequate comfort level or baseline comfort level  Outcome: Progressing     Problem: Discharge Planning  Goal: Discharge to home or other facility with appropriate resources  Outcome: Progressing     Problem: Chronic Conditions and Co-morbidities  Goal: Patient's chronic conditions and co-morbidity symptoms are monitored and maintained or improved  Outcome: Progressing     Problem: Pain  Goal: Takes deep breaths with improved pain control throughout the shift  Outcome: Progressing  Goal: Walks with improved pain control throughout the shift  Outcome: Progressing  Goal: Performs ADL's with improved pain control throughout shift  Outcome: Progressing  Goal: Participates in PT with improved pain control throughout the shift  Outcome: Progressing  Goal: Free from opioid side effects throughout the shift  Outcome: Progressing  Goal: Free from acute confusion related to pain meds throughout the shift  Outcome: Progressing   The patient's goals for the shift include  Ambulate in hallway.    The clinical goals for the shift include Patient will be free of falls and or injuries

## 2024-06-03 ENCOUNTER — PHARMACY VISIT (OUTPATIENT)
Dept: PHARMACY | Facility: CLINIC | Age: 59
End: 2024-06-03
Payer: MEDICAID

## 2024-06-03 VITALS
DIASTOLIC BLOOD PRESSURE: 71 MMHG | RESPIRATION RATE: 18 BRPM | HEIGHT: 63 IN | WEIGHT: 293 LBS | TEMPERATURE: 97.9 F | HEART RATE: 74 BPM | BODY MASS INDEX: 51.91 KG/M2 | SYSTOLIC BLOOD PRESSURE: 133 MMHG | OXYGEN SATURATION: 92 %

## 2024-06-03 PROCEDURE — 2500000005 HC RX 250 GENERAL PHARMACY W/O HCPCS: Performed by: INTERNAL MEDICINE

## 2024-06-03 PROCEDURE — 97110 THERAPEUTIC EXERCISES: CPT | Mod: GP

## 2024-06-03 PROCEDURE — 2500000002 HC RX 250 W HCPCS SELF ADMINISTERED DRUGS (ALT 637 FOR MEDICARE OP, ALT 636 FOR OP/ED): Performed by: INTERNAL MEDICINE

## 2024-06-03 PROCEDURE — 2500000005 HC RX 250 GENERAL PHARMACY W/O HCPCS

## 2024-06-03 PROCEDURE — 99239 HOSP IP/OBS DSCHRG MGMT >30: CPT | Performed by: INTERNAL MEDICINE

## 2024-06-03 PROCEDURE — RXMED WILLOW AMBULATORY MEDICATION CHARGE

## 2024-06-03 PROCEDURE — 2500000001 HC RX 250 WO HCPCS SELF ADMINISTERED DRUGS (ALT 637 FOR MEDICARE OP)

## 2024-06-03 PROCEDURE — C9113 INJ PANTOPRAZOLE SODIUM, VIA: HCPCS | Performed by: INTERNAL MEDICINE

## 2024-06-03 PROCEDURE — 97161 PT EVAL LOW COMPLEX 20 MIN: CPT | Mod: GP

## 2024-06-03 PROCEDURE — 2500000001 HC RX 250 WO HCPCS SELF ADMINISTERED DRUGS (ALT 637 FOR MEDICARE OP): Performed by: INTERNAL MEDICINE

## 2024-06-03 PROCEDURE — 2500000004 HC RX 250 GENERAL PHARMACY W/ HCPCS (ALT 636 FOR OP/ED)

## 2024-06-03 PROCEDURE — 2500000004 HC RX 250 GENERAL PHARMACY W/ HCPCS (ALT 636 FOR OP/ED): Performed by: INTERNAL MEDICINE

## 2024-06-03 RX ORDER — ACETAMINOPHEN 325 MG/1
650 TABLET ORAL EVERY 8 HOURS PRN
Qty: 30 TABLET | Refills: 0 | Status: SHIPPED | OUTPATIENT
Start: 2024-06-03 | End: 2024-07-03

## 2024-06-03 RX ADMIN — AMLODIPINE BESYLATE 10 MG: 10 TABLET ORAL at 08:17

## 2024-06-03 RX ADMIN — LIDOCAINE 2 PATCH: 4 PATCH TOPICAL at 08:18

## 2024-06-03 RX ADMIN — DICLOFENAC 4 G: 10 GEL TOPICAL at 08:20

## 2024-06-03 RX ADMIN — Medication 1 TABLET: at 08:17

## 2024-06-03 RX ADMIN — SUCRALFATE 1 G: 1 SUSPENSION ORAL at 05:54

## 2024-06-03 RX ADMIN — FAMOTIDINE 10 MG: 20 TABLET, FILM COATED ORAL at 08:17

## 2024-06-03 RX ADMIN — ENOXAPARIN SODIUM 60 MG: 100 INJECTION SUBCUTANEOUS at 08:17

## 2024-06-03 RX ADMIN — CYANOCOBALAMIN TAB 1000 MCG 1000 MCG: 1000 TAB at 08:17

## 2024-06-03 RX ADMIN — SUCRALFATE 1 G: 1 SUSPENSION ORAL at 11:37

## 2024-06-03 RX ADMIN — PNEUMOCOCCAL 20-VALENT CONJUGATE VACCINE 0.5 ML
2.2; 2.2; 2.2; 2.2; 2.2; 2.2; 2.2; 2.2; 2.2; 2.2; 2.2; 2.2; 2.2; 2.2; 2.2; 2.2; 4.4; 2.2; 2.2; 2.2 INJECTION, SUSPENSION INTRAMUSCULAR at 12:10

## 2024-06-03 RX ADMIN — FLUTICASONE PROPIONATE 1 SPRAY: 50 SPRAY, METERED NASAL at 08:20

## 2024-06-03 RX ADMIN — PANTOPRAZOLE SODIUM 40 MG: 40 INJECTION, POWDER, FOR SOLUTION INTRAVENOUS at 08:17

## 2024-06-03 RX ADMIN — ESCITALOPRAM OXALATE 20 MG: 10 TABLET ORAL at 08:17

## 2024-06-03 RX ADMIN — HYDROCHLOROTHIAZIDE 25 MG: 25 TABLET ORAL at 08:17

## 2024-06-03 ASSESSMENT — COGNITIVE AND FUNCTIONAL STATUS - GENERAL
MOBILITY SCORE: 17
CLIMB 3 TO 5 STEPS WITH RAILING: A LITTLE
CLIMB 3 TO 5 STEPS WITH RAILING: A LOT
DAILY ACTIVITIY SCORE: 24
TURNING FROM BACK TO SIDE WHILE IN FLAT BAD: A LITTLE
CLIMB 3 TO 5 STEPS WITH RAILING: A LITTLE
TOILETING: A LITTLE
STANDING UP FROM CHAIR USING ARMS: A LITTLE
WALKING IN HOSPITAL ROOM: A LITTLE
MOBILITY SCORE: 22
WALKING IN HOSPITAL ROOM: A LITTLE
MOVING FROM LYING ON BACK TO SITTING ON SIDE OF FLAT BED WITH BEDRAILS: A LITTLE
DAILY ACTIVITIY SCORE: 20
DRESSING REGULAR LOWER BODY CLOTHING: A LITTLE
MOVING TO AND FROM BED TO CHAIR: A LITTLE
WALKING IN HOSPITAL ROOM: A LITTLE
MOBILITY SCORE: 22
HELP NEEDED FOR BATHING: A LITTLE
DRESSING REGULAR UPPER BODY CLOTHING: A LITTLE

## 2024-06-03 ASSESSMENT — PAIN SCALES - GENERAL
PAINLEVEL_OUTOF10: 0 - NO PAIN
PAINLEVEL_OUTOF10: 0 - NO PAIN

## 2024-06-03 ASSESSMENT — PAIN - FUNCTIONAL ASSESSMENT
PAIN_FUNCTIONAL_ASSESSMENT: 0-10
PAIN_FUNCTIONAL_ASSESSMENT: 0-10

## 2024-06-03 NOTE — CARE PLAN
Problem: Discharge Planning  Goal: Discharge to home or other facility with appropriate resources  Outcome: Met     Problem: Chronic Conditions and Co-morbidities  Goal: Patient's chronic conditions and co-morbidity symptoms are monitored and maintained or improved  Outcome: Met     Problem: Pain  Goal: Takes deep breaths with improved pain control throughout the shift  Outcome: Met  Goal: Walks with improved pain control throughout the shift  Outcome: Met  Goal: Performs ADL's with improved pain control throughout shift  Outcome: Met  Goal: Participates in PT with improved pain control throughout the shift  Outcome: Met  Goal: Free from opioid side effects throughout the shift  Outcome: Met  Goal: Free from acute confusion related to pain meds throughout the shift  Outcome: Met   The patient's goals for the shift include  Patient will discharge home    The clinical goals for the shift include Patient will ambulate in hallway

## 2024-06-03 NOTE — NURSING NOTE
Patient is alert and oriented x4. Discharge instructions discussed and reviewed at great length. Patient meds to bed delivered to bedside. All belongings are packed and in the patient possession. Received Pneumococcal vaccine to right deltoid . Patient educated and given handout.

## 2024-06-03 NOTE — RESEARCH NOTES
Introduced myself to the patient.  Patients daughter was present at bedside.  Patient states she had a great visit here in our facility.  Patient states she understands POC, DC instructions and has no complaints with staff etc during her visit.     Advised patient that she can always reach out to me even beyond admission for any questions and or needs regarding experience.  Issued contact card.        Yash Maki   Patient Experience Liaison [facility] [address]  [phone]

## 2024-06-03 NOTE — PROGRESS NOTES
06/03/24 1131   Discharge Planning   Home or Post Acute Services None   Patient expects to be discharged to: home   Does the patient need discharge transport arranged? Yes   RoundTrip coordination needed? Yes   Has discharge transport been arranged? Yes   What day is the transport expected? 06/03/24       Plan per Medical/Surgical team: Patient is medically ready to discharge. Patient will need transportation arranged. Transportation placed in Round Trip via wheelchair, awaiting for time confirmation.    Home Care: denies  PCP: Dr. Amor  Pharmacy: [facility]  DME: patient has a rollator, shower chair, and walker at home  Falls: denies  Dialysis: denies    Potential Barriers: none  Discharge Disposition: home  ADOD: 6/3/24

## 2024-06-03 NOTE — CARE PLAN
The patient's goals for the shift include      The clinical goals for the shift include Pt safety will be maintained

## 2024-06-03 NOTE — PROGRESS NOTES
Physical Therapy    Physical Therapy Evaluation & Treatment    Patient Name: Jessica Smith  MRN: [id number]  Today's Date: 6/3/2024   Time Calculation  Start Time: 0934  Stop Time: 1003  Time Calculation (min): 29 min    Assessment/Plan   PT Assessment  PT Assessment Results: Decreased endurance, Decreased mobility, Decreased strength  Rehab Prognosis: Excellent  Barriers to Discharge: none present  End of Session Communication: Bedside nurse  Assessment Comment: Pt mobilizing well with whw, encouraged continued mobilty and activity upon homegoing, pt appears motivated as well as family is encouraging.  Will benefit from continued PT if remains inpatient to increase overall activity tolerance and strength.  End of Session Patient Position: Alarm off, not on at start of session (seated EOB with needs in reach)   IP OR SWING BED PT PLAN  Inpatient or Swing Bed: Inpatient  PT Plan  Treatment/Interventions: Gait training, Stair training, Strengthening, Therapeutic exercise  PT Plan: Skilled PT  PT Frequency: 2 times per week  PT Discharge Recommendations: No PT needed after discharge  PT Recommended Transfer Status: Stand by assist, Independent  PT - OK to Discharge: Yes (POC/goals/discharge rec intensity created)    Subjective     General Visit Information:  General  Reason for Referral: abdominal pain  Past Medical History Relevant to Rehab: morbid obesity s/p Rouux en Y bypass 2023, carpal tunnel, ashthma, HF, anxiety, depression  Family/Caregiver Present: Yes  Caregiver Feedback: dtr present, encouraging  General Comment: Pt agreeable to participate, reports was up with family yesterday walking with whw.  Demos (S)-> (I) transfers and gait, brief cues for whw use.  Overall mobilizing well.  Home Living:  Home Living  Type of Home: House  Lives With: Significant other  Home Adaptive Equipment: Walker rolling or standard, Cane (rollator)  Home Layout: Two level, Bed/bath upstairs, Stairs to alternate level with  rails  Alternate Level Stairs-Number of Steps: 12  Home Access: Stairs to enter with rails  Entrance Stairs-Number of Steps: 5  Prior Level of Function:  Prior Function Per Pt/Caregiver Report  Receives Help From: Family (prn)  Ambulatory Assistance:  (reports mod (I) with either whw or rollator, able to complete community distances)  Precautions:  Precautions  Medical Precautions: Fall precautions  Vital Signs:  Vital Signs  Heart Rate: 105  SpO2: 95 %    Objective   Pain:  Pain Assessment  Pain Assessment: 0-10  Pain Score: 0 - No pain  Cognition:  Cognition  Overall Cognitive Status: Within Functional Limits    General Assessments:     Activity Tolerance  Endurance: Tolerates 10 - 20 min exercise with multiple rests    Sensation  Sensation Comment: appears intact        Perception  Inattention/Neglect: Appears intact  Initiation: Appears intact  Motor Planning: Appears intact  Perseveration: Not present    Coordination  Coordination Comment: anticipate may require assist for lower body dressing    Postural Control  Postural Control: Within Functional Limits    Static Sitting Balance  Static Sitting-Balance Support: Feet supported  Static Sitting-Level of Assistance: Independent    Static Standing Balance  Static Standing-Balance Support: No upper extremity supported, Bilateral upper extremity supported  Static Standing-Level of Assistance: Distant supervision, Independent  Dynamic Standing Balance  Dynamic Standing-Balance Support: Bilateral upper extremity supported  Dynamic Standing-Comments: whw, distant supervision-> mod (I)  Functional Assessments:  Bed Mobility  Bed Mobility:  (seated EOB upon PT entry and exit.  Was lyng on bed perpendicular upon PT entry, able to bring trunk upright without use of arms to resume full upright sit at EOB.)    Transfers  Transfer: Yes  Transfer 1  Technique 1: Sit to stand, Stand to sit  Transfer Level of Assistance 1: Distant supervision, Independent    Ambulation/Gait  Training  Ambulation/Gait Training Performed: Yes  Ambulation/Gait Training 1  Surface 1: Level tile  Device 1: Rolling walker  Assistance 1: Distant supervision, Modified independent  Quality of Gait 1: Decreased step length (cues for whw use as initially picking up whw instead of pushing)  Comments/Distance (ft) 1: 200 ft with short standing rest breaks     Extremity/Trunk Assessments:  RLE   RLE : Exceptions to WFL  Strength RLE  RLE Overall Strength: Greater than or equal to 3/5 as evidenced by functional mobility  LLE   LLE : Exceptions to WFL  Strength LLE  LLE Overall Strength: Greater than or equal to 3/5 as evidenced by functional mobility  Treatments:  Therapeutic Exercise  Therapeutic Exercise Performed: Yes  Therapeutic Exercise Activity 1: Pt demos LE therex she typically completes, reviewed and completed few reps LAQ, seated adriana, discussed adding ankle weights as tolerated if pt able to complete many reps easily of LE therex.  Outcome Measures:  [facility] Basic Mobility  Turning from your back to your side while in a flat bed without using bedrails: None  Moving from lying on your back to sitting on the side of a flat bed without using bedrails: None  Moving to and from bed to chair (including a wheelchair): None  Standing up from a chair using your arms (e.g. wheelchair or bedside chair): None  To walk in hospital room: A little  Climbing 3-5 steps with railing: A little  Basic Mobility - Total Score: 22    Encounter Problems       Encounter Problems (Active)       Mobility       STG - Patient will ambulate with LRAD mod (I) 350 ft.       Start:  06/03/24    Expected End:  06/17/24            STG - Patient will ascend and descend four to six stairs with supervision and use of rail.        Start:  06/03/24    Expected End:  06/17/24               Strength       Patient will complete (B) LE therex in sitting and standing to improve strength and activity tolerance.        Start:  06/03/24    Expected  End:  06/17/24                   Education Documentation  Home Exercise Program, taught by Lindsey Eugene, PT at 6/3/2024 10:26 AM.  Learner: Family, Patient  Readiness: Acceptance  Method: Explanation, Demonstration  Response: Verbalizes Understanding, Demonstrated Understanding  Comment: walker use/safety, therex    Mobility Training, taught by Lindsey Eugene, PT at 6/3/2024 10:26 AM.  Learner: Family, Patient  Readiness: Acceptance  Method: Explanation, Demonstration  Response: Verbalizes Understanding, Demonstrated Understanding  Comment: walker use/safety, therex    06/03/24 at 10:27 AM - Lindsey Eugene, PT

## 2024-06-04 ENCOUNTER — PATIENT OUTREACH (OUTPATIENT)
Dept: CARE COORDINATION | Facility: CLINIC | Age: 59
End: 2024-06-04
Payer: COMMERCIAL

## 2024-06-04 LAB
ATRIAL RATE: 76 BPM
H PYLORI AG STL QL IA: NEGATIVE
P AXIS: 75 DEGREES
P OFFSET: 167 MS
P ONSET: 116 MS
PR INTERVAL: 202 MS
Q ONSET: 217 MS
QRS COUNT: 12 BEATS
QRS DURATION: 92 MS
QT INTERVAL: 390 MS
QTC CALCULATION(BAZETT): 438 MS
QTC FREDERICIA: 421 MS
R AXIS: 36 DEGREES
T AXIS: 25 DEGREES
T OFFSET: 412 MS
VENTRICULAR RATE: 76 BPM

## 2024-06-04 NOTE — PROGRESS NOTES
Outreach call to patient to support a smooth transition of care from recent admission.  No answer.Enrolled patient in [employer] chatbot for additional support and patient education through transition period.  Will continue to monitor through transition period.

## 2024-06-05 DIAGNOSIS — I10 BENIGN ESSENTIAL HYPERTENSION: Chronic | ICD-10-CM

## 2024-06-05 DIAGNOSIS — G47.00 INSOMNIA, UNSPECIFIED TYPE: ICD-10-CM

## 2024-06-05 DIAGNOSIS — K21.00 GASTROESOPHAGEAL REFLUX DISEASE WITH ESOPHAGITIS WITHOUT HEMORRHAGE: ICD-10-CM

## 2024-06-05 LAB
BACTERIA UR CULT: ABNORMAL
BACTERIA UR CULT: ABNORMAL
CARBA5 NEG: ABNORMAL

## 2024-06-05 NOTE — DISCHARGE SUMMARY
"Discharge Diagnosis  Generalized abdominal pain    Issues Requiring Follow-Up  GI as outpatient     Test Results Pending At Discharge  Pending Labs       No current pending labs.            Hospital Course    Ms. Jessica Smith is a 59 y.o. female presenting with PMHX of HFpEF (EF 60-65% ), anxiety/depression, asthma, HTN, anemia, obesity, OPAL, arthritis, and vitamin D deficiency, GERD, (recurrent abdominal pain due to functional vs adhesions s/p sherita-en-Y, multiple C-sections, abdominal surgeries) who presented to the ED for severe bilateral flank and mid abdominal pain with one episode of non bloody gastric content vomiting she reported history of 3 bowel movement prior to admission.   Pending liver US, repeat labs, U/a negative, Abd/pelvic CT signs of gastroenteritis pending stool/ag.   Hyperkalemia, not clear if that's due to lisinopril Vs RTA IV was given one Lokalmia.   C/w IVF hydration, symptomatic care.  Improving  Close f/p with her GI team      #. Abdominal  pain \" improved  - abdomen soft lax  - Pending liver US,   - repeat labs,   - U/a negative,   - Abd/pelvic CT signs of gastroenteritis   - pending stool/ag.   -Pantoprazole 40 daily  -Tylenol for now for pain      #Morbid obesity  -Consider follow-up with bariatric surgery as outpatient   - s/p Sherita-en-Y       # Hyperkalemia: improving   - K 6.0 on arrival  s/p calcium gluconate 2g IV x1, Kayexalate 15g PO x1  - EKG in ED without peaked T waves or other changes  - admit to tele  -continue with [facility] for more 3 doses  -RFP  - R/o RTA IV - medication induce - hold ACE  - close f/p with nephrology    # Anemia  - baseline Hgb 8-10 mg/dl   - no evidence of acute bleed  -Consider adding IV iron given the history of Sherita-en-Y      # HTN  - continue home amlodipine 10 mg daily  -hold  hydrochlorothiazide 25 to help with hyperkalemia as well  -Hold off lisinopril for now  -Will keep monitoring     #HFpEF  - Clear lung exam   - No concern for heart failure " exacerbation at this point  -Will add BNP   - monitoring as she is on IVF for dehydration     # Anxiety/Depression  - c/w home escitalopram 20mg daily     # Asthma  - c/w home albuterol as needed  - home fluticasone-salmeterol converted to formulary Breo Elipta while inpatient     # Allergic rhinitis  - c/w home flonase daily     # GERD  - Dc home sucralfate 1g QID,  dc pepcid  given hyperkalemia possible RTA IV   - pantoprazole 40 mg PO     # Arthritis  - continue home gabapentin 300 mg HS  - c/w home diclofenac gel, lidocaine patches, and tylenol     Code Status: FULL  NOK: José Acuña (Fiance) [phone]/Rosalba Smith (daughter) [phone]  Disp: PT/ot      Spoke to my patient, Discussed the medical, social conditions, the reason for this admission explained our plan and recommendations.  Time spent on the assessment of patient, gathering and interpreting data, review of medical record/patient history, personally reviewing radiographic imaging. With greater than 50% spent in personal discussion with patient.  Time > 35   min     Pertinent Physical Exam At Time of Discharge  Physical Exam    Home Medications     Medication List      START taking these medications     acetaminophen 325 mg tablet; Commonly known as: Tylenol; Take 2 tablets   (650 mg) by mouth every 8 hours if needed for mild pain (1 - 3).     CONTINUE taking these medications     albuterol 90 mcg/actuation inhaler; Inhale 2 puffs every 6 hours if   needed for wheezing or shortness of breath.   amLODIPine 10 mg tablet; Commonly known as: Norvasc; Take 1 tablet (10   mg) by mouth once daily.   cyanocobalamin 1,000 mcg tablet; Commonly known as: Vitamin B-12; Take 1   tablet (1,000 mcg) by mouth once daily.   diclofenac sodium 1 % gel; Commonly known as: Arthritis Pain   (diclofenac); Apply 4.5 inches (4 g) topically 4 times a day.   escitalopram 20 mg tablet; Commonly known as: Lexapro; Take 1 tablet (20   mg) by mouth once daily.    famotidine 10 mg tablet; Commonly known as: Pepcid AC; Take 1 tablet (10   mg) by mouth 2 times a day.   fluticasone 50 mcg/actuation nasal spray; Commonly known as: Flonase;   Administer 1 spray into each nostril once daily.   fluticasone propion-salmeteroL 100-50 mcg/dose diskus inhaler; Commonly   known as: Advair Diskus; Inhale 1 puff 2 times a day. Rinse mouth with   water after use to reduce aftertaste and incidence of candidiasis. Do not   swallow.   gabapentin 300 mg capsule; Commonly known as: Neurontin; Take 1 capsule   (300 mg) by mouth once daily at bedtime.   hydroCHLOROthiazide 25 mg tablet; Commonly known as: HYDRODiuril; Take 1   tablet once a day   lidocaine 5 % patch; Commonly known as: Lidoderm; APPLY 1 PATCH TO THE   AFFECTED AREA AND LEAVE IN PLACE FOR 12 HOURS, THEN REMOVE AND LEAVE OFF   FOR 12 HOURS. Strength: 5 %   melatonin 3 mg tablet; Take 1 tablet (3 mg) by mouth once daily at   bedtime.   multivit, iron, min. cmb. 5-FA 10-1 mg tablet; Take 1 mg by mouth once   daily.   pantoprazole 40 mg EC tablet; Commonly known as: ProtoNix; Take 1 tablet   (40 mg) by mouth once daily.   polyethylene glycol 17 gram packet; Commonly known as: Glycolax,   Miralax; Take 17 g by mouth 2 times a day.   Senexon-S 8.6-50 mg tablet; Generic drug: sennosides-docusate sodium;   TAKE 1 TABLET BY MOUTH 2 TIMES A DAY.   senna 8.6 mg tablet; Generic drug: sennosides   sucralfate 100 mg/mL suspension; Commonly known as: Carafate; Take 10 mL   (1 g) by mouth 4 times a day with meals.   walker misc; 1 each once daily. Use daily as needed for ambulation   assistance     STOP taking these medications     ferrous sulfate (325 mg ferrous sulfate) tablet; Commonly known as:   FeroSuL   lisinopril 30 mg tablet   Mucinex 1,200 mg tablet extended release 12hr; Generic drug: guaiFENesin       Outpatient Follow-Up  Future Appointments   Date Time Provider Department Center   6/7/2024  2:30 PM Babita Guajardo MD [id number]  Academic   6/13/2024  1:45 PM Maude Perera, PT [id number] Academic       Brandon Brice MD

## 2024-06-06 DIAGNOSIS — Z98.84 HISTORY OF ROUX-EN-Y GASTRIC BYPASS: ICD-10-CM

## 2024-06-06 DIAGNOSIS — J30.2 SEASONAL ALLERGIES: ICD-10-CM

## 2024-06-06 DIAGNOSIS — N39.46 MIXED INCONTINENCE: ICD-10-CM

## 2024-06-06 DIAGNOSIS — G47.33 OBSTRUCTIVE SLEEP APNEA SYNDROME: Chronic | ICD-10-CM

## 2024-06-06 DIAGNOSIS — E87.5 HYPERKALEMIA: ICD-10-CM

## 2024-06-06 DIAGNOSIS — I50.32 CHRONIC HEART FAILURE WITH PRESERVED EJECTION FRACTION (MULTI): ICD-10-CM

## 2024-06-06 DIAGNOSIS — F32.A ANXIETY AND DEPRESSION: ICD-10-CM

## 2024-06-06 DIAGNOSIS — F41.9 ANXIETY AND DEPRESSION: ICD-10-CM

## 2024-06-06 DIAGNOSIS — M79.2 NEUROPATHIC PAIN: ICD-10-CM

## 2024-06-06 DIAGNOSIS — K59.09 CHRONIC CONSTIPATION: ICD-10-CM

## 2024-06-06 DIAGNOSIS — K21.00 GASTROESOPHAGEAL REFLUX DISEASE WITH ESOPHAGITIS WITHOUT HEMORRHAGE: ICD-10-CM

## 2024-06-06 DIAGNOSIS — G47.00 INSOMNIA, UNSPECIFIED TYPE: ICD-10-CM

## 2024-06-06 DIAGNOSIS — E66.01 OBESITY, CLASS III, BMI 40-49.9 (MORBID OBESITY) (MULTI): Chronic | ICD-10-CM

## 2024-06-06 DIAGNOSIS — R10.84 GENERALIZED ABDOMINAL PAIN: ICD-10-CM

## 2024-06-06 DIAGNOSIS — K52.9 GASTROENTERITIS: ICD-10-CM

## 2024-06-06 DIAGNOSIS — I89.0 LYMPHEDEMA OF BOTH LOWER EXTREMITIES: ICD-10-CM

## 2024-06-06 DIAGNOSIS — J44.89 ASTHMA WITH CHRONIC OBSTRUCTIVE PULMONARY DISEASE (COPD) (MULTI): ICD-10-CM

## 2024-06-06 DIAGNOSIS — E55.9 VITAMIN D DEFICIENCY: ICD-10-CM

## 2024-06-06 DIAGNOSIS — I10 BENIGN ESSENTIAL HYPERTENSION: Chronic | ICD-10-CM

## 2024-06-07 ENCOUNTER — OFFICE VISIT (OUTPATIENT)
Dept: PRIMARY CARE | Facility: CLINIC | Age: 59
End: 2024-06-07
Payer: COMMERCIAL

## 2024-06-07 VITALS
SYSTOLIC BLOOD PRESSURE: 125 MMHG | WEIGHT: 293 LBS | BODY MASS INDEX: 51.91 KG/M2 | HEART RATE: 72 BPM | DIASTOLIC BLOOD PRESSURE: 82 MMHG | HEIGHT: 63 IN | OXYGEN SATURATION: 94 %

## 2024-06-07 DIAGNOSIS — N30.00 ACUTE CYSTITIS WITHOUT HEMATURIA: Primary | ICD-10-CM

## 2024-06-07 DIAGNOSIS — R10.84 GENERALIZED ABDOMINAL PAIN: ICD-10-CM

## 2024-06-07 DIAGNOSIS — K21.00 GASTROESOPHAGEAL REFLUX DISEASE WITH ESOPHAGITIS WITHOUT HEMORRHAGE: ICD-10-CM

## 2024-06-07 RX ORDER — LIDOCAINE 50 MG/G
1 PATCH TOPICAL DAILY
Qty: 30 PATCH | Refills: 3 | Status: SHIPPED | OUTPATIENT
Start: 2024-06-07

## 2024-06-07 RX ORDER — NITROFURANTOIN 25; 75 MG/1; MG/1
100 CAPSULE ORAL 2 TIMES DAILY
Qty: 10 CAPSULE | Refills: 0 | Status: SHIPPED | OUTPATIENT
Start: 2024-06-07 | End: 2024-06-12

## 2024-06-07 ASSESSMENT — PATIENT HEALTH QUESTIONNAIRE - PHQ9
1. LITTLE INTEREST OR PLEASURE IN DOING THINGS: NOT AT ALL
SUM OF ALL RESPONSES TO PHQ9 QUESTIONS 1 AND 2: 3
7. TROUBLE CONCENTRATING ON THINGS, SUCH AS READING THE NEWSPAPER OR WATCHING TELEVISION: NOT AT ALL
10. IF YOU CHECKED OFF ANY PROBLEMS, HOW DIFFICULT HAVE THESE PROBLEMS MADE IT FOR YOU TO DO YOUR WORK, TAKE CARE OF THINGS AT HOME, OR GET ALONG WITH OTHER PEOPLE: VERY DIFFICULT
SUM OF ALL RESPONSES TO PHQ QUESTIONS 1-9: 9
2. FEELING DOWN, DEPRESSED OR HOPELESS: NEARLY EVERY DAY
3. TROUBLE FALLING OR STAYING ASLEEP OR SLEEPING TOO MUCH: SEVERAL DAYS
6. FEELING BAD ABOUT YOURSELF - OR THAT YOU ARE A FAILURE OR HAVE LET YOURSELF OR YOUR FAMILY DOWN: NEARLY EVERY DAY
9. THOUGHTS THAT YOU WOULD BE BETTER OFF DEAD, OR OF HURTING YOURSELF: SEVERAL DAYS
5. POOR APPETITE OR OVEREATING: SEVERAL DAYS
8. MOVING OR SPEAKING SO SLOWLY THAT OTHER PEOPLE COULD HAVE NOTICED. OR THE OPPOSITE, BEING SO FIGETY OR RESTLESS THAT YOU HAVE BEEN MOVING AROUND A LOT MORE THAN USUAL: NOT AT ALL
4. FEELING TIRED OR HAVING LITTLE ENERGY: NOT AT ALL

## 2024-06-07 ASSESSMENT — PAIN SCALES - GENERAL: PAINLEVEL: 8

## 2024-06-07 ASSESSMENT — ENCOUNTER SYMPTOMS
LOSS OF SENSATION IN FEET: 0
OCCASIONAL FEELINGS OF UNSTEADINESS: 0
DEPRESSION: 1

## 2024-06-07 NOTE — PROGRESS NOTES
"Subjective   Patient ID: Jessica Smith is a 59 y.o. female who presents for Follow-up (cpe).  HPI    Has abdominal pain  Had gastric bypass surgery in 2009  She has had multiple surgeries in the past   Has pain on the left side and in the center   She's on tylenol and pantoprazole and it doesn't help with symptoms   She has been having pain since she went to the hospital  The pain is worse since her discharge  CT with gastroenteritis during admission  Cooks her own food   Is having daily bowel movements  Her stool is hard and has a hx of constipation  Takes miralax intermittently   Lives with fiance and brother and they get an attitude when she talks about her pain  Pain feels sharp and consistent  Occurs at rest      UTI  Denies any burning or pain while peeing  Has urinary frequency  States she was never told she had an infection during hospitalization  She was not treated with abx  Denies any systemic symptoms    Review of Systems  ROS negative except for above mentioned.      Objective   /82   Pulse 72   Ht 1.6 m (5' 3\")   Wt 147 kg (324 lb)   SpO2 94%   BMI 57.39 kg/m²     Physical Exam  General: morbidly obese, tearful  Cardiac: regular rate  Lungs: no increase work of breathing noted   Abdomen: Bowel sounds x4. Soft, nondistended, tender on light palpation on left upper and lower quadrant . No guarding, rebound, or masses.   MSK: ROM intact spine and extremities.  Neuro: CN II-XII grossly intact.   Skin: Skin normal color, texture, and turgor with no lesions or eruptions.   Psych: Oriented to person, place, and time. Demonstrates good judgement and reason.     Assessment/Plan   Jessica Smith is a 58 y.o. female with PMH of HFpEF (EF 60-65% 10/2021), anxiety/depression, asthma, HTN, anemia, javier-en-Y, GERD, morbid obesity, OPAL, arthritis, and vitamin D deficiency presents for a post hospitalization follow up on abdominal pain.     Problem List Items Addressed This Visit       Generalized abdominal " pain  Likely 2/2 to gastroenteritis per recent hospitalization vs untreated UTI    Relevant Medications    Start lidocaine (Lidoderm) 5 % patch as needed for pain     Other Relevant Orders    Referral to Gastroenterology per hospital discharge documentation   Hospitalization notes/labs/imaging independently reviewed       Acute cystitis without hematuria    -  Primary  Acute, complicated  Untreated UTI during recent hospitalization     Relevant Medications    Start nitrofurantoin, macrocrystal-monohydrate, (Macrobid) 100 mg capsule BID for 5 days       RTC in 1 week for a fu on abdominal pain    Patient seen and discussed with attending, Dr. Frank Guajardo MD  Family Medicine, PGY-3

## 2024-06-10 ENCOUNTER — APPOINTMENT (OUTPATIENT)
Dept: PRIMARY CARE | Facility: CLINIC | Age: 59
End: 2024-06-10
Payer: COMMERCIAL

## 2024-06-10 LAB
ATRIAL RATE: 76 BPM
P AXIS: 75 DEGREES
P OFFSET: 167 MS
P ONSET: 116 MS
PR INTERVAL: 202 MS
Q ONSET: 217 MS
QRS COUNT: 12 BEATS
QRS DURATION: 92 MS
QT INTERVAL: 390 MS
QTC CALCULATION(BAZETT): 438 MS
QTC FREDERICIA: 421 MS
R AXIS: 36 DEGREES
T AXIS: 25 DEGREES
T OFFSET: 412 MS
VENTRICULAR RATE: 76 BPM

## 2024-06-11 RX ORDER — TALC
3 POWDER (GRAM) TOPICAL NIGHTLY
Qty: 30 TABLET | Refills: 10 | OUTPATIENT
Start: 2024-06-11

## 2024-06-11 RX ORDER — TALC
3 POWDER (GRAM) TOPICAL NIGHTLY
Qty: 30 TABLET | Refills: 0 | Status: SHIPPED | OUTPATIENT
Start: 2024-06-11

## 2024-06-11 RX ORDER — ACETAMINOPHEN 325 MG/1
TABLET ORAL
Qty: 30 TABLET | Refills: 10 | OUTPATIENT
Start: 2024-06-11

## 2024-06-11 RX ORDER — PANTOPRAZOLE SODIUM 40 MG/1
40 TABLET, DELAYED RELEASE ORAL DAILY
Qty: 30 TABLET | Refills: 0 | Status: SHIPPED | OUTPATIENT
Start: 2024-06-11 | End: 2024-09-09

## 2024-06-11 RX ORDER — PANTOPRAZOLE SODIUM 40 MG/1
40 TABLET, DELAYED RELEASE ORAL DAILY
Qty: 30 TABLET | Refills: 10 | OUTPATIENT
Start: 2024-06-11

## 2024-06-11 RX ORDER — FLUTICASONE PROPIONATE 50 MCG
1 SPRAY, SUSPENSION (ML) NASAL DAILY
Qty: 16 G | Refills: 0 | Status: SHIPPED | OUTPATIENT
Start: 2024-06-11 | End: 2024-07-11

## 2024-06-11 RX ORDER — AMOXICILLIN 250 MG
1 CAPSULE ORAL 2 TIMES DAILY
Qty: 60 TABLET | Refills: 0 | Status: SHIPPED | OUTPATIENT
Start: 2024-06-11

## 2024-06-11 RX ORDER — SUCRALFATE 1 G/10ML
1 SUSPENSION ORAL
Qty: 1200 ML | Refills: 0 | Status: SHIPPED | OUTPATIENT
Start: 2024-06-11

## 2024-06-11 RX ORDER — FERROUS SULFATE 325(65) MG
1 TABLET ORAL EVERY OTHER DAY
Qty: 15 TABLET | Refills: 0 | Status: SHIPPED | OUTPATIENT
Start: 2024-06-11

## 2024-06-11 RX ORDER — FLUTICASONE PROPIONATE AND SALMETEROL 50; 500 UG/1; UG/1
POWDER RESPIRATORY (INHALATION)
Qty: 1 EACH | Refills: 10 | Status: SHIPPED | OUTPATIENT
Start: 2024-06-11 | End: 2024-07-11

## 2024-06-11 RX ORDER — LISINOPRIL 30 MG/1
TABLET ORAL
Qty: 30 TABLET | Refills: 0 | Status: SHIPPED | OUTPATIENT
Start: 2024-06-11

## 2024-06-13 ENCOUNTER — EVALUATION (OUTPATIENT)
Dept: PHYSICAL THERAPY | Facility: HOSPITAL | Age: 59
End: 2024-06-13
Payer: COMMERCIAL

## 2024-06-13 DIAGNOSIS — G89.29 BILATERAL CHRONIC KNEE PAIN: Primary | ICD-10-CM

## 2024-06-13 DIAGNOSIS — M25.562 BILATERAL CHRONIC KNEE PAIN: Primary | ICD-10-CM

## 2024-06-13 DIAGNOSIS — M19.91 PRIMARY OSTEOARTHRITIS, UNSPECIFIED SITE: ICD-10-CM

## 2024-06-13 DIAGNOSIS — M25.561 BILATERAL CHRONIC KNEE PAIN: Primary | ICD-10-CM

## 2024-06-13 PROCEDURE — 97110 THERAPEUTIC EXERCISES: CPT | Mod: GP

## 2024-06-13 PROCEDURE — 97161 PT EVAL LOW COMPLEX 20 MIN: CPT | Mod: GP

## 2024-06-13 ASSESSMENT — PATIENT HEALTH QUESTIONNAIRE - PHQ9
2. FEELING DOWN, DEPRESSED OR HOPELESS: NEARLY EVERY DAY
3. TROUBLE FALLING OR STAYING ASLEEP OR SLEEPING TOO MUCH: MORE THAN HALF THE DAYS
4. FEELING TIRED OR HAVING LITTLE ENERGY: SEVERAL DAYS
6. FEELING BAD ABOUT YOURSELF - OR THAT YOU ARE A FAILURE OR HAVE LET YOURSELF OR YOUR FAMILY DOWN: NEARLY EVERY DAY
5. POOR APPETITE OR OVEREATING: NOT AT ALL
9. THOUGHTS THAT YOU WOULD BE BETTER OFF DEAD, OR OF HURTING YOURSELF: MORE THAN HALF THE DAYS
10. IF YOU CHECKED OFF ANY PROBLEMS, HOW DIFFICULT HAVE THESE PROBLEMS MADE IT FOR YOU TO DO YOUR WORK, TAKE CARE OF THINGS AT HOME, OR GET ALONG WITH OTHER PEOPLE: NOT DIFFICULT AT ALL
SUM OF ALL RESPONSES TO PHQ9 QUESTIONS 1 AND 2: 5
8. MOVING OR SPEAKING SO SLOWLY THAT OTHER PEOPLE COULD HAVE NOTICED. OR THE OPPOSITE, BEING SO FIGETY OR RESTLESS THAT YOU HAVE BEEN MOVING AROUND A LOT MORE THAN USUAL: NOT AT ALL
SUM OF ALL RESPONSES TO PHQ QUESTIONS 1-9: 13
7. TROUBLE CONCENTRATING ON THINGS, SUCH AS READING THE NEWSPAPER OR WATCHING TELEVISION: NOT AT ALL
1. LITTLE INTEREST OR PLEASURE IN DOING THINGS: MORE THAN HALF THE DAYS

## 2024-06-13 ASSESSMENT — ENCOUNTER SYMPTOMS
DEPRESSION: 1
LOSS OF SENSATION IN FEET: 0
OCCASIONAL FEELINGS OF UNSTEADINESS: 0

## 2024-06-13 NOTE — PROGRESS NOTES
Physical Therapy         Initial Evaluation    Patient Name:Jessica Smith  MRN:[id number]  Today's Date:6/13/2024  Referred by: Kristine Damian  Time Calculation  Start Time: 1335  Stop Time: 1410  Time Calculation (min): 35 min    Reason for Visit: BLE pain     Insurance:  Visit #: 1  Visits Approved: ?  Authorization needed: Yes   Date range:  Insurance info: [employer], 100% COVERAGE    Therapy Diagnosis  1. Bilateral chronic knee pain    2. Primary osteoarthritis, unspecified site         Assessment:  Pt is a 59 y.o. female c/o B knee pain for the last 5 months d/t OA. Pt presents with decreased B knee AROM, decreased BLE strength, decreased BLE flexibility, difficulty with bed mobility, gait deviations, and SOB with very light activity. These impairments are affecting her functional mobility and QoL. Pt is appropriate for skilled PT services to address these impairments and improve QoL and function.    Problem List: activity limitations, ADLs/IADLs/self care skills, balance, decreased functional level, decreased knowledge of assisted device use, decreased knowledge of HEP, edema, fall risk, flexibility, gait/locomotion, pain, posture, range of motion/joint mobility and strength.     Clinical Presentation: Stable    Level of Complexity: Low     Plan:   Treatment/Interventions: Biofeedback, Cryotherapy, Dry needling, Education/ Instruction, Electrical stimulation, Hot pack, Manual therapy, Mechanical traction, Neuromuscular re-education, Self care/ home management, Taping techniques, Therapeutic activities, Therapeutic exercises, Ultrasound, Vasopneumatic device  PT Plan: Skilled PT  PT Frequency:  1x/week  Duration: 6-8 weeks  Rehab Potential: Good  Plan of Care Agreement: Patient    Goals:  The pt will achieve the following goals, in 12 weeks:  Pt will be independent in HEP to maximize PT benefits.  Pt will improve BLE strength to >/= 4+/5 to improve functional mobility such as stair negotiation, ambulation,  squatting, and stooping.   Pt will improve LEFS score by >/= 9 pts higher to meet MCID and demonstrate reduced disability.   Pt will improve average and worst pain rating by >/= 2 pts to meet MCID.   Pt will ambulate with LRAD for >/= 30 min without onset of pain or limitations to improve functional mobility.   Pt will negotiate up/down 15 stairs with one railing reciprocally without pain or knee buckling to improve functional mobility around home.   Pt will perform SLS B >/= 15 sec without UE assist to improve balance.   Pt will improve quad strength to 5/5 with good eccentric control to prevent knee buckling down stairs or during ambulation.    Subjective:    Medical Screening: Reviewed medical history form with patient and medical screening assessed.   Red Flags: Do you have any of the following? No   Fever/chills, unexplained weight changes, dizziness/fainting, unexplained change in bowel or bladder functions, unexplained malaise or muscle weakness, night pain/sweats, numbness or tingling  Precautions:   Fall Risk: Mod   PMH: HTN, chronic heart failure, GERD, anxiety and depression, OA, OSAP, asthma, lymphedema of BLE     General:  Chief complaint: B knee pain d/t OA   Description/Location: Anterior B knees. Intermittent sharp pain   Onset: 5 months ago   BORIS: Unknown     Pain: 6/10 current, 9/10 worst, 0/10 best when laying down   Exaggerating factors: getting up in the morning, stairs, ambulation, balance, prolonged standing   Relieving factors: sitting and laying down, muscle rub    PLOF: Pt was independent with all ADL's previously.  Sleep: Not affecting sleep   Occupation: Not working   Home setup (stairs, laundry): House, 4-5 stairs to get into home, first floor regular set up, up 15 steps to get to bedroom and bathroom, laundromat or washes clothes in tub   Functional limitations: Difficulty with functional limitations. Currently walking with walker when she goes to [facility] house   Exercise routine: Not  really, but does do some walking and stairs throughout day.   Goals: see if PT can help   Personal factors impacting care: None     Objective:  *pain with testing  Gait:  Pt ambulates with R lateral trunk lean, LLE circumduction, decreased hip/knee flexion, shuffling feet, and decreased gait speed.     Lumbar Screen:   Flex: mod restrictions  Ext: mod restrictions  RSB: mod restrictions  LSB: mod restrictions     SLS:  B < 5 sec with + [place]     Flexibility:  Hamstrings:  R mod-severe limitations, L mod-severe limitations  Hip ER:  R mod-severe limitations, L mod-severe limitations  Hip IR:  R mod-severe limitations, L mod-severe limitations  Quads:  R NT, L NT    Strength:   Hip flex: R 4/5, L 4/5  Hip ABD: R 3+*/5, L 3+*/5  Hip ext: R NT/5, L NT/5  Knee ext: R 4+/5, L 4+/5   SLR: B no quad lag   Knee flex: R 4/5, L 4/5  Ankle DF: R 4+/5, L 4+/5    Observation:  Pt has increased difficulty with bed mobility, unable to get into prone positioning.     Outcome Measures:  Other Measures  Lower Extremity Funtional Score (LEFS): 35/80     Interventions:  PT Therapeutic Procedures Time Entry  Therapeutic Exercise Time Entry: 10  Today's session:   Initial evaluation was performed    Pt ed on diagnosis, prognosis, goals of PT, expectations, POC   HEP (See below) ed on normal vs. Abnormal response     Education:  Pt educated on OA and role of PT to improve strength, flexibility, and range to help with sx management.     Therapeutic exercise:  Access Code: [email or web address]  URL: [email or web address]  Date: 06/13/2024  Prepared by: Maude Perera    Exercises  - Seated Hamstring Stretch  - 1 x daily - 7 x weekly - 2 sets - 3 reps - 30 hold  - Sit to Stand with Arms Crossed  - 1 x daily - 7 x weekly - 2 sets - 10 reps  - Standing Single Leg Stance with Counter Support  - 1 x daily - 7 x weekly - 2 sets - 10 reps - 10 hold  - Supine Heel Slide  - 1 x daily - 7 x weekly - 2 sets - 10 reps - 5 hold  -  Supine Quad Set  - 1 x daily - 7 x weekly - 2 sets - 10 reps - 5 hold

## 2024-06-14 NOTE — TELEPHONE ENCOUNTER
Pt called about putting in a script for her legs. She went to see PT and said she was weak. Please call pt [phone]

## 2024-06-17 ENCOUNTER — DOCUMENTATION (OUTPATIENT)
Dept: PRIMARY CARE | Facility: CLINIC | Age: 59
End: 2024-06-17
Payer: COMMERCIAL

## 2024-06-17 DIAGNOSIS — E55.9 VITAMIN D DEFICIENCY: ICD-10-CM

## 2024-06-17 DIAGNOSIS — J44.89 ASTHMA WITH CHRONIC OBSTRUCTIVE PULMONARY DISEASE (COPD) (MULTI): ICD-10-CM

## 2024-06-17 DIAGNOSIS — K59.09 CHRONIC CONSTIPATION: ICD-10-CM

## 2024-06-17 DIAGNOSIS — I10 BENIGN ESSENTIAL HYPERTENSION: Chronic | ICD-10-CM

## 2024-06-17 DIAGNOSIS — M79.2 NEUROPATHIC PAIN: ICD-10-CM

## 2024-06-17 DIAGNOSIS — R10.84 GENERALIZED ABDOMINAL PAIN: ICD-10-CM

## 2024-06-17 DIAGNOSIS — I89.0 LYMPHEDEMA OF BOTH LOWER EXTREMITIES: ICD-10-CM

## 2024-06-17 DIAGNOSIS — Z98.84 HISTORY OF ROUX-EN-Y GASTRIC BYPASS: ICD-10-CM

## 2024-06-17 DIAGNOSIS — F32.A ANXIETY AND DEPRESSION: ICD-10-CM

## 2024-06-17 DIAGNOSIS — E66.01 OBESITY, CLASS III, BMI 40-49.9 (MORBID OBESITY) (MULTI): Chronic | ICD-10-CM

## 2024-06-17 DIAGNOSIS — J30.2 SEASONAL ALLERGIES: ICD-10-CM

## 2024-06-17 DIAGNOSIS — F41.9 ANXIETY AND DEPRESSION: ICD-10-CM

## 2024-06-17 DIAGNOSIS — G47.33 OBSTRUCTIVE SLEEP APNEA SYNDROME: Chronic | ICD-10-CM

## 2024-06-17 DIAGNOSIS — I50.32 CHRONIC HEART FAILURE WITH PRESERVED EJECTION FRACTION (MULTI): ICD-10-CM

## 2024-06-17 DIAGNOSIS — N39.46 MIXED INCONTINENCE: ICD-10-CM

## 2024-06-17 DIAGNOSIS — K52.9 GASTROENTERITIS: ICD-10-CM

## 2024-06-17 DIAGNOSIS — E87.5 HYPERKALEMIA: ICD-10-CM

## 2024-06-17 RX ORDER — PANTOPRAZOLE SODIUM 40 MG/1
40 TABLET, DELAYED RELEASE ORAL DAILY
Qty: 30 TABLET | Refills: 11 | Status: SHIPPED | OUTPATIENT
Start: 2024-06-17 | End: 2025-06-12

## 2024-06-17 RX ORDER — AMLODIPINE BESYLATE 10 MG/1
10 TABLET ORAL DAILY
Qty: 30 TABLET | Refills: 11 | Status: SHIPPED | OUTPATIENT
Start: 2024-06-17

## 2024-06-17 RX ORDER — TALC
3 POWDER (GRAM) TOPICAL NIGHTLY
Qty: 30 TABLET | Refills: 11 | Status: SHIPPED | OUTPATIENT
Start: 2024-06-17

## 2024-06-17 NOTE — PROGRESS NOTES
I saw and evaluated the patient. I personally obtained the key and critical portions of the history and physical exam or was physically present for key and critical portions performed by the resident/fellow. I reviewed the resident/fellow's documentation and discussed the patient with the resident/fellow. I agree with the resident/fellow's medical decision making as documented in the note.    Shruthi Marie MD

## 2024-06-17 NOTE — PROGRESS NOTES
Pt called requesting refill of melatonin, pantoprazole and ATB. Melatonin and pantoprazole orders pended and routed to provider. Message sent to provider r/t ATB refill as only instructed to take for 5 days.

## 2024-06-18 ENCOUNTER — PATIENT OUTREACH (OUTPATIENT)
Dept: CARE COORDINATION | Facility: CLINIC | Age: 59
End: 2024-06-18
Payer: COMMERCIAL

## 2024-07-01 RX ORDER — SENNOSIDES 8.6 MG
1 TABLET ORAL 2 TIMES DAILY PRN
Qty: 60 TABLET | Refills: 0 | Status: SHIPPED | OUTPATIENT
Start: 2024-07-01 | End: 2024-07-31

## 2024-07-01 RX ORDER — ACETAMINOPHEN 325 MG/1
650 TABLET ORAL EVERY 8 HOURS PRN
Qty: 30 TABLET | Refills: 0 | Status: SHIPPED | OUTPATIENT
Start: 2024-07-01 | End: 2024-07-31

## 2024-07-01 RX ORDER — LISINOPRIL 30 MG/1
TABLET ORAL
Qty: 30 TABLET | Refills: 10 | OUTPATIENT
Start: 2024-07-01

## 2024-07-08 ENCOUNTER — PATIENT OUTREACH (OUTPATIENT)
Dept: CARE COORDINATION | Facility: CLINIC | Age: 59
End: 2024-07-08
Payer: COMMERCIAL

## 2024-07-08 DIAGNOSIS — F32.A ANXIETY AND DEPRESSION: ICD-10-CM

## 2024-07-08 DIAGNOSIS — F41.9 ANXIETY AND DEPRESSION: ICD-10-CM

## 2024-07-08 DIAGNOSIS — E55.9 VITAMIN D DEFICIENCY: ICD-10-CM

## 2024-07-08 DIAGNOSIS — E87.5 HYPERKALEMIA: ICD-10-CM

## 2024-07-08 DIAGNOSIS — I50.32 CHRONIC HEART FAILURE WITH PRESERVED EJECTION FRACTION (MULTI): ICD-10-CM

## 2024-07-08 DIAGNOSIS — K59.09 CHRONIC CONSTIPATION: ICD-10-CM

## 2024-07-08 DIAGNOSIS — I10 BENIGN ESSENTIAL HYPERTENSION: Chronic | ICD-10-CM

## 2024-07-08 DIAGNOSIS — N39.46 MIXED INCONTINENCE: ICD-10-CM

## 2024-07-08 DIAGNOSIS — J44.89 ASTHMA WITH CHRONIC OBSTRUCTIVE PULMONARY DISEASE (COPD) (MULTI): ICD-10-CM

## 2024-07-08 DIAGNOSIS — I89.0 LYMPHEDEMA OF BOTH LOWER EXTREMITIES: ICD-10-CM

## 2024-07-08 DIAGNOSIS — Z98.84 HISTORY OF ROUX-EN-Y GASTRIC BYPASS: ICD-10-CM

## 2024-07-08 DIAGNOSIS — D50.9 IRON DEFICIENCY ANEMIA, UNSPECIFIED IRON DEFICIENCY ANEMIA TYPE: ICD-10-CM

## 2024-07-08 DIAGNOSIS — M79.2 NEUROPATHIC PAIN: ICD-10-CM

## 2024-07-08 DIAGNOSIS — J06.9 VIRAL UPPER RESPIRATORY TRACT INFECTION: ICD-10-CM

## 2024-07-08 DIAGNOSIS — K52.9 GASTROENTERITIS: ICD-10-CM

## 2024-07-08 DIAGNOSIS — R10.84 GENERALIZED ABDOMINAL PAIN: ICD-10-CM

## 2024-07-08 DIAGNOSIS — G47.33 OBSTRUCTIVE SLEEP APNEA SYNDROME: Chronic | ICD-10-CM

## 2024-07-08 DIAGNOSIS — E66.01 OBESITY, CLASS III, BMI 40-49.9 (MORBID OBESITY) (MULTI): Chronic | ICD-10-CM

## 2024-07-08 DIAGNOSIS — J30.2 SEASONAL ALLERGIES: ICD-10-CM

## 2024-07-08 NOTE — PROGRESS NOTES
Outreach call to patient to follow up after 30 days of hospital discharge. Left voicemail message.  Cesia Navarro RN [facility]  [phone]

## 2024-07-10 DIAGNOSIS — F41.9 ANXIETY AND DEPRESSION: ICD-10-CM

## 2024-07-10 DIAGNOSIS — D50.9 IRON DEFICIENCY ANEMIA, UNSPECIFIED IRON DEFICIENCY ANEMIA TYPE: ICD-10-CM

## 2024-07-10 DIAGNOSIS — F32.A ANXIETY AND DEPRESSION: ICD-10-CM

## 2024-07-11 RX ORDER — SUCRALFATE 1 G/10ML
SUSPENSION ORAL
Qty: 828 ML | Refills: 10 | Status: SHIPPED | OUTPATIENT
Start: 2024-07-11

## 2024-07-11 RX ORDER — ESCITALOPRAM OXALATE 20 MG/1
20 TABLET ORAL DAILY
Qty: 30 TABLET | Refills: 10 | OUTPATIENT
Start: 2024-07-11

## 2024-07-11 RX ORDER — GABAPENTIN 300 MG/1
300 CAPSULE ORAL
Qty: 30 CAPSULE | Refills: 10 | Status: SHIPPED | OUTPATIENT
Start: 2024-07-11

## 2024-07-16 DIAGNOSIS — N39.46 MIXED INCONTINENCE: ICD-10-CM

## 2024-07-16 DIAGNOSIS — F32.A ANXIETY AND DEPRESSION: ICD-10-CM

## 2024-07-16 DIAGNOSIS — I89.0 LYMPHEDEMA OF BOTH LOWER EXTREMITIES: ICD-10-CM

## 2024-07-16 DIAGNOSIS — M79.2 NEUROPATHIC PAIN: ICD-10-CM

## 2024-07-16 DIAGNOSIS — K52.9 GASTROENTERITIS: ICD-10-CM

## 2024-07-16 DIAGNOSIS — K59.09 CHRONIC CONSTIPATION: ICD-10-CM

## 2024-07-16 DIAGNOSIS — F41.9 ANXIETY AND DEPRESSION: ICD-10-CM

## 2024-07-16 DIAGNOSIS — Z98.84 HISTORY OF ROUX-EN-Y GASTRIC BYPASS: ICD-10-CM

## 2024-07-16 DIAGNOSIS — E66.01 OBESITY, CLASS III, BMI 40-49.9 (MORBID OBESITY) (MULTI): Chronic | ICD-10-CM

## 2024-07-16 DIAGNOSIS — G47.33 OBSTRUCTIVE SLEEP APNEA SYNDROME: Chronic | ICD-10-CM

## 2024-07-16 DIAGNOSIS — J30.2 SEASONAL ALLERGIES: ICD-10-CM

## 2024-07-16 DIAGNOSIS — J44.89 ASTHMA WITH CHRONIC OBSTRUCTIVE PULMONARY DISEASE (COPD) (MULTI): ICD-10-CM

## 2024-07-16 DIAGNOSIS — J06.9 VIRAL UPPER RESPIRATORY TRACT INFECTION: ICD-10-CM

## 2024-07-16 DIAGNOSIS — I50.32 CHRONIC HEART FAILURE WITH PRESERVED EJECTION FRACTION (MULTI): ICD-10-CM

## 2024-07-16 DIAGNOSIS — D50.9 IRON DEFICIENCY ANEMIA, UNSPECIFIED IRON DEFICIENCY ANEMIA TYPE: ICD-10-CM

## 2024-07-16 DIAGNOSIS — E55.9 VITAMIN D DEFICIENCY: ICD-10-CM

## 2024-07-16 DIAGNOSIS — E87.5 HYPERKALEMIA: ICD-10-CM

## 2024-07-16 DIAGNOSIS — R10.84 GENERALIZED ABDOMINAL PAIN: ICD-10-CM

## 2024-07-16 DIAGNOSIS — I10 BENIGN ESSENTIAL HYPERTENSION: Chronic | ICD-10-CM

## 2024-07-17 RX ORDER — FLUTICASONE PROPIONATE 50 MCG
1 SPRAY, SUSPENSION (ML) NASAL DAILY
Qty: 16 G | Refills: 10 | OUTPATIENT
Start: 2024-07-17

## 2024-07-17 RX ORDER — FERROUS SULFATE TAB 325 MG (65 MG ELEMENTAL FE) 325 (65 FE) MG
1 TAB ORAL EVERY OTHER DAY
Qty: 15 TABLET | Refills: 10 | OUTPATIENT
Start: 2024-07-17

## 2024-07-17 RX ORDER — ACETAMINOPHEN 325 MG/1
TABLET ORAL
Qty: 30 TABLET | Refills: 10 | OUTPATIENT
Start: 2024-07-17

## 2024-07-17 RX ORDER — DOCUSATE SODIUM 50MG AND SENNOSIDES 8.6MG 8.6; 5 MG/1; MG/1
1 TABLET, FILM COATED ORAL 2 TIMES DAILY
Qty: 60 TABLET | Refills: 10 | OUTPATIENT
Start: 2024-07-17

## 2024-07-17 NOTE — TELEPHONE ENCOUNTER
Called pt to inform of required appt to refill meds, pt did not schedule at this time. Toni 07/17/24

## 2024-07-24 RX ORDER — FERROUS SULFATE 325(65) MG
1 TABLET ORAL EVERY OTHER DAY
Qty: 15 TABLET | Refills: 0 | OUTPATIENT
Start: 2024-07-24

## 2024-07-24 RX ORDER — ESCITALOPRAM OXALATE 20 MG/1
20 TABLET ORAL DAILY
Qty: 30 TABLET | Refills: 10 | OUTPATIENT
Start: 2024-07-24

## 2024-07-24 RX ORDER — LISINOPRIL 30 MG/1
TABLET ORAL
Qty: 30 TABLET | Refills: 0 | OUTPATIENT
Start: 2024-07-24

## 2024-07-24 RX ORDER — FLUTICASONE PROPIONATE 50 MCG
1 SPRAY, SUSPENSION (ML) NASAL DAILY
Qty: 16 G | Refills: 0 | OUTPATIENT
Start: 2024-07-24 | End: 2024-08-23

## 2024-07-24 RX ORDER — ACETAMINOPHEN 325 MG/1
650 TABLET ORAL EVERY 8 HOURS PRN
Qty: 30 TABLET | Refills: 0 | OUTPATIENT
Start: 2024-07-24 | End: 2024-08-23

## 2024-07-24 RX ORDER — AMOXICILLIN 250 MG
1 CAPSULE ORAL 2 TIMES DAILY
Qty: 60 TABLET | Refills: 0 | OUTPATIENT
Start: 2024-07-24

## 2024-07-24 RX ORDER — POLYETHYLENE GLYCOL 3350 17 G/17G
17 POWDER, FOR SOLUTION ORAL 2 TIMES DAILY
Qty: 30 PACKET | Refills: 0 | OUTPATIENT
Start: 2024-07-24

## 2024-07-24 NOTE — TELEPHONE ENCOUNTER
[facility] called requesting refill of pt escitalopram, ferrous sulfate, senexon acetaminophen, fluticasone nasal spray, polyethylene glycol, and lisinopril. Orders pended and routed to provider.

## 2024-07-30 ENCOUNTER — TREATMENT (OUTPATIENT)
Dept: PHYSICAL THERAPY | Facility: HOSPITAL | Age: 59
End: 2024-07-30
Payer: COMMERCIAL

## 2024-07-30 DIAGNOSIS — M25.562 BILATERAL CHRONIC KNEE PAIN: ICD-10-CM

## 2024-07-30 DIAGNOSIS — G89.29 BILATERAL CHRONIC KNEE PAIN: ICD-10-CM

## 2024-07-30 DIAGNOSIS — M25.561 BILATERAL CHRONIC KNEE PAIN: ICD-10-CM

## 2024-07-30 PROCEDURE — 97110 THERAPEUTIC EXERCISES: CPT | Mod: GP

## 2024-07-30 NOTE — PROGRESS NOTES
Jessica Smith is a 59 y.o. female with past medical history of CHF, asthma, sleep apnea, arthritis, H. Pylori, gastric ulcer, Sherita-en-Y, and SBO who is referred by Dr. Shruthi Marie for generalized abdominal pain. She states she has had issues since her lap converted to open Sherita-en-Y. In 2021 she developed SBO and had diagnostic laparoscopy 8/2021 due to concern for bowel ischemia. She had lysis of adhesions. Recovered well post-op but since then has had intermittent abdominal pain.     She was seen in ER 5/31/2024 due to the pain. It is upper and sometimes left sided but has felt it around umbilicus as well. She states it comes and goes and is described as sharp. Occurs about 2 days out of the week and will last all day. Will be so severe she will have to lay down. It is random and is not associated with eating or movement. No associating symptoms such as nausea, vomiting, heartburn, reflux, constipation, or diarrhea.    In May while in ER had a CT scan compatible with gastritis and mild to moderately thickened small bowel loops. She has anemia with Hgb 8-10. Iron % sat low. She has been taking oral iron. H. Pylori and stool pathogens both negative. Continues on Protonix and Pepcid without relief. Tried Carafate suspension and Tylenol which also do not help. She does not take NSAIDS.    US shows hepatomegaly and slightly lobulated contour, correlate for fibrotic changes/cirrhosis. LFTs are not elevated. Status post cholecystectomy.     Last EGD 2018 with small Sherita-en-Y gastrojejunostomy with gastrojejunal anastomosis characterized by healthy appearing mucosa. Colonoscopy 2016 normal.     Family history: Denies family history of PUD, colon cancer, or other GI disorders or malignancy.     Past Medical History:   Diagnosis Date    Abdominal adhesions 08/08/2021    Abnormal QT interval present on electrocardiogram 03/18/2021    Formatting of this note might be different from the original. Formatting of this note  might be different from the original. -EKG: NSR. QTc 500 Formatting of this note might be different from the original. -EKG: NSR. QTc 500    Acute gastric ulcer without hemorrhage or perforation 01/13/2016    Gastric ulcer, acute    Acute gastrojejunal ulcer 09/07/2007    Formatting of this note might be different from the original. 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    Hypokalemia 09/13/2023    Intertrigo 09/13/2023    Ischemic bowel disease (Multi) 04/01/2021    Personal history of other diseases of the musculoskeletal system and connective tissue 12/08/2015    History of arthritis    Personal history of other diseases of the respiratory system 12/08/2015    History of bronchitis    Personal history of other diseases of the respiratory system 12/08/2015    History of sinusitis    Personal history of other diseases of the respiratory system 12/08/2015    History of acute bronchitis    Personal history of other infectious and parasitic diseases 05/08/2015    History of Helicobacter infection    Personal history of other specified conditions 11/17/2016    History of diarrhea    Personal history of other specified conditions 04/27/2015    History of chest pain    Personal history of other specified conditions 01/10/2014    History of chest pain    Personal history of peptic ulcer disease 05/04/2015    History of gastric ulcer    SBO (small bowel obstruction) (Multi) 09/13/2023    Sebaceous cyst 06/19/2002    Tension headache 02/05/2009    Urinary tract  infection, site not specified 04/15/2015    UTI (lower urinary tract infection)    Wound dehiscence 2023     Past Surgical History:   Procedure Laterality Date    APPENDECTOMY  2014    Appendectomy     SECTION, CLASSIC  2014     Section    COLONOSCOPY  2018    Colonoscopy (Fiberoptic)    ESOPHAGOGASTRODUODENOSCOPY  2018    Diagnostic Esophagogastroduodenoscopy    GASTRIC BYPASS  2017    Gastric Surgery For Morbid Obesity Gastric Bypass    GASTRIC RESTRICTION SURGERY  2015    Gastric Surgery For Morbid Obesity    OTHER SURGICAL HISTORY  2020    Ventral hernia repair    OTHER SURGICAL HISTORY  2014    Cholecystotomy    TUBAL LIGATION  2014    Tubal Ligation    UMBILICAL HERNIA REPAIR  2014    Umbilical Hernia Repair     Current Outpatient Medications   Medication Sig Dispense Refill    albuterol 90 mcg/actuation inhaler Inhale 2 puffs every 6 hours if needed for wheezing or shortness of breath. 18 g 11    amLODIPine (Norvasc) 10 mg tablet TAKE 1 TABLET BY MOUTH ONCE DAILY 30 tablet 11    cyanocobalamin (Vitamin B-12) 1,000 mcg tablet Take 1 tablet (1,000 mcg) by mouth once daily. 30 tablet 11    diclofenac sodium (Arthritis Pain, diclofenac,) 1 % gel Apply 4.5 inches (4 g) topically 4 times a day. 200 g 11    escitalopram (Lexapro) 20 mg tablet Take 1 tablet (20 mg) by mouth once daily. 90 tablet 0    famotidine (Pepcid AC) 10 mg tablet Take 1 tablet (10 mg) by mouth 2 times a day. 60 tablet 11    ferrous sulfate, 325 mg ferrous sulfate, (FeroSuL) tablet Take 1 tablet by mouth every other day. 15 tablet 0    fluticasone (Flonase) 50 mcg/actuation nasal spray Administer 1 spray into each nostril once daily. 16 g 0    fluticasone propion-salmeteroL (Advair Diskus) 100-50 mcg/dose diskus inhaler Inhale 1 puff 2 times a day. Rinse mouth with water after use to reduce aftertaste and incidence of candidiasis. Do not swallow. 60 each 11     gabapentin (Neurontin) 300 mg capsule TAKE 1 CAPSULE BY MOUTH DAILY AT BEDTIME 30 capsule 10    hydroCHLOROthiazide (HYDRODiuril) 25 mg tablet Take 1 tablet once a day 60 tablet 1    lidocaine (Lidoderm) 5 % patch APPLY 1 PATCH TO THE AFFECTED AREA AND LEAVE IN PLACE FOR 12 HOURS, THEN REMOVE AND LEAVE OFF FOR 12 HOURS. Strength: 5 % 30 patch 1    lisinopril 30 mg tablet Take 1 tablet at night, as needed for sleep 30 tablet 0    melatonin 3 mg tablet Take 1 tablet (3 mg) by mouth once daily at bedtime. 30 tablet 11    multivit, iron, min. cmb. 5-FA 10-1 mg tablet Take 1 mg by mouth once daily. 30 tablet 0    pantoprazole (ProtoNix) 40 mg EC tablet Take 1 tablet (40 mg) by mouth once daily. 30 tablet 11    polyethylene glycol (Glycolax, Miralax) 17 gram packet Take 17 g by mouth 2 times a day. 30 packet 0    senna 8.6 mg tablet Take 1 tablet (8.6 mg) by mouth 2 times a day as needed for constipation. 60 tablet 0    sucralfate (Carafate) 100 mg/mL suspension TAKE 10ML BY MOUTH FOUR TIMES A DAY WITH MEALS 828 mL 10    walker misc 1 each once daily. Use daily as needed for ambulation assistance 1 each 0    fluticasone propion-salmeteroL (Advair Diskus) 500-50 mcg/dose diskus inhaler INHALE ONE (1) PUFF BY MOUTH TWICE DAILY *RINSE MOUTH WITH WATER AFTER USE TO REDUCE AFTERTASTE AND INCIDENCE OF CANDIDIASIS. DO NOT SWALLOW* 1 each 10    polyethylene glycol-electrolytes (GaviLyte-G) 420 gram solution Take 4,000 mL by mouth 1 time for 1 dose. 4000 mL 0     No current facility-administered medications for this visit.     Review of Systems  Review of Systems negative except as noted in HPI.    Objective     /81   Pulse 75   Temp 36.1 °C (97 °F)   Wt 148 kg (326 lb 9.6 oz)   SpO2 94%   BMI 57.85 kg/m²      Physical Exam  Constitutional:  No acute distress. Normal appearance. Not ill-appearing.  HENT:  Head normocephalic and atraumatic. Conjunctivae normal.  Cardiovascular:  Normal rate. Regular rhythm.  Pulmonary:   Pulmonary effort normal. No respiratory distress. Breath sounds clear.  Abdominal:  Abdomen is flat and soft. There is no distension. No tenderness or guarding.  Skin: Dry.  Neurological:  Alert and oriented.  Psychiatric:  Mood and affect normal.    Assessment/Plan     59 y.o. female with history of CHF, asthma, sleep apnea, arthritis, H. Pylori, gastric ulcer, Sherita-en-Y, and SBO who presents today for clinic visit for chronic abdominal pain. In 2021 she developed SBO and had diagnostic laparoscopy 8/2021 due to concern for bowel ischemia. She required lysis of adhesions. She continues to have intermittent pain, she states worse the past year or so. She has no associating symptoms and pain thought to be functional versus adhesions from prior abdominal surgeries. She has tried numerous medications in the past including PPI, Pepcid, Carafate, Tylenol, SSRI, and Gabapentin all of which do not help.    We discussed that is is possible that her anemia is due to her gastric surgery however this worsened last year with Hgb drop to 8 around the time her pain worsened. She has responded to oral iron however Hgb continues to be low at 10. We will perform EGD/colonoscopy however we discussed that if procedures reassuring she may benefit from surgical lysis of adhesions.    Recommendations  Continue Protonix (pantoprazole) 40 mg daily.  Continue oral iron supplement.  Schedule EGD and colonoscopy. Golytely bowel prep sent to pharmacy.  See Hepatology due to abnormal liver seen on imaging, although I do not suspect this is the cause of her pain.  Further recommendations pending above procedures. She will follow up after.    Electronically signed by: Sondra Stevens CNP on 8/5/2024 at 2:16 PM
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
## 2024-08-05 ENCOUNTER — OFFICE VISIT (OUTPATIENT)
Dept: GASTROENTEROLOGY | Facility: HOSPITAL | Age: 59
End: 2024-08-05
Payer: COMMERCIAL

## 2024-08-05 VITALS
WEIGHT: 293 LBS | TEMPERATURE: 97 F | SYSTOLIC BLOOD PRESSURE: 135 MMHG | OXYGEN SATURATION: 94 % | DIASTOLIC BLOOD PRESSURE: 81 MMHG | HEART RATE: 75 BPM | BODY MASS INDEX: 57.85 KG/M2

## 2024-08-05 DIAGNOSIS — R93.2 ABNORMAL FINDING ON IMAGING OF LIVER: ICD-10-CM

## 2024-08-05 DIAGNOSIS — R10.84 GENERALIZED ABDOMINAL PAIN: Primary | ICD-10-CM

## 2024-08-05 DIAGNOSIS — D50.9 IRON DEFICIENCY ANEMIA, UNSPECIFIED IRON DEFICIENCY ANEMIA TYPE: ICD-10-CM

## 2024-08-05 PROCEDURE — 1036F TOBACCO NON-USER: CPT | Performed by: NURSE PRACTITIONER

## 2024-08-05 PROCEDURE — 3079F DIAST BP 80-89 MM HG: CPT | Performed by: NURSE PRACTITIONER

## 2024-08-05 PROCEDURE — 3075F SYST BP GE 130 - 139MM HG: CPT | Performed by: NURSE PRACTITIONER

## 2024-08-05 PROCEDURE — 99244 OFF/OP CNSLTJ NEW/EST MOD 40: CPT | Performed by: NURSE PRACTITIONER

## 2024-08-05 RX ORDER — POLYETHYLENE GLYCOL-3350 AND ELECTROLYTES 236; 6.74; 5.86; 2.97; 22.74 G/274.31G; G/274.31G; G/274.31G; G/274.31G; G/274.31G
4000 POWDER, FOR SOLUTION ORAL ONCE
Qty: 4000 ML | Refills: 0 | Status: SHIPPED | OUTPATIENT
Start: 2024-08-05 | End: 2024-08-05

## 2024-08-05 SDOH — ECONOMIC STABILITY: FOOD INSECURITY: WITHIN THE PAST 12 MONTHS, THE FOOD YOU BOUGHT JUST DIDN'T LAST AND YOU DIDN'T HAVE MONEY TO GET MORE.: NEVER TRUE

## 2024-08-05 SDOH — ECONOMIC STABILITY: FOOD INSECURITY: WITHIN THE PAST 12 MONTHS, YOU WORRIED THAT YOUR FOOD WOULD RUN OUT BEFORE YOU GOT MONEY TO BUY MORE.: OFTEN TRUE

## 2024-08-05 ASSESSMENT — LIFESTYLE VARIABLES
SKIP TO QUESTIONS 9-10: 1
AUDIT-C TOTAL SCORE: 0
HOW OFTEN DO YOU HAVE SIX OR MORE DRINKS ON ONE OCCASION: NEVER
HOW MANY STANDARD DRINKS CONTAINING ALCOHOL DO YOU HAVE ON A TYPICAL DAY: PATIENT DOES NOT DRINK
HOW OFTEN DO YOU HAVE A DRINK CONTAINING ALCOHOL: NEVER

## 2024-08-05 ASSESSMENT — PATIENT HEALTH QUESTIONNAIRE - PHQ9
10. IF YOU CHECKED OFF ANY PROBLEMS, HOW DIFFICULT HAVE THESE PROBLEMS MADE IT FOR YOU TO DO YOUR WORK, TAKE CARE OF THINGS AT HOME, OR GET ALONG WITH OTHER PEOPLE: NOT DIFFICULT AT ALL
SUM OF ALL RESPONSES TO PHQ9 QUESTIONS 1 & 2: 6
1. LITTLE INTEREST OR PLEASURE IN DOING THINGS: NEARLY EVERY DAY
2. FEELING DOWN, DEPRESSED OR HOPELESS: NEARLY EVERY DAY

## 2024-08-05 ASSESSMENT — PAIN SCALES - GENERAL: PAINLEVEL: 10-WORST PAIN EVER

## 2024-08-05 NOTE — PATIENT INSTRUCTIONS
Recommendations  Continue Protonix (pantoprazole) 40 mg daily.  Continue oral iron supplement.  Schedule EGD and colonoscopy. You can call [phone] to schedule. Golytely bowel prep sent to your pharmacy.  See Hepatology (liver doctor). Someone should reach out to you to schedule.  Further recommendations pending above procedures. Please call the office at [phone] with any questions or concerns.

## 2024-08-06 DIAGNOSIS — K59.09 CHRONIC CONSTIPATION: ICD-10-CM

## 2024-08-07 RX ORDER — SENNOSIDES 8.6 MG
1 TABLET ORAL 2 TIMES DAILY PRN
Qty: 60 TABLET | Refills: 10 | Status: SHIPPED | OUTPATIENT
Start: 2024-08-07

## 2024-08-14 DIAGNOSIS — D50.9 IRON DEFICIENCY ANEMIA, UNSPECIFIED IRON DEFICIENCY ANEMIA TYPE: ICD-10-CM

## 2024-08-14 DIAGNOSIS — K59.09 CHRONIC CONSTIPATION: ICD-10-CM

## 2024-08-14 DIAGNOSIS — I10 BENIGN ESSENTIAL HYPERTENSION: Chronic | ICD-10-CM

## 2024-08-14 DIAGNOSIS — F41.9 ANXIETY AND DEPRESSION: ICD-10-CM

## 2024-08-14 DIAGNOSIS — F32.A ANXIETY AND DEPRESSION: ICD-10-CM

## 2024-08-14 NOTE — TELEPHONE ENCOUNTER
Pt called requesting refill of polyethylene glycol, lisinopril, escitalopram, ferrous sulfate, fluticasone inhaler, acetaminophen, and senexon. Available refills noted for fluticasone inhaler, and senexon not noted on med list, but ramirona is with refills. Needed orders pended and routed to provider.

## 2024-08-20 RX ORDER — FERROUS SULFATE 325(65) MG
1 TABLET ORAL EVERY OTHER DAY
Qty: 15 TABLET | Refills: 3 | Status: SHIPPED | OUTPATIENT
Start: 2024-08-20

## 2024-08-20 RX ORDER — POLYETHYLENE GLYCOL 3350 17 G/17G
17 POWDER, FOR SOLUTION ORAL 2 TIMES DAILY
Qty: 30 PACKET | Refills: 3 | Status: SHIPPED | OUTPATIENT
Start: 2024-08-20

## 2024-08-20 RX ORDER — ESCITALOPRAM OXALATE 20 MG/1
20 TABLET ORAL DAILY
Qty: 90 TABLET | Refills: 3 | Status: SHIPPED | OUTPATIENT
Start: 2024-08-20 | End: 2025-08-15

## 2024-08-20 RX ORDER — LISINOPRIL 30 MG/1
TABLET ORAL
Qty: 30 TABLET | Refills: 3 | Status: SHIPPED | OUTPATIENT
Start: 2024-08-20

## 2024-08-27 ENCOUNTER — HOSPITAL ENCOUNTER (EMERGENCY)
Facility: HOSPITAL | Age: 59
Discharge: HOME | End: 2024-08-27
Payer: COMMERCIAL

## 2024-08-27 ENCOUNTER — APPOINTMENT (OUTPATIENT)
Dept: RADIOLOGY | Facility: HOSPITAL | Age: 59
End: 2024-08-27
Payer: COMMERCIAL

## 2024-08-27 VITALS
TEMPERATURE: 97.2 F | DIASTOLIC BLOOD PRESSURE: 82 MMHG | OXYGEN SATURATION: 94 % | RESPIRATION RATE: 18 BRPM | HEART RATE: 98 BPM | SYSTOLIC BLOOD PRESSURE: 170 MMHG

## 2024-08-27 DIAGNOSIS — J06.9 VIRAL URI WITH COUGH: Primary | ICD-10-CM

## 2024-08-27 PROCEDURE — 71046 X-RAY EXAM CHEST 2 VIEWS: CPT | Performed by: RADIOLOGY

## 2024-08-27 PROCEDURE — 2500000001 HC RX 250 WO HCPCS SELF ADMINISTERED DRUGS (ALT 637 FOR MEDICARE OP): Mod: SE | Performed by: PHYSICIAN ASSISTANT

## 2024-08-27 PROCEDURE — 71046 X-RAY EXAM CHEST 2 VIEWS: CPT

## 2024-08-27 PROCEDURE — 99283 EMERGENCY DEPT VISIT LOW MDM: CPT | Mod: 25 | Performed by: PHYSICIAN ASSISTANT

## 2024-08-27 PROCEDURE — 94640 AIRWAY INHALATION TREATMENT: CPT

## 2024-08-27 PROCEDURE — 2500000002 HC RX 250 W HCPCS SELF ADMINISTERED DRUGS (ALT 637 FOR MEDICARE OP, ALT 636 FOR OP/ED): Mod: SE | Performed by: PHYSICIAN ASSISTANT

## 2024-08-27 RX ORDER — IPRATROPIUM BROMIDE AND ALBUTEROL SULFATE 2.5; .5 MG/3ML; MG/3ML
3 SOLUTION RESPIRATORY (INHALATION) ONCE
Status: COMPLETED | OUTPATIENT
Start: 2024-08-27 | End: 2024-08-27

## 2024-08-27 RX ORDER — DEXTROMETHORPHAN HYDROBROMIDE AND GUAIFENESIN 10; 200 MG/1; MG/1
1 CAPSULE, GELATIN COATED ORAL EVERY 4 HOURS PRN
Qty: 28 CAPSULE | Refills: 0 | Status: SHIPPED | OUTPATIENT
Start: 2024-08-27 | End: 2024-09-03

## 2024-08-27 RX ORDER — GUAIFENESIN 100 MG/5ML
400 SOLUTION ORAL ONCE
Status: COMPLETED | OUTPATIENT
Start: 2024-08-27 | End: 2024-08-27

## 2024-08-27 ASSESSMENT — LIFESTYLE VARIABLES
EVER FELT BAD OR GUILTY ABOUT YOUR DRINKING: NO
HAVE PEOPLE ANNOYED YOU BY CRITICIZING YOUR DRINKING: NO
EVER HAD A DRINK FIRST THING IN THE MORNING TO STEADY YOUR NERVES TO GET RID OF A HANGOVER: NO
TOTAL SCORE: 0
HAVE YOU EVER FELT YOU SHOULD CUT DOWN ON YOUR DRINKING: NO

## 2024-08-27 ASSESSMENT — COLUMBIA-SUICIDE SEVERITY RATING SCALE - C-SSRS
1. IN THE PAST MONTH, HAVE YOU WISHED YOU WERE DEAD OR WISHED YOU COULD GO TO SLEEP AND NOT WAKE UP?: NO
6. HAVE YOU EVER DONE ANYTHING, STARTED TO DO ANYTHING, OR PREPARED TO DO ANYTHING TO END YOUR LIFE?: NO
2. HAVE YOU ACTUALLY HAD ANY THOUGHTS OF KILLING YOURSELF?: NO

## 2024-08-27 NOTE — ED PROVIDER NOTES
Emergency Department Encounter  [facility] EMERGENCY MEDICINE    Patient: Jessica Smith  MRN: [id number]  : 1965  Date of Evaluation: 2024  ED Provider: Carmen Vanegas PA-C      Chief Complaint       Chief Complaint   Patient presents with    URI     HPI    Jessica Smith is a 59 y.o. female with a PMH significant for HTN, OPAL, anxiety, depression, asthma w/ COPD, and GERD. She presents to the emergency department presenting for SOB x 1 d. Pt reports she has been experiencing rhinorrhea, SOB, and a cough since this morning. States she had an anxiety attack around noon and has been experiencing shortness of breath since then. Her rhinorrhea is similar to her regular allergies symptoms. She reports she took an Advair and Albuterol (states she lost her nebulizer machine) without symptom relief. Her cough is productive with clear mucus. Denies any fevers, chills, N/V, CP, lightheadedness, or diarrhea/constipation. Currently feeling anxious about her condition worsening. Does not require supplemental oxygen.    ROS:     Review of Systems  14 systems reviewed and otherwise acutely negative except as in the HPI.    Past History     Past Medical History:   Diagnosis Date    Abdominal adhesions 2021    Abnormal QT interval present on electrocardiogram 2021    Formatting of this note might be different from the original. Formatting of this note might be different from the original. -EKG: NSR. QTc 500 Formatting of this note might be different from the original. -EKG: NSR. QTc 500    Acute gastric ulcer without hemorrhage or perforation 2016    Gastric ulcer, acute    Acute gastrojejunal ulcer 2007    Formatting of this note might be different from the original. 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    Hypokalemia 2023    Intertrigo 2023    Ischemic bowel disease (Multi) 2021    Personal history of other diseases of the musculoskeletal system and connective tissue 2015    History of arthritis    Personal history of other diseases of the respiratory system 2015    History of bronchitis    Personal history of other diseases of the respiratory system 2015    History of sinusitis    Personal history of other diseases of the respiratory system 2015    History of acute bronchitis    Personal history of other infectious and parasitic diseases 2015    History of Helicobacter infection    Personal history of other specified conditions 2016    History of diarrhea    Personal history of other specified conditions 2015    History of chest pain    Personal history of other specified conditions 01/10/2014    History of chest pain    Personal history of peptic ulcer disease 2015    History of gastric ulcer    SBO (small bowel obstruction) (Multi) 2023    Sebaceous cyst 2002    Tension headache 2009    Urinary tract infection, site not specified 04/15/2015    UTI (lower urinary tract infection)    Wound dehiscence 2023     Past Surgical History:   Procedure Laterality Date    APPENDECTOMY  2014    Appendectomy     SECTION, CLASSIC  2014     Section    COLONOSCOPY  2018    Colonoscopy (Fiberoptic)    ESOPHAGOGASTRODUODENOSCOPY  2018    Diagnostic Esophagogastroduodenoscopy    GASTRIC BYPASS  2017    Gastric Surgery For Morbid Obesity Gastric Bypass    GASTRIC RESTRICTION SURGERY  2015    Gastric  Surgery For Morbid Obesity    OTHER SURGICAL HISTORY  05/26/2020    Ventral hernia repair    OTHER SURGICAL HISTORY  11/05/2014    Cholecystotomy    TUBAL LIGATION  11/05/2014    Tubal Ligation    UMBILICAL HERNIA REPAIR  11/05/2014    Umbilical Hernia Repair     Social History     Socioeconomic History    Marital status: Single   Tobacco Use    Smoking status: Former     Types: Cigarettes    Smokeless tobacco: Never   Substance and Sexual Activity    Alcohol use: Never    Drug use: Never     Social Determinants of Health     Financial Resource Strain: High Risk (5/31/2024)    Overall Financial Resource Strain (CARDIA)     Difficulty of Paying Living Expenses: Hard   Food Insecurity: Food Insecurity Present (8/5/2024)    Hunger Vital Sign     Worried About Running Out of Food in the Last Year: Often true     Ran Out of Food in the Last Year: Never true   Transportation Needs: No Transportation Needs (5/31/2024)    PRAPARE - Transportation     Lack of Transportation (Medical): No     Lack of Transportation (Non-Medical): No   Housing Stability: Low Risk  (5/31/2024)    Housing Stability Vital Sign     Unable to Pay for Housing in the Last Year: No     Number of Places Lived in the Last Year: 2     Unstable Housing in the Last Year: No       Medications/Allergies     Previous Medications    ALBUTEROL 90 MCG/ACTUATION INHALER    Inhale 2 puffs every 6 hours if needed for wheezing or shortness of breath.    AMLODIPINE (NORVASC) 10 MG TABLET    TAKE 1 TABLET BY MOUTH ONCE DAILY    CYANOCOBALAMIN (VITAMIN B-12) 1,000 MCG TABLET    Take 1 tablet (1,000 mcg) by mouth once daily.    DICLOFENAC SODIUM (ARTHRITIS PAIN, DICLOFENAC,) 1 % GEL    Apply 4.5 inches (4 g) topically 4 times a day.    ESCITALOPRAM (LEXAPRO) 20 MG TABLET    Take 1 tablet (20 mg) by mouth once daily.    FAMOTIDINE (PEPCID AC) 10 MG TABLET    Take 1 tablet (10 mg) by mouth 2 times a day.    FERROUS SULFATE, 325 MG FERROUS SULFATE, (FEROSUL) TABLET    Take  1 tablet by mouth every other day.    FLUTICASONE (FLONASE) 50 MCG/ACTUATION NASAL SPRAY    Administer 1 spray into each nostril once daily.    FLUTICASONE PROPION-SALMETEROL (ADVAIR DISKUS) 100-50 MCG/DOSE DISKUS INHALER    Inhale 1 puff 2 times a day. Rinse mouth with water after use to reduce aftertaste and incidence of candidiasis. Do not swallow.    FLUTICASONE PROPION-SALMETEROL (ADVAIR DISKUS) 500-50 MCG/DOSE DISKUS INHALER    INHALE ONE (1) PUFF BY MOUTH TWICE DAILY *RINSE MOUTH WITH WATER AFTER USE TO REDUCE AFTERTASTE AND INCIDENCE OF CANDIDIASIS. DO NOT SWALLOW*    GABAPENTIN (NEURONTIN) 300 MG CAPSULE    TAKE 1 CAPSULE BY MOUTH DAILY AT BEDTIME    HYDROCHLOROTHIAZIDE (HYDRODIURIL) 25 MG TABLET    Take 1 tablet once a day    LIDOCAINE (LIDODERM) 5 % PATCH    APPLY 1 PATCH TO THE AFFECTED AREA AND LEAVE IN PLACE FOR 12 HOURS, THEN REMOVE AND LEAVE OFF FOR 12 HOURS. Strength: 5 %    LISINOPRIL 30 MG TABLET    Take 1 tablet at night, as needed for sleep    MELATONIN 3 MG TABLET    Take 1 tablet (3 mg) by mouth once daily at bedtime.    MULTIVIT, IRON, MIN. CMB. 5-FA 10-1 MG TABLET    Take 1 mg by mouth once daily.    PANTOPRAZOLE (PROTONIX) 40 MG EC TABLET    Take 1 tablet (40 mg) by mouth once daily.    POLYETHYLENE GLYCOL (GLYCOLAX, MIRALAX) 17 GRAM PACKET    Take 17 g by mouth 2 times a day.    SENNA 8.6 MG TABLET    TAKE 1 TABLET BY MOUTH TWICE DAILY AS NEEDED FOR CONSTIPATION    SUCRALFATE (CARAFATE) 100 MG/ML SUSPENSION    TAKE 10ML BY MOUTH FOUR TIMES A DAY WITH MEALS    WALKER MISC    1 each once daily. Use daily as needed for ambulation assistance     Allergies   Allergen Reactions    Aspirin Diarrhea, GI Upset and Unknown        Physical Exam       ED Triage Vitals [08/27/24 1740]   Temperature Heart Rate Respirations BP   36.2 °C (97.2 °F) 98 18 170/82      Pulse Ox Temp Source Heart Rate Source Patient Position   94 % Temporal -- --      BP Location FiO2 (%)     -- --         Physical  Exam    Physical Exam:    VS: As documented in the triage note and EMR flowsheet from this visit were reviewed.    Appearance: Appearing anxious. Alert, oriented, cooperative. Well nourished & well hydrated.    Skin: Atraumatic. Warm, intact and dry. No lesions, rash, or petechiae.    Eyes: EOMs intact. No pain with EOMs. No nystagmus. Bilateral conjunctivae pink without injection or exudates. No hyphema or subconjunctival hemorrhage.    ENT: Hearing grossly intact. No drooling, dysphagia or trismus. Voice non-muffled.    Neck: Supple, without meningismus. No lymphadenopathy.     Pulmonary: Clear bilaterally with good chest wall excursion. No rales, rhonchi or wheezing. No accessory muscle use or stridor. Nonlabored breathing, no supplemental oxygen. No pain with deep breathing.    Cardiac: Normal S1, S2 without murmur, rub, gallop or extrasystole.     Abdomen: Soft, nontender. No rebound or guarding.     Musculoskeletal: No midline or paraspinal tenderness. Extremities warm and well-perfused, capillary refill less than 2 seconds. Pulses full and equal. No TTP, cyanosis, clubbing or deformity noted.    Neurological:  Cranial nerves II through XII are grossly intact, normal sensation, no weakness, no focal findings identified. Ambulating with walker.    Psychiatric: Appropriate mood and affect. Kempt appearance.       Diagnostics   Radiographs:  XR chest 2 views   Final Result   No acute cardiopulmonary process.        MACRO:   None        Signed by: John Pearson 8/27/2024 8:16 PM   Dictation workstation:   [id number]          ED Course   Visit Vitals  /82   Pulse 98   Temp 36.2 °C (97.2 °F) (Temporal)   Resp 18   SpO2 94%   Smoking Status Former     Medications   ipratropium-albuteroL (Duo-Neb) 0.5-2.5 mg/3 mL nebulizer solution 3 mL (3 mL nebulization Given 8/27/24 1930)   guaiFENesin (Robitussin) 100 mg/5 mL syrup 400 mg (400 mg oral Given 8/27/24 1930)       Medical Decision Making     Diagnoses as of  08/27/24 2119   Viral URI with cough     Patient presents today for acute SOB, rhinorrhea, and cough. She states these sxs began today, with her shortness of breath beginning after an anxiety/panic attack this afternoon. She states her albuterol/Advair medications have not been improving her sxs, but has not been able to use her nebulized treatment. She presents sounding slightly out of breath (states this is her baseline), and anxious/distressed d/t her presentation. She reports her rhinorrhea is similar to her allergies she often experiences. Her cough is productive (white mucus), but lungs were clear to auscultation and her SpO2 was at 94% (likely baseline given h/o COPD). Current concerns for potential viral/bacterial bronchitis, pneumonia, or bronchoconstriction following her prior panic attack. Further evaluation includes a CXR and symptom mgmt with Duo-Neb and robitussin.     Chest x-ray without acute cardiopulmonary processes.  Rx for coricidin as needed for cough.  Referred to PCP and pulm (reports she has not seen pulm before with h/o asthma/COPD)  Please call [phone] for Primary Care referral for follow up appointments.      Final Impression      1. Viral URI with cough          DISPOSITION  Disposition: discharge  Patient condition is: Stable    Comment: Please note this report has been produced using speech recognition software and may contain errors related to that system including errors in grammar, punctuation, and spelling, as well as words and phrases that may be inappropriate.  If there are any questions or concerns please feel free to contact the dictating provider for clarification.    SOFIA Cheng PA-C  08/27/24 2119

## 2024-08-28 NOTE — DISCHARGE INSTRUCTIONS
Chest xray is normal - no evidence of pneumonia.  Take cough medication as needed.    You have been referred to primary care and pulmonology - they will contact you to schedule an outpatient appointment.

## 2024-08-29 ENCOUNTER — DOCUMENTATION (OUTPATIENT)
Dept: PHYSICAL THERAPY | Facility: HOSPITAL | Age: 59
End: 2024-08-29
Payer: COMMERCIAL

## 2024-08-29 NOTE — PROGRESS NOTES
Physical Therapy    Discharge Summary    Name: Jessica Smith  MRN: [id number]  : 1965  Date: 24    Discharge Summary: PT    Discharge Information: Date of discharge 24, Date of last visit 24, Date of evaluation 24, Number of attended visits 2, Referred by Nati, and Referred for B chronic knee pain    Rehab Discharge Reason: Attendance inconsistent and Failed to schedule and/or keep follow-up appointment(s)

## 2024-09-01 ENCOUNTER — HOSPITAL ENCOUNTER (INPATIENT)
Facility: HOSPITAL | Age: 59
LOS: 4 days | Discharge: HOME | End: 2024-09-05
Attending: STUDENT IN AN ORGANIZED HEALTH CARE EDUCATION/TRAINING PROGRAM | Admitting: STUDENT IN AN ORGANIZED HEALTH CARE EDUCATION/TRAINING PROGRAM
Payer: COMMERCIAL

## 2024-09-01 ENCOUNTER — APPOINTMENT (OUTPATIENT)
Dept: RADIOLOGY | Facility: HOSPITAL | Age: 59
End: 2024-09-01
Payer: COMMERCIAL

## 2024-09-01 DIAGNOSIS — J06.9 VIRAL URI WITH COUGH: ICD-10-CM

## 2024-09-01 DIAGNOSIS — F41.9 ANXIETY AND DEPRESSION: ICD-10-CM

## 2024-09-01 DIAGNOSIS — J96.01 ACUTE HYPOXIC RESPIRATORY FAILURE (MULTI): Primary | ICD-10-CM

## 2024-09-01 DIAGNOSIS — G47.33 OSA (OBSTRUCTIVE SLEEP APNEA): ICD-10-CM

## 2024-09-01 DIAGNOSIS — K21.00 GASTROESOPHAGEAL REFLUX DISEASE WITH ESOPHAGITIS WITHOUT HEMORRHAGE: ICD-10-CM

## 2024-09-01 DIAGNOSIS — J44.9 CHRONIC OBSTRUCTIVE PULMONARY DISEASE, UNSPECIFIED COPD TYPE (MULTI): ICD-10-CM

## 2024-09-01 DIAGNOSIS — D72.829 LEUKOCYTOSIS, UNSPECIFIED TYPE: ICD-10-CM

## 2024-09-01 DIAGNOSIS — I10 BENIGN ESSENTIAL HYPERTENSION: Chronic | ICD-10-CM

## 2024-09-01 DIAGNOSIS — E55.9 VITAMIN D DEFICIENCY: ICD-10-CM

## 2024-09-01 DIAGNOSIS — G47.33 OBSTRUCTIVE SLEEP APNEA SYNDROME: Chronic | ICD-10-CM

## 2024-09-01 DIAGNOSIS — K21.9 GASTROESOPHAGEAL REFLUX DISEASE, UNSPECIFIED WHETHER ESOPHAGITIS PRESENT: ICD-10-CM

## 2024-09-01 DIAGNOSIS — F32.A ANXIETY AND DEPRESSION: ICD-10-CM

## 2024-09-01 DIAGNOSIS — J44.89 ASTHMA WITH CHRONIC OBSTRUCTIVE PULMONARY DISEASE (COPD) (MULTI): ICD-10-CM

## 2024-09-01 DIAGNOSIS — I50.32 CHRONIC HEART FAILURE WITH PRESERVED EJECTION FRACTION (MULTI): ICD-10-CM

## 2024-09-01 DIAGNOSIS — M79.2 NEUROPATHIC PAIN: ICD-10-CM

## 2024-09-01 DIAGNOSIS — K59.09 CHRONIC CONSTIPATION: ICD-10-CM

## 2024-09-01 LAB
ALBUMIN SERPL BCP-MCNC: 3.8 G/DL (ref 3.4–5)
ALP SERPL-CCNC: 121 U/L (ref 33–110)
ALT SERPL W P-5'-P-CCNC: 7 U/L (ref 7–45)
ANION GAP BLDV CALCULATED.4IONS-SCNC: 14 MMOL/L (ref 10–25)
ANION GAP SERPL CALC-SCNC: 15 MMOL/L (ref 10–20)
AST SERPL W P-5'-P-CCNC: 12 U/L (ref 9–39)
BASE EXCESS BLDV CALC-SCNC: -2.6 MMOL/L (ref -2–3)
BASOPHILS # BLD AUTO: 0.04 X10*3/UL (ref 0–0.1)
BASOPHILS NFR BLD AUTO: 0.3 %
BILIRUB SERPL-MCNC: 0.3 MG/DL (ref 0–1.2)
BNP SERPL-MCNC: 27 PG/ML (ref 0–99)
BODY TEMPERATURE: 37 DEGREES CELSIUS
BUN SERPL-MCNC: 31 MG/DL (ref 6–23)
CA-I BLDV-SCNC: 1.07 MMOL/L (ref 1.1–1.33)
CALCIUM SERPL-MCNC: 8.1 MG/DL (ref 8.6–10.6)
CARDIAC TROPONIN I PNL SERPL HS: 5 NG/L (ref 0–34)
CHLORIDE BLDV-SCNC: 106 MMOL/L (ref 98–107)
CHLORIDE SERPL-SCNC: 107 MMOL/L (ref 98–107)
CO2 SERPL-SCNC: 24 MMOL/L (ref 21–32)
CREAT SERPL-MCNC: 1.65 MG/DL (ref 0.5–1.05)
EGFRCR SERPLBLD CKD-EPI 2021: 36 ML/MIN/1.73M*2
EOSINOPHIL # BLD AUTO: 0.27 X10*3/UL (ref 0–0.7)
EOSINOPHIL NFR BLD AUTO: 1.7 %
ERYTHROCYTE [DISTWIDTH] IN BLOOD BY AUTOMATED COUNT: 14.5 % (ref 11.5–14.5)
GLUCOSE BLDV-MCNC: 137 MG/DL (ref 74–99)
GLUCOSE SERPL-MCNC: 139 MG/DL (ref 74–99)
HCO3 BLDV-SCNC: 25.1 MMOL/L (ref 22–26)
HCT VFR BLD AUTO: 35.8 % (ref 36–46)
HCT VFR BLD EST: 35 % (ref 36–46)
HGB BLD-MCNC: 11.4 G/DL (ref 12–16)
HGB BLDV-MCNC: 11.8 G/DL (ref 12–16)
IMM GRANULOCYTES # BLD AUTO: 0.05 X10*3/UL (ref 0–0.7)
IMM GRANULOCYTES NFR BLD AUTO: 0.3 % (ref 0–0.9)
INHALED O2 CONCENTRATION: 36 %
LACTATE BLDV-SCNC: 1 MMOL/L (ref 0.4–2)
LIPASE SERPL-CCNC: 26 U/L (ref 9–82)
LYMPHOCYTES # BLD AUTO: 1.79 X10*3/UL (ref 1.2–4.8)
LYMPHOCYTES NFR BLD AUTO: 11.2 %
MCH RBC QN AUTO: 27.4 PG (ref 26–34)
MCHC RBC AUTO-ENTMCNC: 31.8 G/DL (ref 32–36)
MCV RBC AUTO: 86 FL (ref 80–100)
MONOCYTES # BLD AUTO: 0.81 X10*3/UL (ref 0.1–1)
MONOCYTES NFR BLD AUTO: 5.1 %
NEUTROPHILS # BLD AUTO: 13.02 X10*3/UL (ref 1.2–7.7)
NEUTROPHILS NFR BLD AUTO: 81.4 %
NRBC BLD-RTO: 0 /100 WBCS (ref 0–0)
OXYHGB MFR BLDV: 62 % (ref 45–75)
PCO2 BLDV: 56 MM HG (ref 41–51)
PH BLDV: 7.26 PH (ref 7.33–7.43)
PLATELET # BLD AUTO: 306 X10*3/UL (ref 150–450)
PO2 BLDV: 39 MM HG (ref 35–45)
POTASSIUM BLDV-SCNC: 4.8 MMOL/L (ref 3.5–5.3)
POTASSIUM SERPL-SCNC: 4.7 MMOL/L (ref 3.5–5.3)
PROT SERPL-MCNC: 7.5 G/DL (ref 6.4–8.2)
RBC # BLD AUTO: 4.16 X10*6/UL (ref 4–5.2)
SAO2 % BLDV: 63 % (ref 45–75)
SODIUM BLDV-SCNC: 140 MMOL/L (ref 136–145)
SODIUM SERPL-SCNC: 141 MMOL/L (ref 136–145)
WBC # BLD AUTO: 16 X10*3/UL (ref 4.4–11.3)

## 2024-09-01 PROCEDURE — 87040 BLOOD CULTURE FOR BACTERIA: CPT

## 2024-09-01 PROCEDURE — 85025 COMPLETE CBC W/AUTO DIFF WBC: CPT | Performed by: STUDENT IN AN ORGANIZED HEALTH CARE EDUCATION/TRAINING PROGRAM

## 2024-09-01 PROCEDURE — 71045 X-RAY EXAM CHEST 1 VIEW: CPT | Performed by: RADIOLOGY

## 2024-09-01 PROCEDURE — 84132 ASSAY OF SERUM POTASSIUM: CPT | Performed by: STUDENT IN AN ORGANIZED HEALTH CARE EDUCATION/TRAINING PROGRAM

## 2024-09-01 PROCEDURE — 74177 CT ABD & PELVIS W/CONTRAST: CPT

## 2024-09-01 PROCEDURE — 1210000001 HC SEMI-PRIVATE ROOM DAILY

## 2024-09-01 PROCEDURE — 83880 ASSAY OF NATRIURETIC PEPTIDE: CPT | Performed by: STUDENT IN AN ORGANIZED HEALTH CARE EDUCATION/TRAINING PROGRAM

## 2024-09-01 PROCEDURE — 74177 CT ABD & PELVIS W/CONTRAST: CPT | Performed by: RADIOLOGY

## 2024-09-01 PROCEDURE — 99285 EMERGENCY DEPT VISIT HI MDM: CPT | Mod: 25

## 2024-09-01 PROCEDURE — 82330 ASSAY OF CALCIUM: CPT | Performed by: STUDENT IN AN ORGANIZED HEALTH CARE EDUCATION/TRAINING PROGRAM

## 2024-09-01 PROCEDURE — 36415 COLL VENOUS BLD VENIPUNCTURE: CPT | Performed by: STUDENT IN AN ORGANIZED HEALTH CARE EDUCATION/TRAINING PROGRAM

## 2024-09-01 PROCEDURE — 96375 TX/PRO/DX INJ NEW DRUG ADDON: CPT

## 2024-09-01 PROCEDURE — 2500000004 HC RX 250 GENERAL PHARMACY W/ HCPCS (ALT 636 FOR OP/ED)

## 2024-09-01 PROCEDURE — 71275 CT ANGIOGRAPHY CHEST: CPT | Performed by: RADIOLOGY

## 2024-09-01 PROCEDURE — 2500000002 HC RX 250 W HCPCS SELF ADMINISTERED DRUGS (ALT 637 FOR MEDICARE OP, ALT 636 FOR OP/ED): Mod: SE | Performed by: STUDENT IN AN ORGANIZED HEALTH CARE EDUCATION/TRAINING PROGRAM

## 2024-09-01 PROCEDURE — 71045 X-RAY EXAM CHEST 1 VIEW: CPT

## 2024-09-01 PROCEDURE — 83690 ASSAY OF LIPASE: CPT | Performed by: STUDENT IN AN ORGANIZED HEALTH CARE EDUCATION/TRAINING PROGRAM

## 2024-09-01 PROCEDURE — 73564 X-RAY EXAM KNEE 4 OR MORE: CPT | Mod: RIGHT SIDE | Performed by: RADIOLOGY

## 2024-09-01 PROCEDURE — 73564 X-RAY EXAM KNEE 4 OR MORE: CPT | Mod: RT

## 2024-09-01 PROCEDURE — 71275 CT ANGIOGRAPHY CHEST: CPT

## 2024-09-01 PROCEDURE — 96365 THER/PROPH/DIAG IV INF INIT: CPT

## 2024-09-01 PROCEDURE — 96361 HYDRATE IV INFUSION ADD-ON: CPT

## 2024-09-01 PROCEDURE — 94640 AIRWAY INHALATION TREATMENT: CPT

## 2024-09-01 PROCEDURE — 84484 ASSAY OF TROPONIN QUANT: CPT | Performed by: STUDENT IN AN ORGANIZED HEALTH CARE EDUCATION/TRAINING PROGRAM

## 2024-09-01 PROCEDURE — 99285 EMERGENCY DEPT VISIT HI MDM: CPT | Performed by: STUDENT IN AN ORGANIZED HEALTH CARE EDUCATION/TRAINING PROGRAM

## 2024-09-01 PROCEDURE — 2500000004 HC RX 250 GENERAL PHARMACY W/ HCPCS (ALT 636 FOR OP/ED): Mod: SE | Performed by: STUDENT IN AN ORGANIZED HEALTH CARE EDUCATION/TRAINING PROGRAM

## 2024-09-01 PROCEDURE — 36415 COLL VENOUS BLD VENIPUNCTURE: CPT

## 2024-09-01 PROCEDURE — 82435 ASSAY OF BLOOD CHLORIDE: CPT | Performed by: STUDENT IN AN ORGANIZED HEALTH CARE EDUCATION/TRAINING PROGRAM

## 2024-09-01 PROCEDURE — 2500000004 HC RX 250 GENERAL PHARMACY W/ HCPCS (ALT 636 FOR OP/ED): Mod: SE

## 2024-09-01 PROCEDURE — 2550000001 HC RX 255 CONTRASTS: Mod: SE | Performed by: STUDENT IN AN ORGANIZED HEALTH CARE EDUCATION/TRAINING PROGRAM

## 2024-09-01 RX ORDER — OXYCODONE HYDROCHLORIDE 5 MG/1
5 TABLET ORAL EVERY 6 HOURS PRN
Status: DISCONTINUED | OUTPATIENT
Start: 2024-09-01 | End: 2024-09-05 | Stop reason: HOSPADM

## 2024-09-01 RX ORDER — CEFTRIAXONE 1 G/50ML
1 INJECTION, SOLUTION INTRAVENOUS ONCE
Status: COMPLETED | OUTPATIENT
Start: 2024-09-01 | End: 2024-09-01

## 2024-09-01 RX ORDER — PANTOPRAZOLE SODIUM 40 MG/1
40 TABLET, DELAYED RELEASE ORAL DAILY
Status: DISCONTINUED | OUTPATIENT
Start: 2024-09-02 | End: 2024-09-05 | Stop reason: HOSPADM

## 2024-09-01 RX ORDER — FERROUS SULFATE 325(65) MG
1 TABLET ORAL EVERY OTHER DAY
Status: DISCONTINUED | OUTPATIENT
Start: 2024-09-02 | End: 2024-09-05 | Stop reason: HOSPADM

## 2024-09-01 RX ORDER — IPRATROPIUM BROMIDE AND ALBUTEROL SULFATE 2.5; .5 MG/3ML; MG/3ML
9 SOLUTION RESPIRATORY (INHALATION) ONCE
Status: COMPLETED | OUTPATIENT
Start: 2024-09-01 | End: 2024-09-01

## 2024-09-01 RX ORDER — GABAPENTIN 300 MG/1
300 CAPSULE ORAL NIGHTLY
Status: DISCONTINUED | OUTPATIENT
Start: 2024-09-01 | End: 2024-09-04

## 2024-09-01 RX ORDER — ESCITALOPRAM OXALATE 20 MG/1
20 TABLET ORAL DAILY
Status: DISCONTINUED | OUTPATIENT
Start: 2024-09-02 | End: 2024-09-05 | Stop reason: HOSPADM

## 2024-09-01 RX ORDER — PREDNISONE 10 MG/1
40 TABLET ORAL DAILY
Status: COMPLETED | OUTPATIENT
Start: 2024-09-02 | End: 2024-09-05

## 2024-09-01 RX ORDER — POLYETHYLENE GLYCOL 3350 17 G/17G
17 POWDER, FOR SOLUTION ORAL DAILY PRN
Status: DISCONTINUED | OUTPATIENT
Start: 2024-09-01 | End: 2024-09-05 | Stop reason: HOSPADM

## 2024-09-01 RX ORDER — FAMOTIDINE 10 MG/ML
20 INJECTION INTRAVENOUS ONCE
Status: COMPLETED | OUTPATIENT
Start: 2024-09-01 | End: 2024-09-01

## 2024-09-01 RX ORDER — ONDANSETRON HYDROCHLORIDE 2 MG/ML
4 INJECTION, SOLUTION INTRAVENOUS ONCE
Status: COMPLETED | OUTPATIENT
Start: 2024-09-01 | End: 2024-09-01

## 2024-09-01 RX ORDER — LANOLIN ALCOHOL/MO/W.PET/CERES
1000 CREAM (GRAM) TOPICAL DAILY
Status: DISCONTINUED | OUTPATIENT
Start: 2024-09-02 | End: 2024-09-05 | Stop reason: HOSPADM

## 2024-09-01 RX ORDER — IPRATROPIUM BROMIDE AND ALBUTEROL SULFATE 2.5; .5 MG/3ML; MG/3ML
3 SOLUTION RESPIRATORY (INHALATION)
Status: DISCONTINUED | OUTPATIENT
Start: 2024-09-02 | End: 2024-09-02

## 2024-09-01 RX ORDER — DICLOFENAC SODIUM 10 MG/G
4 GEL TOPICAL 4 TIMES DAILY
Status: DISCONTINUED | OUTPATIENT
Start: 2024-09-02 | End: 2024-09-05 | Stop reason: HOSPADM

## 2024-09-01 RX ORDER — SUCRALFATE 1 G/10ML
1 SUSPENSION ORAL
Status: DISCONTINUED | OUTPATIENT
Start: 2024-09-02 | End: 2024-09-05 | Stop reason: HOSPADM

## 2024-09-01 RX ORDER — FLUTICASONE FUROATE AND VILANTEROL 100; 25 UG/1; UG/1
1 POWDER RESPIRATORY (INHALATION)
Status: DISCONTINUED | OUTPATIENT
Start: 2024-09-02 | End: 2024-09-05 | Stop reason: HOSPADM

## 2024-09-01 RX ORDER — ENOXAPARIN SODIUM 100 MG/ML
60 INJECTION SUBCUTANEOUS EVERY 12 HOURS SCHEDULED
Status: DISCONTINUED | OUTPATIENT
Start: 2024-09-01 | End: 2024-09-05 | Stop reason: HOSPADM

## 2024-09-01 ASSESSMENT — LIFESTYLE VARIABLES
HAVE YOU EVER FELT YOU SHOULD CUT DOWN ON YOUR DRINKING: NO
HAVE PEOPLE ANNOYED YOU BY CRITICIZING YOUR DRINKING: NO
EVER HAD A DRINK FIRST THING IN THE MORNING TO STEADY YOUR NERVES TO GET RID OF A HANGOVER: NO
EVER FELT BAD OR GUILTY ABOUT YOUR DRINKING: NO
TOTAL SCORE: 0

## 2024-09-01 NOTE — ED PROVIDER NOTES
History of Present Illness     History provided by: Patient  Limitations to History: None  External Records Reviewed with Brief Summary:   - Presented to ED on 8/27/24 for URI. Received DuoNeb and robitussin in the ED, was discharged on Coricidin HBP.      HPI:  Jessica Smith is a 59 y.o. female with history of HFpEF, anxiety/depression, HTN, asthma/COPD, anemia, OPAL, and arthritis presenting with a chief complaint of a fall. The patient states she felt light headed when she was on the toilet trying to wipe herself. She denies loss of consciousness or head trauma. She endorses hitting her right knee. She is having right knee pain worse than her baseline. She endorses abdominal pain. She is having shortness of breath and productive cough worse from baseline since she was in the ED on 8/27/24 for URI. She required supplemental oxygen on presentation to the ED due to hypoxemia. She is not on oxygen at home. She did not receive steroids during her recent visit for SOB.    Physical Exam   Triage vitals:  T 36.4 °C (97.5 °F)  HR 76  BP 97/74  RR 18  O2 (!) 80 % None (Room air)    General: Awake, alert, increased WOB, improves with oxygen. Obese.  Eyes: Gaze conjugate.  No scleral icterus or injection  HENT: Normo-cephalic, atraumatic. No stridor  CV: Regular rate, regular rhythm. Radial pulses 2+ bilaterally  Resp: Increased WOB on RA, improves with oxygen. Mild crackles that clear, some mild expiratory wheezing in the bases bilaterally.  GI: Tender. No rebound or guarding.  MSK/Extremities: No gross bony deformities. Moving all extremities  Skin: Warm. Appropriate color  Neuro: Alert. Oriented. Face symmetric. Speech is fluent.  Gross strength and sensation intact   Psych: Appropriate mood and affect    Medical Decision Making & ED Course   Medical Decision Making and ED course:  59 y.o. female history of HFpEF, anxiety/depression, HTN, asthma/COPD, anemia, OPAL, and arthritis presenting after a fall, now with  shortness of breath and new oxygen requirement. The patient's oxygen requirement is very labile, from 2 LPM to 6 LPM O2. EKG had normal sinus rhythm. VBG revealed mild respiratory acidosis, similar to previous VBGs. She was started on DuoNebs and IV methylprednisolone due to concern for COPD/asthma exacerbation. Patient given Zofran and Pepcid for abdominal pain. CBC revealed leukocytosis to 16.0. Blood cultures ordered and the patient was started on ceftriaxone and fluids due to concern for sepsis as she is tachypneic and has leukocytosis, but does not have a lactic bump, so did meet sepsis criteria without obvious source. Lipase and troponin are negative. CXR did not reveal acute intrapulmonary process.   The patient was signed out to Dr. Trujillo at 1900. At the time of sign out blood cultures, BNP,  CMP, CTAP, right knee xray, and CT angio chest are pending.   ----    Differential diagnoses considered include but are not limited to: COPD exacerbation, asthma exacerbation, pneumonia, knee fracture, sepsis, pancreatitis, intraabdominal infection     Social Determinants of Health which Significantly Impact Care: None identified     EKG Independent Interpretation: EKG interpreted by myself. Please see ED Course for full interpretation.    Independent Result Review and Interpretation: Pending at time of sign out.    Chronic conditions affecting the patient's care: As documented above in MDM    The patient was discussed with the following consultants/services: None at time of sign out    Care Considerations: None      Disposition   Signed out at 1900 to Dr. Baig.      Deric Beach MD PGY-1   Emergency Medicine       Deric Beach MD  Resident  09/01/24 1855       Deric Beach MD  Resident  09/01/24 1913       Deric Beach MD  Resident  09/01/24 1917       Deric Beach MD  Resident  09/01/24 1919       Gideon Nava MD  09/02/24 0002

## 2024-09-01 NOTE — ED TRIAGE NOTES
Patient was on toliet when she got lightheaded and dizzy when leaning forward then fell onto knees in bathroom, -headstrike - LOC - thinners. Reporting intermittent chronic abd pain. Slight CP/SOB. Patient was 80% RA when initially hooked up to the monitor patient placed on 2L with no increased pulse ox then was gradually increased to 6L NC stating at 96%. O2 was decreased back down to 2L NC at 1805. Patient A&Ox4 GCS 15

## 2024-09-01 NOTE — PROGRESS NOTES
Emergency Department Transition of Care Note       Signout   I received Jessica Smith in signout from Dr. Yong MARISCAL .  Please see the ED Provider Note for all HPI, PE and MDM up to the time of signout at 1900.  This is in addition to the primary record.    In brief Ms. Smith is a 58yo female with a past medical history of HFpEF, anxiety/depression, HTN, asthma/COPD, anemia, OPAL, and arthritis presenting from home after a fall off of the toilet earlier today. She reported pain to her right knee as well as shortness of breath. EKG had normal sinus rhythm. VBG revealed mild respiratory acidosis, similar to previous VBGs. She was started on DuoNebs and IV methylprednisolone due to concern for COPD/asthma exacerbation. Patient given Zofran and Pepcid for abdominal pain. CBC revealed leukocytosis to 16.0 with neutrophilic predominance. Blood cultures ordered and the patient was started on ceftriaxone and fluids due to concern for sepsis as she is hypotensive and has leukocytosis. Lipase and troponin are negative. CXR did not reveal acute intrapulmonary process. CT angio revealed prominent calcifications concerning for aortic stenosis, no evidence of acute PE. CT abdomen pelvis showed diffuse small bowel wall thickening with adjacent mesenteric stranding, concerning for enteritis. No evidence of bowel perforation or loculated fluid collection. Patient oxygen requirement has fluctuated between 3-6L NC while in the department. She was attempted to be weaned off of oxygen and was briefly off of nasal cannula upon return from CT. However, with exertion involved with sitting up and attempting to get out of bed, desaturated to 88-89% on room air. Would like to admit for additional workup regarding new oxygen requirement in setting of leukocytosis and pending blood cultures with concern for infectious etiology.     At the time of signout we were awaiting:  CT results   Admission for new oxygen requirement    ED Course & Medical  Decision Making   Medical Decision Making:  Under my care, patient returned from CT scan and received final read.  Read was concerning for diffuse small bowel thickening concerning for enteritis.  CT angio also revealed prominent calcifications concerning for aortic stenosis with no evidence of acute PE.  Patient continues to require oxygen here in the department.  Will require admission to the hospital for her new oxygen requirement, blood cultures pending, added azithromycin for additional antibiotic coverage.  Patient was discussed with admission coordinators who agreed to accept the patient for admission.    Patient briefly not on oxygen after return from CT. Saturating well at that time. When attempting to sit patient up further for potential walking, her saturation decreased to 88% and she was placed back on 6L NC.     Patient was updated on the plan for admission and was agreeable.      After patient was admitted to hospitalist team, VBG was obtained in the emergency department which revealed an acidosis with pH less than 7.2.  Per inpatient team's recommendations I did reach out to the respiratory tach in the ED who noted that the patient is on CPAP at home regularly at nighttime, she was placed on CPAP at her home settings.  Believe that as this is her home regimen, patient is still stable for floor transfer.    ED Course:  ED Course as of 09/02/24 0406   Sun Sep 01, 2024   1804 Significant wheezing biphasic, mainly in bases bilaterally noted, new oxygen requirement [JH]   1814 POCT pCO2, Venous(!): 56  Baseline between 49 and 53, not generally actionable, will not do NIPPV [JH]   1820 XR chest 1 view  No significant change from prior [JH]   1821 Pulse Ox(!): 93 %  Patient desaturated back to 89% on room air during discussion, this is a new oxygen requirement, placed her back on oxygen.  Does have a history of asthma/COPD, will have goal greater than 92%. Per review, was 94% on RA at previous visits in ED  to clinic []   1840 Heart Rate: 93 []      ED Course User Index  [] Gideon Nava MD         Diagnoses as of 09/02/24 0406   Acute hypoxic respiratory failure (Multi)   Leukocytosis, unspecified type       Disposition   As a result of their workup, the patient will require admission to the hospital.  The patient was informed of her diagnosis.  The patient was given the opportunity to ask questions and I answered them. The patient agreed to be admitted to the hospital.    Procedures   Procedures    Patient seen and discussed with ED attending physician.    Carmen Trujillo, DO  Emergency Medicine

## 2024-09-02 PROBLEM — E87.29 RESPIRATORY ACIDOSIS: Status: ACTIVE | Noted: 2024-09-02

## 2024-09-02 LAB
ALBUMIN SERPL BCP-MCNC: 3.6 G/DL (ref 3.4–5)
ALBUMIN SERPL BCP-MCNC: 3.6 G/DL (ref 3.4–5)
ALP SERPL-CCNC: 118 U/L (ref 33–110)
ALT SERPL W P-5'-P-CCNC: 9 U/L (ref 7–45)
ANION GAP BLDV CALCULATED.4IONS-SCNC: 11 MMOL/L (ref 10–25)
ANION GAP SERPL CALC-SCNC: 13 MMOL/L (ref 10–20)
ANION GAP SERPL CALC-SCNC: 17 MMOL/L (ref 10–20)
AST SERPL W P-5'-P-CCNC: 9 U/L (ref 9–39)
BACTERIA SPEC RESP CULT: ABNORMAL
BASE EXCESS BLDV CALC-SCNC: -5.8 MMOL/L (ref -2–3)
BASOPHILS # BLD AUTO: 0.02 X10*3/UL (ref 0–0.1)
BASOPHILS NFR BLD AUTO: 0.1 %
BILIRUB SERPL-MCNC: 0.2 MG/DL (ref 0–1.2)
BODY TEMPERATURE: 37 DEGREES CELSIUS
BUN SERPL-MCNC: 41 MG/DL (ref 6–23)
BUN SERPL-MCNC: 42 MG/DL (ref 6–23)
CA-I BLDV-SCNC: 1.09 MMOL/L (ref 1.1–1.33)
CALCIUM SERPL-MCNC: 7.7 MG/DL (ref 8.6–10.6)
CALCIUM SERPL-MCNC: 7.9 MG/DL (ref 8.6–10.6)
CARDIAC TROPONIN I PNL SERPL HS: 5 NG/L (ref 0–34)
CHLORIDE BLDV-SCNC: 107 MMOL/L (ref 98–107)
CHLORIDE SERPL-SCNC: 107 MMOL/L (ref 98–107)
CHLORIDE SERPL-SCNC: 108 MMOL/L (ref 98–107)
CO2 SERPL-SCNC: 22 MMOL/L (ref 21–32)
CO2 SERPL-SCNC: 23 MMOL/L (ref 21–32)
CREAT SERPL-MCNC: 1.87 MG/DL (ref 0.5–1.05)
CREAT SERPL-MCNC: 1.88 MG/DL (ref 0.5–1.05)
EGFRCR SERPLBLD CKD-EPI 2021: 30 ML/MIN/1.73M*2
EGFRCR SERPLBLD CKD-EPI 2021: 31 ML/MIN/1.73M*2
EOSINOPHIL # BLD AUTO: 0 X10*3/UL (ref 0–0.7)
EOSINOPHIL NFR BLD AUTO: 0 %
ERYTHROCYTE [DISTWIDTH] IN BLOOD BY AUTOMATED COUNT: 14.6 % (ref 11.5–14.5)
GLUCOSE BLDV-MCNC: 213 MG/DL (ref 74–99)
GLUCOSE SERPL-MCNC: 124 MG/DL (ref 74–99)
GLUCOSE SERPL-MCNC: 127 MG/DL (ref 74–99)
GRAM STN SPEC: ABNORMAL
HCO3 BLDV-SCNC: 23.3 MMOL/L (ref 22–26)
HCT VFR BLD AUTO: 36.5 % (ref 36–46)
HCT VFR BLD EST: 33 % (ref 36–46)
HGB BLD-MCNC: 10.4 G/DL (ref 12–16)
HGB BLDV-MCNC: 11.1 G/DL (ref 12–16)
IMM GRANULOCYTES # BLD AUTO: 0.06 X10*3/UL (ref 0–0.7)
IMM GRANULOCYTES NFR BLD AUTO: 0.4 % (ref 0–0.9)
INHALED O2 CONCENTRATION: 44 %
LACTATE BLDV-SCNC: 0.9 MMOL/L (ref 0.4–2)
LYMPHOCYTES # BLD AUTO: 1.02 X10*3/UL (ref 1.2–4.8)
LYMPHOCYTES NFR BLD AUTO: 6.8 %
MAGNESIUM SERPL-MCNC: 2.31 MG/DL (ref 1.6–2.4)
MCH RBC QN AUTO: 27.5 PG (ref 26–34)
MCHC RBC AUTO-ENTMCNC: 28.5 G/DL (ref 32–36)
MCV RBC AUTO: 97 FL (ref 80–100)
MONOCYTES # BLD AUTO: 0.29 X10*3/UL (ref 0.1–1)
MONOCYTES NFR BLD AUTO: 1.9 %
NEUTROPHILS # BLD AUTO: 13.63 X10*3/UL (ref 1.2–7.7)
NEUTROPHILS NFR BLD AUTO: 90.8 %
NRBC BLD-RTO: 0 /100 WBCS (ref 0–0)
OXYHGB MFR BLDV: 93.2 % (ref 45–75)
PCO2 BLDV: 64 MM HG (ref 41–51)
PH BLDV: 7.17 PH (ref 7.33–7.43)
PHOSPHATE SERPL-MCNC: 7.5 MG/DL (ref 2.5–4.9)
PLATELET # BLD AUTO: 287 X10*3/UL (ref 150–450)
PO2 BLDV: 78 MM HG (ref 35–45)
POTASSIUM BLDV-SCNC: 5.6 MMOL/L (ref 3.5–5.3)
POTASSIUM SERPL-SCNC: 6.1 MMOL/L (ref 3.5–5.3)
POTASSIUM SERPL-SCNC: 6.2 MMOL/L (ref 3.5–5.3)
PROT SERPL-MCNC: 6.7 G/DL (ref 6.4–8.2)
RBC # BLD AUTO: 3.78 X10*6/UL (ref 4–5.2)
SAO2 % BLDV: 94 % (ref 45–75)
SARS-COV-2 RNA RESP QL NAA+PROBE: NOT DETECTED
SODIUM BLDV-SCNC: 136 MMOL/L (ref 136–145)
SODIUM SERPL-SCNC: 138 MMOL/L (ref 136–145)
SODIUM SERPL-SCNC: 140 MMOL/L (ref 136–145)
WBC # BLD AUTO: 15 X10*3/UL (ref 4.4–11.3)

## 2024-09-02 PROCEDURE — 5A09357 ASSISTANCE WITH RESPIRATORY VENTILATION, LESS THAN 24 CONSECUTIVE HOURS, CONTINUOUS POSITIVE AIRWAY PRESSURE: ICD-10-PCS | Performed by: STUDENT IN AN ORGANIZED HEALTH CARE EDUCATION/TRAINING PROGRAM

## 2024-09-02 PROCEDURE — 2500000001 HC RX 250 WO HCPCS SELF ADMINISTERED DRUGS (ALT 637 FOR MEDICARE OP)

## 2024-09-02 PROCEDURE — 36415 COLL VENOUS BLD VENIPUNCTURE: CPT

## 2024-09-02 PROCEDURE — 87077 CULTURE AEROBIC IDENTIFY: CPT

## 2024-09-02 PROCEDURE — 2500000004 HC RX 250 GENERAL PHARMACY W/ HCPCS (ALT 636 FOR OP/ED): Performed by: STUDENT IN AN ORGANIZED HEALTH CARE EDUCATION/TRAINING PROGRAM

## 2024-09-02 PROCEDURE — 2500000002 HC RX 250 W HCPCS SELF ADMINISTERED DRUGS (ALT 637 FOR MEDICARE OP, ALT 636 FOR OP/ED): Performed by: STUDENT IN AN ORGANIZED HEALTH CARE EDUCATION/TRAINING PROGRAM

## 2024-09-02 PROCEDURE — 2500000002 HC RX 250 W HCPCS SELF ADMINISTERED DRUGS (ALT 637 FOR MEDICARE OP, ALT 636 FOR OP/ED)

## 2024-09-02 PROCEDURE — 2500000005 HC RX 250 GENERAL PHARMACY W/O HCPCS

## 2024-09-02 PROCEDURE — 87070 CULTURE OTHR SPECIMN AEROBIC: CPT

## 2024-09-02 PROCEDURE — 87205 SMEAR GRAM STAIN: CPT

## 2024-09-02 PROCEDURE — 87632 RESP VIRUS 6-11 TARGETS: CPT

## 2024-09-02 PROCEDURE — 85025 COMPLETE CBC W/AUTO DIFF WBC: CPT

## 2024-09-02 PROCEDURE — 2500000004 HC RX 250 GENERAL PHARMACY W/ HCPCS (ALT 636 FOR OP/ED)

## 2024-09-02 PROCEDURE — 82435 ASSAY OF BLOOD CHLORIDE: CPT

## 2024-09-02 PROCEDURE — 87635 SARS-COV-2 COVID-19 AMP PRB: CPT | Performed by: STUDENT IN AN ORGANIZED HEALTH CARE EDUCATION/TRAINING PROGRAM

## 2024-09-02 PROCEDURE — 84484 ASSAY OF TROPONIN QUANT: CPT | Performed by: STUDENT IN AN ORGANIZED HEALTH CARE EDUCATION/TRAINING PROGRAM

## 2024-09-02 PROCEDURE — 2500000001 HC RX 250 WO HCPCS SELF ADMINISTERED DRUGS (ALT 637 FOR MEDICARE OP): Performed by: STUDENT IN AN ORGANIZED HEALTH CARE EDUCATION/TRAINING PROGRAM

## 2024-09-02 PROCEDURE — 82435 ASSAY OF BLOOD CHLORIDE: CPT | Performed by: STUDENT IN AN ORGANIZED HEALTH CARE EDUCATION/TRAINING PROGRAM

## 2024-09-02 PROCEDURE — 99233 SBSQ HOSP IP/OBS HIGH 50: CPT | Performed by: STUDENT IN AN ORGANIZED HEALTH CARE EDUCATION/TRAINING PROGRAM

## 2024-09-02 PROCEDURE — 94660 CPAP INITIATION&MGMT: CPT

## 2024-09-02 PROCEDURE — 84132 ASSAY OF SERUM POTASSIUM: CPT

## 2024-09-02 PROCEDURE — 84132 ASSAY OF SERUM POTASSIUM: CPT | Performed by: STUDENT IN AN ORGANIZED HEALTH CARE EDUCATION/TRAINING PROGRAM

## 2024-09-02 PROCEDURE — 83735 ASSAY OF MAGNESIUM: CPT

## 2024-09-02 PROCEDURE — 1100000001 HC PRIVATE ROOM DAILY

## 2024-09-02 RX ORDER — GUAIFENESIN 1200 MG/1
1200 TABLET, EXTENDED RELEASE ORAL EVERY 12 HOURS PRN
COMMUNITY

## 2024-09-02 RX ORDER — ACETAMINOPHEN 325 MG/1
325 TABLET ORAL EVERY 4 HOURS PRN
Status: ON HOLD | COMMUNITY
End: 2024-09-04

## 2024-09-02 RX ORDER — IPRATROPIUM BROMIDE AND ALBUTEROL SULFATE 2.5; .5 MG/3ML; MG/3ML
3 SOLUTION RESPIRATORY (INHALATION) EVERY 4 HOURS PRN
Status: DISCONTINUED | OUTPATIENT
Start: 2024-09-02 | End: 2024-09-05 | Stop reason: HOSPADM

## 2024-09-02 RX ORDER — ACETAMINOPHEN 500 MG
10 TABLET ORAL NIGHTLY
Status: DISCONTINUED | OUTPATIENT
Start: 2024-09-02 | End: 2024-09-05 | Stop reason: HOSPADM

## 2024-09-02 SDOH — SOCIAL STABILITY: SOCIAL INSECURITY: ABUSE: ADULT

## 2024-09-02 SDOH — SOCIAL STABILITY: SOCIAL INSECURITY: ARE THERE ANY APPARENT SIGNS OF INJURIES/BEHAVIORS THAT COULD BE RELATED TO ABUSE/NEGLECT?: NO

## 2024-09-02 SDOH — ECONOMIC STABILITY: INCOME INSECURITY: HOW HARD IS IT FOR YOU TO PAY FOR THE VERY BASICS LIKE FOOD, HOUSING, MEDICAL CARE, AND HEATING?: SOMEWHAT HARD

## 2024-09-02 SDOH — ECONOMIC STABILITY: TRANSPORTATION INSECURITY
IN THE PAST 12 MONTHS, HAS THE LACK OF TRANSPORTATION KEPT YOU FROM MEDICAL APPOINTMENTS OR FROM GETTING MEDICATIONS?: YES

## 2024-09-02 SDOH — SOCIAL STABILITY: SOCIAL INSECURITY: WERE YOU ABLE TO COMPLETE ALL THE BEHAVIORAL HEALTH SCREENINGS?: YES

## 2024-09-02 SDOH — ECONOMIC STABILITY: TRANSPORTATION INSECURITY
IN THE PAST 12 MONTHS, HAS LACK OF TRANSPORTATION KEPT YOU FROM MEETINGS, WORK, OR FROM GETTING THINGS NEEDED FOR DAILY LIVING?: YES

## 2024-09-02 SDOH — SOCIAL STABILITY: SOCIAL INSECURITY: HAVE YOU HAD THOUGHTS OF HARMING ANYONE ELSE?: NO

## 2024-09-02 SDOH — SOCIAL STABILITY: SOCIAL INSECURITY: DOES ANYONE TRY TO KEEP YOU FROM HAVING/CONTACTING OTHER FRIENDS OR DOING THINGS OUTSIDE YOUR HOME?: NO

## 2024-09-02 SDOH — SOCIAL STABILITY: SOCIAL INSECURITY: ARE YOU OR HAVE YOU BEEN THREATENED OR ABUSED PHYSICALLY, EMOTIONALLY, OR SEXUALLY BY ANYONE?: NO

## 2024-09-02 SDOH — ECONOMIC STABILITY: HOUSING INSECURITY: AT ANY TIME IN THE PAST 12 MONTHS, WERE YOU HOMELESS OR LIVING IN A SHELTER (INCLUDING NOW)?: NO

## 2024-09-02 SDOH — SOCIAL STABILITY: SOCIAL INSECURITY: HAS ANYONE EVER THREATENED TO HURT YOUR FAMILY OR YOUR PETS?: NO

## 2024-09-02 SDOH — SOCIAL STABILITY: SOCIAL INSECURITY: DO YOU FEEL ANYONE HAS EXPLOITED OR TAKEN ADVANTAGE OF YOU FINANCIALLY OR OF YOUR PERSONAL PROPERTY?: NO

## 2024-09-02 SDOH — ECONOMIC STABILITY: INCOME INSECURITY: IN THE LAST 12 MONTHS, WAS THERE A TIME WHEN YOU WERE NOT ABLE TO PAY THE MORTGAGE OR RENT ON TIME?: NO

## 2024-09-02 SDOH — ECONOMIC STABILITY: HOUSING INSECURITY: IN THE PAST 12 MONTHS, HOW MANY TIMES HAVE YOU MOVED WHERE YOU WERE LIVING?: 0

## 2024-09-02 SDOH — SOCIAL STABILITY: SOCIAL INSECURITY: HAVE YOU HAD ANY THOUGHTS OF HARMING ANYONE ELSE?: NO

## 2024-09-02 SDOH — SOCIAL STABILITY: SOCIAL INSECURITY: DO YOU FEEL UNSAFE GOING BACK TO THE PLACE WHERE YOU ARE LIVING?: NO

## 2024-09-02 ASSESSMENT — COGNITIVE AND FUNCTIONAL STATUS - GENERAL
MOBILITY SCORE: 18
MOVING TO AND FROM BED TO CHAIR: A LITTLE
TURNING FROM BACK TO SIDE WHILE IN FLAT BAD: A LITTLE
CLIMB 3 TO 5 STEPS WITH RAILING: A LITTLE
STANDING UP FROM CHAIR USING ARMS: A LITTLE
CLIMB 3 TO 5 STEPS WITH RAILING: A LITTLE
TURNING FROM BACK TO SIDE WHILE IN FLAT BAD: A LITTLE
CLIMB 3 TO 5 STEPS WITH RAILING: A LITTLE
PATIENT BASELINE BEDBOUND: NO
MOVING FROM LYING ON BACK TO SITTING ON SIDE OF FLAT BED WITH BEDRAILS: A LITTLE
STANDING UP FROM CHAIR USING ARMS: A LITTLE
WALKING IN HOSPITAL ROOM: A LITTLE
DAILY ACTIVITIY SCORE: 24
MOBILITY SCORE: 18
TURNING FROM BACK TO SIDE WHILE IN FLAT BAD: A LITTLE
STANDING UP FROM CHAIR USING ARMS: A LITTLE
MOBILITY SCORE: 18
MOVING FROM LYING ON BACK TO SITTING ON SIDE OF FLAT BED WITH BEDRAILS: A LITTLE
MOVING FROM LYING ON BACK TO SITTING ON SIDE OF FLAT BED WITH BEDRAILS: A LITTLE
WALKING IN HOSPITAL ROOM: A LITTLE
MOVING TO AND FROM BED TO CHAIR: A LITTLE
DAILY ACTIVITIY SCORE: 24
DAILY ACTIVITIY SCORE: 24
MOVING TO AND FROM BED TO CHAIR: A LITTLE
WALKING IN HOSPITAL ROOM: A LITTLE

## 2024-09-02 ASSESSMENT — ACTIVITIES OF DAILY LIVING (ADL)
ADEQUATE_TO_COMPLETE_ADL: YES
TOILETING: INDEPENDENT
HEARING - RIGHT EAR: FUNCTIONAL
ASSISTIVE_DEVICE: WALKER
WALKS IN HOME: INDEPENDENT
PATIENT'S MEMORY ADEQUATE TO SAFELY COMPLETE DAILY ACTIVITIES?: YES
DRESSING YOURSELF: INDEPENDENT
HEARING - LEFT EAR: FUNCTIONAL
GROOMING: INDEPENDENT
BATHING: INDEPENDENT
JUDGMENT_ADEQUATE_SAFELY_COMPLETE_DAILY_ACTIVITIES: YES
FEEDING YOURSELF: INDEPENDENT

## 2024-09-02 ASSESSMENT — LIFESTYLE VARIABLES
AUDIT-C TOTAL SCORE: 0
HOW OFTEN DO YOU HAVE 6 OR MORE DRINKS ON ONE OCCASION: NEVER
PRESCIPTION_ABUSE_PAST_12_MONTHS: NO
AUDIT-C TOTAL SCORE: 0
HOW OFTEN DO YOU HAVE A DRINK CONTAINING ALCOHOL: NEVER
SUBSTANCE_ABUSE_PAST_12_MONTHS: NO
HOW MANY STANDARD DRINKS CONTAINING ALCOHOL DO YOU HAVE ON A TYPICAL DAY: PATIENT DOES NOT DRINK
SKIP TO QUESTIONS 9-10: 1

## 2024-09-02 ASSESSMENT — PATIENT HEALTH QUESTIONNAIRE - PHQ9
1. LITTLE INTEREST OR PLEASURE IN DOING THINGS: SEVERAL DAYS
2. FEELING DOWN, DEPRESSED OR HOPELESS: NEARLY EVERY DAY
SUM OF ALL RESPONSES TO PHQ9 QUESTIONS 1 & 2: 4

## 2024-09-02 ASSESSMENT — PAIN SCALES - GENERAL
PAINLEVEL_OUTOF10: 9
PAINLEVEL_OUTOF10: 2
PAINLEVEL_OUTOF10: 0 - NO PAIN
PAINLEVEL_OUTOF10: 9

## 2024-09-02 ASSESSMENT — PAIN - FUNCTIONAL ASSESSMENT
PAIN_FUNCTIONAL_ASSESSMENT: 0-10

## 2024-09-02 ASSESSMENT — PAIN DESCRIPTION - ORIENTATION
ORIENTATION: RIGHT;LOWER
ORIENTATION: RIGHT

## 2024-09-02 ASSESSMENT — PAIN DESCRIPTION - LOCATION: LOCATION: ABDOMEN

## 2024-09-02 NOTE — CONSULTS
Wound Care Consult     Visit Date: 9/2/2024      Patient Name: Jessica Smith         MRN: [id number]           YOB: 1965     Reason for Consult: left shoulder lesions        Wound Assessment:  Wound 09/02/24 Abrasion Shoulder Left (Active)   Wound Image   09/02/24 1348   Burn Assessment Dry 09/02/24 1348   Drainage Amount None 09/02/24 1348       Wound Team Summary Assessment:   Consulted for left shoulder lesions/abrasions. Patient states these wounds are not new.  Appear in scattered pattern, all dry and without drainage, and some have scabbed over. Small open area swabbed for culture after being cleansed with NS and dried with gauze.      Wound Team Plan: May leave area open to air.     Sammi Sandoval RN  9/2/2024  1:48 PM

## 2024-09-02 NOTE — PROGRESS NOTES
Pharmacy Admission Order Reconciliation Review    Jessica Smith is a 59 y.o. female admitted for Acute hypoxic respiratory failure (Multi). Pharmacy reviewed the patient's unreconciled admission medications.    Prior to admission medications that were reviewed and acted on by the pharmacist include:  Tylenol  Albuterol inh  Mucinex  Amlodipine ( to hold by Hp note)   These medications have been reconciled.     Any other unreconcilied medications have been addressed and will be ordered or held by the patient's medical team. Medications addressed by the pharmacist may be added or changed by the patient's medical team at any time.    Rosalba Capellan, PharmD  Transitions of Care Pharmacist  [facility]  Please reach out via Secure Chat for questions

## 2024-09-02 NOTE — PROGRESS NOTES
Assessment/Plan   Patient is a 59 year old female with PMH significant for HFpEF, Anxiety/MDD, Asthma, OPAL, GERD, recurrent abdominal pain in setting of multiple abdominal surgeries, including Sherita-en-Y, C-sections presenting for chronic abdominal pain that has not recently changed in quality, likely representing persistence of her known chronic sequela of multiple abdominal surgeries. She is also presenting with new hypoxemia and wheezing, likely poorly controlled asthma/exacerbation, in addition to OPAL/OHS not adequately treated. VBG consistent with respiratory acidosis. Low concern for pneumonia at this point as patient does not have cough, fevers, or consolidation on imaging.  CT with evidence of enteritis, no acute pulm process or PE. Wheezing on exam, c/w asthma/copd exacerbation. No h/o PFTs or sleep study per patient.      #Asthma exacerbation with acute hypoxemia respiratory failure  #OPAL, OHS  - afebrile, noted leukocytosis.   - wheezing on exam, saturating 97% on 3L.   - DuoNebs q6H, IS.   - continue home Breo Ellipta  - Azithromycin 500 x 3d  - Prednisone 40 x 4 more days  - f/u sputum culture and viral panel, covid  - f/u Blood cultures  - continuous pulse ox  - CPAP at night  - needs pulmonary follow up for PFTs, sleep study. No history.      #Abdominal pain with evidence of enteritis  #episode of diarrhea/incontinence.   - Patient is describing her known chronic pain with no symptoms of fevers, bloody stools, diarrhea, association with eating and a benign abdominal exam w/ enteritis seen on CT; had gastroenteritis on last ct but prior ct's without evidence.  However, pt had one episode yesterday of diarrhea with incontinence, no prior episodes of incont.   - continue home Pantoprazole  - continue home Sucralfate with meals  - stool studies pending  - Oxycodone 5mg prn  - may benefit from referral to pain management on discharge  - follow up with GI as outpatient, continue their ongoing workup       "  #SPENSER:  - Creatinine 1.65 on admission, normal baseline  - s/p 500mL NS in the ED   - f/u RFP in the AM, repeat fluid bolus as needed    #Liver disease?  - prior ct noted cirrhosis/nodular contour, subsequent ct did not mention. Currently noted upper limit of normal size.   - check inr, albumin  - US liver    #Skin lesions  - wound care  - wound culture     #MDD/GERRY  - continue home Lexapro 20mg     #HTN   - Hold home Amlodipine  - Hold home HCTZ  - Hold home Lisinopril       #Anemia  - continue home B12  - continue homeFerrous sulfate  - outpatient GI workup     #Arthritis  - continue home Diclofenac gel  - continue home Gabapentin 300 QHS     Code status - FULL CODE  Scheduled outpatient appointments in system:   Future Appointments   Date Time Provider Department Center   1/23/2025 10:00 AM Ruddy Guthrie MD [id number]     ---------------------------------------------------------------------------------------------------  Subjective   NO event, she is reporting her breathing is feeling better. Does not feel labored. Has headache and cough. No fevers. One episode of diarrhea with incontinence yesterday, never had it before. Abdominal pain is same characteristics chronically but has been worsening.      ---------------------------------------------------------------------------------------------------  Objective   Last Recorded Vitals  Blood pressure 126/66, pulse 81, temperature 36.4 °C (97.5 °F), resp. rate 20, height 1.6 m (5' 3\"), weight (!) 153 kg (337 lb 4.9 oz), SpO2 96%.  Intake/Output last 3 Shifts:  No intake/output data recorded.    Physical Exam  Vitals and nursing note reviewed.   Constitutional:       General: She is not in acute distress.     Appearance: Normal appearance. She is obese. She is not ill-appearing or toxic-appearing.   HENT:      Head: Normocephalic and atraumatic.      Mouth/Throat:      Mouth: Mucous membranes are moist.   Eyes:      General: No scleral icterus.     " Extraocular Movements: Extraocular movements intact.      Conjunctiva/sclera: Conjunctivae normal.   Cardiovascular:      Rate and Rhythm: Normal rate and regular rhythm.      Heart sounds: S1 normal and S2 normal. No murmur heard.  Pulmonary:      Effort: Pulmonary effort is normal. No respiratory distress.      Breath sounds: No wheezing, rhonchi or rales.   Abdominal:      General: Bowel sounds are normal. There is no distension.      Palpations: Abdomen is soft.      Tenderness: There is no abdominal tenderness. There is no guarding or rebound.   Musculoskeletal:         General: No swelling or deformity.      Cervical back: Neck supple.   Skin:     General: Skin is warm and dry.      Findings: No rash.   Neurological:      General: No focal deficit present.      Mental Status: She is alert. Mental status is at baseline.   Psychiatric:         Mood and Affect: Mood normal.         Relevant Results  Lab Results   Component Value Date    WBC 16.0 (H) 09/01/2024    HGB 11.4 (L) 09/01/2024    HCT 35.8 (L) 09/01/2024    MCV 86 09/01/2024     09/01/2024      Lab Results   Component Value Date    GLUCOSE 139 (H) 09/01/2024    CALCIUM 8.1 (L) 09/01/2024     09/01/2024    K 4.7 09/01/2024    CO2 24 09/01/2024     09/01/2024    BUN 31 (H) 09/01/2024    CREATININE 1.65 (H) 09/01/2024     Scheduled medications  azithromycin, 500 mg, intravenous, q24h  cyanocobalamin, 1,000 mcg, oral, Daily  diclofenac sodium, 4 g, Topical, 4x daily  enoxaparin, 60 mg, subcutaneous, q12h SUNSHINE  escitalopram, 20 mg, oral, Daily  ferrous sulfate (325 mg ferrous sulfate), 1 tablet, oral, Every other day  fluticasone furoate-vilanteroL, 1 puff, inhalation, Daily  gabapentin, 300 mg, oral, Nightly  oxygen, , inhalation, Continuous - Inhalation  pantoprazole, 40 mg, oral, Daily  predniSONE, 40 mg, oral, Daily  sucralfate, 1 g, oral, TID AC      Continuous medications     PRN medications  PRN medications: ipratropium-albuteroL,  oxyCODONE, oxygen, polyethylene glycol    Hernando Devi MD

## 2024-09-02 NOTE — PROGRESS NOTES
Pharmacy Medication History Review    Jessica Smith is a 59 y.o. female admitted for Acute hypoxic respiratory failure (Multi). Pharmacy reviewed the patient's [id number] medications and allergies for accuracy.    Medications ADDED:  Acetaminophen   Mucinex  Medications CHANGED:  N/A  Medications REMOVED:   N/A     The list below reflects the updated PTA list.   Prior to Admission Medications   Prescriptions Last Dose Informant   acetaminophen (Tylenol) 325 mg tablet  Self   Sig: Take 1 tablet (325 mg) by mouth every 4 hours if needed for mild pain (1 - 3).   albuterol 90 mcg/actuation inhaler 9/1/2024 Self   Sig: Inhale 2 puffs every 6 hours if needed for wheezing or shortness of breath.   amLODIPine (Norvasc) 10 mg tablet 9/1/2024 Self   Sig: TAKE 1 TABLET BY MOUTH ONCE DAILY   cyanocobalamin (Vitamin B-12) 1,000 mcg tablet 9/1/2024 Self   Sig: Take 1 tablet (1,000 mcg) by mouth once daily.   dextromethorphan-guaifenesin (Coricidin HBP Chest Bradford-Cough)  mg capsule 9/1/2024 Self   Sig: Take 1 capsule by mouth every 4 hours if needed (cough/congestion) for up to 7 days.   diclofenac sodium (Arthritis Pain, diclofenac,) 1 % gel 9/1/2024 Self   Sig: Apply 4.5 inches (4 g) topically 4 times a day.   escitalopram (Lexapro) 20 mg tablet 9/1/2024 Self   Sig: Take 1 tablet (20 mg) by mouth once daily.   famotidine (Pepcid AC) 10 mg tablet  Self   Sig: Take 1 tablet (10 mg) by mouth 2 times a day.   ferrous sulfate, 325 mg ferrous sulfate, (FeroSuL) tablet 9/1/2024 Self   Sig: Take 1 tablet by mouth every other day.   fluticasone (Flonase) 50 mcg/actuation nasal spray     Sig: Administer 1 spray into each nostril once daily.   fluticasone propion-salmeteroL (Advair Diskus) 100-50 mcg/dose diskus inhaler  Self   Sig: Inhale 1 puff 2 times a day. Rinse mouth with water after use to reduce aftertaste and incidence of candidiasis. Do not swallow.   fluticasone propion-salmeteroL (Advair Diskus) 500-50 mcg/dose  diskus inhaler Not Taking    Sig: INHALE ONE (1) PUFF BY MOUTH TWICE DAILY *RINSE MOUTH WITH WATER AFTER USE TO REDUCE AFTERTASTE AND INCIDENCE OF CANDIDIASIS. DO NOT SWALLOW*   Patient not taking: Reported on 2024   gabapentin (Neurontin) 300 mg capsule 2024 Self   Sig: TAKE 1 CAPSULE BY MOUTH DAILY AT BEDTIME   guaiFENesin (Mucinex) 1,200 mg tablet extended release 12hr  Self   Sig: Take 1 tablet (1,200 mg) by mouth every 12 hours if needed (cough/congestion).   hydroCHLOROthiazide (HYDRODiuril) 25 mg tablet 2024 Self   Sig: Take 1 tablet once a day   lidocaine (Lidoderm) 5 % patch  Self   Sig: APPLY 1 PATCH TO THE AFFECTED AREA AND LEAVE IN PLACE FOR 12 HOURS, THEN REMOVE AND LEAVE OFF FOR 12 HOURS. Strength: 5 %   lisinopril 30 mg tablet 2024 Self   Sig: Take 1 tablet at night, as needed for sleep   Patient taking differently: Take 1 tablet at night   melatonin 3 mg tablet 2024 Self   Sig: Take 1 tablet (3 mg) by mouth once daily at bedtime.   multivit, iron, min. cmb. 5-FA 10-1 mg tablet  Self   Sig: Take 1 mg by mouth once daily.   pantoprazole (ProtoNix) 40 mg EC tablet  Self   Sig: Take 1 tablet (40 mg) by mouth once daily.   polyethylene glycol (Glycolax, Miralax) 17 gram packet  Self   Sig: Take 17 g by mouth 2 times a day.   senna 8.6 mg tablet  Self   Sig: TAKE 1 TABLET BY MOUTH TWICE DAILY AS NEEDED FOR CONSTIPATION   sucralfate (Carafate) 100 mg/mL suspension  Self   Sig: TAKE 10ML BY MOUTH FOUR TIMES A DAY WITH MEALS   walker misc  Self   Si each once daily. Use daily as needed for ambulation assistance      Facility-Administered Medications: None       The list below reflects the updated allergy list. Please review each documented allergy for additional clarification and justification.  Allergies  Reviewed by Kyara Bartholomew RN on 2024        Severity Reactions Comments    Aspirin Not Specified Diarrhea, GI Upset, Unknown             Patient accepts M2B at  discharge. Pharmacy has been updated to [facility].    Sources used to complete the med history include out patient fill history, OARRS, and patient interview   Reliable historian     Below are additional concerns with the patient's PTA list.  N/A    Homar Garcia PharmD  Transitions of Care Pharmacist  [facility]  Please reach out via Secure Chat for questions, or if no response call MOGL or [facility]

## 2024-09-02 NOTE — H&P
HPI:  Patient is a 59 year old female with PMH significant for HFpEF, Anxiety/MDD, Asthma, OPAL, GERD, recurrent abdominal pain (likely secondary to multiple abdominal surgeries, i.e.  Sherita-en-Y, C-sections; possible functional as well) presenting for abdominal pain. She reports that she has had chronic abdominal pain that comes and goes throughout the day for years and the pain has become unbearable. She has been admitted to the hospital in the past for similar complaints and the assessment has been that this is likely secondary to her multiple abdominal surgeries. She has outpatient care for this set up and is followed by GI with plans for EGD in the near future and possible surgery for lysis of adhesions. She says that the pain hasn't changed in character or intensity but she is unable to deal with it and therefore she presents today. She tries advil at home with no benefit. There is no precipitating cause of the pain and it comes and goes regardless of her oral intake and is not associated with any change in character of bowel movements. There is no fever or focal pain associated with it; it is describes as a diffuse soreness.   On presentation to the ED she did report a fall on her knees for which she underwent imaging which was unremarkable. She additionally was noted to be hypoxic, requiring 6L of O2 to maintain saturation above 88% in the setting of RA at baseline. She endorses feeling that she has been wheezing, but doesn't think this is a major change from her baseline. She denies smoking personally, but there are family members who smoke in the house and she does have several animals living in the house as well. She has been feeling hot and sweaty today, but denies fevers, productive cough, or pain with breathing. She does not know of any recent sick contacts.     VS on presentation:  T 36.4 P 76 BP 97/74 80% on RA    Labs:  CBC: 16/11.4/306  RFP: 141/4.7/107/24/31/1.65<139   ALT 7 AST 12 Bili  "12  BNP 27 Lipase 26  VBG: pH 7.26/ pCO2 56 Lactate 1    CT Angio:  IMPRESSION:  1. No evidence of acute pulmonary embolism to proximal segmental level secondary to poor contrast timing. Please note that, assessment of distal segmental and subsegmental branches is limited and small peripheral emboli are not entirely excluded.  2. No acute cardiopulmonary process.  3. There are prominent aortic valvular califications seen, and correlate with concern for aortic stenosis.    CT AP:  IMPRESSION:  1.  Diffuse small bowel wall thickening with adjacent mesenteric stranding, concerning for enteritis. No evidence of bowel perforation or loculated fluid collection.  2. Status post Sherita-en-Y gastric bypass with intact appearing suture lines.    XR Knee:  IMPRESSION:  1.  No acute osseous injury of the right knee.  2. Moderate tricompartmental osteoarthrosis.    ED Interventions:  DuoNebs  Methylprednisolone 125mg  Zofran IV 4mg  Famotidine  500mL NS  CTX 1g    /73 (BP Location: Right arm, Patient Position: Lying)   Pulse 85   Temp 36.4 °C (97.5 °F) (Temporal)   Resp 16   Ht 1.6 m (5' 3\")   Wt 147 kg (325 lb)   SpO2 94%   BMI 57.57 kg/m²     PE:    GEN:  A&Ox3, no acute distress,  EYES: EOMI, non-injected sclera  CARDIO: Normal rate and regular rhythm. No murmurs, rubs, or gallops.  .   PULM: diffuse wheezing bilaterally   GI: Soft, mildly tender on deep palpation  SKIN: scattered ruptured lesions on bilateral shoulders  MSK: ROM intact the extremities without contractures.   EXT: No peripheral edema, contusions, or wounds.   NEURO: Cranial nerves II-XII grossly intact. Sensation to light touch intact and equal bilaterally in upper and lower extremities.  Symmetric 5/5 strength in upper and lower extremities.  PSYCH: Appropriate mood and behavior, converses and responds appropriately during exam      Assessment:  Patient is a 59 year old female with PMH significant for HFpEF, Anxiety/MDD, Asthma, OPAL, GERD, " recurrent abdominal pain (likely secondary to multiple abdominal surgeries, i.e.  Sherita-en-Y, C-sections; possible functional as well) presenting for chronic abdominal pain that has not recently changed in quality, likely representing persistence of her known chronic sequela of multiple abdominal surgeries. She is also presenting with new hypoxia and wheezing, likely representing poorly controlled asthma/exacerbation, in addition to OPAL/OHS not adequately treated. VBG consistent with respiratory acidosis. Low concern for pneumonia at this point as patient does not have cough, fevers, or consolidation on imaging. Will treat as asthma exacerbation for now, but will monitor for worsening and have low threshold to broaden antibiotic coverage.     #Asthma exacerbation  #OPAL, OHS  :: PFTs and sleep study not found in chart  - DuoNebs q6H  - continue home Breo Ellipta  - Azithromycin 500 x 3d  - Prednisone 40 x 4 more days  - f/u sputum culture and viral panel  - f/u Blood cultures  - continuous pulse ox  - CPAP at night  - repeat VBG in the AM if on CPAP overnight  - needs pulmonary follow up for PFTs, sleep study    #Abdominal pain  :: Patient is describing her known chronic pain with no symptoms of fevers, bloody stools, diarrhea, association with eating and a benign abdominal exam w/ enteritis seen on CT; likely represents sequela of abdominal surgeries with adhesions, etc.   - continue home Pantoprazole  - continue home Sucralfate with meals  - Oxycodone 5mg prn  - may benefit from referral to pain management on discharge  - follow up with GI as outpatient, continue their ongoing workup    #Skin lesions  - wound care  - wound culture     #MDD/GERRY  - continue home Lexapro 20mg    #HTN   - Hold home Amlodipine  - Hold home HCTZ  - Hold home Lisinopril    #SPENSER:  :: Creatinine 1.65 on admission, normal baseline  - s/p 500mL NS in the ED   - f/u RFP in the AM, repeat fluid bolus as needed    #Anemia  - continue home B12  -  continue homeFerrous sulfate  - outpatient GI workup    #Arthritis  - continue home Diclofenac gel  - continue home Gabapentin 300 QHS    Code status - FULL CODE

## 2024-09-02 NOTE — CARE PLAN
The patient's goals for the shift include      The clinical goals for the shift include Pt will remain safe and free from injury

## 2024-09-02 NOTE — CARE PLAN
Problem: Respiratory  Goal: Clear secretions with interventions this shift  Outcome: Progressing  Goal: Minimize anxiety/maximize coping throughout shift  Outcome: Progressing  Goal: Minimal/no exertional discomfort or dyspnea this shift  Outcome: Progressing  Goal: No signs of respiratory distress (eg. Use of accessory muscles. Peds grunting)  Outcome: Progressing  Goal: Patent airway maintained this shift  Outcome: Progressing  Goal: Tolerate mechanical ventilation evidenced by VS/agitation level this shift  Outcome: Progressing  Goal: Tolerate pulmonary toileting this shift  Outcome: Progressing  Goal: Verbalize decreased shortness of breath this shift  Outcome: Progressing  Goal: Wean oxygen to maintain O2 saturation per order/standard this shift  Outcome: Progressing  Goal: Increase self care and/or family involvement in next 24 hours  Outcome: Progressing     Problem: Skin  Goal: Decreased wound size/increased tissue granulation at next dressing change  Outcome: Progressing  Goal: Participates in plan/prevention/treatment measures  Outcome: Progressing  Goal: Prevent/manage excess moisture  Outcome: Progressing  Goal: Prevent/minimize sheer/friction injuries  Outcome: Progressing  Goal: Promote/optimize nutrition  Outcome: Progressing  Goal: Promote skin healing  Outcome: Progressing     Problem: Pain  Goal: Takes deep breaths with improved pain control throughout the shift  Outcome: Progressing  Goal: Turns in bed with improved pain control throughout the shift  Outcome: Progressing  Goal: Walks with improved pain control throughout the shift  Outcome: Progressing  Goal: Performs ADL's with improved pain control throughout shift  Outcome: Progressing  Goal: Participates in PT with improved pain control throughout the shift  Outcome: Progressing  Goal: Free from opioid side effects throughout the shift  Outcome: Progressing  Goal: Free from acute confusion related to pain meds throughout the shift  Outcome:  Progressing     Problem: Pain - Adult  Goal: Verbalizes/displays adequate comfort level or baseline comfort level  Outcome: Progressing     Problem: Safety - Adult  Goal: Free from fall injury  Outcome: Progressing     Problem: Discharge Planning  Goal: Discharge to home or other facility with appropriate resources  Outcome: Progressing     Problem: Chronic Conditions and Co-morbidities  Goal: Patient's chronic conditions and co-morbidity symptoms are monitored and maintained or improved  Outcome: Progressing     Problem: Fall/Injury  Goal: Not fall by end of shift  Outcome: Progressing  Goal: Be free from injury by end of the shift  Outcome: Progressing  Goal: Verbalize understanding of personal risk factors for fall in the hospital  Outcome: Progressing  Goal: Verbalize understanding of risk factor reduction measures to prevent injury from fall in the home  Outcome: Progressing  Goal: Use assistive devices by end of the shift  Outcome: Progressing  Goal: Pace activities to prevent fatigue by end of the shift  Outcome: Progressing   The patient's goals for the shift include      The clinical goals for the shift include patient will remain HMDS throughout shift

## 2024-09-02 NOTE — CARE PLAN
The patient's goals for the shift include      The clinical goals for the shift include Pt will remain safe and free from injury    Over the shift, the patient did make progress toward the following goals.   Pt is currently on 4L NC. Spo2 94%.     Problem: Respiratory  Goal: Clear secretions with interventions this shift  Outcome: Progressing  Goal: Minimize anxiety/maximize coping throughout shift  Outcome: Progressing  Goal: Minimal/no exertional discomfort or dyspnea this shift  Outcome: Progressing  Goal: No signs of respiratory distress (eg. Use of accessory muscles. Peds grunting)  Outcome: Progressing  Goal: Patent airway maintained this shift  Outcome: Progressing  Goal: Tolerate mechanical ventilation evidenced by VS/agitation level this shift  Outcome: Progressing  Goal: Tolerate pulmonary toileting this shift  Outcome: Progressing  Goal: Verbalize decreased shortness of breath this shift  Outcome: Progressing  Goal: Wean oxygen to maintain O2 saturation per order/standard this shift  Outcome: Progressing  Goal: Increase self care and/or family involvement in next 24 hours  Outcome: Progressing     Problem: Skin  Goal: Decreased wound size/increased tissue granulation at next dressing change  Outcome: Progressing  Goal: Participates in plan/prevention/treatment measures  Outcome: Progressing  Goal: Prevent/manage excess moisture  Outcome: Progressing  Goal: Prevent/minimize sheer/friction injuries  Outcome: Progressing  Goal: Promote/optimize nutrition  Outcome: Progressing  Goal: Promote skin healing  Outcome: Progressing     Problem: Pain  Goal: Takes deep breaths with improved pain control throughout the shift  Outcome: Progressing  Goal: Turns in bed with improved pain control throughout the shift  Outcome: Progressing  Goal: Walks with improved pain control throughout the shift  Outcome: Progressing  Goal: Performs ADL's with improved pain control throughout shift  Outcome: Progressing  Goal:  Participates in PT with improved pain control throughout the shift  Outcome: Progressing  Goal: Free from opioid side effects throughout the shift  Outcome: Progressing  Goal: Free from acute confusion related to pain meds throughout the shift  Outcome: Progressing     Problem: Pain - Adult  Goal: Verbalizes/displays adequate comfort level or baseline comfort level  Outcome: Progressing     Problem: Safety - Adult  Goal: Free from fall injury  Outcome: Progressing     Problem: Discharge Planning  Goal: Discharge to home or other facility with appropriate resources  Outcome: Progressing     Problem: Chronic Conditions and Co-morbidities  Goal: Patient's chronic conditions and co-morbidity symptoms are monitored and maintained or improved  Outcome: Progressing     Problem: Fall/Injury  Goal: Not fall by end of shift  Outcome: Progressing  Goal: Be free from injury by end of the shift  Outcome: Progressing  Goal: Verbalize understanding of personal risk factors for fall in the hospital  Outcome: Progressing  Goal: Verbalize understanding of risk factor reduction measures to prevent injury from fall in the home  Outcome: Progressing  Goal: Use assistive devices by end of the shift  Outcome: Progressing  Goal: Pace activities to prevent fatigue by end of the shift  Outcome: Progressing

## 2024-09-02 NOTE — SIGNIFICANT EVENT
RAPID RESPONSE RN NOTE- RADAR 6   09/02/24 1530   Onset Documentation   Rapid Response Initiated By Radar auto page   Location/Room [facility]  (LT [id number])   Pager Time 1530   Arrival Time 1532   Event End Time 1540   Primary Reason for Call Radar auto page  (RADAR 6)     VS: 36.7, 82, 21, 131/66, 92%.  Reviewed VS with Naima RN - VS consistent with trend for patient.  No acute concerns from Nursing at this time.  RN to contact Rapid Response Team with any further issues or patient deterioration.

## 2024-09-03 LAB
ALBUMIN SERPL BCP-MCNC: 3.7 G/DL (ref 3.4–5)
ANION GAP SERPL CALC-SCNC: 11 MMOL/L (ref 10–20)
BUN SERPL-MCNC: 46 MG/DL (ref 6–23)
CALCIUM SERPL-MCNC: 7.4 MG/DL (ref 8.6–10.6)
CHLORIDE SERPL-SCNC: 108 MMOL/L (ref 98–107)
CO2 SERPL-SCNC: 28 MMOL/L (ref 21–32)
CREAT SERPL-MCNC: 1.98 MG/DL (ref 0.5–1.05)
EGFRCR SERPLBLD CKD-EPI 2021: 29 ML/MIN/1.73M*2
GLUCOSE SERPL-MCNC: 94 MG/DL (ref 74–99)
INR PPP: 1 (ref 0.9–1.1)
PHOSPHATE SERPL-MCNC: 6.1 MG/DL (ref 2.5–4.9)
POTASSIUM SERPL-SCNC: 4.7 MMOL/L (ref 3.5–5.3)
PROTHROMBIN TIME: 11 SECONDS (ref 9.8–12.8)
SODIUM SERPL-SCNC: 142 MMOL/L (ref 136–145)

## 2024-09-03 PROCEDURE — 2500000005 HC RX 250 GENERAL PHARMACY W/O HCPCS

## 2024-09-03 PROCEDURE — 2500000002 HC RX 250 W HCPCS SELF ADMINISTERED DRUGS (ALT 637 FOR MEDICARE OP, ALT 636 FOR OP/ED)

## 2024-09-03 PROCEDURE — 94640 AIRWAY INHALATION TREATMENT: CPT

## 2024-09-03 PROCEDURE — 84100 ASSAY OF PHOSPHORUS: CPT | Performed by: STUDENT IN AN ORGANIZED HEALTH CARE EDUCATION/TRAINING PROGRAM

## 2024-09-03 PROCEDURE — 85610 PROTHROMBIN TIME: CPT | Performed by: STUDENT IN AN ORGANIZED HEALTH CARE EDUCATION/TRAINING PROGRAM

## 2024-09-03 PROCEDURE — 2500000004 HC RX 250 GENERAL PHARMACY W/ HCPCS (ALT 636 FOR OP/ED)

## 2024-09-03 PROCEDURE — 2500000001 HC RX 250 WO HCPCS SELF ADMINISTERED DRUGS (ALT 637 FOR MEDICARE OP): Performed by: STUDENT IN AN ORGANIZED HEALTH CARE EDUCATION/TRAINING PROGRAM

## 2024-09-03 PROCEDURE — 1100000001 HC PRIVATE ROOM DAILY

## 2024-09-03 PROCEDURE — 2500000001 HC RX 250 WO HCPCS SELF ADMINISTERED DRUGS (ALT 637 FOR MEDICARE OP)

## 2024-09-03 PROCEDURE — 99233 SBSQ HOSP IP/OBS HIGH 50: CPT | Performed by: STUDENT IN AN ORGANIZED HEALTH CARE EDUCATION/TRAINING PROGRAM

## 2024-09-03 PROCEDURE — 36415 COLL VENOUS BLD VENIPUNCTURE: CPT | Performed by: STUDENT IN AN ORGANIZED HEALTH CARE EDUCATION/TRAINING PROGRAM

## 2024-09-03 RX ORDER — GUAIFENESIN 100 MG/5ML
200 SOLUTION ORAL EVERY 4 HOURS PRN
Status: DISCONTINUED | OUTPATIENT
Start: 2024-09-03 | End: 2024-09-05 | Stop reason: HOSPADM

## 2024-09-03 ASSESSMENT — COGNITIVE AND FUNCTIONAL STATUS - GENERAL
MOBILITY SCORE: 18
MOVING FROM LYING ON BACK TO SITTING ON SIDE OF FLAT BED WITH BEDRAILS: A LITTLE
WALKING IN HOSPITAL ROOM: A LITTLE
CLIMB 3 TO 5 STEPS WITH RAILING: A LITTLE
CLIMB 3 TO 5 STEPS WITH RAILING: A LITTLE
WALKING IN HOSPITAL ROOM: A LITTLE
MOVING TO AND FROM BED TO CHAIR: A LITTLE
DAILY ACTIVITIY SCORE: 24
HELP NEEDED FOR BATHING: A LITTLE
MOVING FROM LYING ON BACK TO SITTING ON SIDE OF FLAT BED WITH BEDRAILS: A LITTLE
DAILY ACTIVITIY SCORE: 22
TURNING FROM BACK TO SIDE WHILE IN FLAT BAD: A LITTLE
STANDING UP FROM CHAIR USING ARMS: A LITTLE
DRESSING REGULAR LOWER BODY CLOTHING: A LITTLE
MOBILITY SCORE: 18
MOVING TO AND FROM BED TO CHAIR: A LITTLE
STANDING UP FROM CHAIR USING ARMS: A LITTLE
TURNING FROM BACK TO SIDE WHILE IN FLAT BAD: A LITTLE

## 2024-09-03 ASSESSMENT — PAIN SCALES - WONG BAKER: WONGBAKER_NUMERICALRESPONSE: NO HURT

## 2024-09-03 ASSESSMENT — PAIN - FUNCTIONAL ASSESSMENT: PAIN_FUNCTIONAL_ASSESSMENT: 0-10

## 2024-09-03 ASSESSMENT — PAIN SCALES - GENERAL
PAINLEVEL_OUTOF10: 9
PAINLEVEL_OUTOF10: 3
PAINLEVEL_OUTOF10: 0 - NO PAIN

## 2024-09-03 ASSESSMENT — PAIN DESCRIPTION - LOCATION: LOCATION: ABDOMEN

## 2024-09-03 NOTE — PROGRESS NOTES
9/3/2024 Care Coordination  Jessica Smith is a 59 y.o. female on day 2 of admission presenting with Acute hypoxic respiratory failure (Multi).  Met with pt.  Erwin she is mostly independent with IADL's .  Uses a walker at home.  Daughter lives nearby. C/o of anya knee pain.  Denies  [facility] and home 02.  Dr. Marie is her PCP.    9/4/2024 Care Coordination  Walking pulse ox done today .  She qualifies for home 02.   Pt did not have a preference for DME/02 companies.    Sent to [employer].  Accepted.,  provided pt with a portable 02 cylinder and then will provide the remainder of the set up once she arrives home today.   Provided pt with contact numbers for health care solutions.     9/5/2024 Care Coordination  Pt's discharge was delayed.  Call to daughter this morning.  States she is unable to pick her mother up.  Will discharge to daughter's home.  [address]  [phone], [phone].  [facility] Vehicle you requested for Jessica JACOB in unit/room LT [id number] on 09/05/2024 is scheduled to arrive at 12:00pm EDT! [facility] is handling this ride and you can contact them at [phone].  Daughter aware of transport.

## 2024-09-03 NOTE — PROGRESS NOTES
Assessment/Plan     Jessica Smith is a 59 year old female with PM significant for HFpEF, Anxiety/MDD, Asthma, OPAL, GERD, recurrent abdominal pain in setting of multiple abdominal surgeries, including Sherita-en-Y, C-sections presenting for chronic abdominal pain that has not recently changed in quality, likely representing persistence of her known chronic sequela of multiple abdominal surgeries. She is also presenting with new hypoxemia and wheezing, likely poorly controlled asthma/exacerbation, in addition to OPAL/OHS not adequately treated. VBG consistent with respiratory acidosis. Low concern for pneumonia at this point as patient does not have cough, fevers, or consolidation on imaging. CT with evidence of enteritis, no acute pulm process or PE. Wheezing on exam, c/w asthma/copd exacerbation. No h/o PFTs or sleep study per patient.     - NO concern for c diff   - Treating copd/asthma   -Abd pain chronic, likely an acute component from enteritis given diarrhea.   - Also with lucy, hyper K, repeating and giving fluids. Ordered lokelma  -Needs pfts/sleep study op      Time 50 min         ----------------------------------------------------  Patient is a 59 year old female with PMH significant for HFpEF, Anxiety/MDD, Asthma, OPAL, GERD, recurrent abdominal pain in setting of multiple abdominal surgeries, including Sherita-en-Y, C-sections presenting for chronic abdominal pain that has not recently changed in quality, likely representing persistence of her known chronic sequela of multiple abdominal surgeries. She is also presenting with new hypoxemia and wheezing, likely poorly controlled asthma/exacerbation, in addition to OPAL/OHS not adequately treated. VBG consistent with respiratory acidosis. Low concern for pneumonia at this point as patient does not have cough, fevers, or consolidation on imaging.  CT with evidence of enteritis, no acute pulm process or PE. Wheezing on exam, c/w asthma/copd exacerbation. No h/o PFTs  or sleep study per patient.      #Asthma exacerbation with acute hypoxemia respiratory failure  #OPAL, OHS  - afebrile, noted leukocytosis.   - wheezing on exam, saturating 97% on 3L.   - DuoNebs q6H, IS.   - continue home Breo Ellipta  - Azithromycin 500 x 3d  - Prednisone 40 x 4 more days  - f/u sputum culture and viral panel, covid  - f/u Blood cultures  - continuous pulse ox  - CPAP at night  - needs pulmonary follow up for PFTs, sleep study. No history.      #Abdominal pain with evidence of enteritis  #episode of diarrhea/incontinence.   - Patient is describing her known chronic pain with no symptoms of fevers, bloody stools, diarrhea, association with eating and a benign abdominal exam w/ enteritis seen on CT; had gastroenteritis on last ct but prior ct's without evidence.  However, pt had one episode yesterday of diarrhea with incontinence, no prior episodes of incont.   - continue home Pantoprazole  - continue home Sucralfate with meals  - stool studies pending  - Oxycodone 5mg prn  - may benefit from referral to pain management on discharge  - follow up with GI as outpatient, continue their ongoing workup        #SPENSER:  - Creatinine 1.65 on admission, normal baseline  - s/p 500mL NS in the ED   - f/u RFP in the AM, repeat fluid bolus as needed    #Liver disease?  - prior ct noted cirrhosis/nodular contour, subsequent ct did not mention. Currently noted upper limit of normal size.   - check inr, albumin  - US liver    #Skin lesions  - wound care  - wound culture     #MDD/GERRY  - continue home Lexapro 20mg     #HTN   - Hold home Amlodipine  - Hold home HCTZ  - Hold home Lisinopril       #Anemia  - continue home B12  - continue homeFerrous sulfate  - outpatient GI workup     #Arthritis  - continue home Diclofenac gel  - continue home Gabapentin 300 QHS     Code status - FULL CODE  Scheduled outpatient appointments in system:   Future Appointments   Date Time Provider Department Center   1/23/2025 10:00 AM Ruddy  "[id number]     ---------------------------------------------------------------------------------------------------  Subjective   To follow      ---------------------------------------------------------------------------------------------------  Objective   Last Recorded Vitals  Blood pressure 141/75, pulse 79, temperature 36.1 °C (97 °F), resp. rate 18, height 1.6 m (5' 3\"), weight (!) 154 kg (339 lb 1.6 oz), SpO2 91%.  Intake/Output last 3 Shifts:  I/O last 3 completed shifts:  In: 100 (0.7 mL/kg) [P.O.:100]  Out: 0 (0 mL/kg)   Weight: 153.8 kg     Physical Exam  Vitals and nursing note reviewed.   Constitutional:       General: She is not in acute distress.     Appearance: Normal appearance. She is obese. She is not ill-appearing or toxic-appearing.   HENT:      Head: Normocephalic and atraumatic.      Mouth/Throat:      Mouth: Mucous membranes are moist.   Eyes:      General: No scleral icterus.     Extraocular Movements: Extraocular movements intact.      Conjunctiva/sclera: Conjunctivae normal.   Cardiovascular:      Rate and Rhythm: Normal rate and regular rhythm.      Heart sounds: S1 normal and S2 normal. No murmur heard.  Pulmonary:      Effort: Pulmonary effort is normal. No respiratory distress.      Breath sounds: No wheezing, rhonchi or rales.   Abdominal:      General: Bowel sounds are normal. There is no distension.      Palpations: Abdomen is soft.      Tenderness: There is no abdominal tenderness. There is no guarding or rebound.   Musculoskeletal:         General: No swelling or deformity.      Cervical back: Neck supple.   Skin:     General: Skin is warm and dry.      Findings: No rash.   Neurological:      General: No focal deficit present.      Mental Status: She is alert. Mental status is at baseline.   Psychiatric:         Mood and Affect: Mood normal.         Relevant Results  Lab Results   Component Value Date    WBC 15.0 (H) 09/02/2024    HGB 10.4 (L) 09/02/2024    HCT 36.5 " 09/02/2024    MCV 97 09/02/2024     09/02/2024      Lab Results   Component Value Date    GLUCOSE 124 (H) 09/02/2024    CALCIUM 7.9 (L) 09/02/2024     09/02/2024    K 6.2 (HH) 09/02/2024    CO2 22 09/02/2024     09/02/2024    BUN 42 (H) 09/02/2024    CREATININE 1.87 (H) 09/02/2024     Scheduled medications  azithromycin, 500 mg, intravenous, q24h  cyanocobalamin, 1,000 mcg, oral, Daily  diclofenac sodium, 4 g, Topical, 4x daily  enoxaparin, 60 mg, subcutaneous, q12h SUNSHINE  escitalopram, 20 mg, oral, Daily  ferrous sulfate (325 mg ferrous sulfate), 1 tablet, oral, Every other day  fluticasone furoate-vilanteroL, 1 puff, inhalation, Daily  gabapentin, 300 mg, oral, Nightly  melatonin, 10 mg, oral, Nightly  oxygen, , inhalation, Continuous - Inhalation  pantoprazole, 40 mg, oral, Daily  predniSONE, 40 mg, oral, Daily  sucralfate, 1 g, oral, TID AC      Continuous medications     PRN medications  PRN medications: ipratropium-albuteroL, oxyCODONE, oxygen, polyethylene glycol    Vani Gaines MD

## 2024-09-03 NOTE — CARE PLAN
The clinical goals for the shift include patient will remain HDS this shift    Problem: Respiratory  Goal: Clear secretions with interventions this shift  Outcome: Progressing  Goal: Minimize anxiety/maximize coping throughout shift  Outcome: Progressing  Flowsheets (Taken 9/3/2024 0406)  Minimize anxiety/maximize coping throughout shift: Med administration/monitoring of effect     Problem: Skin  Goal: Decreased wound size/increased tissue granulation at next dressing change  Outcome: Progressing  Flowsheets (Taken 9/3/2024 0406)  Decreased wound size/increased tissue granulation at next dressing change: Promote sleep for wound healing

## 2024-09-04 DIAGNOSIS — I10 BENIGN ESSENTIAL HYPERTENSION: Chronic | ICD-10-CM

## 2024-09-04 LAB
ADENOVIRUS RVP, VIRC: NOT DETECTED
BACTERIA SPEC CULT: ABNORMAL
BACTERIA SPEC CULT: ABNORMAL
ENTEROVIRUS/RHINOVIRUS RVP, VIRC: NOT DETECTED
GRAM STN SPEC: ABNORMAL
GRAM STN SPEC: ABNORMAL
HUMAN BOCAVIRUS RVP, VIRC: NOT DETECTED
HUMAN CORONAVIRUS RVP, VIRC: NOT DETECTED
INFLUENZA A , VIRC: NOT DETECTED
INFLUENZA A H1N1-09 , VIRC: NOT DETECTED
INFLUENZA B PCR, VIRC: NOT DETECTED
METAPNEUMOVIRUS , VIRC: NOT DETECTED
PARAINFLUENZA PCR, VIRC: NOT DETECTED
RSV PCR, RVP, VIRC: NOT DETECTED

## 2024-09-04 PROCEDURE — 99233 SBSQ HOSP IP/OBS HIGH 50: CPT | Performed by: STUDENT IN AN ORGANIZED HEALTH CARE EDUCATION/TRAINING PROGRAM

## 2024-09-04 PROCEDURE — 2500000001 HC RX 250 WO HCPCS SELF ADMINISTERED DRUGS (ALT 637 FOR MEDICARE OP): Performed by: STUDENT IN AN ORGANIZED HEALTH CARE EDUCATION/TRAINING PROGRAM

## 2024-09-04 PROCEDURE — 2500000005 HC RX 250 GENERAL PHARMACY W/O HCPCS

## 2024-09-04 PROCEDURE — 2500000004 HC RX 250 GENERAL PHARMACY W/ HCPCS (ALT 636 FOR OP/ED): Performed by: STUDENT IN AN ORGANIZED HEALTH CARE EDUCATION/TRAINING PROGRAM

## 2024-09-04 PROCEDURE — 2500000004 HC RX 250 GENERAL PHARMACY W/ HCPCS (ALT 636 FOR OP/ED)

## 2024-09-04 PROCEDURE — 99255 IP/OBS CONSLTJ NEW/EST HI 80: CPT | Performed by: PSYCHIATRY & NEUROLOGY

## 2024-09-04 PROCEDURE — 2500000001 HC RX 250 WO HCPCS SELF ADMINISTERED DRUGS (ALT 637 FOR MEDICARE OP)

## 2024-09-04 PROCEDURE — 2500000002 HC RX 250 W HCPCS SELF ADMINISTERED DRUGS (ALT 637 FOR MEDICARE OP, ALT 636 FOR OP/ED)

## 2024-09-04 PROCEDURE — 1100000001 HC PRIVATE ROOM DAILY

## 2024-09-04 PROCEDURE — RXMED WILLOW AMBULATORY MEDICATION CHARGE

## 2024-09-04 RX ORDER — PREDNISONE 20 MG/1
40 TABLET ORAL DAILY
Qty: 2 TABLET | Refills: 0 | Status: SHIPPED | OUTPATIENT
Start: 2024-09-05 | End: 2024-09-06

## 2024-09-04 RX ORDER — ACETAMINOPHEN 325 MG/1
325 TABLET ORAL EVERY 4 HOURS PRN
Qty: 30 TABLET | Refills: 0 | Status: SHIPPED | OUTPATIENT
Start: 2024-09-04

## 2024-09-04 RX ORDER — POLYETHYLENE GLYCOL 3350 17 G/17G
17 POWDER, FOR SOLUTION ORAL 2 TIMES DAILY PRN
Qty: 30 EACH | Refills: 0 | Status: SHIPPED | OUTPATIENT
Start: 2024-09-04

## 2024-09-04 RX ORDER — GABAPENTIN 100 MG/1
100 CAPSULE ORAL 2 TIMES DAILY
Status: DISCONTINUED | OUTPATIENT
Start: 2024-09-04 | End: 2024-09-05

## 2024-09-04 RX ORDER — HYDROXYZINE HYDROCHLORIDE 25 MG/1
25 TABLET, FILM COATED ORAL EVERY 8 HOURS PRN
Status: DISCONTINUED | OUTPATIENT
Start: 2024-09-04 | End: 2024-09-05

## 2024-09-04 RX ORDER — FAMOTIDINE 10 MG/1
10 TABLET ORAL 2 TIMES DAILY
Qty: 60 TABLET | Refills: 0 | Status: SHIPPED | OUTPATIENT
Start: 2024-09-04 | End: 2024-10-05

## 2024-09-04 RX ORDER — PANTOPRAZOLE SODIUM 40 MG/1
40 TABLET, DELAYED RELEASE ORAL DAILY
Qty: 30 TABLET | Refills: 0 | Status: SHIPPED | OUTPATIENT
Start: 2024-09-04 | End: 2024-10-05

## 2024-09-04 RX ORDER — SYRING-NEEDL,DISP,INSUL,0.3 ML 29 G X1/2"
296 SYRINGE, EMPTY DISPOSABLE MISCELLANEOUS ONCE
Status: COMPLETED | OUTPATIENT
Start: 2024-09-04 | End: 2024-09-04

## 2024-09-04 RX ORDER — DEXTROMETHORPHAN HYDROBROMIDE AND GUAIFENESIN 10; 200 MG/1; MG/1
1 CAPSULE, GELATIN COATED ORAL EVERY 4 HOURS PRN
Qty: 28 CAPSULE | Refills: 0 | Status: SHIPPED | OUTPATIENT
Start: 2024-09-04

## 2024-09-04 RX ORDER — ALBUTEROL SULFATE 90 UG/1
2 INHALANT RESPIRATORY (INHALATION) EVERY 6 HOURS PRN
Qty: 18 G | Refills: 11 | Status: SHIPPED | OUTPATIENT
Start: 2024-09-04

## 2024-09-04 RX ORDER — FLUTICASONE PROPIONATE AND SALMETEROL 250; 50 UG/1; UG/1
1 POWDER RESPIRATORY (INHALATION)
Qty: 120 EACH | Refills: 0 | Status: SHIPPED | OUTPATIENT
Start: 2024-09-04 | End: 2024-11-04

## 2024-09-04 RX ORDER — AMLODIPINE BESYLATE 10 MG/1
10 TABLET ORAL DAILY
Qty: 30 TABLET | Refills: 0 | Status: SHIPPED | OUTPATIENT
Start: 2024-09-04 | End: 2024-10-05

## 2024-09-04 RX ORDER — GABAPENTIN 300 MG/1
300 CAPSULE ORAL NIGHTLY
Qty: 30 CAPSULE | Refills: 0 | Status: SHIPPED | OUTPATIENT
Start: 2024-09-04 | End: 2024-09-05

## 2024-09-04 RX ORDER — HYDROMORPHONE HYDROCHLORIDE 1 MG/ML
0.2 INJECTION, SOLUTION INTRAMUSCULAR; INTRAVENOUS; SUBCUTANEOUS ONCE
Status: COMPLETED | OUTPATIENT
Start: 2024-09-04 | End: 2024-09-04

## 2024-09-04 RX ORDER — ALUMINUM HYDROXIDE, MAGNESIUM HYDROXIDE, AND SIMETHICONE 1200; 120; 1200 MG/30ML; MG/30ML; MG/30ML
10 SUSPENSION ORAL ONCE
Status: COMPLETED | OUTPATIENT
Start: 2024-09-04 | End: 2024-09-04

## 2024-09-04 ASSESSMENT — COGNITIVE AND FUNCTIONAL STATUS - GENERAL
STANDING UP FROM CHAIR USING ARMS: A LITTLE
CLIMB 3 TO 5 STEPS WITH RAILING: A LOT
TOILETING: A LITTLE
MOBILITY SCORE: 17
HELP NEEDED FOR BATHING: A LOT
DRESSING REGULAR UPPER BODY CLOTHING: A LITTLE
WALKING IN HOSPITAL ROOM: A LOT
DAILY ACTIVITIY SCORE: 18
DRESSING REGULAR LOWER BODY CLOTHING: A LOT
TURNING FROM BACK TO SIDE WHILE IN FLAT BAD: A LITTLE
MOVING TO AND FROM BED TO CHAIR: A LITTLE

## 2024-09-04 ASSESSMENT — PAIN SCALES - GENERAL
PAINLEVEL_OUTOF10: 2
PAINLEVEL_OUTOF10: 9
PAINLEVEL_OUTOF10: 10 - WORST POSSIBLE PAIN

## 2024-09-04 NOTE — PROGRESS NOTES
"Spiritual Care Visit    Clinical Encounter Type  Visited With: Patient  Routine Visit: Introduction  Continue Visiting: Yes  Referral From: Nurse  Referral To:          Values/Beliefs  Cultural Requests During Hospitalization: none noted  Spiritual Requests During Hospitalization: support, comfort         Patient Spiritual Care Encounters  Suffering Severity: Moderate  Fear Level: Moderate  Feelings of Loneliness: Fair  Feelings of Hopelessness: Fair  Coping: Often demonstrated              PC-7 Assessment (Level of Unmet Needs)  Existential Struggle: Further assess  Spiritual/Muslim Struggle: Further assess  Legacy: Further assess  Relationships: Further assess  Fear of Death/Dying: Further assess  Values/Medical Decision Making: Further assess  Ritual/Other: Further assess  PC-7 Score: 0    SDAT (Spiritual Distress Assessment Tool)  Need for Life Balance: Some evidence of unmet spiritual need  Need for Connection: No evidence of unmet spiritual need  Need for Values Acknowledgement: Some evidence of unmet spiritual need  Need to Maintain Control: Some evidence of unmet spiritual need  Need to Maintain Identity: Some evidence of unmet spiritual need  SDAT Score: 4  SDAT Average Score: 0.8    Taxonomy  Intended Effects: Convey a calming presence, Demonstrate caring and concern  Methods: Demonstrate acceptance, Offer emotional support, Offer spiritual/Samaritan support  Interventions: Discuss concerns, Share words of hope and inspiration  Initial spiritual care visit with patient this morning. Patient came in with \"trouble breathing\"she said. She hopes to feel better soon. She shared that she has family support. Patient declined having any needs at present.  provided words of support and encouragement and will follow. Pt expressed gratitude for my visit.    "

## 2024-09-04 NOTE — NURSING NOTE
Walking Pulse Ox.     Patient remained on 2L NC throughout the whole test and stated she was dizzy and SOB the entire 6 minutes of walking with a walker.     Restin bpm, 92%    While Walkin bpm, 83-85%    After Walkin bpm, 81%     Patient took 2-3 minutes of sitting to come back up to 90% on the 2L       Insurance qualification:    At rest on room air: 88%  With exertion on Room air: <81%  With exertion on normal O2: 81% (2L)

## 2024-09-04 NOTE — CONSULTS
"Inpatient consult to Psychiatry  Consult performed by: Zarina Summers DO  Consult ordered by: Vani Gaines MD  Reason for consult: Anxiety      HISTORY OF PRESENT ILLNESS:  Ms. Jessica Smith is a 58 YO F with Past Med Hx of  HFpEF ( EF: 36.8% on 3/2019), Asthma, OPAL, Sherita-en-Y c/b chronic abdominal pain and adhesions and Past Psych Hx of GERRY, MDD admitted on 9/01 for acute hypoxic respiratory failure. Psychiatry consulted for anxiety.    On chart review, initially admitted on 9/1, requiring 6 L of O2, noted to behaving significant anxiety 2/2 SOB.    On interview, pt is tearful states she has been very overwhelmed in the setting of multiple medical conditions.  Endorsed disturbed sleep, anhedonia and poor appetite. States she is frustrated that she is not able to be more independent and not able to do activities she used to enjoy. She is worried she has to rely on her younger daughter for help. Says she does not want to be a burden to her daughters, and have her children resent her. States that they just came back into her life recently, and she wants to maintain that relationship. Says she has been sad for \" a long time\". States prior to hospitalization fell after having a \" panic attack\", at this time she told her daughter that \" she just wanted to die\". At the time of interview she denies suicidal ideation, however says she is sick of living like this and just wants to get stronger.      Describes as \" panic attacks\" where she has becomes sweaty, dizzy and her heart starts racing after she uses the bathroom. States this has happened multiple times, and has fallen afterwards, denies LOC.    States she has been taking Lexapro 20 mg daily for many years now. Says she was scheduled to see a therapist in the past, however was unable to follow up 2/2 to dealing with medical conditions.        PSYCHIATRIC REVIEW OF SYSTEMS  Depression: depressed mood, sleep disturbance: nightmares, feelings of worthlessness " or guilt, and tearfulness  Anxiety: excessive worry that is difficult to control, sleep disturbance, and panic attacks: tachycardia and diaphoresis  Mihaela: negative  Psychosis: negative  Delirium: negative   Trauma: negative    PSYCHIATRIC HISTORY  Prior diagnoses: MDD, GERRY  Prior hospitalizations: denies  History of suicide attempts: denies  History of self-harm: denies  History of trauma/abuse/loss: endorsed- children taken away from her  History of violence: deferred    Current psychiatrist: denies  Current mental health agency: denies  Current : did not assess  Current outpatient treatment: denies  Guardian or payee: did not assess    Current psychiatric medications: Lexapro 20 mg daily  Past psychiatric medications: Denied  Past psychiatric treatments: Scheduled with therapy in the past, unable to attend    Family psychiatric history: deferred         SUBSTANCE USE HISTORY   She reports that she has quit smoking. Her smoking use included cigarettes. She has never used smokeless tobacco. She reports that she does not drink alcohol and does not use drugs.    Tobacco: deferred  Alcohol: deferred  Cannabis: deferred    SOCIAL HISTORY  Social History     Socioeconomic History    Marital status: Single   Tobacco Use    Smoking status: Former     Types: Cigarettes    Smokeless tobacco: Never   Substance and Sexual Activity    Alcohol use: Never    Drug use: Never     Social Determinants of Health     Financial Resource Strain: Medium Risk (9/2/2024)    Overall Financial Resource Strain (CARDIA)     Difficulty of Paying Living Expenses: Somewhat hard   Food Insecurity: Food Insecurity Present (8/5/2024)    Hunger Vital Sign     Worried About Running Out of Food in the Last Year: Often true     Ran Out of Food in the Last Year: Never true   Transportation Needs: Unmet Transportation Needs (9/2/2024)    PRAPARE - Transportation     Lack of Transportation (Medical): Yes     Lack of Transportation  (Non-Medical): Yes   Housing Stability: Low Risk  (9/2/2024)    Housing Stability Vital Sign     Unable to Pay for Housing in the Last Year: No     Number of Times Moved in the Last Year: 0     Homeless in the Last Year: No      Current living situation: Lives with fiance and finance's brother  Current employment/source of income: disability  Current stressors: Medical conditions      Family: 2 sisters, engaged   Childhood: 2 adult daughters  Marital status: engaged   Children:  2 adult daughters  Social support: finance and youngest daughter, however states finance works a lot and can't always help with 2 dogs and cat      PAST MEDICAL HISTORY  Past Medical History:   Diagnosis Date    Abdominal adhesions 08/08/2021    Abnormal QT interval present on electrocardiogram 03/18/2021    Formatting of this note might be different from the original. Formatting of this note might be different from the original. -EKG: NSR. QTc 500 Formatting of this note might be different from the original. -EKG: NSR. QTc 500    Acute gastric ulcer without hemorrhage or perforation 01/13/2016    Gastric ulcer, acute    Acute gastrojejunal ulcer 09/07/2007    Formatting of this note might be different from the original. 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    Hypokalemia 09/13/2023    Intertrigo 09/13/2023    Ischemic bowel disease (Multi) 04/01/2021    Personal history of other diseases of the musculoskeletal system and connective tissue 12/08/2015    History of arthritis     Personal history of other diseases of the respiratory system 2015    History of bronchitis    Personal history of other diseases of the respiratory system 2015    History of sinusitis    Personal history of other diseases of the respiratory system 2015    History of acute bronchitis    Personal history of other infectious and parasitic diseases 2015    History of Helicobacter infection    Personal history of other specified conditions 2016    History of diarrhea    Personal history of other specified conditions 2015    History of chest pain    Personal history of other specified conditions 01/10/2014    History of chest pain    Personal history of peptic ulcer disease 2015    History of gastric ulcer    SBO (small bowel obstruction) (Multi) 2023    Sebaceous cyst 2002    Tension headache 2009    Urinary tract infection, site not specified 04/15/2015    UTI (lower urinary tract infection)    Wound dehiscence 2023          PAST SURGICAL HISTORY  Past Surgical History:   Procedure Laterality Date    APPENDECTOMY  2014    Appendectomy     SECTION, CLASSIC  2014     Section    COLONOSCOPY  2018    Colonoscopy (Fiberoptic)    ESOPHAGOGASTRODUODENOSCOPY  2018    Diagnostic Esophagogastroduodenoscopy    GASTRIC BYPASS  2017    Gastric Surgery For Morbid Obesity Gastric Bypass    GASTRIC RESTRICTION SURGERY  2015    Gastric Surgery For Morbid Obesity    OTHER SURGICAL HISTORY  2020    Ventral hernia repair    OTHER SURGICAL HISTORY  2014    Cholecystotomy    TUBAL LIGATION  2014    Tubal Ligation    UMBILICAL HERNIA REPAIR  2014    Umbilical Hernia Repair          FAMILY HISTORY  No family history on file.     ALLERGIES  Aspirin    Some components of the patient's history were obtained through personal review of the patient's available medical records.      OBJECTIVE    VITALS       "9/3/2024     6:42 AM 9/3/2024     7:39 AM 9/3/2024    11:39 AM 9/3/2024     3:39 PM 9/3/2024     8:44 PM 9/4/2024     5:00 AM 9/4/2024     7:41 AM   Vitals   Systolic  141 130 150 146 138 104   Diastolic  75 68 81 81 83 60   Heart Rate  79 78 87 87 87 75   Temp  36.1 °C (97 °F) 36.6 °C (97.9 °F) 35.8 °C (96.4 °F) 36.4 °C (97.5 °F) 36.6 °C (97.9 °F) 36.4 °C (97.5 °F)   Resp  18 16 16 16 16    Weight (lb) 339.1         BMI 60.07 kg/m2         BSA (m2) 2.62 m2              MENTAL STATUS EXAM  Appearance: Black hair in long lizbet, wound on left shoulder, poor dentition, wearing hospital gown  Attitude: Engaged  Behavior: Cooperative  Motor Activity: No EPS or tremor, psychomotor slowing or agitation present  Speech: normal rate, rhythm, spontaneous  Mood: \" Worried\"  Affect: Anxious  Thought Process:  Linear, organized, goal oriented  Thought Content:   Denies suicidal ideation, intent or plan at time of interview, denies HI, delusions absent  Thought Perception: Denies AH/ VH  Cognition: Intact to attention to concentration  Insight: Fair  Judgement: Fair      MEDICAL REVIEW OF SYSTEMS  Review of Systems     HOME MEDICATIONS  Medication Documentation Review Audit       Reviewed by Johny WiseD (Pharmacist) on 09/02/24 at 1344      Medication Order Taking? Sig Documenting Provider Last Dose Status   acetaminophen (Tylenol) 325 mg tablet [id number]  Take 1 tablet (325 mg) by mouth every 4 hours if needed for mild pain (1 - 3). Historical Provider, MD  Active   albuterol 90 mcg/actuation inhaler [id number] Yes Inhale 2 puffs every 6 hours if needed for wheezing or shortness of breath. Terri Amor MD 9/1/2024 Active   amLODIPine (Norvasc) 10 mg tablet [id number] Yes TAKE 1 TABLET BY MOUTH ONCE DAILY Babita Guajardo MD 9/1/2024 Active   cyanocobalamin (Vitamin B-12) 1,000 mcg tablet [id number] Yes Take 1 tablet (1,000 mcg) by mouth once daily. Terri Amor MD 9/1/2024 Active "   dextromethorphan-guaifenesin (Coricidin HBP Chest Bradford-Cough)  mg capsule [id number] Yes Take 1 capsule by mouth every 4 hours if needed (cough/congestion) for up to 7 days. Carmen Vanegas PA-C 2024 Active   diclofenac sodium (Arthritis Pain, diclofenac,) 1 % gel [id number] Yes Apply 4.5 inches (4 g) topically 4 times a day. Terri Amor MD 2024 Active   escitalopram (Lexapro) 20 mg tablet [id number] Yes Take 1 tablet (20 mg) by mouth once daily. Terri Amor MD 2024 Active   famotidine (Pepcid AC) 10 mg tablet [id number]  Take 1 tablet (10 mg) by mouth 2 times a day. Toy Davis MD  Active   ferrous sulfate, 325 mg ferrous sulfate, (FeroSuL) tablet [id number] Yes Take 1 tablet by mouth every other day. Terri Amor MD 2024 Active   fluticasone (Flonase) 50 mcg/actuation nasal spray [id number]  Administer 1 spray into each nostril once daily. Terri Amor MD   24 2359   fluticasone propion-salmeteroL (Advair Diskus) 100-50 mcg/dose diskus inhaler [id number]  Inhale 1 puff 2 times a day. Rinse mouth with water after use to reduce aftertaste and incidence of candidiasis. Do not swallow. Terri Amor MD  Active   fluticasone propion-salmeteroL (Advair Diskus) 500-50 mcg/dose diskus inhaler [id number] No INHALE ONE (1) PUFF BY MOUTH TWICE DAILY *RINSE MOUTH WITH WATER AFTER USE TO REDUCE AFTERTASTE AND INCIDENCE OF CANDIDIASIS. DO NOT SWALLOW*   Patient not taking: Reported on 2024    Terri Amor MD Not Taking  24 2359   gabapentin (Neurontin) 300 mg capsule [id number] No TAKE 1 CAPSULE BY MOUTH DAILY AT BEDTIME Terri Amor MD 2024 Active   guaiFENesin (Mucinex) 1,200 mg tablet extended release 12hr [id number]  Take 1 tablet (1,200 mg) by mouth every 12 hours if needed (cough/congestion). Historical Provider, MD  Active   hydroCHLOROthiazide (HYDRODiuril) 25 mg tablet [id number] Yes  Take 1 tablet once a day Terri Amor MD 9/1/2024 Active   lidocaine (Lidoderm) 5 % patch [id number]  APPLY 1 PATCH TO THE AFFECTED AREA AND LEAVE IN PLACE FOR 12 HOURS, THEN REMOVE AND LEAVE OFF FOR 12 HOURS. Strength: 5 % Terri Amor MD  Active   lisinopril 30 mg tablet [id number] No Take 1 tablet at night, as needed for sleep   Patient taking differently: Take 1 tablet at night    Terri Amor MD 8/31/2024 Active   melatonin 3 mg tablet [id number] No Take 1 tablet (3 mg) by mouth once daily at bedtime. Babita Guajardo MD 8/31/2024 Active   multivit, iron, min. cmb. 5-FA 10-1 mg tablet [id number]  Take 1 mg by mouth once daily. Terri Amor MD  Active   pantoprazole (ProtoNix) 40 mg EC tablet [id number]  Take 1 tablet (40 mg) by mouth once daily. Babita Guajardo MD  Active   polyethylene glycol (Glycolax, Miralax) 17 gram packet [id number]  Take 17 g by mouth 2 times a day. Terri Amor MD  Active   senna 8.6 mg tablet [id number]  TAKE 1 TABLET BY MOUTH TWICE DAILY AS NEEDED FOR CONSTIPATION Terri Amor MD  Active   sucralfate (Carafate) 100 mg/mL suspension [id number]  TAKE 10ML BY MOUTH FOUR TIMES A DAY WITH MEALS Terri Amor MD  Active   walker misc [id number]  1 each once daily. Use daily as needed for ambulation assistance Babita Guajardo MD  Active                     CURRENT MEDICATIONS  Scheduled medications  azithromycin, 500 mg, intravenous, q24h  cyanocobalamin, 1,000 mcg, oral, Daily  diclofenac sodium, 4 g, Topical, 4x daily  enoxaparin, 60 mg, subcutaneous, q12h SUNSHINE  escitalopram, 20 mg, oral, Daily  ferrous sulfate (325 mg ferrous sulfate), 1 tablet, oral, Every other day  fluticasone furoate-vilanteroL, 1 puff, inhalation, Daily  gabapentin, 300 mg, oral, Nightly  melatonin, 10 mg, oral, Nightly  pantoprazole, 40 mg, oral, Daily  predniSONE, 40 mg, oral, Daily  sucralfate, 1 g, oral, TID AC        Continuous medications       PRN  medications  PRN medications: guaiFENesin, ipratropium-albuteroL, oxyCODONE, oxygen, polyethylene glycol     LABS  No results found for this or any previous visit (from the past 24 hour(s)).     IMAGING  No results found.     PSYCHIATRIC RISK ASSESSMENT  Violence Risk Factors:  current psychiatric illness and unemployed  Acute Risk of Harm to Others is Considered: Low  Suicide Risk Factors: chronic medical illness, chronic pain, current psychiatric illness, and anxious ruminations  Protective Factors: strong coping skills, fear of suicide or death, sense of responsibility towards family, social support/connectedness, moral objections to suicide, positive family relationships, hopefulness/future-orientation, and marriage/partnership  Acute Risk of Harm to Self is Considered: Moderate    ASSESSMENT AND PLAN  Ms. Jessica Smith is a 60 YO F with Past Med Hx of  HFpEF ( EF: 36.8% on 3/2019), Asthma, OPAL, Sherita-en-Y c/b chronic abdominal pain and adhesions and Past Psych Hx of GERRY, MDD admitted on 9/01 for acute hypoxic respiratory failure. Psychiatry consulted for anxiety.  On initial assessment, Ms. Smith is tearful, endorsed feelings of guilt, excessive worry, changes in sleep and appetite in the setting of multiple medical conditions and functional limitations. Etiology most consistent with Adjustment disorder with depressed mood vs demoralization, r/o MDD recurrence. Regarding panic attacks, describes sx of tachycardia, diaphoresis after using the bathroom, may consider vasovagal syncope. In order to target anxiety would recommend Gabapentin 100 mg Q12H PRN-1st line and Atarax 25mg Q8H 2nd line. While renal function is impaired, able to receive Gabapentin 900 mg/d with current renal function. Would benefit from outpatient follow up with Psychiatry to manage psychotropics in the future. Given hyperkalemia would recommend a repeat EKG.    IMPRESSION  #Adjustment d/o w/ depressed mood  #Demoralization  #r/o MDD  "recurrent    RECOMMENDATIONS  Safety:  - Patient does not currently meet criteria for inpatient psychiatric admission.   - Patient lacks the capacity to leave AMA at this time and thus cannot leave AMA. Call [facility] if patient attempts to leave AMA.  - To evaluate decision-making capacity, recommend use of the Capacity Evaluation Tool. Search “[facility] Capacity Evaluation\" under SmartText unless the patient has a legal guardian, in which case all decisions per the legal guardian.  - Patient does not require a 1:1 sitter from a psychiatric perspective at this time.  - As with all hospitalized patients, would recommend delirium precautions, as below.    Delirium Guidelines  Provide glasses, hearing aids and/or communication boards as needed for impairments  Frequent reorientation, minimize room and staff changes  Open blinds during the day, dark/quiet room at night   Minimal interruptions and daytime naps  Early evaluation and intervention by PT, out of bed as tolerated  Minimize use of restraints   Minimize use of benzodiazepines, anticholinergic medications, and opiates (while ensuring adequate treatment of pain)  Keep Mg>2, K>4 (as able)  Ensure regular bowel and bladder function (as able)    Medications:  - c/w Lexapro 20mg daily  - Gabapentin 100mg Q12H PRN- 1st line for anxiety  - Atarax 25mg Q8H PRN- 2nd line for anxiety      Work-up:  - EKG (6/24): QTc of 438 m/s  ==========  - Discussed recommendations with primary team.  - Psychiatry will continue to follow.    Thank you for allowing us to participate in the care of this patient. Please page [phone] with any questions or concerns.    Patient seen and staffed with Dr. Villalpando, who agrees with above plan.    Zarina Summers DO, PGY-II  [facility]- Psychiatry    Medication Consent  Medication Consent: n/a; consult service        "

## 2024-09-04 NOTE — CONSULTS
"Nutrition Diet Education:   Nutrition Assessment    Reason for Assessment: Admission nursing screening (stated pt wants to lose weight)    Patient is a 59 y.o. female presenting with abdominal pain, new hypoxemia and wheezing.     Pmhx  HFpEF, Anxiety/MDD, Asthma, OPAL, GERD, recurrent abdominal pain in setting of multiple abdominal surgeries, including Sherita-en-Y, C-sections       Nutrition History:  Energy Intake: Good > 75 %  Food and Nutrient History: Pt reports fairly good appetite and intake. She usually tries to avoid foods that she knows bother her stomach, such as fried foods and spicy foods. She reports eating a lot of sandwiches because she knows she tolerates them.  Denies current dumping syndrome but had problems with initially following GB surgery in 2006 or 2009(she couldn't recall the year).  Food Allergies/Intolerances:   avoids fried and spicy foods  GI Symptoms: Abdominal pain  Oral Problems: None       Anthropometrics:  Height: 160 cm (5' 3\")   Weight: (!) 154 kg (339 lb 1.6 oz)   BMI (Calculated): 60.08  IBW/kg (Dietitian Calculated): 52.3 kg  Percent of IBW: 292 %       Weight History:   Wt Readings from Last 10 Encounters:   09/03/24 (!) 154 kg (339 lb 1.6 oz)   08/05/24 148 kg (326 lb 9.6 oz)   06/07/24 147 kg (324 lb)   05/31/24 147 kg (323 lb)   05/08/24 147 kg (323 lb)   03/14/24 144 kg (318 lb)   03/13/24 144 kg (318 lb)   10/10/23 143 kg (315 lb)   06/09/23 139 kg (307 lb 6.4 oz)   04/28/23 140 kg (308 lb 9.6 oz)      Weight Change %:   Weight has been gradually increasing.     Nutrition Focused Physical Exam Findings:  defer: no concern for malnutrition    Nutrition Significant Labs:  CBC Trend:   Results from last 7 days   Lab Units 09/02/24  1217 09/01/24  1807   WBC AUTO x10*3/uL 15.0* 16.0*   RBC AUTO x10*6/uL 3.78* 4.16   HEMOGLOBIN g/dL 10.4* 11.4*   HEMATOCRIT % 36.5 35.8*   MCV fL 97 86   PLATELETS AUTO x10*3/uL 287 306    , A1C:  Lab Results   Component Value Date    HGBA1C 5.2 " 03/13/2024   , Renal Lab Trend:   Results from last 7 days   Lab Units 09/03/24  0817 09/02/24  1410 09/02/24  1217 09/02/24  1217 09/01/24  1807   POTASSIUM mmol/L 4.7 6.2*  --  6.1* 4.7   PHOSPHORUS mg/dL 6.1* 7.5*   < >  --   --    SODIUM mmol/L 142 140  --  138 141   MAGNESIUM mg/dL  --   --   --  2.31  --    EGFR mL/min/1.73m*2 29* 31*  --  30* 36*   BUN mg/dL 46* 42*  --  41* 31*   CREATININE mg/dL 1.98* 1.87*  --  1.88* 1.65*    < > = values in this interval not displayed.        Nutrition Specific Medications:  B12, ferrous sulfate, prednisone,     I/O:   Last BM Date: 08/31/24;      Dietary Orders (From admission, onward)       Start     Ordered    09/01/24 2331  Adult diet Regular  Diet effective now        Question:  Diet type  Answer:  Regular    09/01/24 2330                     Estimated Needs:   Total Energy Estimated Needs (kCal): 2100 kCal  Method for Estimating Needs: REE = 2087  Total Protein Estimated Needs (g): 80 g  Method for Estimating Needs: 1.5gm./kg IBW  Total Fluid Estimated Needs (mL):  (per team)        Nutrition Diagnosis   Malnutrition Diagnosis  Patient has Malnutrition Diagnosis: No    Nutrition Diagnosis  Patient has Nutrition Diagnosis: No       Nutrition Interventions/Recommendations         Nutrition Prescription:  Individualized Nutrition Prescription Provided for : Continue regular diet. Pt may benefit from referral to outpt dietitian for weight loss counseling and eval of chronic abd pain.        Nutrition Interventions:   Interventions: Meals and snacks  Goal: Consume >75% of meals without abd pain.      Nutrition Education:   Discussed diet for home. Pt not able to give a detailed history but diet appears to lack variety but doesn't appear to be excessive in calories.        Nutrition Monitoring and Evaluation   Food/Nutrient Related History Monitoring  Monitoring and Evaluation Plan: Energy intake, Amount of food  Criteria: PO intake will be adequate to meet needs        Time Spent (min): 60 minutes

## 2024-09-04 NOTE — CARE PLAN
The patient's goals for the shift include      The clinical goals for the shift include Patient will wean to RA by the end of the shift      Problem: Respiratory  Goal: Clear secretions with interventions this shift  Outcome: Progressing  Goal: Minimize anxiety/maximize coping throughout shift  Outcome: Progressing  Goal: Minimal/no exertional discomfort or dyspnea this shift  Outcome: Progressing  Goal: No signs of respiratory distress (eg. Use of accessory muscles. Peds grunting)  Outcome: Progressing  Goal: Patent airway maintained this shift  Outcome: Progressing  Goal: Tolerate mechanical ventilation evidenced by VS/agitation level this shift  Outcome: Progressing  Goal: Tolerate pulmonary toileting this shift  Outcome: Progressing  Goal: Verbalize decreased shortness of breath this shift  Outcome: Progressing  Goal: Wean oxygen to maintain O2 saturation per order/standard this shift  Outcome: Progressing  Goal: Increase self care and/or family involvement in next 24 hours  Outcome: Progressing     Problem: Skin  Goal: Decreased wound size/increased tissue granulation at next dressing change  Outcome: Progressing  Goal: Participates in plan/prevention/treatment measures  Outcome: Progressing  Goal: Prevent/manage excess moisture  Outcome: Progressing  Goal: Prevent/minimize sheer/friction injuries  Outcome: Progressing  Goal: Promote/optimize nutrition  Outcome: Progressing  Goal: Promote skin healing  Outcome: Progressing     Problem: Pain  Goal: Takes deep breaths with improved pain control throughout the shift  Outcome: Progressing  Goal: Turns in bed with improved pain control throughout the shift  Outcome: Progressing  Goal: Walks with improved pain control throughout the shift  Outcome: Progressing  Goal: Performs ADL's with improved pain control throughout shift  Outcome: Progressing  Goal: Participates in PT with improved pain control throughout the shift  Outcome: Progressing  Goal: Free from opioid  side effects throughout the shift  Outcome: Progressing  Goal: Free from acute confusion related to pain meds throughout the shift  Outcome: Progressing     Problem: Pain - Adult  Goal: Verbalizes/displays adequate comfort level or baseline comfort level  Outcome: Progressing     Problem: Safety - Adult  Goal: Free from fall injury  Outcome: Progressing     Problem: Discharge Planning  Goal: Discharge to home or other facility with appropriate resources  Outcome: Progressing     Problem: Chronic Conditions and Co-morbidities  Goal: Patient's chronic conditions and co-morbidity symptoms are monitored and maintained or improved  Outcome: Progressing     Problem: Fall/Injury  Goal: Not fall by end of shift  Outcome: Progressing  Goal: Be free from injury by end of the shift  Outcome: Progressing  Goal: Verbalize understanding of personal risk factors for fall in the hospital  Outcome: Progressing  Goal: Verbalize understanding of risk factor reduction measures to prevent injury from fall in the home  Outcome: Progressing  Goal: Use assistive devices by end of the shift  Outcome: Progressing  Goal: Pace activities to prevent fatigue by end of the shift  Outcome: Progressing

## 2024-09-04 NOTE — PROGRESS NOTES
Assessment/Plan     Jessica Smith is a 59 year old female with PM significant for HFpEF, Anxiety/MDD, Asthma, OPAL, GERD, recurrent abdominal pain in setting of multiple abdominal surgeries, including Sehrita-en-Y, C-sections presenting for chronic abdominal pain that has not recently changed in quality, likely representing persistence of her known chronic sequela of multiple abdominal surgeries. She is also presenting with new hypoxemia and wheezing, likely poorly controlled asthma/exacerbation, in addition to OPAL/OHS not adequately treated. VBG consistent with respiratory acidosis. Low concern for pneumonia at this point as patient does not have cough, fevers, or consolidation on imaging. CT with evidence of enteritis, no acute pulm process or PE. Wheezing on exam, c/w asthma/copd exacerbation. No h/o PFTs or sleep study per patient.      - Pt is emotional, anxious and sad. States she was never told about her PMH of current diseases in the past. Family was at the bedside. Frustrated and concerns for her multiple re-admissions without any improvements. Psych team was consulted.  RN was updated.   -Needs pfts/sleep study op      Time 50 min         ----------------------------------------------------  Patient is a 59 year old female with PMH significant for HFpEF, Anxiety/MDD, Asthma, OPAL, GERD, recurrent abdominal pain in setting of multiple abdominal surgeries, including Sherita-en-Y, C-sections presenting for chronic abdominal pain that has not recently changed in quality, likely representing persistence of her known chronic sequela of multiple abdominal surgeries. She is also presenting with new hypoxemia and wheezing, likely poorly controlled asthma/exacerbation, in addition to OPAL/OHS not adequately treated. VBG consistent with respiratory acidosis. Low concern for pneumonia at this point as patient does not have cough, fevers, or consolidation on imaging.  CT with evidence of enteritis, no acute pulm process or PE.  Wheezing on exam, c/w asthma/copd exacerbation. No h/o PFTs or sleep study per patient.      #Asthma exacerbation with acute hypoxemia respiratory failure  #OPAL, OHS  - afebrile, noted leukocytosis.   - wheezing on exam, saturating 97% on 3L.   - DuoNebs q6H, IS.   - continue home Breo Ellipta  - Azithromycin 500 x 3d  - Prednisone 40 x 4 more days  - f/u sputum culture and viral panel, covid  - f/u Blood cultures  - continuous pulse ox  - CPAP at night  - needs pulmonary follow up for PFTs, sleep study. No history.      #Abdominal pain with evidence of enteritis  #episode of diarrhea/incontinence.   - Patient is describing her known chronic pain with no symptoms of fevers, bloody stools, diarrhea, association with eating and a benign abdominal exam w/ enteritis seen on CT; had gastroenteritis on last ct but prior ct's without evidence.  However, pt had one episode yesterday of diarrhea with incontinence, no prior episodes of incont.   - continue home Pantoprazole  - continue home Sucralfate with meals  - stool studies pending  - Oxycodone 5mg prn  - may benefit from referral to pain management on discharge  - follow up with GI as outpatient, continue their ongoing workup        #CKD:  - Creatinine 1.65 on admission, normal baseline        #Skin lesions  - wound care  - wound culture     #MDD/GERRY  - continue home Lexapro 20mg     #HTN   - Hold home Amlodipine  - Hold home HCTZ  - Hold home Lisinopril       #Anemia  - continue home B12  - continue homeFerrous sulfate  - outpatient GI workup     #Arthritis  - continue home Diclofenac gel  - continue home Gabapentin 300 QHS     Code status - FULL CODE  Scheduled outpatient appointments in system:   Future Appointments   Date Time Provider Department Center   1/23/2025 10:00 AM Ruddy Guthrie MD [id number]     ---------------------------------------------------------------------------------------------------  Subjective   To follow     "  ---------------------------------------------------------------------------------------------------  Objective   Last Recorded Vitals  Blood pressure 104/60, pulse 75, temperature 36.4 °C (97.5 °F), resp. rate 16, height 1.6 m (5' 3\"), weight (!) 154 kg (339 lb 1.6 oz), SpO2 92%.  Intake/Output last 3 Shifts:  I/O last 3 completed shifts:  In: 924 (6 mL/kg) [P.O.:924]  Out: 2850 (18.5 mL/kg) [Urine:2850 (0.5 mL/kg/hr)]  Weight: 153.8 kg     Physical Exam  Vitals and nursing note reviewed.   Constitutional:       General: She is not in acute distress.     Appearance: Normal appearance. She is obese. She is not ill-appearing or toxic-appearing.   HENT:      Head: Normocephalic and atraumatic.      Mouth/Throat:      Mouth: Mucous membranes are moist.   Eyes:      General: No scleral icterus.     Extraocular Movements: Extraocular movements intact.      Conjunctiva/sclera: Conjunctivae normal.   Cardiovascular:      Rate and Rhythm: Normal rate and regular rhythm.      Heart sounds: S1 normal and S2 normal. No murmur heard.  Pulmonary:      Effort: Pulmonary effort is normal. No respiratory distress.      Breath sounds: No wheezing, rhonchi or rales.   Abdominal:      General: Bowel sounds are normal. There is no distension.      Palpations: Abdomen is soft.      Tenderness: There is no abdominal tenderness. There is no guarding or rebound.   Musculoskeletal:         General: No swelling or deformity.      Cervical back: Neck supple.   Skin:     General: Skin is warm and dry.      Findings: No rash.   Neurological:      General: No focal deficit present.      Mental Status: She is alert. Mental status is at baseline.   Psychiatric:         Mood and Affect: Mood normal.         Relevant Results  Lab Results   Component Value Date    WBC 15.0 (H) 09/02/2024    HGB 10.4 (L) 09/02/2024    HCT 36.5 09/02/2024    MCV 97 09/02/2024     09/02/2024      Lab Results   Component Value Date    GLUCOSE 94 09/03/2024    " CALCIUM 7.4 (L) 09/03/2024     09/03/2024    K 4.7 09/03/2024    CO2 28 09/03/2024     (H) 09/03/2024    BUN 46 (H) 09/03/2024    CREATININE 1.98 (H) 09/03/2024     Scheduled medications  azithromycin, 500 mg, intravenous, q24h  cyanocobalamin, 1,000 mcg, oral, Daily  diclofenac sodium, 4 g, Topical, 4x daily  enoxaparin, 60 mg, subcutaneous, q12h SUNSHINE  escitalopram, 20 mg, oral, Daily  ferrous sulfate (325 mg ferrous sulfate), 1 tablet, oral, Every other day  fluticasone furoate-vilanteroL, 1 puff, inhalation, Daily  gabapentin, 300 mg, oral, Nightly  melatonin, 10 mg, oral, Nightly  pantoprazole, 40 mg, oral, Daily  predniSONE, 40 mg, oral, Daily  sucralfate, 1 g, oral, TID AC      Continuous medications     PRN medications  PRN medications: guaiFENesin, ipratropium-albuteroL, oxyCODONE, oxygen, polyethylene glycol    Vani Gaines MD

## 2024-09-05 ENCOUNTER — HOME HEALTH ADMISSION (OUTPATIENT)
Dept: HOME HEALTH SERVICES | Facility: HOME HEALTH | Age: 59
End: 2024-09-05
Payer: COMMERCIAL

## 2024-09-05 ENCOUNTER — APPOINTMENT (OUTPATIENT)
Dept: RADIOLOGY | Facility: HOSPITAL | Age: 59
End: 2024-09-05
Payer: COMMERCIAL

## 2024-09-05 ENCOUNTER — DOCUMENTATION (OUTPATIENT)
Dept: HOME HEALTH SERVICES | Facility: HOME HEALTH | Age: 59
End: 2024-09-05
Payer: COMMERCIAL

## 2024-09-05 ENCOUNTER — PHARMACY VISIT (OUTPATIENT)
Dept: PHARMACY | Facility: CLINIC | Age: 59
End: 2024-09-05
Payer: MEDICAID

## 2024-09-05 VITALS
HEART RATE: 83 BPM | OXYGEN SATURATION: 98 % | HEIGHT: 63 IN | WEIGHT: 293 LBS | TEMPERATURE: 97.7 F | SYSTOLIC BLOOD PRESSURE: 160 MMHG | RESPIRATION RATE: 20 BRPM | DIASTOLIC BLOOD PRESSURE: 82 MMHG | BODY MASS INDEX: 51.91 KG/M2

## 2024-09-05 LAB — BACTERIA BLD CULT: NORMAL

## 2024-09-05 PROCEDURE — 94640 AIRWAY INHALATION TREATMENT: CPT

## 2024-09-05 PROCEDURE — 99239 HOSP IP/OBS DSCHRG MGMT >30: CPT | Performed by: STUDENT IN AN ORGANIZED HEALTH CARE EDUCATION/TRAINING PROGRAM

## 2024-09-05 PROCEDURE — 2500000004 HC RX 250 GENERAL PHARMACY W/ HCPCS (ALT 636 FOR OP/ED)

## 2024-09-05 PROCEDURE — 2500000002 HC RX 250 W HCPCS SELF ADMINISTERED DRUGS (ALT 637 FOR MEDICARE OP, ALT 636 FOR OP/ED)

## 2024-09-05 PROCEDURE — 76705 ECHO EXAM OF ABDOMEN: CPT | Performed by: RADIOLOGY

## 2024-09-05 PROCEDURE — 76705 ECHO EXAM OF ABDOMEN: CPT

## 2024-09-05 PROCEDURE — RXMED WILLOW AMBULATORY MEDICATION CHARGE

## 2024-09-05 PROCEDURE — 2500000001 HC RX 250 WO HCPCS SELF ADMINISTERED DRUGS (ALT 637 FOR MEDICARE OP): Performed by: STUDENT IN AN ORGANIZED HEALTH CARE EDUCATION/TRAINING PROGRAM

## 2024-09-05 PROCEDURE — 2500000001 HC RX 250 WO HCPCS SELF ADMINISTERED DRUGS (ALT 637 FOR MEDICARE OP)

## 2024-09-05 RX ORDER — HYDROXYZINE HYDROCHLORIDE 25 MG/1
25 TABLET, FILM COATED ORAL EVERY 8 HOURS PRN
Qty: 30 TABLET | Refills: 0 | Status: SHIPPED | OUTPATIENT
Start: 2024-09-05

## 2024-09-05 RX ORDER — HYDROXYZINE HYDROCHLORIDE 25 MG/1
25 TABLET, FILM COATED ORAL EVERY 8 HOURS PRN
Status: DISCONTINUED | OUTPATIENT
Start: 2024-09-05 | End: 2024-09-05 | Stop reason: HOSPADM

## 2024-09-05 RX ORDER — GABAPENTIN 300 MG/1
300 CAPSULE ORAL NIGHTLY
Status: DISCONTINUED | OUTPATIENT
Start: 2024-09-05 | End: 2024-09-05 | Stop reason: HOSPADM

## 2024-09-05 RX ORDER — GABAPENTIN 100 MG/1
100 CAPSULE ORAL 2 TIMES DAILY
Qty: 60 CAPSULE | Refills: 0 | Status: SHIPPED | OUTPATIENT
Start: 2024-09-05 | End: 2024-09-05 | Stop reason: HOSPADM

## 2024-09-05 RX ORDER — GABAPENTIN 100 MG/1
100 CAPSULE ORAL 2 TIMES DAILY PRN
Qty: 30 CAPSULE | Refills: 0 | Status: SHIPPED | OUTPATIENT
Start: 2024-09-05

## 2024-09-05 RX ORDER — GABAPENTIN 300 MG/1
300 CAPSULE ORAL NIGHTLY
Qty: 30 CAPSULE | Refills: 0 | Status: SHIPPED | OUTPATIENT
Start: 2024-09-05 | End: 2024-10-05

## 2024-09-05 RX ORDER — GABAPENTIN 100 MG/1
100 CAPSULE ORAL 2 TIMES DAILY PRN
Status: DISCONTINUED | OUTPATIENT
Start: 2024-09-05 | End: 2024-09-05 | Stop reason: HOSPADM

## 2024-09-05 RX ORDER — HYDROXYZINE HYDROCHLORIDE 25 MG/1
25 TABLET, FILM COATED ORAL EVERY 8 HOURS PRN
Qty: 30 TABLET | Refills: 0 | Status: SHIPPED | OUTPATIENT
Start: 2024-09-05 | End: 2024-09-05 | Stop reason: HOSPADM

## 2024-09-05 NOTE — PROGRESS NOTES
"PSYCHIATRY CONSULT-LIAISON PROGRESS NOTE    Jessica Smith is a 59 y.o. female on day 4 of admission presenting with Acute hypoxic respiratory failure (Multi).    SUBJECTIVE    On interview, Ms. Smith was sitting up in bed eating breakfast. She reported feeling anxious about going home today as she is unsure if family members will have the time and ability to provide her with adequate support while she is recovering. Specifically, she has concerns about her mobility and whether she will be able to ambulate safely at home. States daughter will take care of her animals while she is at home. However overall reports improved anxiety with Gabapentin 100 mg PRN, she is agreeable to following up with a Psychiatrist outpatient. Denies SI/HI. Denies A/V hallucinations.  ==========  Additional information: No PRNS required overnight.    OBJECTIVE    VITALS      9/4/2024     7:41 AM 9/4/2024     4:07 PM 9/4/2024     7:40 PM 9/4/2024    10:41 PM 9/4/2024    11:57 PM 9/5/2024     7:37 AM 9/5/2024    11:26 AM   Vitals   Systolic 104 145 159  110 121 160   Diastolic 60 80 77  65 72 82   Heart Rate 75 83 87  76 72 83   Temp 36.4 °C (97.5 °F) 36.4 °C (97.5 °F) 36.2 °C (97.2 °F)  36.5 °C (97.7 °F)  36.5 °C (97.7 °F)   Resp   18 20           MENTAL STATUS EXAM  Appearance:  Black hair in long lizbet, wearing hospital gown, poor dentition, sitting up in bed eating breakfast.  Attitude: Engaged  Behavior: Cooperative, conversational  Motor Activity: No EPS or tremor, no psychomotor agitation/retardation  Speech: Spontaneous, normal rate/rhythm/tone  Mood: \"Nervous\" about going home  Affect: Mood congruent  Thought Process: Linear, organized, goal oriented  Thought Content:  Denies SI/HI. Delusions absent.  Thought Perception: Denies A/V hallucinations.  Cognition: Appropriate attention and concentration.  Insight: Fair  Judgement: Fair    CURRENT MEDICATIONS  Scheduled medications  cyanocobalamin, 1,000 mcg, oral, Daily  diclofenac " sodium, 4 g, Topical, 4x daily  enoxaparin, 60 mg, subcutaneous, q12h SUNSHINE  escitalopram, 20 mg, oral, Daily  ferrous sulfate (325 mg ferrous sulfate), 1 tablet, oral, Every other day  fluticasone furoate-vilanteroL, 1 puff, inhalation, Daily  gabapentin, 300 mg, oral, Nightly  melatonin, 10 mg, oral, Nightly  pantoprazole, 40 mg, oral, Daily  sucralfate, 1 g, oral, TID AC        Continuous medications       PRN medications  PRN medications: gabapentin, guaiFENesin, hydrOXYzine HCL, ipratropium-albuteroL, oxyCODONE, oxygen, polyethylene glycol     LABS  No results found for this or any previous visit (from the past 24 hour(s)).     IMAGING  No results found.     PSYCHIATRIC RISK ASSESSMENT  Violence Risk Factors:  current psychiatric illness and unemployed  Acute Risk of Harm to Others is Considered: Low  Suicide Risk Factors: chronic medical illness, chronic pain, current psychiatric illness, and anxious ruminations  Protective Factors: strong coping skills, fear of suicide or death, sense of responsibility towards family, social support/connectedness, moral objections to suicide, positive family relationships, hopefulness/future-orientation, and marriage/partnership  Acute Risk of Harm to Self is Considered: Low    ASSESSMENT AND PLAN  Ms. Jessica Smith is a 58 YO F with Past Med Hx of  HFpEF ( EF: 36.8% on 3/2019), Asthma, OPAL, Sherita-en-Y c/b chronic abdominal pain and adhesions and Past Psych Hx of GERRY, MDD admitted on 9/01 for acute hypoxic respiratory failure. Psychiatry consulted for anxiety.On initial assessment, Ms. Smith is tearful, endorsed feelings of guilt, excessive worry, changes in sleep and appetite in the setting of multiple medical conditions and functional limitations. Etiology most consistent with Adjustment disorder with depressed mood vs demoralization, r/o MDD recurrence. Regarding panic attacks, describes sx of tachycardia, diaphoresis after using the bathroom, may consider vasovagal  "syncope. In order to target anxiety would recommend Gabapentin 100 mg Q12H PRN-1st line and Atarax 25mg Q8H 2nd line. While renal function is impaired, able to receive Gabapentin 900 mg/d with current renal function. Would benefit from outpatient follow up with Psychiatry to manage psychotropics in the future. Given hyperkalemia would recommend a repeat EKG.    Update 9/5/2024: Patient anxious about going home today, however reports anxiety is improved overall with Gabapentin 100mg PRN. Educated patient on gabapentin and hydroxyzine use as well as need for continued follow up outpatient. Provided her with phone number to schedule with outpatient psychiatry.     IMPRESSION  #Adjustment d/o w/ depressed mood  #Demoralization  #r/o MDD recurrent     RECOMMENDATIONS  Safety:  - Patient does not currently meet criteria for inpatient psychiatric admission.   - To evaluate decision-making capacity, recommend use of the Capacity Evaluation Tool. Search “[facility] Capacity Evaluation\" under SmartText unless the patient has a legal guardian, in which case all decisions per the legal guardian.  - Patient does not require a 1:1 sitter from a psychiatric perspective at this time.  - As with all hospitalized patients, would recommend delirium precautions, as below.  - Would benefit from follow up with outpatient Psychiatry : [phone]     Medications:  - Continue Lexapro 20mg daily  - C/w Gabapentin 300 mg QHS  - Continue Gabapentin 100mg PO Q12H PRN- 1st line for anxiety  - Continue Atarax 25mg Q8H PRN- 2nd line for anxiety       Work-up:  - EKG (6/24): QTc of 438 m/s    ==========    - Discussed recommendations with primary team.  - Provided patient with phone number to schedule with psychiatry outpatient. Discussed potential benefits of continued psychiatric medication management and talk therapy.  - Psychiatry will sign off on patient discharge     Thank you for allowing us to participate in the care of this patient. Please page " [phone] with any questions or concerns.       Medication Consent  Medication Consent: n/a; consult service    DELIRIUM GUIDELINES  Non-Pharmacologic:  - Assess visual and hearing impairments and provide aids and communication boards.  - Assess immobility and advocate for early evaluation and intervention by physical therapy, out of bed when medically indicated, and expeditious removal of tethers.  - Promote physiologic sleep and maintenance of sleep/wake cycle by ensuring blinds are open during the day, maintaining dark/quiet room at night with minimal interruptions, and minimizing daytime naps.  - Minimize room and staff changes.  - Engage the patient in cognitively stimulating activities and provide frequent reorientation.   - Minimize use of restraints to situations where necessary to keep patient and staff safe and to prevent from removing lines, tubes, medical devices, dressings, etc.      Pharmacologic:  - Minimize use of deliriogenic medications such as benzodiazepines, anticholinergic medications, and opiates (while ensuring adequate treatment of pain).  - Assess and treat disruption in bowel and bladder function.   - Assess and treat abnormalities in nutrition and hydration status.         JONNA ESTRADA, M3    The patient was seen and discussed with student doctor Burke, patient was seen and discussed and staffed with Dr. Villalpando.    Zarina Summers DO, PGY-II  [facility]-Psychiatry

## 2024-09-05 NOTE — DISCHARGE SUMMARY
Discharge Diagnosis  Acute hypoxic respiratory failure (Multi)    Issues Requiring Follow-Up  Pulm, card, psych and PCP Fu     Discharge Meds     Medication List      START taking these medications     fluticasone propion-salmeteroL 250-50 mcg/dose diskus inhaler; Commonly   known as: Advair Diskus; Inhale 1 puff 2 times a day. Rinse mouth with   water after use to reduce aftertaste and incidence of candidiasis. Do not   swallow.; Replaces: fluticasone propion-salmeteroL 100-50 mcg/dose diskus   inhaler   hydrOXYzine HCL 25 mg tablet; Commonly known as: Atarax; Take 1 tablet   (25 mg) by mouth every 8 hours if needed for anxiety or itching.   oxygen gas therapy; Commonly known as: O2; Inhale 1 each continuously.   predniSONE 20 mg tablet; Commonly known as: Deltasone; Take 2 tablets   (40 mg) by mouth once daily for 1 dose.     CHANGE how you take these medications     gabapentin 100 mg capsule; Commonly known as: Neurontin; Take 1 capsule   (100 mg) by mouth 2 times a day. AT BEDTIME; What changed: medication   strength, how much to take, when to take this   lisinopril 30 mg tablet; Take 1 tablet at night, as needed for sleep;   What changed: additional instructions   polyethylene glycol 17 gram packet; Commonly known as: Glycolax,   Miralax; Take 17 g by mouth 2 times a day as needed (constipation).; What   changed: when to take this, reasons to take this     CONTINUE taking these medications     acetaminophen 325 mg tablet; Commonly known as: Tylenol; Take 1 tablet   (325 mg) by mouth every 4 hours if needed for mild pain (1 - 3).   albuterol 90 mcg/actuation inhaler; Inhale 2 puffs every 6 hours if   needed for wheezing or shortness of breath.   amLODIPine 10 mg tablet; Commonly known as: Norvasc; Take 1 tablet (10   mg) by mouth once daily.   Coricidin HBP Chest Bradford-Cough  mg capsule; Generic drug:   dextromethorphan-guaifenesin; Take 1 capsule by mouth every 4 hours if   needed (cough/congestion).    cyanocobalamin 1,000 mcg tablet; Commonly known as: Vitamin B-12; Take 1   tablet (1,000 mcg) by mouth once daily.   diclofenac sodium 1 % gel; Commonly known as: Arthritis Pain   (diclofenac); Apply 4.5 inches (4 g) topically 4 times a day.   escitalopram 20 mg tablet; Commonly known as: Lexapro; Take 1 tablet (20   mg) by mouth once daily.   famotidine 10 mg tablet; Commonly known as: Pepcid AC; Take 1 tablet (10   mg) by mouth 2 times a day.   ferrous sulfate (325 mg ferrous sulfate) tablet; Commonly known as:   FeroSuL; Take 1 tablet by mouth every other day.   fluticasone 50 mcg/actuation nasal spray; Commonly known as: Flonase;   Administer 1 spray into each nostril once daily.   lidocaine 5 % patch; Commonly known as: Lidoderm; APPLY 1 PATCH TO THE   AFFECTED AREA AND LEAVE IN PLACE FOR 12 HOURS, THEN REMOVE AND LEAVE OFF   FOR 12 HOURS. Strength: 5 %   melatonin 3 mg tablet; Take 1 tablet (3 mg) by mouth once daily at   bedtime.   Mucinex 1,200 mg tablet extended release 12hr; Generic drug: guaiFENesin   multivit, iron, min. cmb. 5-FA 10-1 mg tablet; Take 1 mg by mouth once   daily.   pantoprazole 40 mg EC tablet; Commonly known as: ProtoNix; Take 1 tablet   (40 mg) by mouth once daily.   senna 8.6 mg tablet; Generic drug: sennosides; TAKE 1 TABLET BY MOUTH   TWICE DAILY AS NEEDED FOR CONSTIPATION   sucralfate 100 mg/mL suspension; Commonly known as: Carafate; TAKE 10ML   BY MOUTH FOUR TIMES A DAY WITH MEALS   walker misc; 1 each once daily. Use daily as needed for ambulation   assistance     STOP taking these medications     Advair Diskus 500-50 mcg/dose diskus inhaler; Generic drug: fluticasone   propion-salmeteroL   fluticasone propion-salmeteroL 100-50 mcg/dose diskus inhaler; Commonly   known as: Advair Diskus; Replaced by: fluticasone propion-salmeteroL   250-50 mcg/dose diskus inhaler   hydroCHLOROthiazide 25 mg tablet; Commonly known as: HYDRODiuril       Test Results Pending At Discharge  Pending  Labs       Order Current Status    Blood Culture Preliminary result    Blood Culture Preliminary result            Hospital Course         Jessica Smith is a 59 year old female with PM significant for HFpEF, Anxiety/MDD, Asthma, OPAL, GERD, recurrent abdominal pain in setting of multiple abdominal surgeries, including Sherita-en-Y, C-sections presenting for chronic abdominal pain that has not recently changed in quality, likely representing persistence of her known chronic sequela of multiple abdominal surgeries. She is also presenting with new hypoxemia and wheezing, likely poorly controlled asthma/exacerbation, in addition to OPAL/OHS not adequately treated. VBG consistent with respiratory acidosis. Low concern for pneumonia at this point as patient does not have cough, fevers, or consolidation on imaging. Wheezing on exam, c/w asthma/copd exacerbation. No h/o PFTs or sleep study per patient. Pt was started on breathing regimen and reported improvement. However, unalbe to wean off to room air and requested home O2 which was approved. Her wheezing and cough are resolved. Psych was consulted too this admission for better control of her anxiety/depression. Pt was dc home with meds to bed and Pulm, card, psych and PCP Fu   -Needs pfts/sleep study op    -Requested Pulm, card, psych and PCP Fu        Time 50 min           Pertinent Physical Exam At Time of Discharge  Physical Exam    Outpatient Follow-Up  Future Appointments   Date Time Provider Department Center   1/23/2025 10:00 AM Ruddy Guthrie MD [id number]         Vani Gaines MD

## 2024-09-05 NOTE — CARE PLAN
The patient's goals for the shift include      The clinical goals for the shift include Patient will wean to RA throughout the shift      Problem: Respiratory  Goal: Clear secretions with interventions this shift  Outcome: Progressing  Goal: Minimize anxiety/maximize coping throughout shift  Outcome: Progressing  Goal: Minimal/no exertional discomfort or dyspnea this shift  Outcome: Progressing  Goal: No signs of respiratory distress (eg. Use of accessory muscles. Peds grunting)  Outcome: Progressing  Goal: Patent airway maintained this shift  Outcome: Progressing  Goal: Tolerate mechanical ventilation evidenced by VS/agitation level this shift  Outcome: Progressing  Goal: Tolerate pulmonary toileting this shift  Outcome: Progressing  Goal: Verbalize decreased shortness of breath this shift  Outcome: Progressing  Goal: Wean oxygen to maintain O2 saturation per order/standard this shift  Outcome: Progressing  Goal: Increase self care and/or family involvement in next 24 hours  Outcome: Progressing     Problem: Skin  Goal: Decreased wound size/increased tissue granulation at next dressing change  Outcome: Progressing  Goal: Participates in plan/prevention/treatment measures  Outcome: Progressing  Goal: Prevent/manage excess moisture  Outcome: Progressing  Goal: Prevent/minimize sheer/friction injuries  Outcome: Progressing  Goal: Promote/optimize nutrition  Outcome: Progressing  Goal: Promote skin healing  Outcome: Progressing     Problem: Safety - Adult  Goal: Free from fall injury  Outcome: Progressing     Problem: Discharge Planning  Goal: Discharge to home or other facility with appropriate resources  Outcome: Progressing     Problem: Chronic Conditions and Co-morbidities  Goal: Patient's chronic conditions and co-morbidity symptoms are monitored and maintained or improved  Outcome: Progressing     Problem: Fall/Injury  Goal: Not fall by end of shift  Outcome: Progressing  Goal: Be free from injury by end of the  shift  Outcome: Progressing  Goal: Verbalize understanding of personal risk factors for fall in the hospital  Outcome: Progressing  Goal: Verbalize understanding of risk factor reduction measures to prevent injury from fall in the home  Outcome: Progressing  Goal: Use assistive devices by end of the shift  Outcome: Progressing  Goal: Pace activities to prevent fatigue by end of the shift  Outcome: Progressing

## 2024-09-05 NOTE — HH CARE COORDINATION
Home Care received a Referral for Nursing, Physical Therapy, Occupational Therapy, and Home Health Aide. We have processed the referral for a Start of Care on 9/6-9/7.     If you have any questions or concerns, please feel free to contact us at [phone]. Follow the prompts, enter your five digit zip code, and you will be directed to your care team on [facility].

## 2024-09-06 ENCOUNTER — PATIENT OUTREACH (OUTPATIENT)
Dept: CARE COORDINATION | Facility: CLINIC | Age: 59
End: 2024-09-06
Payer: COMMERCIAL

## 2024-09-06 LAB
BACTERIA BLD AEROBE CULT: ABNORMAL
BACTERIA BLD CULT: ABNORMAL
GRAM STN SPEC: ABNORMAL

## 2024-09-06 NOTE — PROGRESS NOTES
Discharge facility: [facility]  Discharge diagnosis: Acute hypoxic respiratory failure   Admission date: 9/2/24  Discharge date: 9/5/24  PCP Appointment Date: Needs scheduled   Specialist Appointment Date: Needs Follow Up Scheduled With: Pulm, Cardio, Psych    Hospital Encounter and Summary: Linked     Outreach call to patient to support a smooth transition of care from recent admission. Unable to reach patient at listed contact number- no answer. Will continue to follow through transition period.    Misty Lanier RN, [facility]  Accountable Care Organization Operations Office  [address] [place] [address]  Phone [phone]

## 2024-09-09 ENCOUNTER — TELEPHONE (OUTPATIENT)
Dept: HOME HEALTH SERVICES | Facility: HOME HEALTH | Age: 59
End: 2024-09-09
Payer: COMMERCIAL

## 2024-09-09 DIAGNOSIS — D50.9 IRON DEFICIENCY ANEMIA, UNSPECIFIED IRON DEFICIENCY ANEMIA TYPE: Primary | ICD-10-CM

## 2024-09-09 RX ORDER — HYDROCHLOROTHIAZIDE 25 MG/1
TABLET ORAL
Qty: 30 TABLET | Refills: 10 | OUTPATIENT
Start: 2024-09-09

## 2024-09-09 RX ORDER — POLYETHYLENE GLYCOL-3350 AND ELECTROLYTES WITH FLAVOR PACK 240; 5.84; 2.98; 6.72; 22.72 G/278.26G; G/278.26G; G/278.26G; G/278.26G; G/278.26G
4000 POWDER, FOR SOLUTION ORAL ONCE
Qty: 4000 ML | Refills: 0 | Status: SHIPPED | OUTPATIENT
Start: 2024-09-09 | End: 2024-09-09

## 2024-09-09 NOTE — TELEPHONE ENCOUNTER
Hello,    Your recent home care referral for Jessica Smith has been made a Non Admit with  Home Care due to Inability to Contact Patient. If you have further questions, feel free to reach out to our office at [phone].     Thank you,   [facility]

## 2024-09-12 DIAGNOSIS — D50.9 IRON DEFICIENCY ANEMIA, UNSPECIFIED IRON DEFICIENCY ANEMIA TYPE: Primary | ICD-10-CM

## 2024-09-12 RX ORDER — POLYETHYLENE GLYCOL-3350 AND ELECTROLYTES WITH FLAVOR PACK 240; 5.84; 2.98; 6.72; 22.72 G/278.26G; G/278.26G; G/278.26G; G/278.26G; G/278.26G
4000 POWDER, FOR SOLUTION ORAL ONCE
Qty: 4000 ML | Refills: 0 | Status: SHIPPED | OUTPATIENT
Start: 2024-09-12 | End: 2024-09-12

## 2024-09-19 ENCOUNTER — PATIENT OUTREACH (OUTPATIENT)
Dept: CARE COORDINATION | Facility: CLINIC | Age: 59
End: 2024-09-19
Payer: COMMERCIAL

## 2024-09-19 NOTE — PROGRESS NOTES
Attempt made to reach patient for AMYITO after provider follow up appointment outreach. Unable to reach patient at listed contact number- no answer. Will continue to follow through transition period.    Misty Lanier RN, [facility]  Accountable Care Organization Operations Office  [facility] [id number]  Phone [phone]

## 2024-10-03 ENCOUNTER — PATIENT OUTREACH (OUTPATIENT)
Dept: CARE COORDINATION | Facility: CLINIC | Age: 59
End: 2024-10-03
Payer: COMMERCIAL

## 2024-10-03 NOTE — PROGRESS NOTES
Attempts made to reach patient for MAYITO outreach and no contact made.    Check in 30 days after hospital discharge to support smooth transition of care. No recent hospital admissions noted upon chart review. Will close this MAYITO encounter at this time due to unable to reach patient upon 30 days.    Misty Lanier RN, [facility]  Accountable Care Organization Operations Office  [address] [id number]  Phone [phone]

## 2024-10-18 ENCOUNTER — TELEPHONE (OUTPATIENT)
Dept: PRIMARY CARE | Facility: CLINIC | Age: 59
End: 2024-10-18

## 2024-10-18 NOTE — TELEPHONE ENCOUNTER
Pt called about her back and side hurting, and wants to know if you can give her a call or prescribe her something for it. [phone] thank you!

## 2024-10-25 ENCOUNTER — HOSPITAL ENCOUNTER (INPATIENT)
Facility: HOSPITAL | Age: 59
End: 2024-10-25
Attending: EMERGENCY MEDICINE | Admitting: INTERNAL MEDICINE
Payer: COMMERCIAL

## 2024-10-25 ENCOUNTER — APPOINTMENT (OUTPATIENT)
Dept: RADIOLOGY | Facility: HOSPITAL | Age: 59
End: 2024-10-25
Payer: COMMERCIAL

## 2024-10-25 ENCOUNTER — CLINICAL SUPPORT (OUTPATIENT)
Dept: EMERGENCY MEDICINE | Facility: HOSPITAL | Age: 59
End: 2024-10-25
Payer: COMMERCIAL

## 2024-10-25 DIAGNOSIS — N30.90 CYSTITIS: ICD-10-CM

## 2024-10-25 DIAGNOSIS — G47.33 OSA (OBSTRUCTIVE SLEEP APNEA): ICD-10-CM

## 2024-10-25 DIAGNOSIS — R10.9 ABDOMINAL PAIN, UNSPECIFIED ABDOMINAL LOCATION: Chronic | ICD-10-CM

## 2024-10-25 DIAGNOSIS — J96.01 ACUTE HYPOXIC RESPIRATORY FAILURE (MULTI): ICD-10-CM

## 2024-10-25 DIAGNOSIS — I10 BENIGN ESSENTIAL HYPERTENSION: Chronic | ICD-10-CM

## 2024-10-25 DIAGNOSIS — E87.5 HYPERKALEMIA: ICD-10-CM

## 2024-10-25 DIAGNOSIS — N25.89 RENAL TUBULAR ACIDOSIS, TYPE 4: Primary | ICD-10-CM

## 2024-10-25 DIAGNOSIS — E87.20 METABOLIC ACIDOSIS: ICD-10-CM

## 2024-10-25 DIAGNOSIS — Z98.84 HISTORY OF ROUX-EN-Y GASTRIC BYPASS: ICD-10-CM

## 2024-10-25 DIAGNOSIS — G47.33 OBSTRUCTIVE SLEEP APNEA SYNDROME: Chronic | ICD-10-CM

## 2024-10-25 DIAGNOSIS — R10.84 GENERALIZED ABDOMINAL PAIN: ICD-10-CM

## 2024-10-25 LAB
ALBUMIN SERPL BCP-MCNC: 3.4 G/DL (ref 3.4–5)
ALBUMIN SERPL BCP-MCNC: 3.9 G/DL (ref 3.4–5)
ALP SERPL-CCNC: 143 U/L (ref 33–110)
ALP SERPL-CCNC: 179 U/L (ref 33–110)
ALT SERPL W P-5'-P-CCNC: 8 U/L (ref 7–45)
ALT SERPL W P-5'-P-CCNC: 9 U/L (ref 7–45)
ANION GAP BLDV CALCULATED.4IONS-SCNC: 12 MMOL/L (ref 10–25)
ANION GAP BLDV CALCULATED.4IONS-SCNC: 14 MMOL/L (ref 10–25)
ANION GAP BLDV CALCULATED.4IONS-SCNC: ABNORMAL MMOL/L
ANION GAP SERPL CALC-SCNC: 11 MMOL/L (ref 10–20)
ANION GAP SERPL CALC-SCNC: 13 MMOL/L (ref 10–20)
APPEARANCE UR: ABNORMAL
AST SERPL W P-5'-P-CCNC: 10 U/L (ref 9–39)
AST SERPL W P-5'-P-CCNC: 13 U/L (ref 9–39)
BACTERIA #/AREA URNS AUTO: ABNORMAL /HPF
BASE EXCESS BLDV CALC-SCNC: -12.1 MMOL/L (ref -2–3)
BASE EXCESS BLDV CALC-SCNC: -13.2 MMOL/L (ref -2–3)
BASE EXCESS BLDV CALC-SCNC: -13.8 MMOL/L (ref -2–3)
BASOPHILS # BLD AUTO: 0.05 X10*3/UL (ref 0–0.1)
BASOPHILS NFR BLD AUTO: 0.4 %
BILIRUB SERPL-MCNC: 0.3 MG/DL (ref 0–1.2)
BILIRUB SERPL-MCNC: 0.3 MG/DL (ref 0–1.2)
BILIRUB UR STRIP.AUTO-MCNC: NEGATIVE MG/DL
BODY TEMPERATURE: 37 DEGREES CELSIUS
BUN SERPL-MCNC: 48 MG/DL (ref 6–23)
BUN SERPL-MCNC: 50 MG/DL (ref 6–23)
CA-I BLDV-SCNC: 1.18 MMOL/L (ref 1.1–1.33)
CA-I BLDV-SCNC: 1.21 MMOL/L (ref 1.1–1.33)
CA-I BLDV-SCNC: 1.23 MMOL/L (ref 1.1–1.33)
CALCIUM SERPL-MCNC: 7.9 MG/DL (ref 8.6–10.6)
CALCIUM SERPL-MCNC: 8.6 MG/DL (ref 8.6–10.6)
CHLORIDE BLDV-SCNC: 112 MMOL/L (ref 98–107)
CHLORIDE BLDV-SCNC: 113 MMOL/L (ref 98–107)
CHLORIDE BLDV-SCNC: ABNORMAL MMOL/L
CHLORIDE SERPL-SCNC: 112 MMOL/L (ref 98–107)
CHLORIDE SERPL-SCNC: 113 MMOL/L (ref 98–107)
CO2 SERPL-SCNC: 16 MMOL/L (ref 21–32)
CO2 SERPL-SCNC: 18 MMOL/L (ref 21–32)
COLOR UR: YELLOW
CREAT SERPL-MCNC: 2.11 MG/DL (ref 0.5–1.05)
CREAT SERPL-MCNC: 2.19 MG/DL (ref 0.5–1.05)
EGFRCR SERPLBLD CKD-EPI 2021: 25 ML/MIN/1.73M*2
EGFRCR SERPLBLD CKD-EPI 2021: 27 ML/MIN/1.73M*2
EOSINOPHIL # BLD AUTO: 0.09 X10*3/UL (ref 0–0.7)
EOSINOPHIL NFR BLD AUTO: 0.7 %
ERYTHROCYTE [DISTWIDTH] IN BLOOD BY AUTOMATED COUNT: 15.1 % (ref 11.5–14.5)
FLUAV RNA RESP QL NAA+PROBE: NOT DETECTED
FLUBV RNA RESP QL NAA+PROBE: NOT DETECTED
GLUCOSE BLD MANUAL STRIP-MCNC: 115 MG/DL (ref 74–99)
GLUCOSE BLD MANUAL STRIP-MCNC: 95 MG/DL (ref 74–99)
GLUCOSE BLDV-MCNC: 110 MG/DL (ref 74–99)
GLUCOSE BLDV-MCNC: 122 MG/DL (ref 74–99)
GLUCOSE BLDV-MCNC: 97 MG/DL (ref 74–99)
GLUCOSE SERPL-MCNC: 124 MG/DL (ref 74–99)
GLUCOSE SERPL-MCNC: 94 MG/DL (ref 74–99)
GLUCOSE UR STRIP.AUTO-MCNC: NORMAL MG/DL
HCO3 BLDV-SCNC: 14.1 MMOL/L (ref 22–26)
HCO3 BLDV-SCNC: 15 MMOL/L (ref 22–26)
HCO3 BLDV-SCNC: 16 MMOL/L (ref 22–26)
HCT VFR BLD AUTO: 36.1 % (ref 36–46)
HCT VFR BLD EST: 30 % (ref 36–46)
HCT VFR BLD EST: 31 % (ref 36–46)
HCT VFR BLD EST: 33 % (ref 36–46)
HGB BLD-MCNC: 11.1 G/DL (ref 12–16)
HGB BLDV-MCNC: 10 G/DL (ref 12–16)
HGB BLDV-MCNC: 10.3 G/DL (ref 12–16)
HGB BLDV-MCNC: 11 G/DL (ref 12–16)
HOLD SPECIMEN: NORMAL
HYALINE CASTS #/AREA URNS AUTO: ABNORMAL /LPF
IMM GRANULOCYTES # BLD AUTO: 0.05 X10*3/UL (ref 0–0.7)
IMM GRANULOCYTES NFR BLD AUTO: 0.4 % (ref 0–0.9)
INHALED O2 CONCENTRATION: 21 %
INHALED O2 CONCENTRATION: 21 %
KETONES UR STRIP.AUTO-MCNC: NEGATIVE MG/DL
LACTATE BLDV-SCNC: 0.7 MMOL/L (ref 0.4–2)
LACTATE BLDV-SCNC: 1 MMOL/L (ref 0.4–2)
LACTATE BLDV-SCNC: 1.7 MMOL/L (ref 0.4–2)
LACTATE SERPL-SCNC: 0.7 MMOL/L (ref 0.4–2)
LEUKOCYTE ESTERASE UR QL STRIP.AUTO: ABNORMAL
LIPASE SERPL-CCNC: 41 U/L (ref 9–82)
LYMPHOCYTES # BLD AUTO: 1.63 X10*3/UL (ref 1.2–4.8)
LYMPHOCYTES NFR BLD AUTO: 13.1 %
MCH RBC QN AUTO: 27.1 PG (ref 26–34)
MCHC RBC AUTO-ENTMCNC: 30.7 G/DL (ref 32–36)
MCV RBC AUTO: 88 FL (ref 80–100)
MONOCYTES # BLD AUTO: 0.75 X10*3/UL (ref 0.1–1)
MONOCYTES NFR BLD AUTO: 6 %
MUCOUS THREADS #/AREA URNS AUTO: ABNORMAL /LPF
NEUTROPHILS # BLD AUTO: 9.9 X10*3/UL (ref 1.2–7.7)
NEUTROPHILS NFR BLD AUTO: 79.4 %
NITRITE UR QL STRIP.AUTO: NEGATIVE
NRBC BLD-RTO: 0 /100 WBCS (ref 0–0)
OXYHGB MFR BLDV: 63.7 % (ref 45–75)
OXYHGB MFR BLDV: 80.3 % (ref 45–75)
OXYHGB MFR BLDV: 88 % (ref 45–75)
PCO2 BLDV: 37 MM HG (ref 41–51)
PCO2 BLDV: 45 MM HG (ref 41–51)
PCO2 BLDV: 46 MM HG (ref 41–51)
PH BLDV: 7.12 PH (ref 7.33–7.43)
PH BLDV: 7.16 PH (ref 7.33–7.43)
PH BLDV: 7.19 PH (ref 7.33–7.43)
PH UR STRIP.AUTO: 5.5 [PH]
PLATELET # BLD AUTO: 288 X10*3/UL (ref 150–450)
PO2 BLDV: 37 MM HG (ref 35–45)
PO2 BLDV: 47 MM HG (ref 35–45)
PO2 BLDV: 59 MM HG (ref 35–45)
POTASSIUM BLDV-SCNC: 5.1 MMOL/L (ref 3.5–5.3)
POTASSIUM BLDV-SCNC: 5.2 MMOL/L (ref 3.5–5.3)
POTASSIUM BLDV-SCNC: 6.7 MMOL/L (ref 3.5–5.3)
POTASSIUM SERPL-SCNC: 5 MMOL/L (ref 3.5–5.3)
POTASSIUM SERPL-SCNC: 6.8 MMOL/L (ref 3.5–5.3)
PROT SERPL-MCNC: 6.1 G/DL (ref 6.4–8.2)
PROT SERPL-MCNC: 6.7 G/DL (ref 6.4–8.2)
PROT UR STRIP.AUTO-MCNC: ABNORMAL MG/DL
RBC # BLD AUTO: 4.09 X10*6/UL (ref 4–5.2)
RBC # UR STRIP.AUTO: NEGATIVE /UL
RBC #/AREA URNS AUTO: ABNORMAL /HPF
SAO2 % BLDV: 66 % (ref 45–75)
SAO2 % BLDV: 82 % (ref 45–75)
SAO2 % BLDV: 90 % (ref 45–75)
SARS-COV-2 RNA RESP QL NAA+PROBE: NOT DETECTED
SODIUM BLDV-SCNC: 133 MMOL/L (ref 136–145)
SODIUM BLDV-SCNC: 135 MMOL/L (ref 136–145)
SODIUM BLDV-SCNC: 136 MMOL/L (ref 136–145)
SODIUM SERPL-SCNC: 135 MMOL/L (ref 136–145)
SODIUM SERPL-SCNC: 136 MMOL/L (ref 136–145)
SP GR UR STRIP.AUTO: 1.02
SQUAMOUS #/AREA URNS AUTO: ABNORMAL /HPF
UROBILINOGEN UR STRIP.AUTO-MCNC: NORMAL MG/DL
WBC # BLD AUTO: 12.5 X10*3/UL (ref 4.4–11.3)
WBC #/AREA URNS AUTO: ABNORMAL /HPF

## 2024-10-25 PROCEDURE — 2500000004 HC RX 250 GENERAL PHARMACY W/ HCPCS (ALT 636 FOR OP/ED): Mod: SE | Performed by: EMERGENCY MEDICINE

## 2024-10-25 PROCEDURE — 2500000004 HC RX 250 GENERAL PHARMACY W/ HCPCS (ALT 636 FOR OP/ED): Mod: SE

## 2024-10-25 PROCEDURE — 96366 THER/PROPH/DIAG IV INF ADDON: CPT

## 2024-10-25 PROCEDURE — 83880 ASSAY OF NATRIURETIC PEPTIDE: CPT

## 2024-10-25 PROCEDURE — 87040 BLOOD CULTURE FOR BACTERIA: CPT

## 2024-10-25 PROCEDURE — 96361 HYDRATE IV INFUSION ADD-ON: CPT

## 2024-10-25 PROCEDURE — 87636 SARSCOV2 & INF A&B AMP PRB: CPT

## 2024-10-25 PROCEDURE — 93010 ELECTROCARDIOGRAM REPORT: CPT | Performed by: EMERGENCY MEDICINE

## 2024-10-25 PROCEDURE — 2020000001 HC ICU ROOM DAILY

## 2024-10-25 PROCEDURE — 82436 ASSAY OF URINE CHLORIDE: CPT

## 2024-10-25 PROCEDURE — 81001 URINALYSIS AUTO W/SCOPE: CPT

## 2024-10-25 PROCEDURE — 36415 COLL VENOUS BLD VENIPUNCTURE: CPT

## 2024-10-25 PROCEDURE — 96365 THER/PROPH/DIAG IV INF INIT: CPT

## 2024-10-25 PROCEDURE — 2500000001 HC RX 250 WO HCPCS SELF ADMINISTERED DRUGS (ALT 637 FOR MEDICARE OP): Mod: SE

## 2024-10-25 PROCEDURE — 99285 EMERGENCY DEPT VISIT HI MDM: CPT

## 2024-10-25 PROCEDURE — 84132 ASSAY OF SERUM POTASSIUM: CPT

## 2024-10-25 PROCEDURE — 74177 CT ABD & PELVIS W/CONTRAST: CPT | Mod: FOREIGN READ | Performed by: RADIOLOGY

## 2024-10-25 PROCEDURE — 2500000005 HC RX 250 GENERAL PHARMACY W/O HCPCS: Mod: SE | Performed by: EMERGENCY MEDICINE

## 2024-10-25 PROCEDURE — 99285 EMERGENCY DEPT VISIT HI MDM: CPT | Performed by: EMERGENCY MEDICINE

## 2024-10-25 PROCEDURE — 74177 CT ABD & PELVIS W/CONTRAST: CPT

## 2024-10-25 PROCEDURE — 2550000001 HC RX 255 CONTRASTS: Mod: SE | Performed by: EMERGENCY MEDICINE

## 2024-10-25 PROCEDURE — 80053 COMPREHEN METABOLIC PANEL: CPT

## 2024-10-25 PROCEDURE — 36415 COLL VENOUS BLD VENIPUNCTURE: CPT | Performed by: EMERGENCY MEDICINE

## 2024-10-25 PROCEDURE — 93005 ELECTROCARDIOGRAM TRACING: CPT

## 2024-10-25 PROCEDURE — 99291 CRITICAL CARE FIRST HOUR: CPT

## 2024-10-25 PROCEDURE — 83690 ASSAY OF LIPASE: CPT

## 2024-10-25 PROCEDURE — 82947 ASSAY GLUCOSE BLOOD QUANT: CPT

## 2024-10-25 PROCEDURE — 2500000002 HC RX 250 W HCPCS SELF ADMINISTERED DRUGS (ALT 637 FOR MEDICARE OP, ALT 636 FOR OP/ED): Mod: SE | Performed by: EMERGENCY MEDICINE

## 2024-10-25 PROCEDURE — 87077 CULTURE AEROBIC IDENTIFY: CPT

## 2024-10-25 PROCEDURE — 96375 TX/PRO/DX INJ NEW DRUG ADDON: CPT

## 2024-10-25 PROCEDURE — 83605 ASSAY OF LACTIC ACID: CPT

## 2024-10-25 PROCEDURE — 84132 ASSAY OF SERUM POTASSIUM: CPT | Performed by: EMERGENCY MEDICINE

## 2024-10-25 PROCEDURE — 85025 COMPLETE CBC W/AUTO DIFF WBC: CPT

## 2024-10-25 PROCEDURE — 94640 AIRWAY INHALATION TREATMENT: CPT | Mod: 59

## 2024-10-25 RX ORDER — DIATRIZOATE MEGLUMINE AND DIATRIZOATE SODIUM 660; 100 MG/ML; MG/ML
30 SOLUTION ORAL; RECTAL ONCE
Status: DISCONTINUED | OUTPATIENT
Start: 2024-10-25 | End: 2024-10-25

## 2024-10-25 RX ORDER — ALBUTEROL SULFATE 0.83 MG/ML
10 SOLUTION RESPIRATORY (INHALATION) ONCE
Status: COMPLETED | OUTPATIENT
Start: 2024-10-25 | End: 2024-10-25

## 2024-10-25 RX ORDER — INDOMETHACIN 25 MG/1
50 CAPSULE ORAL ONCE
Status: COMPLETED | OUTPATIENT
Start: 2024-10-25 | End: 2024-10-25

## 2024-10-25 RX ORDER — VANCOMYCIN HYDROCHLORIDE 1 G/200ML
1000 INJECTION, SOLUTION INTRAVENOUS ONCE
Status: DISCONTINUED | OUTPATIENT
Start: 2024-10-26 | End: 2024-10-26

## 2024-10-25 RX ORDER — METOCLOPRAMIDE 10 MG/1
10 TABLET ORAL ONCE
Status: COMPLETED | OUTPATIENT
Start: 2024-10-25 | End: 2024-10-25

## 2024-10-25 RX ORDER — DEXTROSE 50 % IN WATER (D50W) INTRAVENOUS SYRINGE
25 ONCE
Status: COMPLETED | OUTPATIENT
Start: 2024-10-25 | End: 2024-10-25

## 2024-10-25 RX ORDER — OXYCODONE AND ACETAMINOPHEN 5; 325 MG/1; MG/1
1 TABLET ORAL ONCE
Status: COMPLETED | OUTPATIENT
Start: 2024-10-25 | End: 2024-10-25

## 2024-10-25 RX ORDER — VANCOMYCIN HYDROCHLORIDE 1 G/200ML
1000 INJECTION, SOLUTION INTRAVENOUS ONCE
Status: COMPLETED | OUTPATIENT
Start: 2024-10-25 | End: 2024-10-26

## 2024-10-25 ASSESSMENT — LIFESTYLE VARIABLES
TOTAL SCORE: 0
EVER FELT BAD OR GUILTY ABOUT YOUR DRINKING: NO
EVER HAD A DRINK FIRST THING IN THE MORNING TO STEADY YOUR NERVES TO GET RID OF A HANGOVER: NO
HAVE YOU EVER FELT YOU SHOULD CUT DOWN ON YOUR DRINKING: NO
HAVE PEOPLE ANNOYED YOU BY CRITICIZING YOUR DRINKING: NO

## 2024-10-25 ASSESSMENT — COLUMBIA-SUICIDE SEVERITY RATING SCALE - C-SSRS
6. HAVE YOU EVER DONE ANYTHING, STARTED TO DO ANYTHING, OR PREPARED TO DO ANYTHING TO END YOUR LIFE?: NO
2. HAVE YOU ACTUALLY HAD ANY THOUGHTS OF KILLING YOURSELF?: NO
1. IN THE PAST MONTH, HAVE YOU WISHED YOU WERE DEAD OR WISHED YOU COULD GO TO SLEEP AND NOT WAKE UP?: NO

## 2024-10-25 ASSESSMENT — PAIN SCALES - GENERAL
PAINLEVEL_OUTOF10: 5 - MODERATE PAIN
PAINLEVEL_OUTOF10: 0 - NO PAIN
PAINLEVEL_OUTOF10: 0 - NO PAIN

## 2024-10-25 ASSESSMENT — PAIN DESCRIPTION - LOCATION: LOCATION: ABDOMEN

## 2024-10-25 NOTE — ED PROVIDER NOTES
Emergency Department Provider Note        History of Present Illness     History provided by: Patient  Limitations to History: None  External Records Reviewed with Brief Summary: Discharge Summary from 9/5/24 which showed recent hospitalization for abd pain and hypoxia.    HPI:  Jessica Smith is a 59 y.o. female with PMHx of HFpEF, Anxiety/MDD, Asthma, OPAL, CKD, GERD, recurrent abdominal pain (2/2 to adhesions vs functional), h/o multiple abdominal surgeries, (Sherita-en-Y, C-sections), H. Pylori, gastric ulcer, SBO and had diagnostic laparoscopy 8/2021 with lysis of adhesions presenting with diarrhea, abdominal pain, headache, feeling faint, malodorous urine.   Diarrhea x 1 week. Watery to soft. Last bout was a few hours ago today. Denies black tarry stool or bright red stool. Having ~6 bouts of diarrhea/day. Recently admitted 9/1-9/5/24 for abdominal pain and hypoxemia. Received 3 days of azithromycin.   Abdominal pain x 1 week. RUQ pain that radiates to R back. She has chronic intermittent abdominal pain, but this has been persistent for one week and the radiation to the back is new. 10/10 pain. Sharp. Denies fevers, chills, nausea, vomiting, sick contacts or recent travel. Denies NSAID use. She takes tylenol, carafate, and PPI for abd pain without relief. Follows outpatient with GI. Current plan to get EGD and colonoscopy on 11/7/24. Reports low appetite for one day 2/2 lack of hunger. Typically eats dinner, sometimes breakfast. Drinks 32 oz water daily, and 8 oz of ginger ale daily. Last EGD 2018 with small Sherita-en-Y gastrojejunostomy with gastrojejunal anastomosis characterized by healthy appearing mucosa. Colonoscopy 2016 normal.   Headache behind both eyes started this morning. 10/10 pain. Sharp. Does not radiate. Pain from HA and abd are so bad that she feels she is going to pass out when she stands up. Has not passed out yet. Took tylenol at home with no relief. Denies vision changes.   Developed malodorous  urine today. Cannot describe smell. Says urine is yellow. Denies eating asparagus. Denies hematuria, dysuria. Reports urinary frequency and urgency. She's peeing several small amounts. She is unsure if this feels like a UTI.       Physical Exam   Triage vitals:  T 36.9 °C (98.5 °F)  HR 81  BP 97/66  RR 18  O2 98 % None (Room air)    General: Awake, alert, in no acute distress  Eyes: Gaze conjugate.  No scleral icterus or injection.  HENT: Normo-cephalic, atraumatic.   CV: Regular rate, regular rhythm.   Resp: Breathing non-labored, speaking in full sentences.  Clear to auscultation bilaterally  GI: Soft, non-distended. Tender in RUQ and LUQ.  Skin: Warm.   Neuro: Alert. Oriented. Face symmetric. Speech is fluent.   Psych: Appropriate mood and affect    Medical Decision Making & ED Course   Medical Decision Makin y.o. female with PMHx of HFpEF, Anxiety/MDD, Asthma, OPAL, CKD, GERD, recurrent abdominal pain (2/2 to adhesions vs functional), h/o multiple abdominal surgeries, (Sherita-en-Y, C-sections), H. Pylori, gastric ulcer, SBO and had diagnostic laparoscopy 2021 with lysis of adhesions presenting with diarrhea, abdominal pain, headache, feeling faint, malodorous urine. Patient is hypotensive relative to baseline. Afebrile, other vitals stable. Ordered CT AP to assess for GI obstruction. Ordered C. Diff to work up diarrhea. Ordered UA to work up urinary frequency and odor. Ordered lipase to assess for pancreatitis. Lactate and lipase WNL. CMP revealed hyperkalemia of 6.8, SPENSER of Cr 2.19 (baseline 1.65), bicarb 18, EKG without peaked T waves or widened QRS. Hyperkalemia cocktail administered. VBG showed pH 7.12. Metabolic acidosis likely 2/2 to diarrhea and/or kidney dysfunction. Next bag of IVF to include bicarb.CBC showed leukocytosis of 12.4. UA and C. Diff tests not yet collected. Desatted to 94% on RA around 5:30PM, started 2L NC, and returned to 100% saturation. Dispo likely MICU vs SICU depending on  results of CT AP.  ----      Differential diagnoses considered include but are not limited to: Abdominal pain 2/2 adhesions vs functional vs referred pain from kidney vs gastroenteritis     Social Determinants of Health which Significantly Impact Care: None identified     EKG Independent Interpretation: The EKG obtained at 14:52 was independently interpreted by my attending. It demonstrates Normal rhythm with a ventricular rate of 81. Normal axis. Intervals WNL for AL, QRS and QTc. ST segments showed no change. No peaked T waves. Similar compared to prior EKG from 6/2024.    The EKG obtained at 16:32 was independently interpreted by my attending. It demonstrates Normal rhythm with a ventricular rate of 81. Normal axis. Intervals WNL for AL, QRS and QTc. ST segments showed no change. No peaked T waves. Similar compared to prior EKG from 10/25/24.     Independent Result Review and Interpretation:  Hyperkalemic, non anion gap metabolic acidosis, leukocytosis, SPENSER     Chronic conditions affecting the patient's care: As documented above in MDM    The patient was discussed with the following consultants/services: None    Care Considerations: None    ED Course:  ED Course as of 10/26/24 0655   Fri Oct 25, 2024   1653 Labs are notable for hyperkalemia of 6.8 without acute EKG changes. Bicarb slightly low at 18 but venous panel reveals significant mixed acidosis. Lactate is normal. With the hyperkalemia and mildly low sodium, along with slight SPENSER, this could indicate a component of RTA.     Will give albuterol, insulin, bicarb (push in addition to drip). Holding off on lokelma given her diarrhea.  [LM]   1905 Repeat gas shows pH 7.19 pCO2 of 37 potassium is improved, indicates a primary metabolic acidosis with compensatory respiratory alkalosis [GA]   2038 CT shows multiple loops of inflamed bowel, sequelae of gastric bypass and prior surgeries, similar to prior exams.  Small free fluid noted without abscess or perforation.   [LM]   2242 POCT pH, Venous(!!): 7.16 [GA]      ED Course User Index  [GA] Jaison Parks MD  [LM] Flores Roa MD         Diagnoses as of 10/26/24 0655   Renal tubular acidosis, type 4   Hyperkalemia   Metabolic acidosis   Cystitis     Disposition   Patient was signed out to Dr. Jaison Parks at 6 PM pending completion of their work-up.  Please see the next provider's transition of care note for the remainder of the patient's care.     Procedures   Procedures        Iesha Dutta MD  Emergency Medicine     Iesha Dutta MD  Resident  10/25/24 1845       Iesha Dutta MD  Resident  10/26/24 0655

## 2024-10-25 NOTE — ED TRIAGE NOTES
Diarrhea and ABD pain x 1 week. C/O headache and dizziness started today. Took tylenol at home with no relief.

## 2024-10-25 NOTE — PROGRESS NOTES
Patient has been identified as having an emergent need for administration of iodinated contrast for CT scan prior to result of laboratory studies OR despite known reduced GFR due to possibility of life and/or limb threatening pathology.    I acknowledge the risks and benefits of emergently proceeding with contrast administration including that, at present, it is the position of the [employer] that contrast induced nephropathy (STAN) is a rare but possible consequence. At this time the benefits of proceeding with contrast administration outweigh the risks.    Attempts will be made to mitigate possible STAN risk with IV fluid hydration if able.    Iesha Dutta MD   Emergency Medicine

## 2024-10-26 LAB
ALBUMIN SERPL BCP-MCNC: 3.1 G/DL (ref 3.4–5)
ALBUMIN SERPL BCP-MCNC: 3.1 G/DL (ref 3.4–5)
ALBUMIN SERPL BCP-MCNC: 3.3 G/DL (ref 3.4–5)
ALP SERPL-CCNC: 134 U/L (ref 33–110)
ALP SERPL-CCNC: 139 U/L (ref 33–110)
ALP SERPL-CCNC: 139 U/L (ref 33–110)
ALT SERPL W P-5'-P-CCNC: 7 U/L (ref 7–45)
ALT SERPL W P-5'-P-CCNC: 8 U/L (ref 7–45)
ALT SERPL W P-5'-P-CCNC: 8 U/L (ref 7–45)
ANION GAP BLDV CALCULATED.4IONS-SCNC: 8 MMOL/L (ref 10–25)
ANION GAP BLDV CALCULATED.4IONS-SCNC: 8 MMOL/L (ref 10–25)
ANION GAP BLDV CALCULATED.4IONS-SCNC: 9 MMOL/L (ref 10–25)
ANION GAP BLDV CALCULATED.4IONS-SCNC: 9 MMOL/L (ref 10–25)
ANION GAP SERPL CALC-SCNC: 12 MMOL/L (ref 10–20)
ANION GAP SERPL CALC-SCNC: 12 MMOL/L (ref 10–20)
ANION GAP SERPL CALC-SCNC: 16 MMOL/L
AST SERPL W P-5'-P-CCNC: 10 U/L (ref 9–39)
AST SERPL W P-5'-P-CCNC: 11 U/L (ref 9–39)
AST SERPL W P-5'-P-CCNC: 12 U/L (ref 9–39)
ATRIAL RATE: 81 BPM
BASE EXCESS BLDV CALC-SCNC: -10.3 MMOL/L (ref -2–3)
BASE EXCESS BLDV CALC-SCNC: -2.7 MMOL/L (ref -2–3)
BASE EXCESS BLDV CALC-SCNC: -7.2 MMOL/L (ref -2–3)
BASE EXCESS BLDV CALC-SCNC: -8 MMOL/L (ref -2–3)
BASOPHILS # BLD AUTO: 0.04 X10*3/UL (ref 0–0.1)
BASOPHILS NFR BLD AUTO: 0.5 %
BILIRUB SERPL-MCNC: 0.3 MG/DL (ref 0–1.2)
BILIRUB SERPL-MCNC: 0.4 MG/DL (ref 0–1.2)
BILIRUB SERPL-MCNC: 0.4 MG/DL (ref 0–1.2)
BNP SERPL-MCNC: 13 PG/ML (ref 0–99)
BODY TEMPERATURE: 37 DEGREES CELSIUS
BUN SERPL-MCNC: 36 MG/DL (ref 6–23)
BUN SERPL-MCNC: 43 MG/DL (ref 6–23)
BUN SERPL-MCNC: 45 MG/DL (ref 6–23)
CA-I BLDV-SCNC: 1.09 MMOL/L (ref 1.1–1.33)
CA-I BLDV-SCNC: 1.09 MMOL/L (ref 1.1–1.33)
CA-I BLDV-SCNC: 1.13 MMOL/L (ref 1.1–1.33)
CA-I BLDV-SCNC: 1.16 MMOL/L (ref 1.1–1.33)
CALCIUM SERPL-MCNC: 7.4 MG/DL (ref 8.6–10.6)
CALCIUM SERPL-MCNC: 7.5 MG/DL (ref 8.6–10.6)
CALCIUM SERPL-MCNC: 7.7 MG/DL (ref 8.6–10.6)
CHLORIDE BLDV-SCNC: 108 MMOL/L (ref 98–107)
CHLORIDE BLDV-SCNC: 109 MMOL/L (ref 98–107)
CHLORIDE BLDV-SCNC: 110 MMOL/L (ref 98–107)
CHLORIDE BLDV-SCNC: 111 MMOL/L (ref 98–107)
CHLORIDE SERPL-SCNC: 107 MMOL/L (ref 98–107)
CHLORIDE SERPL-SCNC: 108 MMOL/L (ref 98–107)
CHLORIDE SERPL-SCNC: 110 MMOL/L (ref 98–107)
CHLORIDE UR-SCNC: 58 MMOL/L
CHLORIDE/CREATININE (MMOL/G) IN URINE: 29 MMOL/G CREAT (ref 38–318)
CO2 SERPL-SCNC: 17 MMOL/L (ref 21–32)
CO2 SERPL-SCNC: 21 MMOL/L (ref 21–32)
CO2 SERPL-SCNC: 21 MMOL/L (ref 21–32)
CREAT SERPL-MCNC: 1.59 MG/DL (ref 0.5–1.05)
CREAT SERPL-MCNC: 1.8 MG/DL (ref 0.5–1.05)
CREAT SERPL-MCNC: 1.94 MG/DL (ref 0.5–1.05)
CREAT UR-MCNC: 197.1 MG/DL (ref 20–320)
EGFRCR SERPLBLD CKD-EPI 2021: 29 ML/MIN/1.73M*2
EGFRCR SERPLBLD CKD-EPI 2021: 32 ML/MIN/1.73M*2
EGFRCR SERPLBLD CKD-EPI 2021: 37 ML/MIN/1.73M*2
EOSINOPHIL # BLD AUTO: 0.12 X10*3/UL (ref 0–0.7)
EOSINOPHIL NFR BLD AUTO: 1.4 %
ERYTHROCYTE [DISTWIDTH] IN BLOOD BY AUTOMATED COUNT: 14.9 % (ref 11.5–14.5)
GLUCOSE BLDV-MCNC: 115 MG/DL (ref 74–99)
GLUCOSE BLDV-MCNC: 122 MG/DL (ref 74–99)
GLUCOSE BLDV-MCNC: 134 MG/DL (ref 74–99)
GLUCOSE BLDV-MCNC: 96 MG/DL (ref 74–99)
GLUCOSE SERPL-MCNC: 109 MG/DL (ref 74–99)
GLUCOSE SERPL-MCNC: 140 MG/DL (ref 74–99)
GLUCOSE SERPL-MCNC: 84 MG/DL (ref 74–99)
HCO3 BLDV-SCNC: 17.5 MMOL/L (ref 22–26)
HCO3 BLDV-SCNC: 19.6 MMOL/L (ref 22–26)
HCO3 BLDV-SCNC: 20.1 MMOL/L (ref 22–26)
HCO3 BLDV-SCNC: 23.2 MMOL/L (ref 22–26)
HCT VFR BLD AUTO: 28 % (ref 36–46)
HCT VFR BLD EST: 27 % (ref 36–46)
HCT VFR BLD EST: 28 % (ref 36–46)
HCT VFR BLD EST: 29 % (ref 36–46)
HCT VFR BLD EST: 29 % (ref 36–46)
HGB BLD-MCNC: 9 G/DL (ref 12–16)
HGB BLDV-MCNC: 9 G/DL (ref 12–16)
HGB BLDV-MCNC: 9.3 G/DL (ref 12–16)
HGB BLDV-MCNC: 9.5 G/DL (ref 12–16)
HGB BLDV-MCNC: 9.5 G/DL (ref 12–16)
HOLD SPECIMEN: NORMAL
IMM GRANULOCYTES # BLD AUTO: 0.01 X10*3/UL (ref 0–0.7)
IMM GRANULOCYTES NFR BLD AUTO: 0.1 % (ref 0–0.9)
INHALED O2 CONCENTRATION: 21 %
LACTATE BLDV-SCNC: 0.6 MMOL/L (ref 0.4–2)
LACTATE BLDV-SCNC: 0.9 MMOL/L (ref 0.4–2)
LACTATE BLDV-SCNC: 0.9 MMOL/L (ref 0.4–2)
LACTATE BLDV-SCNC: 1 MMOL/L (ref 0.4–2)
LYMPHOCYTES # BLD AUTO: 1.94 X10*3/UL (ref 1.2–4.8)
LYMPHOCYTES NFR BLD AUTO: 23 %
MCH RBC QN AUTO: 27.4 PG (ref 26–34)
MCHC RBC AUTO-ENTMCNC: 32.1 G/DL (ref 32–36)
MCV RBC AUTO: 85 FL (ref 80–100)
MONOCYTES # BLD AUTO: 0.72 X10*3/UL (ref 0.1–1)
MONOCYTES NFR BLD AUTO: 8.5 %
NEUTROPHILS # BLD AUTO: 5.61 X10*3/UL (ref 1.2–7.7)
NEUTROPHILS NFR BLD AUTO: 66.5 %
NRBC BLD-RTO: 0 /100 WBCS (ref 0–0)
OXYHGB MFR BLDV: 68.8 % (ref 45–75)
OXYHGB MFR BLDV: 74.4 % (ref 45–75)
OXYHGB MFR BLDV: 76.4 % (ref 45–75)
OXYHGB MFR BLDV: 81.6 % (ref 45–75)
P AXIS: 77 DEGREES
P OFFSET: 173 MS
P ONSET: 126 MS
PCO2 BLDV: 44 MM HG (ref 41–51)
PCO2 BLDV: 47 MM HG (ref 41–51)
PCO2 BLDV: 48 MM HG (ref 41–51)
PCO2 BLDV: 49 MM HG (ref 41–51)
PH BLDV: 7.18 PH (ref 7.33–7.43)
PH BLDV: 7.21 PH (ref 7.33–7.43)
PH BLDV: 7.23 PH (ref 7.33–7.43)
PH BLDV: 7.33 PH (ref 7.33–7.43)
PHOSPHATE SERPL-MCNC: 4.1 MG/DL (ref 2.5–4.9)
PLATELET # BLD AUTO: 240 X10*3/UL (ref 150–450)
PO2 BLDV: 38 MM HG (ref 35–45)
PO2 BLDV: 43 MM HG (ref 35–45)
PO2 BLDV: 45 MM HG (ref 35–45)
PO2 BLDV: 51 MM HG (ref 35–45)
POTASSIUM BLDV-SCNC: 5 MMOL/L (ref 3.5–5.3)
POTASSIUM BLDV-SCNC: 5.2 MMOL/L (ref 3.5–5.3)
POTASSIUM BLDV-SCNC: 5.3 MMOL/L (ref 3.5–5.3)
POTASSIUM BLDV-SCNC: 5.6 MMOL/L (ref 3.5–5.3)
POTASSIUM SERPL-SCNC: 5.1 MMOL/L (ref 3.5–5.3)
POTASSIUM SERPL-SCNC: 5.2 MMOL/L (ref 3.5–5.3)
POTASSIUM SERPL-SCNC: 5.4 MMOL/L (ref 3.5–5.3)
POTASSIUM UR-SCNC: 25 MMOL/L
POTASSIUM/CREAT UR-RTO: 13 MMOL/G CREAT
PR INTERVAL: 186 MS
PROCALCITONIN SERPL-MCNC: 0.05 NG/ML
PROT SERPL-MCNC: 5.2 G/DL (ref 6.4–8.2)
PROT SERPL-MCNC: 5.5 G/DL (ref 6.4–8.2)
PROT SERPL-MCNC: 5.6 G/DL (ref 6.4–8.2)
Q ONSET: 219 MS
QRS COUNT: 13 BEATS
QRS DURATION: 86 MS
QT INTERVAL: 376 MS
QTC CALCULATION(BAZETT): 436 MS
QTC FREDERICIA: 415 MS
R AXIS: 44 DEGREES
RBC # BLD AUTO: 3.28 X10*6/UL (ref 4–5.2)
SAO2 % BLDV: 71 % (ref 45–75)
SAO2 % BLDV: 76 % (ref 45–75)
SAO2 % BLDV: 78 % (ref 45–75)
SAO2 % BLDV: 84 % (ref 45–75)
SODIUM BLDV-SCNC: 132 MMOL/L (ref 136–145)
SODIUM BLDV-SCNC: 132 MMOL/L (ref 136–145)
SODIUM BLDV-SCNC: 133 MMOL/L (ref 136–145)
SODIUM BLDV-SCNC: 134 MMOL/L (ref 136–145)
SODIUM SERPL-SCNC: 134 MMOL/L (ref 136–145)
SODIUM SERPL-SCNC: 136 MMOL/L (ref 136–145)
SODIUM SERPL-SCNC: 139 MMOL/L (ref 136–145)
SODIUM UR-SCNC: 61 MMOL/L
SODIUM/CREAT UR-RTO: 31 MMOL/G CREAT
T AXIS: -38 DEGREES
T OFFSET: 407 MS
VENTRICULAR RATE: 81 BPM
WBC # BLD AUTO: 8.4 X10*3/UL (ref 4.4–11.3)

## 2024-10-26 PROCEDURE — 2500000002 HC RX 250 W HCPCS SELF ADMINISTERED DRUGS (ALT 637 FOR MEDICARE OP, ALT 636 FOR OP/ED)

## 2024-10-26 PROCEDURE — 87493 C DIFF AMPLIFIED PROBE: CPT

## 2024-10-26 PROCEDURE — 2500000004 HC RX 250 GENERAL PHARMACY W/ HCPCS (ALT 636 FOR OP/ED)

## 2024-10-26 PROCEDURE — 2500000005 HC RX 250 GENERAL PHARMACY W/O HCPCS

## 2024-10-26 PROCEDURE — 1100000001 HC PRIVATE ROOM DAILY

## 2024-10-26 PROCEDURE — 36415 COLL VENOUS BLD VENIPUNCTURE: CPT

## 2024-10-26 PROCEDURE — 84132 ASSAY OF SERUM POTASSIUM: CPT

## 2024-10-26 PROCEDURE — 2500000001 HC RX 250 WO HCPCS SELF ADMINISTERED DRUGS (ALT 637 FOR MEDICARE OP)

## 2024-10-26 PROCEDURE — 83605 ASSAY OF LACTIC ACID: CPT

## 2024-10-26 PROCEDURE — 84075 ASSAY ALKALINE PHOSPHATASE: CPT

## 2024-10-26 PROCEDURE — 87506 IADNA-DNA/RNA PROBE TQ 6-11: CPT

## 2024-10-26 PROCEDURE — 2500000004 HC RX 250 GENERAL PHARMACY W/ HCPCS (ALT 636 FOR OP/ED): Mod: JZ

## 2024-10-26 PROCEDURE — 84100 ASSAY OF PHOSPHORUS: CPT

## 2024-10-26 PROCEDURE — 76937 US GUIDE VASCULAR ACCESS: CPT

## 2024-10-26 PROCEDURE — 85025 COMPLETE CBC W/AUTO DIFF WBC: CPT

## 2024-10-26 PROCEDURE — 84145 PROCALCITONIN (PCT): CPT

## 2024-10-26 PROCEDURE — 99254 IP/OBS CNSLTJ NEW/EST MOD 60: CPT | Performed by: STUDENT IN AN ORGANIZED HEALTH CARE EDUCATION/TRAINING PROGRAM

## 2024-10-26 RX ORDER — FUROSEMIDE 10 MG/ML
40 INJECTION INTRAMUSCULAR; INTRAVENOUS ONCE
Status: COMPLETED | OUTPATIENT
Start: 2024-10-26 | End: 2024-10-26

## 2024-10-26 RX ORDER — POLYETHYLENE GLYCOL 3350 17 G/17G
17 POWDER, FOR SOLUTION ORAL 2 TIMES DAILY PRN
Status: DISCONTINUED | OUTPATIENT
Start: 2024-10-26 | End: 2024-11-01 | Stop reason: HOSPADM

## 2024-10-26 RX ORDER — HYDROXYZINE HYDROCHLORIDE 25 MG/1
25 TABLET, FILM COATED ORAL EVERY 8 HOURS PRN
Status: DISCONTINUED | OUTPATIENT
Start: 2024-10-26 | End: 2024-11-01 | Stop reason: HOSPADM

## 2024-10-26 RX ORDER — FLUTICASONE PROPIONATE 50 MCG
1 SPRAY, SUSPENSION (ML) NASAL DAILY
Status: DISCONTINUED | OUTPATIENT
Start: 2024-10-26 | End: 2024-11-01 | Stop reason: HOSPADM

## 2024-10-26 RX ORDER — ALBUTEROL SULFATE 90 UG/1
2 INHALANT RESPIRATORY (INHALATION) EVERY 6 HOURS PRN
Status: DISCONTINUED | OUTPATIENT
Start: 2024-10-26 | End: 2024-11-01 | Stop reason: HOSPADM

## 2024-10-26 RX ORDER — CALCIUM GLUCONATE 20 MG/ML
1 INJECTION, SOLUTION INTRAVENOUS ONCE
Status: DISCONTINUED | OUTPATIENT
Start: 2024-10-26 | End: 2024-10-26

## 2024-10-26 RX ORDER — SODIUM BICARBONATE 650 MG/1
1300 TABLET ORAL 2 TIMES DAILY
Status: DISCONTINUED | OUTPATIENT
Start: 2024-10-26 | End: 2024-10-31

## 2024-10-26 RX ORDER — HEPARIN SODIUM 5000 [USP'U]/ML
7500 INJECTION, SOLUTION INTRAVENOUS; SUBCUTANEOUS EVERY 8 HOURS SCHEDULED
Status: DISCONTINUED | OUTPATIENT
Start: 2024-10-26 | End: 2024-11-01 | Stop reason: HOSPADM

## 2024-10-26 RX ORDER — LIDOCAINE 560 MG/1
1 PATCH PERCUTANEOUS; TOPICAL; TRANSDERMAL DAILY
Status: DISCONTINUED | OUTPATIENT
Start: 2024-10-26 | End: 2024-11-01 | Stop reason: HOSPADM

## 2024-10-26 RX ORDER — FLUTICASONE FUROATE AND VILANTEROL 200; 25 UG/1; UG/1
1 POWDER RESPIRATORY (INHALATION)
Status: DISCONTINUED | OUTPATIENT
Start: 2024-10-26 | End: 2024-11-01 | Stop reason: HOSPADM

## 2024-10-26 RX ORDER — LANOLIN ALCOHOL/MO/W.PET/CERES
1000 CREAM (GRAM) TOPICAL DAILY
Status: DISCONTINUED | OUTPATIENT
Start: 2024-10-26 | End: 2024-11-01 | Stop reason: HOSPADM

## 2024-10-26 RX ORDER — LISINOPRIL 20 MG/1
30 TABLET ORAL DAILY
Status: DISCONTINUED | OUTPATIENT
Start: 2024-10-26 | End: 2024-11-01 | Stop reason: HOSPADM

## 2024-10-26 RX ORDER — TALC
3 POWDER (GRAM) TOPICAL NIGHTLY
Status: DISCONTINUED | OUTPATIENT
Start: 2024-10-26 | End: 2024-10-27

## 2024-10-26 RX ORDER — DICLOFENAC SODIUM 10 MG/G
4 GEL TOPICAL 4 TIMES DAILY
Status: DISCONTINUED | OUTPATIENT
Start: 2024-10-26 | End: 2024-11-01 | Stop reason: HOSPADM

## 2024-10-26 RX ORDER — GABAPENTIN 100 MG/1
100 CAPSULE ORAL 2 TIMES DAILY PRN
Status: DISCONTINUED | OUTPATIENT
Start: 2024-10-26 | End: 2024-11-01 | Stop reason: HOSPADM

## 2024-10-26 RX ORDER — GUAIFENESIN 600 MG/1
1200 TABLET, EXTENDED RELEASE ORAL EVERY 12 HOURS PRN
Status: DISCONTINUED | OUTPATIENT
Start: 2024-10-26 | End: 2024-10-26 | Stop reason: SDUPTHER

## 2024-10-26 RX ORDER — PANTOPRAZOLE SODIUM 40 MG/1
40 TABLET, DELAYED RELEASE ORAL DAILY
Status: DISCONTINUED | OUTPATIENT
Start: 2024-10-26 | End: 2024-11-01 | Stop reason: HOSPADM

## 2024-10-26 RX ORDER — CALCIUM GLUCONATE 20 MG/ML
1 INJECTION, SOLUTION INTRAVENOUS EVERY 4 HOURS
Status: DISPENSED | OUTPATIENT
Start: 2024-10-26 | End: 2024-10-26

## 2024-10-26 RX ORDER — SENNOSIDES 8.6 MG/1
1 TABLET ORAL 2 TIMES DAILY PRN
Status: DISCONTINUED | OUTPATIENT
Start: 2024-10-26 | End: 2024-11-01 | Stop reason: HOSPADM

## 2024-10-26 RX ORDER — GABAPENTIN 300 MG/1
300 CAPSULE ORAL NIGHTLY
Status: DISCONTINUED | OUTPATIENT
Start: 2024-10-26 | End: 2024-11-01 | Stop reason: HOSPADM

## 2024-10-26 RX ORDER — ACETAMINOPHEN 325 MG/1
975 TABLET ORAL EVERY 8 HOURS PRN
Status: DISCONTINUED | OUTPATIENT
Start: 2024-10-26 | End: 2024-11-01 | Stop reason: HOSPADM

## 2024-10-26 RX ORDER — CALCIUM GLUCONATE 98 MG/ML
1 INJECTION, SOLUTION INTRAVENOUS ONCE
Status: DISCONTINUED | OUTPATIENT
Start: 2024-10-26 | End: 2024-10-26

## 2024-10-26 RX ORDER — FERROUS SULFATE 325(65) MG
1 TABLET ORAL EVERY OTHER DAY
Status: DISCONTINUED | OUTPATIENT
Start: 2024-10-26 | End: 2024-11-01 | Stop reason: HOSPADM

## 2024-10-26 RX ORDER — ESCITALOPRAM OXALATE 10 MG/1
20 TABLET ORAL DAILY
Status: DISCONTINUED | OUTPATIENT
Start: 2024-10-26 | End: 2024-11-01 | Stop reason: HOSPADM

## 2024-10-26 SDOH — SOCIAL STABILITY: SOCIAL INSECURITY: ARE THERE ANY APPARENT SIGNS OF INJURIES/BEHAVIORS THAT COULD BE RELATED TO ABUSE/NEGLECT?: NO

## 2024-10-26 SDOH — SOCIAL STABILITY: SOCIAL INSECURITY
WITHIN THE LAST YEAR, HAVE YOU BEEN KICKED, HIT, SLAPPED, OR OTHERWISE PHYSICALLY HURT BY YOUR PARTNER OR EX-PARTNER?: NO

## 2024-10-26 SDOH — SOCIAL STABILITY: SOCIAL INSECURITY: WERE YOU ABLE TO COMPLETE ALL THE BEHAVIORAL HEALTH SCREENINGS?: YES

## 2024-10-26 SDOH — SOCIAL STABILITY: SOCIAL INSECURITY: ABUSE: ADULT

## 2024-10-26 SDOH — ECONOMIC STABILITY: FOOD INSECURITY: WITHIN THE PAST 12 MONTHS, YOU WORRIED THAT YOUR FOOD WOULD RUN OUT BEFORE YOU GOT THE MONEY TO BUY MORE.: OFTEN TRUE

## 2024-10-26 SDOH — SOCIAL STABILITY: SOCIAL INSECURITY: WITHIN THE LAST YEAR, HAVE YOU BEEN AFRAID OF YOUR PARTNER OR EX-PARTNER?: NO

## 2024-10-26 SDOH — SOCIAL STABILITY: SOCIAL INSECURITY: HAS ANYONE EVER THREATENED TO HURT YOUR FAMILY OR YOUR PETS?: NO

## 2024-10-26 SDOH — SOCIAL STABILITY: SOCIAL INSECURITY: WITHIN THE LAST YEAR, HAVE YOU BEEN HUMILIATED OR EMOTIONALLY ABUSED IN OTHER WAYS BY YOUR PARTNER OR EX-PARTNER?: NO

## 2024-10-26 SDOH — ECONOMIC STABILITY: INCOME INSECURITY: IN THE PAST 12 MONTHS HAS THE ELECTRIC, GAS, OIL, OR WATER COMPANY THREATENED TO SHUT OFF SERVICES IN YOUR HOME?: NO

## 2024-10-26 SDOH — ECONOMIC STABILITY: FOOD INSECURITY: WITHIN THE PAST 12 MONTHS, THE FOOD YOU BOUGHT JUST DIDN'T LAST AND YOU DIDN'T HAVE MONEY TO GET MORE.: OFTEN TRUE

## 2024-10-26 SDOH — SOCIAL STABILITY: SOCIAL INSECURITY: DOES ANYONE TRY TO KEEP YOU FROM HAVING/CONTACTING OTHER FRIENDS OR DOING THINGS OUTSIDE YOUR HOME?: NO

## 2024-10-26 SDOH — SOCIAL STABILITY: SOCIAL INSECURITY: DO YOU FEEL ANYONE HAS EXPLOITED OR TAKEN ADVANTAGE OF YOU FINANCIALLY OR OF YOUR PERSONAL PROPERTY?: NO

## 2024-10-26 SDOH — SOCIAL STABILITY: SOCIAL INSECURITY: HAVE YOU HAD ANY THOUGHTS OF HARMING ANYONE ELSE?: NO

## 2024-10-26 SDOH — SOCIAL STABILITY: SOCIAL INSECURITY
WITHIN THE LAST YEAR, HAVE YOU BEEN RAPED OR FORCED TO HAVE ANY KIND OF SEXUAL ACTIVITY BY YOUR PARTNER OR EX-PARTNER?: NO

## 2024-10-26 SDOH — SOCIAL STABILITY: SOCIAL INSECURITY: ARE YOU OR HAVE YOU BEEN THREATENED OR ABUSED PHYSICALLY, EMOTIONALLY, OR SEXUALLY BY ANYONE?: NO

## 2024-10-26 SDOH — SOCIAL STABILITY: SOCIAL INSECURITY: DO YOU FEEL UNSAFE GOING BACK TO THE PLACE WHERE YOU ARE LIVING?: NO

## 2024-10-26 SDOH — SOCIAL STABILITY: SOCIAL INSECURITY: HAVE YOU HAD THOUGHTS OF HARMING ANYONE ELSE?: NO

## 2024-10-26 ASSESSMENT — ACTIVITIES OF DAILY LIVING (ADL)
TOILETING: INDEPENDENT
DRESSING YOURSELF: INDEPENDENT
PATIENT'S MEMORY ADEQUATE TO SAFELY COMPLETE DAILY ACTIVITIES?: YES
ADEQUATE_TO_COMPLETE_ADL: YES
GROOMING: INDEPENDENT
HEARING - LEFT EAR: FUNCTIONAL
BATHING: INDEPENDENT
LACK_OF_TRANSPORTATION: YES
ASSISTIVE_DEVICE: WALKER
HEARING - RIGHT EAR: FUNCTIONAL
JUDGMENT_ADEQUATE_SAFELY_COMPLETE_DAILY_ACTIVITIES: YES
FEEDING YOURSELF: INDEPENDENT
WALKS IN HOME: INDEPENDENT

## 2024-10-26 ASSESSMENT — PAIN SCALES - GENERAL
PAINLEVEL_OUTOF10: 0 - NO PAIN
PAINLEVEL_OUTOF10: 0 - NO PAIN
PAINLEVEL_OUTOF10: 8
PAINLEVEL_OUTOF10: 9
PAINLEVEL_OUTOF10: 0 - NO PAIN
PAINLEVEL_OUTOF10: 10 - WORST POSSIBLE PAIN
PAINLEVEL_OUTOF10: 8

## 2024-10-26 ASSESSMENT — COGNITIVE AND FUNCTIONAL STATUS - GENERAL
TURNING FROM BACK TO SIDE WHILE IN FLAT BAD: A LITTLE
DAILY ACTIVITIY SCORE: 18
DRESSING REGULAR UPPER BODY CLOTHING: A LITTLE
DRESSING REGULAR LOWER BODY CLOTHING: A LITTLE
TOILETING: A LITTLE
PATIENT BASELINE BEDBOUND: NO
WALKING IN HOSPITAL ROOM: A LITTLE
MOBILITY SCORE: 18
MOVING TO AND FROM BED TO CHAIR: A LITTLE
STANDING UP FROM CHAIR USING ARMS: A LITTLE
PERSONAL GROOMING: A LITTLE
MOVING FROM LYING ON BACK TO SITTING ON SIDE OF FLAT BED WITH BEDRAILS: A LITTLE
EATING MEALS: A LITTLE
HELP NEEDED FOR BATHING: A LITTLE
CLIMB 3 TO 5 STEPS WITH RAILING: A LITTLE

## 2024-10-26 ASSESSMENT — LIFESTYLE VARIABLES
HOW OFTEN DO YOU HAVE A DRINK CONTAINING ALCOHOL: NEVER
HOW OFTEN DO YOU HAVE 6 OR MORE DRINKS ON ONE OCCASION: NEVER
SKIP TO QUESTIONS 9-10: 1
HOW MANY STANDARD DRINKS CONTAINING ALCOHOL DO YOU HAVE ON A TYPICAL DAY: PATIENT DOES NOT DRINK
AUDIT-C TOTAL SCORE: 0
AUDIT-C TOTAL SCORE: 0

## 2024-10-26 ASSESSMENT — PATIENT HEALTH QUESTIONNAIRE - PHQ9
1. LITTLE INTEREST OR PLEASURE IN DOING THINGS: SEVERAL DAYS
SUM OF ALL RESPONSES TO PHQ9 QUESTIONS 1 & 2: 2
2. FEELING DOWN, DEPRESSED OR HOPELESS: SEVERAL DAYS

## 2024-10-26 ASSESSMENT — PAIN - FUNCTIONAL ASSESSMENT
PAIN_FUNCTIONAL_ASSESSMENT: 0-10

## 2024-10-26 ASSESSMENT — PAIN DESCRIPTION - DESCRIPTORS: DESCRIPTORS: STABBING

## 2024-10-26 NOTE — HOSPITAL COURSE
"Jessica Smith is a 59 y.o. female PMHx of HFpEF (last echo 2022), multiple abdominal surgeries (adhesion-lysis, cholecystectomy, javier-en-Y) and recurrent pain 2/2 adhesions, and OPAL/OHS and Asthma who presented to the ED on 10/25 for diarrhea (liquid stools) 4x a day for the past week (non-bloody), weakness x3-4 days and dizziness (today) and admitted to MICU for further management for metabolic acidosis 2/2 GI losses. Concerning for Colitis on imaging +/- UTI I/s of increased urgency and \"stinging\" + presence of pyuria. viral gastroenteritis vs SIBO. On bicarb ggt for NAGMA (gap 8) w  HCO3- 16 (much lower than baseline). Leukocytosis present, mild. Likely 2/2 colitis vs UTI.   Patient given fluids and bicarb drip with improvement; was able to be switched to PO meds and clear liquid diet. Zosyn was started due to concern for gastroenteritis and UTI. Urine lytes and blood cultures ordered as well. CTCAP concerning for infection vs malignancy vs inflammation, and GI was consulted.  VBG at 4pm came back with pH of 7.33 and bicarb of 23.2. At this time, patient had no ICU level needs and transfer order was placed.  "

## 2024-10-26 NOTE — CONSULTS
[facility]   [facility] [place]  INITIAL CONSULT NOTE       Reason For Consult  Abnormal CT imaging, r/o IBD vs malignancy     SUBJECTIVE     History Of Present Illness  Jessica Smith is a 59 y.o. female with a past medical history of HFpEF (last echo 2022), multiple abdominal surgeries (adhesion-lysis, cholecystectomy, javier-en-[id number]) and recurrent pain 2/2 adhesions, and OPAL/OHS and Asthma admitted on 10/25/2024 with diarrhea and weakness. GI is consulted for CT imaging with c/f thickened small bowel colitis vs IBD, thickening at J-J anastomosis c/f infection, inflammation or malignancy.     Patient presented to the ED with a week of liquid diarrhea 4x a day, weakness and dizziness. She denies any melena or hematochezia. Patient with no recent sick contacts and no questionable food. Denies any fevers, chills, sweats, bloody stools or melena, no emesis or nausea. Endorses headache 5/10 pain thats generalized and did not improve w tylenol. Endorses sharp abdominal pain that comes and goes and is 10/10 at its worst. No changes with food.  Denies any family history of UC or crohns, no family hx of CRC.     Labs in the ED notable for pH 7.17 (VBG), lactate 0.9, K 6.7, WBC 12.5 Hgb 11.1, negative lipase, and + LE on UA. CT AP showed loops of thickened and inflamed small bowel c/f IBD vs infectious enteritis, significant wall thickening at suture line of posterior GJ anastomosis c/f infection, inflammatory or neoplasm.    Review of Systems  Negative but noted in HPI        Past Medical History:    Past Medical History:   Diagnosis Date    Abdominal adhesions 08/08/2021    Abnormal QT interval present on electrocardiogram 03/18/2021    Formatting of this note might be different from the original. Formatting of this note might be different from the original. -EKG: NSR. QTc 500 Formatting of this note might be different from the original. -EKG: NSR. QTc 500    Acute gastric ulcer  without hemorrhage or perforation 01/13/2016    Gastric ulcer, acute    Acute gastrojejunal ulcer 09/07/2007    Formatting of this note might be different from the original. 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    Hypokalemia 09/13/2023    Intertrigo 09/13/2023    Ischemic bowel disease (Multi) 04/01/2021    Personal history of other diseases of the musculoskeletal system and connective tissue 12/08/2015    History of arthritis    Personal history of other diseases of the respiratory system 12/08/2015    History of bronchitis    Personal history of other diseases of the respiratory system 12/08/2015    History of sinusitis    Personal history of other diseases of the respiratory system 12/08/2015    History of acute bronchitis    Personal history of other infectious and parasitic diseases 05/08/2015    History of Helicobacter infection    Personal history of other specified conditions 11/17/2016    History of diarrhea    Personal history of other specified conditions 04/27/2015    History of chest pain    Personal history of other specified conditions 01/10/2014    History of chest pain    Personal history of peptic ulcer disease 05/04/2015    History of gastric ulcer    SBO (small bowel obstruction) (Multi) 09/13/2023    Sebaceous cyst 06/19/2002    Tension headache 02/05/2009    Urinary tract infection, site not specified 04/15/2015    UTI (lower urinary tract infection)    Wound dehiscence 09/13/2023       Home Medications  Medications Prior to Admission    Medication Sig Dispense Refill Last Dose/Taking    acetaminophen (Tylenol) 325 mg tablet Take 1 tablet (325 mg) by mouth every 4 hours if needed for mild pain (1 - 3). 30 tablet 0     albuterol 90 mcg/actuation inhaler Inhale 2 puffs every 6 hours if needed for wheezing or shortness of breath. 18 g 11     amLODIPine (Norvasc) 10 mg tablet Take 1 tablet (10 mg) by mouth once daily. 30 tablet 0     cyanocobalamin (Vitamin B-12) 1,000 mcg tablet Take 1 tablet (1,000 mcg) by mouth once daily. 30 tablet 11     dextromethorphan-guaifenesin (Coricidin HBP Chest Bradford-Cough)  mg capsule Take 1 capsule by mouth every 4 hours if needed (cough/congestion). 28 capsule 0     diclofenac sodium (Arthritis Pain, diclofenac,) 1 % gel Apply 4.5 inches (4 g) topically 4 times a day. 200 g 11     escitalopram (Lexapro) 20 mg tablet Take 1 tablet (20 mg) by mouth once daily. 90 tablet 3     famotidine (Pepcid AC) 10 mg tablet Take 1 tablet (10 mg) by mouth 2 times a day. 60 tablet 0     ferrous sulfate, 325 mg ferrous sulfate, (FeroSuL) tablet Take 1 tablet by mouth every other day. 15 tablet 3     fluticasone (Flonase) 50 mcg/actuation nasal spray Administer 1 spray into each nostril once daily. 16 g 0     fluticasone propion-salmeteroL (Advair Diskus) 250-50 mcg/dose diskus inhaler Inhale 1 puff 2 times a day. Rinse mouth with water after use to reduce aftertaste and incidence of candidiasis. Do not swallow. 120 each 0     gabapentin (Neurontin) 100 mg capsule Take 1 capsule (100 mg) by mouth 2 times a day as needed (1st line for anxiety). 30 capsule 0     gabapentin (Neurontin) 300 mg capsule Take 1 capsule (300 mg) by mouth once daily at bedtime. 30 capsule 0     guaiFENesin (Mucinex) 1,200 mg tablet extended release 12hr Take 1 tablet (1,200 mg) by mouth every 12 hours if needed (cough/congestion).       hydrOXYzine HCL (Atarax) 25 mg tablet Take 1 tablet (25 mg) by mouth every 8 hours if needed for anxiety (2st line for  anxiety). 30 tablet 0     lidocaine (Lidoderm) 5 % patch APPLY 1 PATCH TO THE AFFECTED AREA AND LEAVE IN PLACE FOR 12 HOURS, THEN REMOVE AND LEAVE OFF FOR 12 HOURS. Strength: 5 % 30 patch 1     lisinopril 30 mg tablet Take 1 tablet at night, as needed for sleep (Patient taking differently: Take 1 tablet at night) 30 tablet 3     melatonin 3 mg tablet Take 1 tablet (3 mg) by mouth once daily at bedtime. 30 tablet 11     multivit, iron, min. cmb. 5-FA 10-1 mg tablet Take 1 mg by mouth once daily. 30 tablet 0     oxygen (O2) gas therapy Inhale 1 each continuously.       pantoprazole (ProtoNix) 40 mg EC tablet Take 1 tablet (40 mg) by mouth once daily. 30 tablet 0     polyethylene glycol (Glycolax, Miralax) 17 gram packet Take 17 g by mouth 2 times a day as needed (constipation). 30 each 0     senna 8.6 mg tablet TAKE 1 TABLET BY MOUTH TWICE DAILY AS NEEDED FOR CONSTIPATION 60 tablet 10     sucralfate (Carafate) 100 mg/mL suspension TAKE 10ML BY MOUTH FOUR TIMES A DAY WITH MEALS 828 mL 10     walker misc 1 each once daily. Use daily as needed for ambulation assistance 1 each 0          Surgical History:    Past Surgical History:   Procedure Laterality Date    APPENDECTOMY  2014    Appendectomy     SECTION, CLASSIC  2014     Section    COLONOSCOPY  2018    Colonoscopy (Fiberoptic)    ESOPHAGOGASTRODUODENOSCOPY  2018    Diagnostic Esophagogastroduodenoscopy    GASTRIC BYPASS  2017    Gastric Surgery For Morbid Obesity Gastric Bypass    GASTRIC RESTRICTION SURGERY  2015    Gastric Surgery For Morbid Obesity    OTHER SURGICAL HISTORY  2020    Ventral hernia repair    OTHER SURGICAL HISTORY  2014    Cholecystotomy    TUBAL LIGATION  2014    Tubal Ligation    UMBILICAL HERNIA REPAIR  2014    Umbilical Hernia Repair       Allergies:    Allergies   Allergen Reactions    Aspirin Diarrhea, GI Upset and Unknown       Social History:    Social History      Socioeconomic History    Marital status: Single     Spouse name: Not on file    Number of children: Not on file    Years of education: Not on file    Highest education level: Not on file   Occupational History    Not on file   Tobacco Use    Smoking status: Former     Types: Cigarettes    Smokeless tobacco: Never   Substance and Sexual Activity    Alcohol use: Never    Drug use: Never    Sexual activity: Not on file   Other Topics Concern    Not on file   Social History Narrative    Not on file     Social Drivers of Health     Financial Resource Strain: Medium Risk (10/26/2024)    Overall Financial Resource Strain (CARDIA)     Difficulty of Paying Living Expenses: Somewhat hard   Food Insecurity: Food Insecurity Present (10/26/2024)    Hunger Vital Sign     Worried About Running Out of Food in the Last Year: Often true     Ran Out of Food in the Last Year: Often true   Transportation Needs: Unmet Transportation Needs (10/26/2024)    PRAPARE - Transportation     Lack of Transportation (Medical): Yes     Lack of Transportation (Non-Medical): Yes   Physical Activity: Not on file   Stress: Not on file   Social Connections: Not on file   Intimate Partner Violence: Not At Risk (10/26/2024)    Humiliation, Afraid, Rape, and Kick questionnaire     Fear of Current or Ex-Partner: No     Emotionally Abused: No     Physically Abused: No     Sexually Abused: No   Housing Stability: Low Risk  (10/26/2024)    Housing Stability Vital Sign     Unable to Pay for Housing in the Last Year: No     Number of Times Moved in the Last Year: 1     Homeless in the Last Year: No       Family History:  No family history on file.    EXAM     Vitals:    Vitals:    10/26/24 0800 10/26/24 0900 10/26/24 1000 10/26/24 1100   BP: 104/55 114/55 109/52 135/72   BP Location: Right arm      Patient Position: Lying      Pulse: 72 68 75 72   Resp: 15 14 14 15   Temp: 35.6 °C (96.1 °F)      TempSrc: Temporal      SpO2: 98% 100% 96% 99%   Weight:        Height:         Failed to redirect to the Tim[facility] the [facility].    Intake/Output Summary (Last 24 hours) at 10/26/2024 1154  Last data filed at 10/26/2024 1018  Gross per 24 hour   Intake 4635.42 ml   Output 400 ml   Net 4235.42 ml         Physical Exam  General: well-nourished, no acute distress  HEENT: PERRLA, EOM intact, no scleral icterus, moist MM  Respiratory:normal work of breathing  Cardiovascular: RRR, no murmurs/rubs/gallops  Abdomen: Soft, tender to palpation in upper quadrants   Extremities: no edema, no asterixis  Neuro: alert and oriented, CNII-XII grossly intact, moves all 4 extremities with no focal deficits    OBJECTIVE                                                                              Medications       Current Facility-Administered Medications:     albuterol 90 mcg/actuation inhaler 2 puff, 2 puff, inhalation, q6h PRN, Inna Martinez MD    calcium gluconate 1 g in sodium chloride (iso) IV 50 mL, 1 g, intravenous, q4h, Concepcion Kuhn MD, Stopped at 10/26/24 1002    cyanocobalamin (Vitamin B-12) tablet 1,000 mcg, 1,000 mcg, oral, Daily, Inna Martinez MD, 1,000 mcg at 10/26/24 0800    dextromethorphan-guaifenesin (Mucinex DM)  mg per 12 hr tablet 1 tablet, 1 tablet, oral, BID PRN, Inna Martinez MD    diclofenac sodium (Voltaren) 1 % gel 4 g, 4 g, Topical, 4x daily, Inna Martinez MD    escitalopram (Lexapro) tablet 20 mg, 20 mg, oral, Daily, Inna Martinez MD, 20 mg at 10/26/24 0800    [Held by provider] ferrous sulfate (325 mg ferrous sulfate) tablet 325 mg, 1 tablet, oral, Every other day, Inna Martinez MD    fluticasone (Flonase) nasal spray 1 spray, 1 spray, Each Nostril, Daily, Inna Martinez MD, 1 spray at 10/26/24 0809    fluticasone furoate-vilanteroL (Breo Ellipta) 200-25 mcg/dose inhaler 1 puff, 1 puff, inhalation, Daily, Inna Martinez MD    gabapentin (Neurontin) capsule 100 mg, 100 mg, oral, BID PRN, Inna Martinez MD    gabapentin (Neurontin) capsule 300 mg, 300  mg, oral, Nightly, Inna Martinez MD, 300 mg at 10/26/24 0144    heparin (porcine) injection 7,500 Units, 7,500 Units, subcutaneous, q8h SUNSHINE, Inna Martinez MD, 7,500 Units at 10/26/24 0805    hydrOXYzine HCL (Atarax) tablet 25 mg, 25 mg, oral, q8h PRN, Inna Martinez MD    lidocaine 4 % patch 1 patch, 1 patch, transdermal, Daily, Inna Martinez MD, 1 patch at 10/26/24 0826    [Held by provider] lisinopril tablet 30 mg, 30 mg, oral, Daily, Inna Martinez MD    melatonin tablet 3 mg, 3 mg, oral, Nightly, Inna Martinez MD, 3 mg at 10/26/24 0038    oxygen (O2) therapy, , inhalation, Continuous, Inna Martinez MD    pantoprazole (ProtoNix) EC tablet 40 mg, 40 mg, oral, Daily, Inna Martinez MD, 40 mg at 10/26/24 0800    piperacillin-tazobactam (Zosyn) 3.375 g in dextrose (iso) IV 50 mL, 3.375 g, intravenous, q6h, Inna Martinez MD, Stopped at 10/26/24 1018    polyethylene glycol (Glycolax, Miralax) packet 17 g, 17 g, oral, BID PRN, Inna Martinez MD    sennosides (Senokot) tablet 8.6 mg, 1 tablet, oral, BID PRN, Inna Martinez MD    sodium bicarbonate tablet 1,300 mg, 1,300 mg, oral, BID, Concepcion Kuhn MD, 1,300 mg at 10/26/24 1146                                                                            Labs     Results for orders placed or performed during the hospital encounter of 10/25/24 (from the past 24 hours)   Green Top   Result Value Ref Range    Extra Tube Hold for add-ons.    Comprehensive metabolic panel   Result Value Ref Range    Glucose 94 74 - 99 mg/dL    Sodium 135 (L) 136 - 145 mmol/L    Potassium 6.8 (HH) 3.5 - 5.3 mmol/L    Chloride 113 (H) 98 - 107 mmol/L    Bicarbonate 18 (L) 21 - 32 mmol/L    Anion Gap 11 10 - 20 mmol/L    Urea Nitrogen 48 (H) 6 - 23 mg/dL    Creatinine 2.19 (H) 0.50 - 1.05 mg/dL    eGFR 25 (L) >60 mL/min/1.73m*2    Calcium 8.6 8.6 - 10.6 mg/dL    Albumin 3.9 3.4 - 5.0 g/dL    Alkaline Phosphatase 179 (H) 33 - 110 U/L    Total Protein 6.7 6.4 - 8.2 g/dL    AST 10 9 - 39 U/L    Bilirubin, Total  0.3 0.0 - 1.2 mg/dL    ALT 9 7 - 45 U/L   CBC and Auto Differential   Result Value Ref Range    WBC 12.5 (H) 4.4 - 11.3 x10*3/uL    nRBC 0.0 0.0 - 0.0 /100 WBCs    RBC 4.09 4.00 - 5.20 x10*6/uL    Hemoglobin 11.1 (L) 12.0 - 16.0 g/dL    Hematocrit 36.1 36.0 - 46.0 %    MCV 88 80 - 100 fL    MCH 27.1 26.0 - 34.0 pg    MCHC 30.7 (L) 32.0 - 36.0 g/dL    RDW 15.1 (H) 11.5 - 14.5 %    Platelets 288 150 - 450 x10*3/uL    Neutrophils % 79.4 40.0 - 80.0 %    Immature Granulocytes %, Automated 0.4 0.0 - 0.9 %    Lymphocytes % 13.1 13.0 - 44.0 %    Monocytes % 6.0 2.0 - 10.0 %    Eosinophils % 0.7 0.0 - 6.0 %    Basophils % 0.4 0.0 - 2.0 %    Neutrophils Absolute 9.90 (H) 1.20 - 7.70 x10*3/uL    Immature Granulocytes Absolute, Automated 0.05 0.00 - 0.70 x10*3/uL    Lymphocytes Absolute 1.63 1.20 - 4.80 x10*3/uL    Monocytes Absolute 0.75 0.10 - 1.00 x10*3/uL    Eosinophils Absolute 0.09 0.00 - 0.70 x10*3/uL    Basophils Absolute 0.05 0.00 - 0.10 x10*3/uL   Lipase   Result Value Ref Range    Lipase 41 9 - 82 U/L   Lactate   Result Value Ref Range    Lactate 0.7 0.4 - 2.0 mmol/L   B-type natriuretic peptide   Result Value Ref Range    BNP 13 0 - 99 pg/mL   ECG 12 lead   Result Value Ref Range    Ventricular Rate 81 BPM    Atrial Rate 81 BPM    NY Interval 186 ms    QRS Duration 86 ms    QT Interval 376 ms    QTC Calculation(Bazett) 436 ms    P Axis 77 degrees    R Axis 44 degrees    T Axis -38 degrees    QRS Count 13 beats    Q Onset 219 ms    P Onset 126 ms    P Offset 173 ms    T Offset 407 ms    QTC Fredericia 415 ms   BLOOD GAS VENOUS FULL PANEL   Result Value Ref Range    POCT pH, Venous 7.12 (LL) 7.33 - 7.43 pH    POCT pCO2, Venous 46 41 - 51 mm Hg    POCT pO2, Venous 37 35 - 45 mm Hg    POCT SO2, Venous 66 45 - 75 %    POCT Oxy Hemoglobin, Venous 63.7 45.0 - 75.0 %    POCT Hematocrit Calculated, Venous 33.0 (L) 36.0 - 46.0 %    POCT Sodium, Venous 133 (L) 136 - 145 mmol/L    POCT Potassium, Venous 6.7 (HH) 3.5 - 5.3  mmol/L    POCT Chloride, Venous      POCT Ionized Calicum, Venous 1.23 1.10 - 1.33 mmol/L    POCT Glucose, Venous 97 74 - 99 mg/dL    POCT Lactate, Venous 0.7 0.4 - 2.0 mmol/L    POCT Base Excess, Venous -13.8 (L) -2.0 - 3.0 mmol/L    POCT HCO3 Calculated, Venous 15.0 (L) 22.0 - 26.0 mmol/L    POCT Hemoglobin, Venous 11.0 (L) 12.0 - 16.0 g/dL    POCT Anion Gap, Venous      Patient Temperature 37.0 degrees Celsius    FiO2 21 %   POCT GLUCOSE   Result Value Ref Range    POCT Glucose 95 74 - 99 mg/dL   Urinalysis with Reflex Culture and Microscopic   Result Value Ref Range    Color, Urine Yellow Light-Yellow, Yellow, Dark-Yellow    Appearance, Urine Turbid (N) Clear    Specific Gravity, Urine 1.020 1.005 - 1.035    pH, Urine 5.5 5.0, 5.5, 6.0, 6.5, 7.0, 7.5, 8.0    Protein, Urine 50 (1+) (A) NEGATIVE, 10 (TRACE), 20 (TRACE) mg/dL    Glucose, Urine Normal Normal mg/dL    Blood, Urine NEGATIVE NEGATIVE    Ketones, Urine NEGATIVE NEGATIVE mg/dL    Bilirubin, Urine NEGATIVE NEGATIVE    Urobilinogen, Urine Normal Normal mg/dL    Nitrite, Urine NEGATIVE NEGATIVE    Leukocyte Esterase, Urine 250 Zayda/µL (A) NEGATIVE   Extra Urine Gray Tube   Result Value Ref Range    Extra Tube Hold for add-ons.    Microscopic Only, Urine   Result Value Ref Range    WBC, Urine 6-10 (A) 1-5, NONE /HPF    RBC, Urine 1-2 NONE, 1-2, 3-5 /HPF    Squamous Epithelial Cells, Urine 1-9 (SPARSE) Reference range not established. /HPF    Bacteria, Urine 1+ (A) NONE SEEN /HPF    Mucus, Urine FEW Reference range not established. /LPF    Hyaline Casts, Urine 1+ (A) NONE /LPF   Urine electrolytes   Result Value Ref Range    Sodium, Urine Random 61 mmol/L    Sodium/Creatinine Ratio 31 Not established. mmol/g Creat    Potassium, Urine Random 25 mmol/L    Potassium/Creatinine Ratio 13 Not established mmol/g Creat    Chloride, Urine Random 58 mmol/L    Chloride/Creatinine Ratio 29 (L) 38 - 318 mmol/g creat    Creatinine, Urine Random 197.1 20.0 - 320.0 mg/dL    POCT GLUCOSE   Result Value Ref Range    POCT Glucose 115 (H) 74 - 99 mg/dL   Influenza A, and B PCR   Result Value Ref Range    Flu A Result Not Detected Not Detected    Flu B Result Not Detected Not Detected   Sars-CoV-2 PCR   Result Value Ref Range    Coronavirus 2019, PCR Not Detected Not Detected   Comprehensive metabolic panel   Result Value Ref Range    Glucose 124 (H) 74 - 99 mg/dL    Sodium 136 136 - 145 mmol/L    Potassium 5.0 3.5 - 5.3 mmol/L    Chloride 112 (H) 98 - 107 mmol/L    Bicarbonate 16 (L) 21 - 32 mmol/L    Anion Gap 13 10 - 20 mmol/L    Urea Nitrogen 50 (H) 6 - 23 mg/dL    Creatinine 2.11 (H) 0.50 - 1.05 mg/dL    eGFR 27 (L) >60 mL/min/1.73m*2    Calcium 7.9 (L) 8.6 - 10.6 mg/dL    Albumin 3.4 3.4 - 5.0 g/dL    Alkaline Phosphatase 143 (H) 33 - 110 U/L    Total Protein 6.1 (L) 6.4 - 8.2 g/dL    AST 13 9 - 39 U/L    Bilirubin, Total 0.3 0.0 - 1.2 mg/dL    ALT 8 7 - 45 U/L   Blood Gas Venous Full Panel   Result Value Ref Range    POCT pH, Venous 7.16 (LL) 7.33 - 7.43 pH    POCT pCO2, Venous 45 41 - 51 mm Hg    POCT pO2, Venous 47 (H) 35 - 45 mm Hg    POCT SO2, Venous 82 (H) 45 - 75 %    POCT Oxy Hemoglobin, Venous 80.3 (H) 45.0 - 75.0 %    POCT Hematocrit Calculated, Venous 30.0 (L) 36.0 - 46.0 %    POCT Sodium, Venous 135 (L) 136 - 145 mmol/L    POCT Potassium, Venous 5.2 3.5 - 5.3 mmol/L    POCT Chloride, Venous 112 (H) 98 - 107 mmol/L    POCT Ionized Calicum, Venous 1.18 1.10 - 1.33 mmol/L    POCT Glucose, Venous 122 (H) 74 - 99 mg/dL    POCT Lactate, Venous 1.0 0.4 - 2.0 mmol/L    POCT Base Excess, Venous -12.1 (L) -2.0 - 3.0 mmol/L    POCT HCO3 Calculated, Venous 16.0 (L) 22.0 - 26.0 mmol/L    POCT Hemoglobin, Venous 10.0 (L) 12.0 - 16.0 g/dL    POCT Anion Gap, Venous 12.0 10.0 - 25.0 mmol/L    Patient Temperature 37.0 degrees Celsius    FiO2 21 %   Blood Culture    Specimen: Peripheral Venipuncture; Blood culture   Result Value Ref Range    Blood Culture Loaded on Instrument - Culture in  progress    Blood Culture    Specimen: Peripheral Venipuncture; Blood culture   Result Value Ref Range    Blood Culture Loaded on Instrument - Culture in progress    Procalcitonin   Result Value Ref Range    Procalcitonin 0.05 <=0.07 ng/mL   Comprehensive metabolic panel   Result Value Ref Range    Glucose 140 (H) 74 - 99 mg/dL    Sodium 134 (L) 136 - 145 mmol/L    Potassium 5.1 3.5 - 5.3 mmol/L    Chloride 110 (H) 98 - 107 mmol/L    Bicarbonate 17 (L) 21 - 32 mmol/L    Anion Gap 12 10 - 20 mmol/L    Urea Nitrogen 45 (H) 6 - 23 mg/dL    Creatinine 1.94 (H) 0.50 - 1.05 mg/dL    eGFR 29 (L) >60 mL/min/1.73m*2    Calcium 7.4 (L) 8.6 - 10.6 mg/dL    Albumin 3.1 (L) 3.4 - 5.0 g/dL    Alkaline Phosphatase 134 (H) 33 - 110 U/L    Total Protein 5.2 (L) 6.4 - 8.2 g/dL    AST 11 9 - 39 U/L    Bilirubin, Total 0.3 0.0 - 1.2 mg/dL    ALT 7 7 - 45 U/L   Phosphorus   Result Value Ref Range    Phosphorus 4.1 2.5 - 4.9 mg/dL   BLOOD GAS VENOUS FULL PANEL   Result Value Ref Range    POCT pH, Venous 7.18 (LL) 7.33 - 7.43 pH    POCT pCO2, Venous 47 41 - 51 mm Hg    POCT pO2, Venous 51 (H) 35 - 45 mm Hg    POCT SO2, Venous 84 (H) 45 - 75 %    POCT Oxy Hemoglobin, Venous 81.6 (H) 45.0 - 75.0 %    POCT Hematocrit Calculated, Venous 27.0 (L) 36.0 - 46.0 %    POCT Sodium, Venous 132 (L) 136 - 145 mmol/L    POCT Potassium, Venous 5.0 3.5 - 5.3 mmol/L    POCT Chloride, Venous 111 (H) 98 - 107 mmol/L    POCT Ionized Calicum, Venous 1.09 (L) 1.10 - 1.33 mmol/L    POCT Glucose, Venous 134 (H) 74 - 99 mg/dL    POCT Lactate, Venous 0.9 0.4 - 2.0 mmol/L    POCT Base Excess, Venous -10.3 (L) -2.0 - 3.0 mmol/L    POCT HCO3 Calculated, Venous 17.5 (L) 22.0 - 26.0 mmol/L    POCT Hemoglobin, Venous 9.0 (L) 12.0 - 16.0 g/dL    POCT Anion Gap, Venous 9.0 (L) 10.0 - 25.0 mmol/L    Patient Temperature 37.0 degrees Celsius    FiO2 21 %   BLOOD GAS VENOUS FULL PANEL   Result Value Ref Range    POCT pH, Venous 7.21 (LL) 7.33 - 7.43 pH    POCT pCO2, Venous 49  41 - 51 mm Hg    POCT pO2, Venous 45 35 - 45 mm Hg    POCT SO2, Venous 78 (H) 45 - 75 %    POCT Oxy Hemoglobin, Venous 76.4 (H) 45.0 - 75.0 %    POCT Hematocrit Calculated, Venous 29.0 (L) 36.0 - 46.0 %    POCT Sodium, Venous 133 (L) 136 - 145 mmol/L    POCT Potassium, Venous 5.2 3.5 - 5.3 mmol/L    POCT Chloride, Venous 110 (H) 98 - 107 mmol/L    POCT Ionized Calicum, Venous 1.09 (L) 1.10 - 1.33 mmol/L    POCT Glucose, Venous 122 (H) 74 - 99 mg/dL    POCT Lactate, Venous 0.6 0.4 - 2.0 mmol/L    POCT Base Excess, Venous -8.0 (L) -2.0 - 3.0 mmol/L    POCT HCO3 Calculated, Venous 19.6 (L) 22.0 - 26.0 mmol/L    POCT Hemoglobin, Venous 9.5 (L) 12.0 - 16.0 g/dL    POCT Anion Gap, Venous 9.0 (L) 10.0 - 25.0 mmol/L    Patient Temperature 37.0 degrees Celsius    FiO2 21 %   BLOOD GAS VENOUS FULL PANEL   Result Value Ref Range    POCT pH, Venous 7.23 (LL) 7.33 - 7.43 pH    POCT pCO2, Venous 48 41 - 51 mm Hg    POCT pO2, Venous 43 35 - 45 mm Hg    POCT SO2, Venous 76 (H) 45 - 75 %    POCT Oxy Hemoglobin, Venous 74.4 45.0 - 75.0 %    POCT Hematocrit Calculated, Venous 28.0 (L) 36.0 - 46.0 %    POCT Sodium, Venous 132 (L) 136 - 145 mmol/L    POCT Potassium, Venous 5.3 3.5 - 5.3 mmol/L    POCT Chloride, Venous 109 (H) 98 - 107 mmol/L    POCT Ionized Calicum, Venous 1.16 1.10 - 1.33 mmol/L    POCT Glucose, Venous 115 (H) 74 - 99 mg/dL    POCT Lactate, Venous 1.0 0.4 - 2.0 mmol/L    POCT Base Excess, Venous -7.2 (L) -2.0 - 3.0 mmol/L    POCT HCO3 Calculated, Venous 20.1 (L) 22.0 - 26.0 mmol/L    POCT Hemoglobin, Venous 9.3 (L) 12.0 - 16.0 g/dL    POCT Anion Gap, Venous 8.0 (L) 10.0 - 25.0 mmol/L    Patient Temperature 37.0 degrees Celsius    FiO2 21 %   Comprehensive metabolic panel   Result Value Ref Range    Glucose 109 (H) 74 - 99 mg/dL    Sodium 136 136 - 145 mmol/L    Potassium 5.2 3.5 - 5.3 mmol/L    Chloride 108 (H) 98 - 107 mmol/L    Bicarbonate 21 21 - 32 mmol/L    Anion Gap 12 10 - 20 mmol/L    Urea Nitrogen 43 (H) 6 -  23 mg/dL    Creatinine 1.80 (H) 0.50 - 1.05 mg/dL    eGFR 32 (L) >60 mL/min/1.73m*2    Calcium 7.7 (L) 8.6 - 10.6 mg/dL    Albumin 3.1 (L) 3.4 - 5.0 g/dL    Alkaline Phosphatase 139 (H) 33 - 110 U/L    Total Protein 5.5 (L) 6.4 - 8.2 g/dL    AST 10 9 - 39 U/L    Bilirubin, Total 0.4 0.0 - 1.2 mg/dL    ALT 8 7 - 45 U/L   CBC and Auto Differential   Result Value Ref Range    WBC 8.4 4.4 - 11.3 x10*3/uL    nRBC 0.0 0.0 - 0.0 /100 WBCs    RBC 3.28 (L) 4.00 - 5.20 x10*6/uL    Hemoglobin 9.0 (L) 12.0 - 16.0 g/dL    Hematocrit 28.0 (L) 36.0 - 46.0 %    MCV 85 80 - 100 fL    MCH 27.4 26.0 - 34.0 pg    MCHC 32.1 32.0 - 36.0 g/dL    RDW 14.9 (H) 11.5 - 14.5 %    Platelets 240 150 - 450 x10*3/uL    Neutrophils % 66.5 40.0 - 80.0 %    Immature Granulocytes %, Automated 0.1 0.0 - 0.9 %    Lymphocytes % 23.0 13.0 - 44.0 %    Monocytes % 8.5 2.0 - 10.0 %    Eosinophils % 1.4 0.0 - 6.0 %    Basophils % 0.5 0.0 - 2.0 %    Neutrophils Absolute 5.61 1.20 - 7.70 x10*3/uL    Immature Granulocytes Absolute, Automated 0.01 0.00 - 0.70 x10*3/uL    Lymphocytes Absolute 1.94 1.20 - 4.80 x10*3/uL    Monocytes Absolute 0.72 0.10 - 1.00 x10*3/uL    Eosinophils Absolute 0.12 0.00 - 0.70 x10*3/uL    Basophils Absolute 0.04 0.00 - 0.10 x10*3/uL                                                                              Imaging           === 10/25/24 ===    CT ABDOMEN PELVIS W IV CONTRAST    - Impression -  Several loops of thickened and inflamed small bowel similar to the  prior exam.  Given the persistence of these findings compared to the  prior exam, inflammatory bowel disease is in the differential versus  an infectious enteritis.  Changes of gastric bypass again noted with presumed additional GI  tract surgeries.  The more inferior and posterior anastomosis within  the bowel shows significant wall thickening at the suture line.  This  may be infectious, inflammatory or neoplastic.  Consider further  investigation with colonoscopy after  resolution of acute symptoms.  Small amount of free fluid presumably is reactive to the small bowel  process.  No free air.  No abscess or definite perforation.  Rectus diastases with post surgical changes of the abdominal midline.  No discrete hernia defect identified.                                                                               GI Procedures   EGD 7/17/18 (heartburn)   Impression:            - Small hiatal hernia.                         - Sherita-en-Y gastrojejunostomy with gastrojejunal                          anastomosis characterized by healthy appearing mucosa.                          Biopsied.                         - Normal examined jejunum.   FINAL DIAGNOSIS   A.  GASTRIC, BIOPSY:    --GASTRIC OXYNTIC MUCOSA WITH NO SIGNIFICANT DIAGNOSTIC ALTERATION.   --NO MORPHOLOGIC EVIDENCE OF HELICOBACTER PYLORI-LIKE ORGANISMS.     EGD 8/2/17 (f/up hx gastric ulcer)   Impression:            - Normal esophagus.                         - Z-line regular.                         - Sherita-en-Y gastrojejunostomy with gastrojejunal                          anastomosis characterized by healthy appearing mucosa.                         - The gastric pouch was large and measured 7 cm in                          length. The mucosa appeared normal. No ulcer was seen.                          Biopsied.                         - Normal examined jejunum.  FINAL DIAGNOSIS   A.  GASTRIC POUCH, COLD BIOPSY:    --MILD CHRONIC GASTRITIS   --NEGATIVE FOR H. PYLORI     Colonoscopy 5/11/16  Impression:            - Small external hemorrhoids.                         - No specimens collected.  - Repeat colonoscopy in 10 (ten) years for screening  purposes.    Colonoscopy 4/6/16  Impression:            - Aborted colonoscopy to the transverse colon                          secondary to poor bowel preparation. This is the                          patient's third aborted colonoscopy due to poor bowel                           preparation since 2015.                         - Solid stool in the rectum, in the sigmoid colon, in                          the descending colon and in the transverse colon.                         - No specimens collected.      Colonoscopy 11/11/15  Impression:            - Aborted and incomplete colonoscopy to the hepatic                          flexure secondary to poor bowel preparation.                         - This is the patient's second attempt at screening                          colonoscopy. The first attempted colonoscopy was                          aborted secondary to poor bowel preparation. The                          patient was instructed to begin MiraLax TID for two                          weeks before the procedure, to be followed by                          MiraLax-Gatorade bowel preparation the day before the                          procedure. However, it is uncertain whether these                          instructions were followed. The patient stated that                          she had a pork chop for breakfast the morning before                          the procedure.                         - Solid stool, found on digital rectal exam.                         - Solid stool in the rectum, in the recto-sigmoid                          colon, in the sigmoid colon, in the descending colon,                          at the splenic flexure, in the transverse colon and at                          the hepatic flexure.                         - Non-bleeding external and internal hemorrhoids.                         - No specimens collected.    Colonoscopy 10/8/15  Impression:            - Incomplete colonoscopy to the cecum due to solid                          stool in the entire examined colon.                         - Small non-bleeding external and internal hemorrhoids.                         - No specimens collected.    EGD 8/26/15  Impression:            - Red blood at the  gastroesophageal junction. Biopsied.                         - Z-line regular, 41 cm from the incisors.                         - Gastric bypass with a normal-sized pouch intact                          staple line. 5 cm pouch. 7 cm afferent limb                         - Normal examined jejunum.                         - Several biopsies were obtained in the afferent                          jejunal loop.  FINAL DIAGNOSIS   A.  GASTROESOPHAGEAL JUNCTION, BIOPSY:   -- SQUAMOCOLUMNAR JUNCTIONAL MUCOSA WITH GOBLET CELLS, SEE NOTE     Note: Differential diagnosis includes Brantley's esophagus versus intestinal   metaplasia in cardia. Clinical correlation suggested. Negative for dysplasia     EGD 12/17/14  Impression:            - Normal esophagus.                         - Z-line regular, 43 cm from the incisors.                         - Gastric bypass with a normal-sized pouch intact                          staple line.                         - Normal examined jejunum.                         - Gastric ulcer with clean base. Biopsied.  A. STOMACH, GASTRIC POUCH ULCER, BIOPSIES:   --REACTIVE GASTROPATHY, WITH PARTICULATE MATERIAL CONSISTENT WITH IRON PILL   GASTRITIS.  SEE NOTE.   Note: There is abundant particulate refractile material deposited in the   superficial areas of the eroded mucosa.  This is positive with iron stain.  The   findings are consistent with iron pill gastritis (reactive gastropathy   secondary to iron pill).  No Helicobacter organisms are seen.  There is no   intestinal metaplasia and no dysplasia.     EGD 1/20/14  Impression:            - Z-line irregular, 41 cm from the incisors. This was                          biopsied. R/o Brantley's.                         - The entire examined gastric pouch was normal except                          small area of erosions and erythema at 9'0 clock from                          anastomosis, biopsied. Anastomosis was at 47 cm, no                           ulcer, no stricture. Candy cane (blind end) at 51 cm                         - Normal examined jejunum.    A.  POUCH, COLD BIOPSY:    -- CHEMICAL GASTROPATHY.     B.  GASTROESOPHAGEAL JUNCTION, BIOPSY:    -- SQUAMOCOLUMNAR JUNCTIONAL MUCOSA WITH MILD REACTIVE CHANGES.   -- NO INTESTINAL METAPLASIA (GOBLET CELLS) ARE IDENTIFIED.     ASSESSMENT / PLAN                  ASSESSMENT/PLAN:    Jessica Smith is a 59 y.o. female with a past medical history of HFpEF (last echo 2022), multiple abdominal surgeries (adhesion-lysis, cholecystectomy, javier-en-Y) and recurrent pain 2/2 adhesions, and OPAL/OHS and Asthma admitted on 10/25/2024 with diarrhea and weakness. GI is consulted for CT imaging with c/f thickened small bowel colitis vs IBD, thickening at J-J anastomosis c/f infection, inflammation or malignancy.     With abdominal pain and thickening at J-J anastomosis need to rule out ulceration vs infection vs malignancy with push enteroscopy. With diarrhea and history of c.diff in 2018 will follow up stool pathogen panel and cdiff labs, will also recommend stool ova and parasite and giardia/cryptosporidium testing.     #Diarrhea   #Abdominal pain  #Colitis on imaging   -Continue daily IV PPI   -Please order stool ova and parasite and giardia/cryptosporidium testing  -Please ensure stool pathogen panel & Cdiff labs are obtained (orders are in)   -Please ensure nursing documents stool count   -Plan for push enteroscopy on Monday +/- colonoscopy will discuss with patient tomorrow regarding colonoscopy for diarrhea   -Please start CLD on Sunday to while awaiting cdiff results for possible colonoscopy Monday. Will determine need for colonoscopy based off results.     Patient was discussed with on call attending Dr. Hoang Bello and will be seen by consult attending Dr. Landrum tomorrow    Thank you for this interesting consult. Gastroenterology will continue to follow.  -During weekday hours of 7am-5pm please do not hesitate to  contact me on Epic Chat or page [phone] if there are any further questions between the weekday hours of 7 AM - 5 PM.   -After hours, on weekends, and on holidays, please page the [place] GI [place] [phone]. Thank you.      Portia Real MD

## 2024-10-26 NOTE — CARE PLAN
Problem: Pain - Adult  Goal: Verbalizes/displays adequate comfort level or baseline comfort level  Outcome: Progressing     Problem: Safety - Adult  Goal: Free from fall injury  Outcome: Progressing     Problem: Discharge Planning  Goal: Discharge to home or other facility with appropriate resources  Outcome: Progressing     Problem: Chronic Conditions and Co-morbidities  Goal: Patient's chronic conditions and co-morbidity symptoms are monitored and maintained or improved  Outcome: Progressing     Problem: Skin  Goal: Decreased wound size/increased tissue granulation at next dressing change  Outcome: Progressing  Goal: Participates in plan/prevention/treatment measures  Outcome: Progressing  Goal: Prevent/manage excess moisture  Outcome: Progressing  Goal: Prevent/minimize sheer/friction injuries  Outcome: Progressing  Goal: Promote/optimize nutrition  Outcome: Progressing  Goal: Promote skin healing  Outcome: Progressing

## 2024-10-26 NOTE — SIGNIFICANT EVENT
ICU to [facility] Transfer Summary     I:  ICU Admission Reason & Brief ICU Course:    Jessica Smith is a 59 y.o. female with PMHx of HFpEF (last echo 2022), multiple abdominal surgeries (adhesion-lysis, cholecystectomy, javier-en-Y) and recurrent pain 2/2 adhesions, and OPAL/OHS and Asthma who presented to the ED on 10/25 for diarrhea (liquid stools) 4x a day for the past week (non-bloody), weakness x3-4 days and dizziness (today) and admitted to MICU for further management for metabolic acidosis 2/2 GI losses.     Patient given fluids and bicarb drip with improvement; was able to be switched to PO meds and clear liquid diet. Zosyn was started due to concern for gastroenteritis and UTI. Urine lytes and blood cultures ordered as well. CTCAP concerning for infection vs malignancy vs inflammation, and GI was consulted.  VBG at 4pm came back with pH of 7.33 and bicarb of 23.2. At this time, patient had no ICU level needs and transfer order was placed.      C: Code Status/DPOA Info/Goals of Care/ACP Note    Full Code  DPOA/Contact Number: Rosalba Smith    U: Unprescribing & Pertinent High-Risk Medications    Changes to home meds: norvasc not ordered, pepsid not ordered,ferosul held, gabapentin not ordered, lisinopril held, sucralfate not ordered     Anticoagulation: Yes - SQH    Antibiotics:   [] N/A - no current planned antimicrobioals  [x]  Zosyn indication possible colitis, cystitis start date 10/26/2024 planned duration unknown    P: Pending Tests at the Time of Transfer   Blood cultures, stool cultures      A: Active consultants, including Rehab:   []  Subspecialty Consultants: gastroenterology  []  PT  []  OT  []  SLP  []  Wound Care    U: Uncertainty Measure/Diagnostic Pause:    Working diagnosis at the time of transfer  metabolic acidosis 2/2 GI losses. , though ddx includes cystitis, gastritis, IBD, infective source     Diagnosis Degree of Certainty: 1. High degree of certainty about the clinical diagnosis.     S:  Summary of Major Problems and To-Dos:   Plan:  NEURO  # Anxiety/MDD  :: Home regimen: Lexapro 20mg daily, Gabapentin 300 mg at bedtime // Gabapentin 100mg PO Q12H PRN- 1st line for anxiety // Atarax 25mg Q8H PRN- 2nd line for anxiety  Plan:  -c/w home plan. CTM Renal function and asses daily  - denies any SI/SH     CARDIOVASCULAR  # HFpEF  #Dehydration  #HTN  ::last echo limited 2022, no obvious abnormality  ::bedside POCUS limited to BODY habitus. JVP <2cm H2O from clavicle while @45 degree angle. Dehydration 2/2 GI losses (rest per GI section)   ::s/p 1L LR in ED  Plan:  -another 1L Bolus given in MICU  - holding home lisinopril and amlodipine I/s of normotension and GI losses.   - f/up BNP     PULMONARY  # OPAL/OHS  #Asthma  ::PFTs 9/2006: FEV1/FVC 56 / FEV1 1.5 w 10% change post-BD/ no hyperinflation in lung volumes, no DLCO. Sleep study 2006, PFTs shows obstructive pattern  :: says on 2L NC at night when sleeping for comfort  Plan:  -c/w home albuterol  - c/w 2L NC at night for comfort, needs another sleep eval +/- CPAP/bipap  - needs pulm f/up and repeat PFTs and sleep study     GI/NUTRITION  # Diarrhea  #Colitis  :: non-bloody liquid stools about 3-4 BM a day for the past week. No sick contacts or dietary changes.   :: CT CAP done, no acute concerns other than colitis and cystitis  ::viral gastroenteritis vs SIBO  PLAN:  - f/up C.diff and stool pathogen PCR  - fluid status per cardiology section  - f/up D-lactate  - consulted GI, waiting their recs     #Sherita-en-Y  #recurrent abdominal 2/2 adhesions   #SBO and diagnostic laparoscopy 8/2021 w adhesion lysis  #cholecystectomy  #GERD  - Current plan to get EGD and colonoscopy on 11/7/24. Last EGD 2018 with small Sherita-en-Y gastrojejunostomy with gastrojejunal anastomosis characterized by healthy appearing mucosa. Colonoscopy 2016 normal.   - CT CAP done, no acute concerns other than colitis and cystitis     RENAL/ELECTROLYTES  # Non-Anion Gap Metabolic  acidosis  #Hyperkalemia  #SPENSER  ::baseline Cr. 0.8-1.2, admission Cr 2.19. Likely pre-renal in etiology I/s GI losses  ::admission vbg 7.17/64/78 and HCO3- 18 on CMP. S/p bicarb pushes and then bicarb ggt @150 because next HCO3- on CMP 16 and vbg 7.16/45/82.   :: NAGMA (8) 2/2 GI losses most likely I/s diarrhea over 1 week.    ::K+ 6.8 -> 5.0 after hyperK cocktail  Plan:  -c/w bicarb ggt and repeat CMP and VBG to assess response  -corrected calcium was low, 1g gluconate given  -labs trending in the right direction-- VBG repeated at 4pm and WNL  - fluids and diarrhea per cards and GI section  - f/up urine lytes  - CTM K+  -titrate off oral bicarb before leaving hospital     HEME/ONC  - TANISHA     ENDOCRINE  TANISHA  - f/up a1c     INFECTIOUS DISEASE  # leukocytosis  :: 2/2 viral gastroenteritits (non-bloody diarrhea and colitis on imaging) vs UTI (dysuria, increased frequency, cystitis on imaging)   :: WCC 12.5.   ::No cough, SOB, no suspicion of RTI  PLAN:  - trend WCC  - f/up Ucx, blood cultures  - c/w zosyn, D/C Vanc     MSK/RHEUM/SKIN  # arthritis  #skin lesions on shoulders  ::says skin lesions 2/2 cat scratches  :: knee pain d/t arthritis per patient  Plan:  -CTM    To-do list prior to transfer:  N/A     E: Exam, including Lines/Drains/Airways & Data Review:   Physical exam:   General: Well-developed female in no acute distress.   HEENT: Normocephalic, atraumatic. PERRL. EOMI.   Respiratory: CTA bilaterally. No wheezes, rales, or rhonchi. Normal respiratory effort.  Cardiovascular: RRR. No murmurs, gallops, or rubs. No JVD. Radial pulses 2+.  Abdominal: Soft, nondistended, generalized tenderness to palpation, no peritoniteal signs. BS hyperactive.  Neuro: Aox4, 5/5 in b/l UE and LE   MSK: No LE edema Bilaterally, endorses painful knees when ambulating  Skin: Warm, dry. Circular bruising w some dried blood over B/L shoulders. Patient says 2/2 cat at daughters house  Difficult airway? N/A  Lines/drains assessed for  removal? Yes, describe: 20 PIV r forearm, 20 PIV l forearm, no signs of infection    Within 30 minutes of the patient physically leaving the floor, a Floor Readiness Note needs to be placed with updated vitals.

## 2024-10-26 NOTE — PROGRESS NOTES
"Medical Intensive Care - Daily Progress Note   Subjective    Jessica Smith is a 59 y.o. year old female patient admitted on 10/25/2024 with following ICU needs: electrolyte repletion in setting of metabolic acidosis 2/2 GI losses      Meds    Scheduled medications  calcium gluconate, 1 g, intravenous, q4h  cyanocobalamin, 1,000 mcg, oral, Daily  diclofenac sodium, 4 g, Topical, 4x daily  escitalopram, 20 mg, oral, Daily  [Held by provider] ferrous sulfate (325 mg ferrous sulfate), 1 tablet, oral, Every other day  fluticasone, 1 spray, Each Nostril, Daily  fluticasone furoate-vilanteroL, 1 puff, inhalation, Daily  gabapentin, 300 mg, oral, Nightly  heparin (porcine), 7,500 Units, subcutaneous, q8h SUNSHINE  lidocaine, 1 patch, transdermal, Daily  [Held by provider] lisinopril, 30 mg, oral, Daily  melatonin, 3 mg, oral, Nightly  pantoprazole, 40 mg, oral, Daily  piperacillin-tazobactam, 3.375 g, intravenous, q6h  sodium bicarbonate, 1,300 mg, oral, BID      Continuous medications  oxygen,       PRN medications  PRN medications: albuterol, dextromethorphan-guaifenesin, gabapentin, hydrOXYzine HCL, polyethylene glycol, sennosides     Objective    Blood pressure 138/61, pulse 79, temperature 36 °C (96.8 °F), temperature source Temporal, resp. rate 15, height 1.6 m (5' 3\"), weight 145 kg (319 lb 3.6 oz), SpO2 93%.     Physical Exam       Intake/Output Summary (Last 24 hours) at 10/26/2024 1418  Last data filed at 10/26/2024 1106  Gross per 24 hour   Intake 4800.42 ml   Output 400 ml   Net 4400.42 ml     Labs:   Results from last 72 hours   Lab Units 10/26/24  0822 10/26/24  0316 10/25/24  2238   SODIUM mmol/L 136 134* 136   POTASSIUM mmol/L 5.2 5.1 5.0   CHLORIDE mmol/L 108* 110* 112*   CO2 mmol/L 21 17* 16*   BUN mg/dL 43* 45* 50*   CREATININE mg/dL 1.80* 1.94* 2.11*   GLUCOSE mg/dL 109* 140* 124*   CALCIUM mg/dL 7.7* 7.4* 7.9*   ANION GAP mmol/L 12 12 13   EGFR mL/min/1.73m*2 32* 29* 27*   PHOSPHORUS mg/dL  --  4.1  --     "   Results from last 72 hours   Lab Units 10/26/24  0822 10/25/24  1448   WBC AUTO x10*3/uL 8.4 12.5*   HEMOGLOBIN g/dL 9.0* 11.1*   HEMATOCRIT % 28.0* 36.1   PLATELETS AUTO x10*3/uL 240 288   NEUTROS PCT AUTO % 66.5 79.4   LYMPHS PCT AUTO % 23.0 13.1   MONOS PCT AUTO % 8.5 6.0   EOS PCT AUTO % 1.4 0.7          Results from last 72 hours   Lab Units 10/26/24  0822 10/26/24  0549 10/26/24  0316   POCT PH, VENOUS pH 7.23* 7.21* 7.18*   POCT PCO2, VENOUS mm Hg 48 49 47   POCT PO2, VENOUS mm Hg 43 45 51*        Micro/ID:     Lab Results   Component Value Date    URINECULTURE 20,000 - 80,000 Escherichia coli (A) 06/01/2024    URINECULTURE 20,000 - 80,000 Morganella morganii (A) 06/01/2024    BLOODCULT Loaded on Instrument - Culture in progress 10/25/2024    BLOODCULT Loaded on Instrument - Culture in progress 10/25/2024       Summary of key imaging results from the last 24 hours   CT AP w IV contrast: Several loops of thickened and inflamed small bowel similar to the  prior exam.  Given the persistence of these findings compared to theprior exam, inflammatory bowel disease is in the differential versus an infectious enteritis. Changes of gastric bypass again noted with presumed additional GI tract surgeries.  The more inferior and posterior anastomosis within the bowel shows significant wall thickening at the suture line.  No free air.  No abscess or definite perforation. Rectus diastases with post surgical changes of the abdominal midline.  No discrete hernia defect identified.    Assessment and Plan     Assessment: Jessica Smith is a 59 y.o. female with PMHx of HFpEF (last echo 2022), multiple abdominal surgeries (adhesion-lysis, cholecystectomy, javier-en-Y) and recurrent pain 2/2 adhesions, and OPAL/OHS and Asthma who presented to the ED on 10/25 for diarrhea (liquid stools) 4x a day for the past week (non-bloody), weakness x3-4 days and dizziness (today) and admitted to MICU for further management for metabolic  acidosis 2/2 GI losses.     Mechanical Ventilation: none  Sedation/Analgesia:  none  Restraints: no    Summary for 10/26/24  :  Patient continued to receive bicarb drip with improvement in bicarb level to 21. Potassium decreased to 5.2. Corrected calcium was low, 1g calcium gluconate given. Bicarb drip switched to oral as patient could tolerate it, and was started on a clear liquid diet. Patient said she felt like her clothes were fitting more loosely and she had felt fatigued for months. GI was consulted to evaluate between possible colitis/IBD/malignancy. VBG at 4pm came back WNL- bicarb and PH normal. At this time, patient was appropriate for floor.     Plan:  NEURO  # Anxiety/MDD  :: Home regimen: Lexapro 20mg daily, Gabapentin 300 mg at bedtime // Gabapentin 100mg PO Q12H PRN- 1st line for anxiety // Atarax 25mg Q8H PRN- 2nd line for anxiety  Plan:  -c/w home plan. CTM Renal function and asses daily  - denies any SI/SH     CARDIOVASCULAR  # HFpEF  #Dehydration  #HTN  ::last echo limited 2022, no obvious abnormality  ::bedside POCUS limited to BODY habitus. JVP <2cm H2O from clavicle while @45 degree angle. Dehydration 2/2 GI losses (rest per GI section)   ::s/p 1L LR in ED  Plan:  -another 1L Bolus given in MICU  - holding home lisinopril and amlodipine I/s of normotension and GI losses.   - f/up BNP     PULMONARY  # OPAL/OHS  #Asthma  ::PFTs 9/2006: FEV1/FVC 56 / FEV1 1.5 w 10% change post-BD/ no hyperinflation in lung volumes, no DLCO. Sleep study 2006, PFTs shows obstructive pattern  :: says on 2L NC at night when sleeping for comfort  Plan:  -c/w home albuterol  - c/w 2L NC at night for comfort, needs another sleep eval +/- CPAP/bipap  - needs pulm f/up and repeat PFTs and sleep study     GI/NUTRITION  # Diarrhea  #Colitis  :: non-bloody liquid stools about 3-4 BM a day for the past week. No sick contacts or dietary changes.   :: CT CAP done, no acute concerns other than colitis and cystitis  ::viral  gastroenteritis vs SIBO  PLAN:  - f/up C.diff and stool pathogen PCR  - fluid status per cardiology section  - f/up D-lactate  - consulted GI who will see on Monday     #Sherita-en-Y  #recurrent abdominal 2/2 adhesions   #SBO and diagnostic laparoscopy 8/2021 w adhesion lysis  #cholecystectomy  #GERD  - Current plan to get EGD and colonoscopy on 11/7/24. Last EGD 2018 with small Sherita-en-Y gastrojejunostomy with gastrojejunal anastomosis characterized by healthy appearing mucosa. Colonoscopy 2016 normal.   - CT CAP done, no acute concerns other than colitis and cystitis     RENAL/ELECTROLYTES  # Non-Anion Gap Metabolic acidosis  #Hyperkalemia  #SPENSER  ::baseline Cr. 0.8-1.2, admission Cr 2.19. Likely pre-renal in etiology I/s GI losses  ::admission vbg 7.17/64/78 and HCO3- 18 on CMP. S/p bicarb pushes and then bicarb ggt @150 because next HCO3- on CMP 16 and vbg 7.16/45/82.   :: NAGMA (8) 2/2 GI losses most likely I/s diarrhea over 1 week.    ::K+ 6.8 -> 5.0 after hyperK cocktail  Plan:  -c/w bicarb ggt and repeat CMP and VBG to assess response  -corrected calcium was low, 1g gluconate given  -labs trending in the right direction-- VBG repeated at 4pm and WNL  - fluids and diarrhea per cards and GI section  - f/up urine lytes  - CTM K+     HEME/ONC  - TANISHA     ENDOCRINE  TANISHA  - f/up a1c     INFECTIOUS DISEASE  # leukocytosis  :: 2/2 viral gastroenteritits (non-bloody diarrhea and colitis on imaging) vs UTI (dysuria, increased frequency, cystitis on imaging)   :: WCC 12.5.   ::No cough, SOB, no suspicion of RTI  PLAN:  - trend WCC  - f/up Ucx, blood cultures  - c/w zosyn, D/C Vanc     MSK/RHEUM/SKIN  # arthritis  #skin lesions on shoulders  ::says skin lesions 2/2 cat scratches  :: knee pain d/t arthritis per patient  Plan:  -CTM    ICU Check List     FEN  Fluids: LR  Electrolytes: WNL  Nutrition: clear liquid diet  Prophylaxis:  DVT ppx: heparin  GI ppx: protonix  Bowel care: miralax, senokot  Hardware:         External  Urinary Catheter (Active)   Placement Date/Time: 10/26/24 0034     Number of days: 0       Social:  Code: Full Code    HPOA: Rosalba Smith, [phone]   Disposition:     Jessica Harrell,    10/26/24 at 2:18 PM     Disclaimer: Documentation completed with the information available at the time of input. The times in the chart may not be reflective of actual patient care times, interventions, or procedures. Documentation occurs after the physical care of the patient.

## 2024-10-26 NOTE — CARE PLAN
Problem: Pain - Adult  Goal: Verbalizes/displays adequate comfort level or baseline comfort level  Outcome: Progressing     Problem: Safety - Adult  Goal: Free from fall injury  Outcome: Progressing     Problem: Discharge Planning  Goal: Discharge to home or other facility with appropriate resources  Outcome: Progressing     Problem: Chronic Conditions and Co-morbidities  Goal: Patient's chronic conditions and co-morbidity symptoms are monitored and maintained or improved  Outcome: Progressing     Problem: Skin  Goal: Decreased wound size/increased tissue granulation at next dressing change  Outcome: Progressing  Flowsheets (Taken 10/26/2024 0615)  Decreased wound size/increased tissue granulation at next dressing change:   Promote sleep for wound healing   Protective dressings over bony prominences  Goal: Participates in plan/prevention/treatment measures  Outcome: Progressing  Flowsheets (Taken 10/26/2024 0615)  Participates in plan/prevention/treatment measures:   Discuss with provider PT/OT consult   Elevate heels  Goal: Prevent/manage excess moisture  Outcome: Progressing  Flowsheets (Taken 10/26/2024 0615)  Prevent/manage excess moisture:   Cleanse incontinence/protect with barrier cream   Follow provider orders for dressing changes  Goal: Prevent/minimize sheer/friction injuries  Outcome: Progressing  Flowsheets (Taken 10/26/2024 0615)  Prevent/minimize sheer/friction injuries:   Complete micro-shifts as needed if patient unable. Adjust patient position to relieve pressure points, not a full turn   Increase activity/out of bed for meals   HOB 30 degrees or less   Turn/reposition every 2 hours/use positioning/transfer devices  Goal: Promote/optimize nutrition  Outcome: Progressing  Flowsheets (Taken 10/26/2024 0615)  Promote/optimize nutrition: Monitor/record intake including meals  Goal: Promote skin healing  Outcome: Progressing  Flowsheets (Taken 10/26/2024 0615)  Promote skin healing:   Assess skin/pad  under line(s)/device(s)   Turn/reposition every 2 hours/use positioning/transfer devices   Protective dressings over bony prominences

## 2024-10-26 NOTE — H&P
"MICU Admission History and Physical    MICU Indication: Further evaluation/management of metabolic acidosis (2/2 GI loses) and on bicarb ggt.     HPI:  Jessica Smith is a 59 y.o. female with PMHx of HFpEF (last echo ), multiple abdominal surgeries (adhesion-lysis, cholecystectomy, javier-en-Y) and recurrent pain 2/2 adhesions, and OPAL/OHS and Asthma who presented to the ED on 10/25 for diarrhea (liquid stools) 4x a day for the past week (non-bloody), weakness x3-4 days and dizziness (today) and admitted to MICU for further management for metabolic acidosis 2/2 GI losses. Came to the ED because today she felt orthostatic after standing up from toilet seat.  Patient did not eat anything new 1 week ago and has no sick contacts.  Denies any fevers, chills, sweats, bloody stools or melena, no emesis or nausea. Endorses headache 5/10 pain thats generalized and did not improve w tylenol. Endorses 4/10 generalized abdominal pain that worsens on palpation and \"stinging in urine\" and urinates more frequently (x3-4 days), but says its not painful. Endorses lower back pain, but not flank pain.    Of note, approved for home O2 at discharge 2024 for hypoxia likely 2/2 uncontrolled OPAL/OHS and Asthma. Patient uses 2L NC at bedtime only, not when ambulating. Ambulation limited to Leg pain which she states is from arthritis.     ED Course, notable findings:   Vitals:   /70   Pulse 89   Temp 36.9 °C  Resp 17    Wt 147 kg   SpO2 98%   BMI 57.39 kg/m²     Pertinent Labs:  Vb.17/64/94 lactate 0.9 // K+ 6.7 on next vbg. Last vbg before MICU presentation 7.19/37/90 and lactate 1.7 while on bicarb ggt  Lipase normal, cbc WCC 12.5 and hb 11.1, UA +Ve leuk esterase and WCC.   Imaging:  CT AP w IV contrast: Several loops of thickened and inflamed small bowel similar to the  prior exam.  Given the persistence of these findings compared to theprior exam, inflammatory bowel disease is in the differential versus an " infectious enteritis. Changes of gastric bypass again noted with presumed additional GI tract surgeries.  The more inferior and posterior anastomosis within the bowel shows significant wall thickening at the suture line.  No free air.  No abscess or definite perforation. Rectus diastases with post surgical changes of the abdominal midline.  No discrete hernia defect identified.  Interventions:   - Percocet 5-325 x1  - 1L LR  - Reglan 10mg Once  - Bicarb push x1 -> then ggt @100 -> then @150  - hyperK orderset (lokelma, insulin, D50)  - Vanc+Zosyn  -------------------------------------    PMHx and PSHx:  - HFpEF: last echo 2022, limited.   - Anxiety/MDD  - Asthma: PFTs 9/2006: FEV1/FVC 56 / FEV1 1.5 w 10% change post-BD/ no hyperinflation in lung volumes, no DLCO.   - HTN  - OPAL/OHS: Sleep study 2006, PFTs shows obstructive pattern  - GERD   - H/pylori (treated 2018), negative in stool in 6/2024  - recurrent abdominal pain I/s multiple surgeries and adhesions.  - cholocystectomy  - SBO and diagnostic laparoscopy 8/2021 w adhesion lysis  - Sherita-en-Y  - C-seciton and tubal ligation    Allergies:  Allergies   Allergen Reactions    Aspirin Diarrhea, GI Upset and Unknown     Home Meds:  Current Facility-Administered Medications   Medication Dose Route Frequency Provider Last Rate Last Admin    piperacillin-tazobactam (Zosyn) 3.375 g in dextrose (iso) IV 50 mL  3.375 g intravenous Once Jaison Parks MD        sodium bicarbonate 150 mEq in dextrose 5% 1,150 mL infusion  150 mL/hr intravenous Continuous Jaison Parks MD        vancomycin (Vancocin) 1,000 mg in dextrose 5%  mL  1,000 mg intravenous Once Jaison Parks MD        Followed by    [START ON 10/26/2024] vancomycin (Vancocin) 1,000 mg in dextrose 5%  mL  1,000 mg intravenous Once Jaison Parks MD         Current Outpatient Medications   Medication Sig Dispense Refill    acetaminophen (Tylenol) 325 mg tablet Take 1 tablet (325 mg) by mouth every 4  hours if needed for mild pain (1 - 3). 30 tablet 0    albuterol 90 mcg/actuation inhaler Inhale 2 puffs every 6 hours if needed for wheezing or shortness of breath. 18 g 11    amLODIPine (Norvasc) 10 mg tablet Take 1 tablet (10 mg) by mouth once daily. 30 tablet 0    cyanocobalamin (Vitamin B-12) 1,000 mcg tablet Take 1 tablet (1,000 mcg) by mouth once daily. 30 tablet 11    dextromethorphan-guaifenesin (Coricidin HBP Chest Bradford-Cough)  mg capsule Take 1 capsule by mouth every 4 hours if needed (cough/congestion). 28 capsule 0    diclofenac sodium (Arthritis Pain, diclofenac,) 1 % gel Apply 4.5 inches (4 g) topically 4 times a day. 200 g 11    escitalopram (Lexapro) 20 mg tablet Take 1 tablet (20 mg) by mouth once daily. 90 tablet 3    famotidine (Pepcid AC) 10 mg tablet Take 1 tablet (10 mg) by mouth 2 times a day. 60 tablet 0    ferrous sulfate, 325 mg ferrous sulfate, (FeroSuL) tablet Take 1 tablet by mouth every other day. 15 tablet 3    fluticasone (Flonase) 50 mcg/actuation nasal spray Administer 1 spray into each nostril once daily. 16 g 0    fluticasone propion-salmeteroL (Advair Diskus) 250-50 mcg/dose diskus inhaler Inhale 1 puff 2 times a day. Rinse mouth with water after use to reduce aftertaste and incidence of candidiasis. Do not swallow. 120 each 0    gabapentin (Neurontin) 100 mg capsule Take 1 capsule (100 mg) by mouth 2 times a day as needed (1st line for anxiety). 30 capsule 0    gabapentin (Neurontin) 300 mg capsule Take 1 capsule (300 mg) by mouth once daily at bedtime. 30 capsule 0    guaiFENesin (Mucinex) 1,200 mg tablet extended release 12hr Take 1 tablet (1,200 mg) by mouth every 12 hours if needed (cough/congestion).      hydrOXYzine HCL (Atarax) 25 mg tablet Take 1 tablet (25 mg) by mouth every 8 hours if needed for anxiety (2st line for anxiety). 30 tablet 0    lidocaine (Lidoderm) 5 % patch APPLY 1 PATCH TO THE AFFECTED AREA AND LEAVE IN PLACE FOR 12 HOURS, THEN REMOVE AND LEAVE  "OFF FOR 12 HOURS. Strength: 5 % 30 patch 1    lisinopril 30 mg tablet Take 1 tablet at night, as needed for sleep (Patient taking differently: Take 1 tablet at night) 30 tablet 3    melatonin 3 mg tablet Take 1 tablet (3 mg) by mouth once daily at bedtime. 30 tablet 11    multivit, iron, min. cmb. 5-FA 10-1 mg tablet Take 1 mg by mouth once daily. 30 tablet 0    oxygen (O2) gas therapy Inhale 1 each continuously.      pantoprazole (ProtoNix) 40 mg EC tablet Take 1 tablet (40 mg) by mouth once daily. 30 tablet 0    polyethylene glycol (Glycolax, Miralax) 17 gram packet Take 17 g by mouth 2 times a day as needed (constipation). 30 each 0    senna 8.6 mg tablet TAKE 1 TABLET BY MOUTH TWICE DAILY AS NEEDED FOR CONSTIPATION 60 tablet 10    sucralfate (Carafate) 100 mg/mL suspension TAKE 10ML BY MOUTH FOUR TIMES A DAY WITH MEALS 828 mL 10    walker misc 1 each once daily. Use daily as needed for ambulation assistance 1 each 0       Family history:  No family history on file.    Social history:  - Functional Status: says can walk about 50 yards before stopping, limitied by leg pain \"2/2 arthitis\" and not SOB  - Alcohol: denies  - Tobacco: denies  - Illicit Drugs: denies  - living: lives with fiance, feels safe, has good social support    Vitals:  Vitals:    10/25/24 2200   BP: 105/61   Pulse: 98   Resp: 15   Temp:    SpO2: 95%     Physical exam:   General: Well-developed female in no acute distress. Large body habitus  HEENT: Normocephalic, atraumatic. PERRL. EOMI.   Respiratory: CTA bilaterally. No wheezes, rales, or rhonchi. Normal respiratory effort.  Cardiovascular: RRR. No murmurs, gallops, or rubs. No JVD. Radial pulses 2+.  Abdominal: Soft, nondistended, generalized tenderness to palpation, no peritoniteal signs. BS hyperactive.  Neuro: Aox4, 5/5 in b/l UE and LE   MSK: No LE edema Bilaterally, endorses painful knees when ambulating  Skin: Warm, dry. Circular bruising w some dried blood over B/L shoulders. Patient " olive 2/2 cat at \[facility]\""    Labs:  Results from last 7 days   Lab Units 10/25/24  1448   SODIUM mmol/L 135*   POTASSIUM mmol/L 6.8*   CHLORIDE mmol/L 113*   CO2 mmol/L 18*   BUN mg/dL 48*   CREATININE mg/dL 2.19*   EGFR mL/min/1.73m*2 25*   GLUCOSE mg/dL 94   CALCIUM mg/dL 8.6           Results from last 7 days   Lab Units 10/25/24  1448   WBC AUTO x10*3/uL 12.5*   HEMOGLOBIN g/dL 11.1*   HEMATOCRIT % 36.1   PLATELETS AUTO x10*3/uL 288           Results from last 7 days   Lab Units 10/25/24  1448   ALK PHOS U/L 179*   BILIRUBIN TOTAL mg/dL 0.3   PROTEIN TOTAL g/dL 6.7   ALT U/L 9   AST U/L 10       Latest Reference Range & Units 10/25/24 16:24 10/25/24 18:48 10/25/24 22:38   POCT pH, Venous 7.33 - 7.43 pH 7.12 (LL) 7.19 (LL) 7.16 (LL)   POCT pCO2, Venous 41 - 51 mm Hg 46 37 (L) 45   POCT pO2, Venous 35 - 45 mm Hg 37 59 (H) 47 (H)   POCT SO2, Venous 45 - 75 % 66 90 (H) 82 (H)   POCT Oxy Hemoglobin, Venous 45.0 - 75.0 % 63.7 88.0 (H) 80.3 (H)   POCT Hematocrit Calculated, Venous 36.0 - 46.0 % 33.0 (L) 31.0 (L) 30.0 (L)   POCT Sodium, Venous 136 - 145 mmol/L 133 (L) 136 135 (L)   POCT Potassium, Venous 3.5 - 5.3 mmol/L 6.7 (HH) 5.1 5.2   POCT Chloride, Venous 98 - 107 mmol/L COMMENT ONLY 113 (H) 112 (H)   POCT Ionized Calicum, Venous 1.10 - 1.33 mmol/L 1.23 1.21 1.18   POCT Glucose, Venous 74 - 99 mg/dL 97 110 (H) 122 (H)   POCT Lactate, Venous 0.4 - 2.0 mmol/L 0.7 1.7 1.0   POCT Base Excess, Venous -2.0 - 3.0 mmol/L -13.8 (L) -13.2 (L) -12.1 (L)   POCT HCO3 Calculated, Venous 22.0 - 26.0 mmol/L 15.0 (L) 14.1 (L) 16.0 (L)   POCT Hemoglobin, Venous 12.0 - 16.0 g/dL 11.0 (L) 10.3 (L) 10.0 (L)   POCT Anion Gap, Venous 10.0 - 25.0 mmol/L COMMENT ONLY 14.0 12.0       Assessment/Plan   Jessica Smith is a 59 y.o. female PMHx of HFpEF (last echo 2022), multiple abdominal surgeries (adhesion-lysis, cholecystectomy, javier-en-Y) and recurrent pain 2/2 adhesions, and OPAL/OHS and Asthma who presented to the ED on 10/25 for  "diarrhea (liquid stools) 4x a day for the past week (non-bloody), weakness x3-4 days and dizziness (today) and admitted to MICU for further management for metabolic acidosis 2/2 GI losses. Concerning for Colitis on imaging +/- UTI I/s of increased urgency and \"stinging\" + presence of pyuria. viral gastroenteritis vs SIBO. On bicarb ggt for NAGMA (gap 8) w  HCO3- 16 (much lower than baseline). Leukocytosis present, mild. Likely 2/2 colitis vs UTI.    NEURO  # Anxiety/MDD  :: Home regimen: Lexapro 20mg daily, Gabapentin 300 mg at bedtime // Gabapentin 100mg PO Q12H PRN- 1st line for anxiety // Atarax 25mg Q8H PRN- 2nd line for anxiety  Plan:  -c/w home plan. CTM Renal function and asses daily  - denies any SI/SH    CARDIOVASCULAR  # HFpEF  #Dehydration  #HTN  ::last echo limited 2022, no obvious abnormality  ::bedside POCUS limited to BODY habitus. JVP <2cm H2O from clavicle while @45 degree angle. Dehydration 2/2 GI losses (rest per GI section)   ::s/p 1L LR in ED  Plan:  -another 1L Bolus  - holding home lisinopril and amlodipine I/s of normotension and GI losses.   - f/up BNP    PULMONARY  # OPAL/OHS  #Asthma  ::PFTs 9/2006: FEV1/FVC 56 / FEV1 1.5 w 10% change post-BD/ no hyperinflation in lung volumes, no DLCO. Sleep study 2006, PFTs shows obstructive pattern  :: says on 2L NC at night when sleeping for comfort  Plan:  -c/w home albuterol  - c/w 2L NC at night for comfort, needs another sleep eval +/- CPAP/bipap  - needs pulm f/up and repeat PFTs and sleep study    GI/NUTRITION  # Diarrhea  #Colitis  :: non-bloody liquid stools about 3-4 BM a day for the past week. No sick contacts or dietary changes.   :: CT CAP done, no acute concerns other than colitis and cystitis  ::viral gastroenteritis vs SIBO  PLAN:  - f/up C.diff and stool pathogen PCR  - fluid status per cardiology section  - f/up D-lactate  - consider consulting GI in am    #Sherita-en-Y  #recurrent abdominal 2/2 adhesions   #SBO and diagnostic laparoscopy " 8/2021 w adhesion lysis  #cholecystectomy  #GERD  - Current plan to get EGD and colonoscopy on 11/7/24. Last EGD 2018 with small Sherita-en-Y gastrojejunostomy with gastrojejunal anastomosis characterized by healthy appearing mucosa. Colonoscopy 2016 normal.   - CT CAP done, no acute concerns other than colitis and cystitis    RENAL/ELECTROLYTES  # Non-Anion Gap Metabolic acidosis  #Hyperkalemia  #SPENSER  ::baseline Cr. 0.8-1.2, admission Cr 2.19. Likely pre-renal in etiology I/s GI losses  ::admission vbg 7.17/64/78 and HCO3- 18 on CMP. S/p bicarb pushes and then bicarb ggt @150 because next HCO3- on CMP 16 and vbg 7.16/45/82.   :: NAGMA (8) 2/2 GI losses most likely I/s diarrhea over 1 week.    ::K+ 6.8 -> 5.0 after hyperK cocktail  Plan:  -c/w bicarb ggt and repeat CMP and VBG to assess response  - fluids and diarrhea per cards and GI section  - f/up urine lytes  - CTM K+    HEME/ONC  - TANISHA    ENDOCRINE  TANISHA  - f/up a1c    INFECTIOUS DISEASE  # leukocytosis  :: 2/2 viral gastroenteritits (non-bloody diarrhea and colitis on imaging) vs UTI (dysuria, increased frequency, cystitis on imaging)   :: WCC 12.5.   ::No cough, SOB, no suspicion of RTI  PLAN:  - trend WCC  - f/up Ucx, blood cultures  - c/w zosyn, D/C Vanc    MSK/RHEUM/SKIN  # arthritis  #skin lesions on shoulders  ::says skin lesions 2/2 cat scratches  :: knee pain d/t arthritis per patient  Plan:  -CTM    N: Clears  A: PIV  DVT ppx: Heparing SQ  GI ppx: pantoprazole 40 Po    Oxygen:RA, 2L at night for comfort  Bowel care:N/A  Catheter:external    Code Status: Full Code (confirmed on admission)   NOK:  Primary Emergency Contact: Rosalba Smith MD  PGY 2-Internal Medicine

## 2024-10-26 NOTE — PROGRESS NOTES
I received Jessica Smith in signout from outgoing provider.  Please see the previous note for all HPI, PE and MDM up to the time of signout at 1800.    In brief Jessica Smith is an 59 y.o. female presenting for flulike symptoms, abdominal pain nausea and vomiting as well as diarrhea.  This patient was a signout to me and I received the patient with a partially completed workup.  In brief this patient with a history of multiple abdominal surgeries most pertinent for a Sherita-en-Y, H. pylori gastric ulcers SBO's is having malodorous urine and diarrhea.  She was found to have a metabolic acidosis with associated respiratory acidosis to a pH of 7.12, initiated on bicarb drip.  Hyperkalemic appropriately decremented with potassium cocktail.  Lokelma avoided because of ongoing diarrhea.  CT scan of the abdomen did not show any acute surgical pathology.  It did show findings concerning for diffuse colitis as well as cystitis.  Chief Complaint   Patient presents with    Flu Symptoms   .  At the time of signout we were awaiting: Results of CT scan and dispositioning          During my care despite resuscitation with LR and then isotonic bicarbonate infusion the patient became more acidemic with a pH of 7.16 and a pCO2 of 45, patient maintained her airway and required only 2 L of nasal cannula.  CMP Recentin pending blood cultures x 2, vancomycin and Zosyn ordered for complicated UTI in a postsurgical patient requiring pseudomonal and MRSA coverage.  Discussed the case with the medical ICU attending who accepted the patient.  The patient was admitted to the medical ICU. handed off to the oncoming provider team in the emergency department as she awaited a bed placement in the unit.       Patient seen and discussed with attending of record.    Jaison Parks MD

## 2024-10-26 NOTE — SIGNIFICANT EVENT
Floor Readiness Note       I, personally, evaluated Jessica Smith prior to transfer to the floor, including reviewing all current laboratory and imaging studies. The patient remains appropriate for transfer to the floor. Bedside nurse and respiratory therapy are also in agreement of patient's readiness for the floor.     Brief summary:  Jessica Smith is a 59 y.o. female who was admitted to the MICU on 10/26/2024 for further management for metabolic acidosis 2/2 GI losses. . They have been treated with IV fluids, zosyn, vanc, bicarb drip, oral bicarb.     Updated focused Physical Exam:  General: Well-developed female in no acute distress.   HEENT: Normocephalic, atraumatic. PERRL. EOMI.   Respiratory: CTA bilaterally. No wheezes, rales, or rhonchi. Normal respiratory effort.  Cardiovascular: RRR. No murmurs, gallops, or rubs. No JVD. Radial pulses 2+.  Abdominal: Soft, nondistended, generalized tenderness to palpation, no peritoniteal signs. BS hyperactive.  Neuro: Aox4, 5/5 in b/l UE and LE   MSK: No LE edema Bilaterally  Skin: Warm, dry. Circular bruising w some dried blood over B/L shoulders. Patient says 2/2 cat at [employer] house    Current Vital Signs:  Heart Rate: 77 (10/26/24 1800 : User, System Default)  BP: 126/70 (10/26/24 1800 : User, System Default)  Temp: 36.5 °C (97.7 °F) (10/26/24 1600 : Michelle Valverde)  Resp: 14 (10/26/24 1800 : User, System Default)  SpO2: 98 % (10/26/24 1800 : User, System Default)    Relevant updates since rounds:  VBG came back WNL, patient on oral bicarb, pending cultures    Accepting team, Hospitalist C, received verbal sign out and the Provider Care team/Attending has been updated. Bedside nurse will now call accepting nurse for report and patient will be transferred to [place] [phone].    Jessica Harrell DO

## 2024-10-27 VITALS
RESPIRATION RATE: 18 BRPM | BODY MASS INDEX: 51.91 KG/M2 | WEIGHT: 293 LBS | OXYGEN SATURATION: 93 % | HEART RATE: 77 BPM | HEIGHT: 63 IN | TEMPERATURE: 97.7 F | SYSTOLIC BLOOD PRESSURE: 107 MMHG | DIASTOLIC BLOOD PRESSURE: 59 MMHG

## 2024-10-27 LAB
ALBUMIN SERPL BCP-MCNC: 3.2 G/DL (ref 3.4–5)
ANION GAP SERPL CALC-SCNC: 13 MMOL/L (ref 10–20)
BACTERIA BLD CULT: NORMAL
BACTERIA BLD CULT: NORMAL
BACTERIA UR CULT: ABNORMAL
BACTERIA UR CULT: ABNORMAL
BUN SERPL-MCNC: 31 MG/DL (ref 6–23)
C DIF TOX TCDA+TCDB STL QL NAA+PROBE: NOT DETECTED
CALCIUM SERPL-MCNC: 7.7 MG/DL (ref 8.6–10.6)
CHLORIDE SERPL-SCNC: 106 MMOL/L (ref 98–107)
CO2 SERPL-SCNC: 23 MMOL/L (ref 21–32)
CREAT SERPL-MCNC: 1.58 MG/DL (ref 0.5–1.05)
EGFRCR SERPLBLD CKD-EPI 2021: 38 ML/MIN/1.73M*2
GLUCOSE BLD MANUAL STRIP-MCNC: 92 MG/DL (ref 74–99)
GLUCOSE SERPL-MCNC: 88 MG/DL (ref 74–99)
PHOSPHATE SERPL-MCNC: 3.3 MG/DL (ref 2.5–4.9)
POTASSIUM SERPL-SCNC: 4.7 MMOL/L (ref 3.5–5.3)
SODIUM SERPL-SCNC: 137 MMOL/L (ref 136–145)

## 2024-10-27 PROCEDURE — 82947 ASSAY GLUCOSE BLOOD QUANT: CPT

## 2024-10-27 PROCEDURE — 2500000004 HC RX 250 GENERAL PHARMACY W/ HCPCS (ALT 636 FOR OP/ED): Performed by: STUDENT IN AN ORGANIZED HEALTH CARE EDUCATION/TRAINING PROGRAM

## 2024-10-27 PROCEDURE — 2500000001 HC RX 250 WO HCPCS SELF ADMINISTERED DRUGS (ALT 637 FOR MEDICARE OP): Performed by: STUDENT IN AN ORGANIZED HEALTH CARE EDUCATION/TRAINING PROGRAM

## 2024-10-27 PROCEDURE — 2500000004 HC RX 250 GENERAL PHARMACY W/ HCPCS (ALT 636 FOR OP/ED)

## 2024-10-27 PROCEDURE — 99232 SBSQ HOSP IP/OBS MODERATE 35: CPT | Performed by: STUDENT IN AN ORGANIZED HEALTH CARE EDUCATION/TRAINING PROGRAM

## 2024-10-27 PROCEDURE — 1100000001 HC PRIVATE ROOM DAILY

## 2024-10-27 PROCEDURE — 2500000002 HC RX 250 W HCPCS SELF ADMINISTERED DRUGS (ALT 637 FOR MEDICARE OP, ALT 636 FOR OP/ED): Performed by: STUDENT IN AN ORGANIZED HEALTH CARE EDUCATION/TRAINING PROGRAM

## 2024-10-27 PROCEDURE — 2500000005 HC RX 250 GENERAL PHARMACY W/O HCPCS: Performed by: STUDENT IN AN ORGANIZED HEALTH CARE EDUCATION/TRAINING PROGRAM

## 2024-10-27 RX ORDER — SODIUM BICARBONATE 650 MG/1
650 TABLET ORAL 2 TIMES DAILY
Status: CANCELLED | OUTPATIENT
Start: 2024-10-27

## 2024-10-27 RX ORDER — ACETAMINOPHEN 500 MG
10 TABLET ORAL NIGHTLY
Status: DISCONTINUED | OUTPATIENT
Start: 2024-10-27 | End: 2024-11-01 | Stop reason: HOSPADM

## 2024-10-27 ASSESSMENT — COGNITIVE AND FUNCTIONAL STATUS - GENERAL
CLIMB 3 TO 5 STEPS WITH RAILING: A LOT
DRESSING REGULAR UPPER BODY CLOTHING: A LITTLE
MOBILITY SCORE: 14
DRESSING REGULAR LOWER BODY CLOTHING: A LITTLE
DAILY ACTIVITIY SCORE: 20
DRESSING REGULAR LOWER BODY CLOTHING: A LITTLE
DRESSING REGULAR UPPER BODY CLOTHING: A LITTLE
HELP NEEDED FOR BATHING: A LITTLE
STANDING UP FROM CHAIR USING ARMS: A LOT
TOILETING: A LITTLE
CLIMB 3 TO 5 STEPS WITH RAILING: A LOT
MOVING FROM LYING ON BACK TO SITTING ON SIDE OF FLAT BED WITH BEDRAILS: A LITTLE
TURNING FROM BACK TO SIDE WHILE IN FLAT BAD: A LITTLE
HELP NEEDED FOR BATHING: A LITTLE
MOVING TO AND FROM BED TO CHAIR: A LOT
MOVING TO AND FROM BED TO CHAIR: A LOT
MOBILITY SCORE: 14
STANDING UP FROM CHAIR USING ARMS: A LOT
TURNING FROM BACK TO SIDE WHILE IN FLAT BAD: A LITTLE
WALKING IN HOSPITAL ROOM: A LOT
DAILY ACTIVITIY SCORE: 20
WALKING IN HOSPITAL ROOM: A LOT
TOILETING: A LITTLE
MOVING FROM LYING ON BACK TO SITTING ON SIDE OF FLAT BED WITH BEDRAILS: A LITTLE

## 2024-10-27 ASSESSMENT — PAIN SCALES - GENERAL
PAINLEVEL_OUTOF10: 5 - MODERATE PAIN
PAINLEVEL_OUTOF10: 5 - MODERATE PAIN
PAINLEVEL_OUTOF10: 7
PAINLEVEL_OUTOF10: 6

## 2024-10-27 ASSESSMENT — PAIN DESCRIPTION - LOCATION
LOCATION: HIP
LOCATION: ABDOMEN

## 2024-10-27 ASSESSMENT — PAIN DESCRIPTION - ORIENTATION: ORIENTATION: MID

## 2024-10-27 ASSESSMENT — PAIN - FUNCTIONAL ASSESSMENT: PAIN_FUNCTIONAL_ASSESSMENT: 0-10

## 2024-10-27 NOTE — CARE PLAN
The patient's goals for the shift include      The clinical goals for the shift include patients potassium will be less than 5.4 by the end of the shift.      Problem: Pain - Adult  Goal: Verbalizes/displays adequate comfort level or baseline comfort level  Outcome: Progressing     Problem: Safety - Adult  Goal: Free from fall injury  Outcome: Progressing     Problem: Discharge Planning  Goal: Discharge to home or other facility with appropriate resources  Outcome: Progressing     Problem: Chronic Conditions and Co-morbidities  Goal: Patient's chronic conditions and co-morbidity symptoms are monitored and maintained or improved  Outcome: Progressing     Problem: Skin  Goal: Decreased wound size/increased tissue granulation at next dressing change  Outcome: Progressing  Goal: Participates in plan/prevention/treatment measures  Outcome: Progressing  Flowsheets (Taken 10/26/2024 2219)  Participates in plan/prevention/treatment measures:   Discuss with provider PT/OT consult   Increase activity/out of bed for meals  Goal: Prevent/manage excess moisture  Outcome: Progressing  Flowsheets (Taken 10/26/2024 2219)  Prevent/manage excess moisture:   Cleanse incontinence/protect with barrier cream   Moisturize dry skin   Monitor for/manage infection if present  Goal: Prevent/minimize sheer/friction injuries  Outcome: Progressing  Flowsheets (Taken 10/26/2024 2219)  Prevent/minimize sheer/friction injuries:   Complete micro-shifts as needed if patient unable. Adjust patient position to relieve pressure points, not a full turn   HOB 30 degrees or less   Turn/reposition every 2 hours/use positioning/transfer devices   Use pull sheet  Goal: Promote/optimize nutrition  Outcome: Progressing  Flowsheets (Taken 10/26/2024 2219)  Promote/optimize nutrition:   Discuss with provider if NPO > 2 days   Offer water/supplements/favorite foods  Goal: Promote skin healing  Outcome: Progressing  Flowsheets (Taken 10/26/2024 2219)  Promote skin  healing:   Turn/reposition every 2 hours/use positioning/transfer devices   Rotate device position/do not position patient on device   Protective dressings over bony prominences   Ensure correct size (line/device) and apply per  instructions   Assess skin/pad under line(s)/device(s)

## 2024-10-27 NOTE — PROGRESS NOTES
"[facility]  [facility] [place]  CONSULT FOLLOW-UP     Reason For Consult  Diarrhea Abnormal CT imaging, r/o IBD vs malignancy     History Of Present Illness  Jessica Smith is a 59 y.o. female with a past medical history of HFpEF (last echo 2022), multiple abdominal surgeries (adhesion-lysis, cholecystectomy, javier-en-Y) and recurrent pain 2/2 adhesions, and OPAL/OHS and Asthma admitted on 10/25/2024 with diarrhea and weakness. GI is consulted for CT imaging with c/f thickened small bowel colitis vs IBD, thickening at J-J anastomosis c/f infection, inflammation or malignancy.     SUBJECTIVE     Patient with one bowel movement yesterday. No bm yet today. Abdominal pain she reports is worsening. No nausea or vomiting.     Cdiff negative, stool pathogen panel pending     EXAM     Last Recorded Vitals  Blood pressure 106/62, pulse 80, temperature 37.1 °C (98.8 °F), temperature source Temporal, resp. rate 17, height 1.6 m (5' 3\"), weight 145 kg (319 lb 3.6 oz), SpO2 98%.      Intake/Output Summary (Last 24 hours) at 10/27/2024 1326  Last data filed at 10/27/2024 0704  Gross per 24 hour   Intake 460 ml   Output 3250 ml   Net -2790 ml          Physical Exam  General: well-nourished, no acute distress  HEENT: PERRLA, EOM intact, no scleral icterus, moist MM  Respiratory: CTA bilaterally, normal work of breathing  Cardiovascular: RRR, no murmurs/rubs/gallops  Abdomen: Soft, nontender, nondistended, bowel sounds present, no masses palpated, no organomeagly  Extremities: no edema, no asterixis  Neuro: alert and oriented, CNII-XII grossly intact, moves all 4 extremities with no focal deficits      OBJECTIVE                                                                              Medications             Current Facility-Administered Medications:     acetaminophen (Tylenol) tablet 975 mg, 975 mg, oral, q8h PRN, Marcia Johnson DO, 975 mg at 10/26/24 2115    albuterol 90 " mcg/actuation inhaler 2 puff, 2 puff, inhalation, q6h PRN, Marcia Umanas DO    cyanocobalamin (Vitamin B-12) tablet 1,000 mcg, 1,000 mcg, oral, Daily, Marcia Johnson, DO, 1,000 mcg at 10/26/24 0800    dextromethorphan-guaifenesin (Mucinex DM)  mg per 12 hr tablet 1 tablet, 1 tablet, oral, BID PRN, Marcia Johnson DO    diclofenac sodium (Voltaren) 1 % gel 4 g, 4 g, Topical, 4x daily, Macria Johnson, DO, 4 g at 10/26/24 2059    escitalopram (Lexapro) tablet 20 mg, 20 mg, oral, Daily, Marcia Johnson DO, 20 mg at 10/26/24 0800    [Held by provider] ferrous sulfate (325 mg ferrous sulfate) tablet 325 mg, 1 tablet, oral, Every other day, Marcia Johnson DO    fluticasone (Flonase) nasal spray 1 spray, 1 spray, Each Nostril, Daily, Marcia Johnson DO, 1 spray at 10/26/24 0809    fluticasone furoate-vilanteroL (Breo Ellipta) 200-25 mcg/dose inhaler 1 puff, 1 puff, inhalation, Daily, Marcia Johnson DO    gabapentin (Neurontin) capsule 100 mg, 100 mg, oral, BID PRN, Marcia Johnson DO    gabapentin (Neurontin) capsule 300 mg, 300 mg, oral, Nightly, Marcia Johnson DO, 300 mg at 10/26/24 2058    heparin (porcine) injection 7,500 Units, 7,500 Units, subcutaneous, q8h SUNSHINE, Marcia Johnson DO, 7,500 Units at 10/27/24 0704    hydrOXYzine HCL (Atarax) tablet 25 mg, 25 mg, oral, q8h PRN, Marcia Johnson DO    lidocaine 4 % patch 1 patch, 1 patch, transdermal, Daily, Marcia Johnson DO, 1 patch at 10/26/24 0826    [Held by provider] lisinopril tablet 30 mg, 30 mg, oral, Daily, Marcia Johnson DO    melatonin tablet 10 mg, 10 mg, oral, Nightly, Marcia Johnson DO    oxygen (O2) therapy, , inhalation, Continuous, Inna Martinez MD    pantoprazole (ProtoNix) EC tablet 40 mg, 40 mg, oral, Daily, Marcia Johnson DO, 40 mg at 10/26/24 0800    piperacillin-tazobactam (Zosyn) 3.375 g in dextrose (iso) IV 50 mL, 3.375 g, intravenous, q6h, Inna Martniez MD, Stopped at  10/27/24 0740    [Held by provider] polyethylene glycol (Glycolax, Miralax) packet 17 g, 17 g, oral, BID PRN, Marcia Umanas, DO    [Held by provider] sennosides (Senokot) tablet 8.6 mg, 1 tablet, oral, BID PRN, Marcia Hurstbons, DO    sodium bicarbonate tablet 1,300 mg, 1,300 mg, oral, BID, Marcia Hurstbons, DO, 1,300 mg at 10/26/24 2058    sodium zirconium cyclosilicate (Lokelma) packet 10 g, 10 g, oral, q8h, Marcia HAILEY Johnson, DO, 10 g at 10/26/24 2057                                                                            Labs     CBC RFP   Lab Results   Component Value Date    WBC 8.4 10/26/2024    HGB 9.0 (L) 10/26/2024    HCT 28.0 (L) 10/26/2024    MCV 85 10/26/2024     10/26/2024    NEUTROABS 5.61 10/26/2024    Lab Results   Component Value Date     10/26/2024    K 4.7 10/26/2024     10/26/2024    CO2 23 10/26/2024    BUN 31 (H) 10/26/2024    CREATININE 1.58 (H) 10/26/2024     Lab Results   Component Value Date    MG 2.31 09/02/2024    PHOS 3.3 10/26/2024    CALCIUM 7.7 (L) 10/26/2024    ALBUMIN 3.2 (L) 10/26/2024         Hepatic Function ABG/VBG   Lab Results   Component Value Date    ALT 8 10/26/2024    AST 12 10/26/2024    ALKPHOS 139 (H) 10/26/2024     Lab Results   Component Value Date    BILIDIR 0.0 09/19/2021      Lab Results   Component Value Date    PROTIME 11.0 09/03/2024    APTT 33 06/09/2023    INR 1.0 09/03/2024    Lab Results   Component Value Date    LACTATE 0.7 10/25/2024                                                                               Imaging     === 10/25/24 ===     CT ABDOMEN PELVIS W IV CONTRAST     - Impression -  Several loops of thickened and inflamed small bowel similar to the  prior exam.  Given the persistence of these findings compared to the  prior exam, inflammatory bowel disease is in the differential versus  an infectious enteritis.  Changes of gastric bypass again noted with presumed additional GI  tract surgeries.  The more inferior  and posterior anastomosis within  the bowel shows significant wall thickening at the suture line.  This  may be infectious, inflammatory or neoplastic.  Consider further  investigation with colonoscopy after resolution of acute symptoms.  Small amount of free fluid presumably is reactive to the small bowel  process.  No free air.  No abscess or definite perforation.  Rectus diastases with post surgical changes of the abdominal midline.  No discrete hernia defect identified.                                                                         GI Procedures     EGD 7/17/18 (heartburn)   Impression:            - Small hiatal hernia.                         - Sherita-en-Y gastrojejunostomy with gastrojejunal                          anastomosis characterized by healthy appearing mucosa.                          Biopsied.                         - Normal examined jejunum.   FINAL DIAGNOSIS   A.  GASTRIC, BIOPSY:    --GASTRIC OXYNTIC MUCOSA WITH NO SIGNIFICANT DIAGNOSTIC ALTERATION.   --NO MORPHOLOGIC EVIDENCE OF HELICOBACTER PYLORI-LIKE ORGANISMS.      EGD 8/2/17 (f/up hx gastric ulcer)   Impression:            - Normal esophagus.                         - Z-line regular.                         - Sherita-en-Y gastrojejunostomy with gastrojejunal                          anastomosis characterized by healthy appearing mucosa.                         - The gastric pouch was large and measured 7 cm in                          length. The mucosa appeared normal. No ulcer was seen.                          Biopsied.                         - Normal examined jejunum.  FINAL DIAGNOSIS   A.  GASTRIC POUCH, COLD BIOPSY:    --MILD CHRONIC GASTRITIS   --NEGATIVE FOR H. PYLORI      Colonoscopy 5/11/16  Impression:            - Small external hemorrhoids.                         - No specimens collected.  - Repeat colonoscopy in 10 (ten) years for screening  purposes.     Colonoscopy 4/6/16  Impression:            - Aborted colonoscopy  to the transverse colon                          secondary to poor bowel preparation. This is the                          patient's third aborted colonoscopy due to poor bowel                          preparation since 2015.                         - Solid stool in the rectum, in the sigmoid colon, in                          the descending colon and in the transverse colon.                         - No specimens collected.        Colonoscopy 11/11/15  Impression:            - Aborted and incomplete colonoscopy to the hepatic                          flexure secondary to poor bowel preparation.                         - This is the patient's second attempt at screening                          colonoscopy. The first attempted colonoscopy was                          aborted secondary to poor bowel preparation. The                          patient was instructed to begin MiraLax TID for two                          weeks before the procedure, to be followed by                          MiraLax-Gatorade bowel preparation the day before the                          procedure. However, it is uncertain whether these                          instructions were followed. The patient stated that                          she had a pork chop for breakfast the morning before                          the procedure.                         - Solid stool, found on digital rectal exam.                         - Solid stool in the rectum, in the recto-sigmoid                          colon, in the sigmoid colon, in the descending colon,                          at the splenic flexure, in the transverse colon and at                          the hepatic flexure.                         - Non-bleeding external and internal hemorrhoids.                         - No specimens collected.     Colonoscopy 10/8/15  Impression:            - Incomplete colonoscopy to the cecum due to solid                          stool in the entire examined  colon.                         - Small non-bleeding external and internal hemorrhoids.                         - No specimens collected.     EGD 8/26/15  Impression:            - Red blood at the gastroesophageal junction. Biopsied.                         - Z-line regular, 41 cm from the incisors.                         - Gastric bypass with a normal-sized pouch intact                          staple line. 5 cm pouch. 7 cm afferent limb                         - Normal examined jejunum.                         - Several biopsies were obtained in the afferent                          jejunal loop.  FINAL DIAGNOSIS   A.  GASTROESOPHAGEAL JUNCTION, BIOPSY:   -- SQUAMOCOLUMNAR JUNCTIONAL MUCOSA WITH GOBLET CELLS, SEE NOTE     Note: Differential diagnosis includes Brantley's esophagus versus intestinal   metaplasia in cardia. Clinical correlation suggested. Negative for dysplasia      EGD 12/17/14  Impression:            - Normal esophagus.                         - Z-line regular, 43 cm from the incisors.                         - Gastric bypass with a normal-sized pouch intact                          staple line.                         - Normal examined jejunum.                         - Gastric ulcer with clean base. Biopsied.  A. STOMACH, GASTRIC POUCH ULCER, BIOPSIES:   --REACTIVE GASTROPATHY, WITH PARTICULATE MATERIAL CONSISTENT WITH IRON PILL   GASTRITIS.  SEE NOTE.   Note: There is abundant particulate refractile material deposited in the   superficial areas of the eroded mucosa.  This is positive with iron stain.  The   findings are consistent with iron pill gastritis (reactive gastropathy   secondary to iron pill).  No Helicobacter organisms are seen.  There is no   intestinal metaplasia and no dysplasia.      EGD 1/20/14  Impression:            - Z-line irregular, 41 cm from the incisors. This was                          biopsied. R/o Brantley's.                         - The entire examined gastric pouch  was normal except                          small area of erosions and erythema at 9'0 clock from                          anastomosis, biopsied. Anastomosis was at 47 cm, no                          ulcer, no stricture. Candy cane (blind end) at 51 cm                         - Normal examined jejunum.     A.  POUCH, COLD BIOPSY:    -- CHEMICAL GASTROPATHY.     B.  GASTROESOPHAGEAL JUNCTION, BIOPSY:    -- SQUAMOCOLUMNAR JUNCTIONAL MUCOSA WITH MILD REACTIVE CHANGES.   -- NO INTESTINAL METAPLASIA (GOBLET CELLS) ARE IDENTIFIED.       ASSESSMENT / PLAN     ASSESSMENT/PLAN:    Jessica Smith is a 59 y.o. female with a past medical history of HFpEF (last echo 2022), multiple abdominal surgeries (adhesion-lysis, cholecystectomy, javier-en-Y) and recurrent pain 2/2 adhesions, and OPAL/OHS and Asthma admitted on 10/25/2024 with diarrhea and weakness. GI is consulted for CT imaging with c/f thickened small bowel colitis vs IBD, thickening at J-J anastomosis c/f infection, inflammation or malignancy.      With abdominal pain and thickening at J-J anastomosis need to rule out ulceration vs infection vs malignancy with push enteroscopy. With diarrhea and history of c.diff in 2018 will follow up stool pathogen panel and cdiff labs, will also recommend stool ova and parasite and giardia/cryptosporidium testing.      #Diarrhea   #Abdominal pain  #Colitis on imaging   -Plan for push enteroscopy on Monday depending on schedule Monday may be pushed to Tuesday   -Ok for regular diet today   -Please make NPO at midnight for procedure   -IV PPI Daily   -Please order stool ova and parasite and giardia/cryptosporidium testing  -Follow up on stool pathogen panel   -Please ensure nursing documents stool count       Patient was seen and discussed with Dr. Landrum    Thank you for this interesting consult. Gastroenterology will continue to follow.  -During weekday hours of 7am-5pm please do not hesitate to contact me on [employer] Chat or page [phone] if  there are any further questions between the weekday hours of 7 AM - 5 PM.   -After hours, on weekends, and on holidays, please page the [place] GI fellow at [phone]. Thank you.      Portia Real MD

## 2024-10-27 NOTE — PROGRESS NOTES
10/27/24 1319   Discharge Planning   Living Arrangements Spouse/significant other   Support Systems Spouse/significant other   Type of Residence Private residence   Number of Stairs to Enter Residence 4   Number of Stairs Within Residence 15   Do you have animals or pets at home? No   Who is requesting discharge planning? Provider   Home or Post Acute Services None   Expected Discharge Disposition SNF  (Requesting [facility])   Does the patient need discharge transport arranged? Yes   RoundTrip coordination needed? Yes   Has discharge transport been arranged? Yes     Met with patient/significant other to introduce myself, role and discuss discharge planning.  Patient lives with her significant other.  Patient is independent in all adl's.  Requires a walker to assist with mobility.  Patient 's walker was left in the ER room 31 this admission.  Patient denies any recent falls.  Patient feel safe at [facility] and denies any issues with making follow  appointments or getting her medications.  Patient is oxygen dependent ? [facility].   Patient is requesting to go to skilled placement to assist with mobility.  Patient would like [facility].  Referral placed, but will need PT/OT evals.  Patient expressed no questions and no social work concerns.  PCP:  Dr. Terri Zarate  Pharmacy:  [facility]  Home Care:  N/A  DME:  ?HCS

## 2024-10-27 NOTE — PROGRESS NOTES
Assessment/Plan   Jessica Smith is a 59 y.o. female with PMH of HFpEF,  OPAL/OHS, Asthma, and multiple abdominal surgeries (adhesions-lysis, cholecystectomy, javier-en-Y) and recurrent pain 2/2 adhesions, who presented to the ED on 10/25 for diarrhea with liquid stool. Reports about 4x a day for about a week (non-bloody) associated with weakness x3-4 days and dizziness. Found to have severe metabolic acidosis 2/2 GI losses and admitted to MICU on a bicarbonate infusion for further management for metabolic acidosis 2/2 GI losses. . Bicarb normalized. Pt was transitioned to oral bicarbonate. CTCAP concerning for infection vs malignancy vs inflammation, Gi consulted. Stool work up pending. UA also concerning for UTI.  Started empirically on zosyn pending infectious work up. Gi deciding now on possible push endoscopy v. Colonoscopy.     #acute diarrhea, Colitis  #UTI  - ct concerning for colitis and cystitis  - follow up blood and urine cultures and stool studies  - GI planning potential scope, continue on CLD  - continue zosyn    # Non-Anion Gap Metabolic acidosis  - 2/2 GI losses  - s/p bicarb infusion  - monitor I/o, wean off PO bicarb as able    #SPENSER  #Hyperkalemia  - spenser 2/2 dehydration, elevated K 2/2 spenser  - cr 2.11 to 1.6  - strict I/o, avoid nephrotoxins, renally dose as indicated    #Javier-en-Y  #recurrent abdominal 2/2 adhesions   #SBO and diagnostic laparoscopy 8/2021 w adhesion lysis  #cholecystectomy  #GERD  - Currently was scheduled to have EGD and colonoscopy on 11/7/24. Last EGD 2018 with small Javier-en-Y gastrojejunostomy with gastrojejunal anastomosis characterized by healthy appearing mucosa. Colonoscopy 2016 normal.   - CT CAP with likely colitis and cystitis  - GI planning on scope, npo at midnight in case GI pursues push endoscopy tomorrow.     # OPAL/OHS  #Asthma  -PFTs 9/2006: FEV1/FVC 56 / FEV1 1.5 w 10% change post-BD/ no hyperinflation in lung volumes, no DLCO. Sleep study 2006, PFTs shows  "obstructive pattern  - uses 2L NC at night when sleeping for comfort  -c/w home albuterol  - c/w 2L NC at night for comfort  -needs pulm f/up and repeat PFTs and sleep study     # arthritis  #skin lesions on shoulders  - reports skin lesions 2/2 cat scratches  - knee pain d/t arthritis per patient    # Anxiety/MDD  - Home regimen: Lexapro 20mg daily, Gabapentin 300 mg at bedtime // Gabapentin 100mg PO Q12H PRN- 1st line for anxiety // Atarax 25mg Q8H PRN- 2nd line for anxiety  - denies any SI/SH      Scheduled outpatient appointments in system:   Future Appointments   Date Time Provider Department Center   11/7/2024  8:30 AM MD Dominique Hernandez [facility]     ---------------------------------------------------------------------------------------------------  Subjective   No events. Reports slowing down of diarrhea, last bm last night.  Tolerating diet, on regular diet today. Reports later and epigastric abdominal pain that is improving. No dizizness, cp, sob. Endorses polyuria but no other LUTS.      ---------------------------------------------------------------------------------------------------  Objective   Last Recorded Vitals  Blood pressure 106/62, pulse 80, temperature 37.1 °C (98.8 °F), temperature source Temporal, resp. rate 17, height 1.6 m (5' 3\"), weight 145 kg (319 lb 3.6 oz), SpO2 98%.  Intake/Output last 3 Shifts:  I/O last 3 completed shifts:  In: 4260.4 (29.4 mL/kg) [P.O.:360; I.V.:2250.4 (15.5 mL/kg); IV Piggyback:1650]  Out: 3050 (21.1 mL/kg) [Urine:3050 (0.6 mL/kg/hr)]  Dosing Weight: 144.8 kg     Physical Exam  Vitals and nursing note reviewed.   Constitutional:       General: She is not in acute distress.     Appearance: Normal appearance. She is not toxic-appearing.   HENT:      Head: Normocephalic and atraumatic.      Mouth/Throat:      Mouth: Mucous membranes are moist.   Eyes:      General: No scleral icterus.     Extraocular Movements: Extraocular movements intact.      " Conjunctiva/sclera: Conjunctivae normal.   Cardiovascular:      Rate and Rhythm: Normal rate and regular rhythm.      Heart sounds: S1 normal and S2 normal. No murmur heard.  Pulmonary:      Effort: Pulmonary effort is normal. No respiratory distress.      Breath sounds: No wheezing, rhonchi or rales.   Abdominal:      General: Bowel sounds are normal. There is no distension.      Palpations: Abdomen is soft.      Tenderness: There is no guarding or rebound.      Comments: Mild epigastric and lateral abdominal ttp, mostly LLQ   Musculoskeletal:         General: No swelling or deformity.      Cervical back: Neck supple.   Skin:     General: Skin is warm and dry.      Findings: No rash.   Neurological:      General: No focal deficit present.      Mental Status: She is alert. Mental status is at baseline.   Psychiatric:         Mood and Affect: Mood normal.         Relevant Results  Lab Results   Component Value Date    WBC 8.4 10/26/2024    HGB 9.0 (L) 10/26/2024    HCT 28.0 (L) 10/26/2024    MCV 85 10/26/2024     10/26/2024      Lab Results   Component Value Date    GLUCOSE 88 10/26/2024    CALCIUM 7.7 (L) 10/26/2024     10/26/2024    K 4.7 10/26/2024    CO2 23 10/26/2024     10/26/2024    BUN 31 (H) 10/26/2024    CREATININE 1.58 (H) 10/26/2024     Scheduled medications  cyanocobalamin, 1,000 mcg, oral, Daily  diclofenac sodium, 4 g, Topical, 4x daily  escitalopram, 20 mg, oral, Daily  [Held by provider] ferrous sulfate (325 mg ferrous sulfate), 1 tablet, oral, Every other day  fluticasone, 1 spray, Each Nostril, Daily  fluticasone furoate-vilanteroL, 1 puff, inhalation, Daily  gabapentin, 300 mg, oral, Nightly  heparin (porcine), 7,500 Units, subcutaneous, q8h SUNSHINE  lidocaine, 1 patch, transdermal, Daily  [Held by provider] lisinopril, 30 mg, oral, Daily  melatonin, 10 mg, oral, Nightly  pantoprazole, 40 mg, oral, Daily  piperacillin-tazobactam, 3.375 g, intravenous, q6h  sodium bicarbonate, 1,300  mg, oral, BID  sodium zirconium cyclosilicate, 10 g, oral, q8h      Continuous medications  oxygen,       PRN medications  PRN medications: acetaminophen, albuterol, dextromethorphan-guaifenesin, gabapentin, hydrOXYzine HCL, polyethylene glycol, sennosides    Hernando Devi MD

## 2024-10-28 ENCOUNTER — APPOINTMENT (OUTPATIENT)
Dept: GASTROENTEROLOGY | Facility: HOSPITAL | Age: 59
End: 2024-10-28
Payer: COMMERCIAL

## 2024-10-28 LAB
ALBUMIN SERPL BCP-MCNC: 3.4 G/DL (ref 3.4–5)
ANION GAP SERPL CALC-SCNC: 13 MMOL/L (ref 10–20)
BUN SERPL-MCNC: 24 MG/DL (ref 6–23)
C COLI+JEJ+UPSA DNA STL QL NAA+PROBE: NOT DETECTED
CALCIUM SERPL-MCNC: 7.8 MG/DL (ref 8.6–10.6)
CHLORIDE SERPL-SCNC: 106 MMOL/L (ref 98–107)
CO2 SERPL-SCNC: 25 MMOL/L (ref 21–32)
CREAT SERPL-MCNC: 1.38 MG/DL (ref 0.5–1.05)
EC STX1 GENE STL QL NAA+PROBE: NOT DETECTED
EC STX2 GENE STL QL NAA+PROBE: NOT DETECTED
EGFRCR SERPLBLD CKD-EPI 2021: 44 ML/MIN/1.73M*2
ERYTHROCYTE [DISTWIDTH] IN BLOOD BY AUTOMATED COUNT: 14.6 % (ref 11.5–14.5)
GLUCOSE SERPL-MCNC: 89 MG/DL (ref 74–99)
HCT VFR BLD AUTO: 29.4 % (ref 36–46)
HGB BLD-MCNC: 9 G/DL (ref 12–16)
MCH RBC QN AUTO: 27.4 PG (ref 26–34)
MCHC RBC AUTO-ENTMCNC: 30.6 G/DL (ref 32–36)
MCV RBC AUTO: 90 FL (ref 80–100)
NOROVIRUS GI + GII RNA STL NAA+PROBE: NOT DETECTED
NRBC BLD-RTO: 0 /100 WBCS (ref 0–0)
PHOSPHATE SERPL-MCNC: 3.6 MG/DL (ref 2.5–4.9)
PLATELET # BLD AUTO: 256 X10*3/UL (ref 150–450)
POTASSIUM SERPL-SCNC: 3.8 MMOL/L (ref 3.5–5.3)
RBC # BLD AUTO: 3.28 X10*6/UL (ref 4–5.2)
RV RNA STL NAA+PROBE: NOT DETECTED
SALMONELLA DNA STL QL NAA+PROBE: NOT DETECTED
SHIGELLA DNA SPEC QL NAA+PROBE: NOT DETECTED
SODIUM SERPL-SCNC: 140 MMOL/L (ref 136–145)
V CHOLERAE DNA STL QL NAA+PROBE: NOT DETECTED
WBC # BLD AUTO: 7.2 X10*3/UL (ref 4.4–11.3)
Y ENTEROCOL DNA STL QL NAA+PROBE: NOT DETECTED

## 2024-10-28 PROCEDURE — 2500000004 HC RX 250 GENERAL PHARMACY W/ HCPCS (ALT 636 FOR OP/ED): Performed by: STUDENT IN AN ORGANIZED HEALTH CARE EDUCATION/TRAINING PROGRAM

## 2024-10-28 PROCEDURE — 94640 AIRWAY INHALATION TREATMENT: CPT

## 2024-10-28 PROCEDURE — 2500000004 HC RX 250 GENERAL PHARMACY W/ HCPCS (ALT 636 FOR OP/ED)

## 2024-10-28 PROCEDURE — 2500000005 HC RX 250 GENERAL PHARMACY W/O HCPCS: Performed by: STUDENT IN AN ORGANIZED HEALTH CARE EDUCATION/TRAINING PROGRAM

## 2024-10-28 PROCEDURE — 2500000001 HC RX 250 WO HCPCS SELF ADMINISTERED DRUGS (ALT 637 FOR MEDICARE OP): Performed by: STUDENT IN AN ORGANIZED HEALTH CARE EDUCATION/TRAINING PROGRAM

## 2024-10-28 PROCEDURE — 36415 COLL VENOUS BLD VENIPUNCTURE: CPT | Performed by: STUDENT IN AN ORGANIZED HEALTH CARE EDUCATION/TRAINING PROGRAM

## 2024-10-28 PROCEDURE — 99233 SBSQ HOSP IP/OBS HIGH 50: CPT | Performed by: STUDENT IN AN ORGANIZED HEALTH CARE EDUCATION/TRAINING PROGRAM

## 2024-10-28 PROCEDURE — 1100000001 HC PRIVATE ROOM DAILY

## 2024-10-28 PROCEDURE — 80069 RENAL FUNCTION PANEL: CPT | Performed by: STUDENT IN AN ORGANIZED HEALTH CARE EDUCATION/TRAINING PROGRAM

## 2024-10-28 PROCEDURE — 2500000002 HC RX 250 W HCPCS SELF ADMINISTERED DRUGS (ALT 637 FOR MEDICARE OP, ALT 636 FOR OP/ED): Performed by: STUDENT IN AN ORGANIZED HEALTH CARE EDUCATION/TRAINING PROGRAM

## 2024-10-28 PROCEDURE — 99232 SBSQ HOSP IP/OBS MODERATE 35: CPT | Performed by: STUDENT IN AN ORGANIZED HEALTH CARE EDUCATION/TRAINING PROGRAM

## 2024-10-28 PROCEDURE — 85027 COMPLETE CBC AUTOMATED: CPT | Performed by: STUDENT IN AN ORGANIZED HEALTH CARE EDUCATION/TRAINING PROGRAM

## 2024-10-28 NOTE — CARE PLAN
The patient's goals for the shift include      The clinical goals for the shift include Pt will be HDS this shift

## 2024-10-28 NOTE — PROGRESS NOTES
"[facility]  [facility] [place]  CONSULT FOLLOW-UP     Reason For Consult  Diarrhea Abnormal CT imaging, r/o IBD vs malignancy     History Of Present Illness  Jessica Smith is a 59 y.o. female with a past medical history of HFpEF (last echo 2022), multiple abdominal surgeries (adhesion-lysis, cholecystectomy, javier-en-Y) and recurrent pain 2/2 adhesions, and OPAL/OHS and Asthma admitted on 10/25/2024 with diarrhea and weakness. GI is consulted for CT imaging with c/f thickened small bowel colitis vs IBD, thickening at J-J anastomosis c/f infection, inflammation or malignancy.     SUBJECTIVE   Unable to perform enteroscopy today due to endoscopy schedule. Will proceed tomorrow.     Patient with one documented bowel movement yesterday but patient reports at least 3. Had at least one bowel movement today that she reports as watery. Still having abdominal pain. No nausea or vomiting.      Cdiff negative, stool pathogen panel negative     EXAM     Last Recorded Vitals  Blood pressure 141/81, pulse 81, temperature 36.5 °C (97.7 °F), resp. rate 15, height 1.6 m (5' 3\"), weight 145 kg (319 lb 3.6 oz), SpO2 95%.      Intake/Output Summary (Last 24 hours) at 10/28/2024 1812  Last data filed at 10/28/2024 0831  Gross per 24 hour   Intake --   Output 1200 ml   Net -1200 ml          Physical Exam  General: well-nourished, no acute distress  HEENT: PERRLA, EOM intact, no scleral icterus, moist MM  Respiratory: CTA bilaterally, normal work of breathing  Cardiovascular: RRR, no murmurs/rubs/gallops  Abdomen: Soft, tender, nondistended, bowel sounds present, no masses palpated, no organomeagly  Extremities: no edema, no asterixis  Neuro: alert and oriented, CNII-XII grossly intact, moves all 4 extremities with no focal deficits      OBJECTIVE                                                                              Medications             Current Facility-Administered Medications:     " acetaminophen (Tylenol) tablet 975 mg, 975 mg, oral, q8h PRN, Marcia Johnson DO, 975 mg at 10/27/24 2038    albuterol 90 mcg/actuation inhaler 2 puff, 2 puff, inhalation, q6h PRN, Marcia Johnson DO    cyanocobalamin (Vitamin B-12) tablet 1,000 mcg, 1,000 mcg, oral, Daily, Marcia Johnson DO, 1,000 mcg at 10/28/24 0847    dextromethorphan-guaifenesin (Mucinex DM)  mg per 12 hr tablet 1 tablet, 1 tablet, oral, BID PRN, Marcia Johnson DO    diclofenac sodium (Voltaren) 1 % gel 4 g, 4 g, Topical, 4x daily, Marcia Johnson DO, 4 g at 10/27/24 2039    escitalopram (Lexapro) tablet 20 mg, 20 mg, oral, Daily, Marcia Johnson DO, 20 mg at 10/28/24 0847    [Held by provider] ferrous sulfate (325 mg ferrous sulfate) tablet 325 mg, 1 tablet, oral, Every other day, Marcia Johnson DO    fluticasone (Flonase) nasal spray 1 spray, 1 spray, Each Nostril, Daily, Marcia Johnson DO, 1 spray at 10/28/24 0845    fluticasone furoate-vilanteroL (Breo Ellipta) 200-25 mcg/dose inhaler 1 puff, 1 puff, inhalation, Daily, Marcia Johnson DO, 1 puff at 10/28/24 0915    gabapentin (Neurontin) capsule 100 mg, 100 mg, oral, BID PRN, Marcia Johnson DO    gabapentin (Neurontin) capsule 300 mg, 300 mg, oral, Nightly, Marcia Johnson DO, 300 mg at 10/27/24 2038    heparin (porcine) injection 7,500 Units, 7,500 Units, subcutaneous, q8h SUNSHINE, Marcia Johnson DO, 7,500 Units at 10/28/24 1722    hydrOXYzine HCL (Atarax) tablet 25 mg, 25 mg, oral, q8h PRN, Marcia Johnson DO, 25 mg at 10/27/24 2039    lidocaine 4 % patch 1 patch, 1 patch, transdermal, Daily, Marcia Johnson DO, 1 patch at 10/28/24 0846    [Held by provider] lisinopril tablet 30 mg, 30 mg, oral, Daily, Marcia Johnson DO    melatonin tablet 10 mg, 10 mg, oral, Nightly, Marcia Johnson DO, 10 mg at 10/27/24 2038    oxygen (O2) therapy, , inhalation, Continuous, Inna Martinez MD    pantoprazole (ProtoNix) EC tablet 40  mg, 40 mg, oral, Daily, Marcia R Johnson, DO, 40 mg at 10/28/24 0846    piperacillin-tazobactam (Zosyn) 3.375 g in dextrose (iso) IV 50 mL, 3.375 g, intravenous, q6h, Inna Martinez MD, Last Rate: 0 mL/hr at 10/28/24 1126, 3.375 g at 10/28/24 1722    [Held by provider] polyethylene glycol (Glycolax, Miralax) packet 17 g, 17 g, oral, BID PRN, Marcia R Johnson, DO    [Held by provider] sennosides (Senokot) tablet 8.6 mg, 1 tablet, oral, BID PRN, Marcia R Johnson, DO    sodium bicarbonate tablet 1,300 mg, 1,300 mg, oral, BID, Marcia R Johnson, DO, 1,300 mg at 10/28/24 0848    sodium zirconium cyclosilicate (Lokelma) packet 10 g, 10 g, oral, q8h, Marcia R Johnson, DO, 10 g at 10/28/24 1150                                                                            Labs     CBC RFP   Lab Results   Component Value Date    WBC 7.2 10/28/2024    HGB 9.0 (L) 10/28/2024    HCT 29.4 (L) 10/28/2024    MCV 90 10/28/2024     10/28/2024    NEUTROABS 5.61 10/26/2024    Lab Results   Component Value Date     10/28/2024    K 3.8 10/28/2024     10/28/2024    CO2 25 10/28/2024    BUN 24 (H) 10/28/2024    CREATININE 1.38 (H) 10/28/2024     Lab Results   Component Value Date    MG 2.31 09/02/2024    PHOS 3.6 10/28/2024    CALCIUM 7.8 (L) 10/28/2024    ALBUMIN 3.4 10/28/2024         Hepatic Function ABG/VBG   Lab Results   Component Value Date    ALT 8 10/26/2024    AST 12 10/26/2024    ALKPHOS 139 (H) 10/26/2024     Lab Results   Component Value Date    BILIDIR 0.0 09/19/2021      Lab Results   Component Value Date    PROTIME 11.0 09/03/2024    APTT 33 06/09/2023    INR 1.0 09/03/2024    Lab Results   Component Value Date    LACTATE 0.7 10/25/2024                                                                               Imaging     === 10/25/24 ===     CT ABDOMEN PELVIS W IV CONTRAST     - Impression -  Several loops of thickened and inflamed small bowel similar to the  prior exam.  Given the persistence of  these findings compared to the  prior exam, inflammatory bowel disease is in the differential versus  an infectious enteritis.  Changes of gastric bypass again noted with presumed additional GI  tract surgeries.  The more inferior and posterior anastomosis within  the bowel shows significant wall thickening at the suture line.  This  may be infectious, inflammatory or neoplastic.  Consider further  investigation with colonoscopy after resolution of acute symptoms.  Small amount of free fluid presumably is reactive to the small bowel  process.  No free air.  No abscess or definite perforation.  Rectus diastases with post surgical changes of the abdominal midline.  No discrete hernia defect identified.                                                                         GI Procedures     EGD 7/17/18 (heartburn)   Impression:            - Small hiatal hernia.                         - Sherita-en-Y gastrojejunostomy with gastrojejunal                          anastomosis characterized by healthy appearing mucosa.                          Biopsied.                         - Normal examined jejunum.   FINAL DIAGNOSIS   A.  GASTRIC, BIOPSY:    --GASTRIC OXYNTIC MUCOSA WITH NO SIGNIFICANT DIAGNOSTIC ALTERATION.   --NO MORPHOLOGIC EVIDENCE OF HELICOBACTER PYLORI-LIKE ORGANISMS.      EGD 8/2/17 (f/up hx gastric ulcer)   Impression:            - Normal esophagus.                         - Z-line regular.                         - Sherita-en-Y gastrojejunostomy with gastrojejunal                          anastomosis characterized by healthy appearing mucosa.                         - The gastric pouch was large and measured 7 cm in                          length. The mucosa appeared normal. No ulcer was seen.                          Biopsied.                         - Normal examined jejunum.  FINAL DIAGNOSIS   A.  GASTRIC POUCH, COLD BIOPSY:    --MILD CHRONIC GASTRITIS   --NEGATIVE FOR H. PYLORI      Colonoscopy  5/11/16  Impression:            - Small external hemorrhoids.                         - No specimens collected.  - Repeat colonoscopy in 10 (ten) years for screening  purposes.     Colonoscopy 4/6/16  Impression:            - Aborted colonoscopy to the transverse colon                          secondary to poor bowel preparation. This is the                          patient's third aborted colonoscopy due to poor bowel                          preparation since 2015.                         - Solid stool in the rectum, in the sigmoid colon, in                          the descending colon and in the transverse colon.                         - No specimens collected.        Colonoscopy 11/11/15  Impression:            - Aborted and incomplete colonoscopy to the hepatic                          flexure secondary to poor bowel preparation.                         - This is the patient's second attempt at screening                          colonoscopy. The first attempted colonoscopy was                          aborted secondary to poor bowel preparation. The                          patient was instructed to begin MiraLax TID for two                          weeks before the procedure, to be followed by                          MiraLax-Gatorade bowel preparation the day before the                          procedure. However, it is uncertain whether these                          instructions were followed. The patient stated that                          she had a pork chop for breakfast the morning before                          the procedure.                         - Solid stool, found on digital rectal exam.                         - Solid stool in the rectum, in the recto-sigmoid                          colon, in the sigmoid colon, in the descending colon,                          at the splenic flexure, in the transverse colon and at                          the hepatic flexure.                         -  Non-bleeding external and internal hemorrhoids.                         - No specimens collected.     Colonoscopy 10/8/15  Impression:            - Incomplete colonoscopy to the cecum due to solid                          stool in the entire examined colon.                         - Small non-bleeding external and internal hemorrhoids.                         - No specimens collected.     EGD 8/26/15  Impression:            - Red blood at the gastroesophageal junction. Biopsied.                         - Z-line regular, 41 cm from the incisors.                         - Gastric bypass with a normal-sized pouch intact                          staple line. 5 cm pouch. 7 cm afferent limb                         - Normal examined jejunum.                         - Several biopsies were obtained in the afferent                          jejunal loop.  FINAL DIAGNOSIS   A.  GASTROESOPHAGEAL JUNCTION, BIOPSY:   -- SQUAMOCOLUMNAR JUNCTIONAL MUCOSA WITH GOBLET CELLS, SEE NOTE     Note: Differential diagnosis includes Brantley's esophagus versus intestinal   metaplasia in cardia. Clinical correlation suggested. Negative for dysplasia      EGD 12/17/14  Impression:            - Normal esophagus.                         - Z-line regular, 43 cm from the incisors.                         - Gastric bypass with a normal-sized pouch intact                          staple line.                         - Normal examined jejunum.                         - Gastric ulcer with clean base. Biopsied.  A. STOMACH, GASTRIC POUCH ULCER, BIOPSIES:   --REACTIVE GASTROPATHY, WITH PARTICULATE MATERIAL CONSISTENT WITH IRON PILL   GASTRITIS.  SEE NOTE.   Note: There is abundant particulate refractile material deposited in the   superficial areas of the eroded mucosa.  This is positive with iron stain.  The   findings are consistent with iron pill gastritis (reactive gastropathy   secondary to iron pill).  No Helicobacter organisms are seen.  There is  no   intestinal metaplasia and no dysplasia.      EGD 1/20/14  Impression:            - Z-line irregular, 41 cm from the incisors. This was                          biopsied. R/o Brantley's.                         - The entire examined gastric pouch was normal except                          small area of erosions and erythema at 9'0 clock from                          anastomosis, biopsied. Anastomosis was at 47 cm, no                          ulcer, no stricture. Candy cane (blind end) at 51 cm                         - Normal examined jejunum.     A.  POUCH, COLD BIOPSY:    -- CHEMICAL GASTROPATHY.     B.  GASTROESOPHAGEAL JUNCTION, BIOPSY:    -- SQUAMOCOLUMNAR JUNCTIONAL MUCOSA WITH MILD REACTIVE CHANGES.   -- NO INTESTINAL METAPLASIA (GOBLET CELLS) ARE IDENTIFIED.       ASSESSMENT / PLAN     ASSESSMENT/PLAN:    Jessica Smith is a 59 y.o. female with a past medical history of HFpEF (last echo 2022), multiple abdominal surgeries (adhesion-lysis, cholecystectomy, javier-en-Y) and recurrent pain 2/2 adhesions, and OPAL/OHS and Asthma admitted on 10/25/2024 with diarrhea and weakness. GI is consulted for CT imaging with c/f thickened small bowel enteritis vs IBD, thickening at J-J anastomosis c/f infection, inflammation or malignancy.      With abdominal pain and thickening at J-J anastomosis need to rule out ulceration vs infection vs malignancy with push enteroscopy. With diarrhea and history of c.diff in 2018 however stool pathogen and c.diff negative.  Still pending ova and parasite and giardia/cryptosporidium testing.      #Diarrhea   #Abdominal pain  #Colitis on imaging   -Plan for push enteroscopy on Tuesday 10/29/24  -Ok for regular diet today   -Please make NPO at midnight for procedure   -IV PPI Daily   -Please order stool ova and parasite and giardia/cryptosporidium testing  -Please ensure nursing documents stool count     Patient was seen and discussed with Dr. Landrum    Thank you for this interesting  consult. Gastroenterology will continue to follow.  -During weekday hours of 7am-5pm please do not hesitate to contact me on [employer] Chat or page [phone] if there are any further questions between the weekday hours of 7 AM - 5 PM.   -After hours, on weekends, and on holidays, please page the on-call GI fellow at [phone]. Thank you.      Portia Real MD

## 2024-10-28 NOTE — PROGRESS NOTES
10/28/24 1313 Transitional Care Coordinator Notes:    Transitional Care Coordination Progress Note:  Patient discussed during interdisciplinary rounds.   Team members present: MD and TCC  Plan per Medical/Surgical team: Patient was admitted for abnormal imaging on a CT scan. GI consulted to r/o IBD vs malignancy. GI to decide if they will schedule patient for a push endoscopy vs colonoscopy. ID consulted for antibiotic recommendations. MD to place PT/OT orders to evaluate for discharge needs.  Payor: [facility]  Discharge disposition: TBD  Potential Barriers: none   ADOD: 1-3 days                        Assessment/Plan   Assessment & Plan  Renal tubular acidosis, type 4               Pushpa Zhu RN

## 2024-10-29 ENCOUNTER — ANESTHESIA (OUTPATIENT)
Dept: GASTROENTEROLOGY | Facility: HOSPITAL | Age: 59
End: 2024-10-29
Payer: COMMERCIAL

## 2024-10-29 ENCOUNTER — ANESTHESIA EVENT (OUTPATIENT)
Dept: GASTROENTEROLOGY | Facility: HOSPITAL | Age: 59
End: 2024-10-29
Payer: COMMERCIAL

## 2024-10-29 ENCOUNTER — APPOINTMENT (OUTPATIENT)
Dept: GASTROENTEROLOGY | Facility: HOSPITAL | Age: 59
End: 2024-10-29
Payer: COMMERCIAL

## 2024-10-29 LAB
ALBUMIN SERPL BCP-MCNC: 3.4 G/DL (ref 3.4–5)
ANION GAP SERPL CALC-SCNC: 14 MMOL/L (ref 10–20)
BACTERIA UR CULT: ABNORMAL
BACTERIA UR CULT: ABNORMAL
BUN SERPL-MCNC: 21 MG/DL (ref 6–23)
CALCIUM SERPL-MCNC: 7.9 MG/DL (ref 8.6–10.6)
CHLORIDE SERPL-SCNC: 105 MMOL/L (ref 98–107)
CO2 SERPL-SCNC: 25 MMOL/L (ref 21–32)
CREAT SERPL-MCNC: 1.31 MG/DL (ref 0.5–1.05)
EGFRCR SERPLBLD CKD-EPI 2021: 47 ML/MIN/1.73M*2
ERYTHROCYTE [DISTWIDTH] IN BLOOD BY AUTOMATED COUNT: 14.5 % (ref 11.5–14.5)
GLUCOSE SERPL-MCNC: 88 MG/DL (ref 74–99)
HCT VFR BLD AUTO: 29.8 % (ref 36–46)
HGB BLD-MCNC: 9.2 G/DL (ref 12–16)
MCH RBC QN AUTO: 28 PG (ref 26–34)
MCHC RBC AUTO-ENTMCNC: 30.9 G/DL (ref 32–36)
MCV RBC AUTO: 91 FL (ref 80–100)
NRBC BLD-RTO: 0 /100 WBCS (ref 0–0)
PHOSPHATE SERPL-MCNC: 3.5 MG/DL (ref 2.5–4.9)
PLATELET # BLD AUTO: 242 X10*3/UL (ref 150–450)
POTASSIUM SERPL-SCNC: 3.6 MMOL/L (ref 3.5–5.3)
RBC # BLD AUTO: 3.29 X10*6/UL (ref 4–5.2)
SODIUM SERPL-SCNC: 140 MMOL/L (ref 136–145)
WBC # BLD AUTO: 8.4 X10*3/UL (ref 4.4–11.3)

## 2024-10-29 PROCEDURE — 3700000001 HC GENERAL ANESTHESIA TIME - INITIAL BASE CHARGE

## 2024-10-29 PROCEDURE — 2500000002 HC RX 250 W HCPCS SELF ADMINISTERED DRUGS (ALT 637 FOR MEDICARE OP, ALT 636 FOR OP/ED): Performed by: STUDENT IN AN ORGANIZED HEALTH CARE EDUCATION/TRAINING PROGRAM

## 2024-10-29 PROCEDURE — 36415 COLL VENOUS BLD VENIPUNCTURE: CPT | Performed by: STUDENT IN AN ORGANIZED HEALTH CARE EDUCATION/TRAINING PROGRAM

## 2024-10-29 PROCEDURE — 2500000004 HC RX 250 GENERAL PHARMACY W/ HCPCS (ALT 636 FOR OP/ED)

## 2024-10-29 PROCEDURE — 2500000004 HC RX 250 GENERAL PHARMACY W/ HCPCS (ALT 636 FOR OP/ED): Performed by: STUDENT IN AN ORGANIZED HEALTH CARE EDUCATION/TRAINING PROGRAM

## 2024-10-29 PROCEDURE — 3700000002 HC GENERAL ANESTHESIA TIME - EACH INCREMENTAL 1 MINUTE

## 2024-10-29 PROCEDURE — 44360 SMALL BOWEL ENDOSCOPY: CPT | Performed by: INTERNAL MEDICINE

## 2024-10-29 PROCEDURE — 7100000002 HC RECOVERY ROOM TIME - EACH INCREMENTAL 1 MINUTE

## 2024-10-29 PROCEDURE — 97166 OT EVAL MOD COMPLEX 45 MIN: CPT | Mod: GO | Performed by: OCCUPATIONAL THERAPIST

## 2024-10-29 PROCEDURE — 0DJ08ZZ INSPECTION OF UPPER INTESTINAL TRACT, VIA NATURAL OR ARTIFICIAL OPENING ENDOSCOPIC: ICD-10-PCS | Performed by: INTERNAL MEDICINE

## 2024-10-29 PROCEDURE — 80069 RENAL FUNCTION PANEL: CPT | Performed by: STUDENT IN AN ORGANIZED HEALTH CARE EDUCATION/TRAINING PROGRAM

## 2024-10-29 PROCEDURE — 94640 AIRWAY INHALATION TREATMENT: CPT

## 2024-10-29 PROCEDURE — 1100000001 HC PRIVATE ROOM DAILY

## 2024-10-29 PROCEDURE — 2500000001 HC RX 250 WO HCPCS SELF ADMINISTERED DRUGS (ALT 637 FOR MEDICARE OP): Performed by: STUDENT IN AN ORGANIZED HEALTH CARE EDUCATION/TRAINING PROGRAM

## 2024-10-29 PROCEDURE — 7100000001 HC RECOVERY ROOM TIME - INITIAL BASE CHARGE

## 2024-10-29 PROCEDURE — 99232 SBSQ HOSP IP/OBS MODERATE 35: CPT | Performed by: STUDENT IN AN ORGANIZED HEALTH CARE EDUCATION/TRAINING PROGRAM

## 2024-10-29 PROCEDURE — 97161 PT EVAL LOW COMPLEX 20 MIN: CPT | Mod: GP | Performed by: PHYSICAL THERAPIST

## 2024-10-29 PROCEDURE — 85027 COMPLETE CBC AUTOMATED: CPT | Performed by: STUDENT IN AN ORGANIZED HEALTH CARE EDUCATION/TRAINING PROGRAM

## 2024-10-29 PROCEDURE — 99233 SBSQ HOSP IP/OBS HIGH 50: CPT | Performed by: STUDENT IN AN ORGANIZED HEALTH CARE EDUCATION/TRAINING PROGRAM

## 2024-10-29 RX ORDER — SODIUM CHLORIDE, SODIUM LACTATE, POTASSIUM CHLORIDE, CALCIUM CHLORIDE 600; 310; 30; 20 MG/100ML; MG/100ML; MG/100ML; MG/100ML
INJECTION, SOLUTION INTRAVENOUS CONTINUOUS PRN
Status: DISCONTINUED | OUTPATIENT
Start: 2024-10-29 | End: 2024-10-29

## 2024-10-29 RX ORDER — LIDOCAINE HCL/PF 100 MG/5ML
SYRINGE (ML) INTRAVENOUS AS NEEDED
Status: DISCONTINUED | OUTPATIENT
Start: 2024-10-29 | End: 2024-10-29

## 2024-10-29 RX ORDER — PROPOFOL 10 MG/ML
INJECTION, EMULSION INTRAVENOUS CONTINUOUS PRN
Status: DISCONTINUED | OUTPATIENT
Start: 2024-10-29 | End: 2024-10-29

## 2024-10-29 RX ORDER — ONDANSETRON HYDROCHLORIDE 2 MG/ML
INJECTION, SOLUTION INTRAVENOUS AS NEEDED
Status: DISCONTINUED | OUTPATIENT
Start: 2024-10-29 | End: 2024-10-29

## 2024-10-29 RX ORDER — ONDANSETRON HYDROCHLORIDE 2 MG/ML
4 INJECTION, SOLUTION INTRAVENOUS ONCE AS NEEDED
OUTPATIENT
Start: 2024-10-29

## 2024-10-29 RX ORDER — IPRATROPIUM BROMIDE AND ALBUTEROL SULFATE 2.5; .5 MG/3ML; MG/3ML
3 SOLUTION RESPIRATORY (INHALATION)
Status: DISCONTINUED | OUTPATIENT
Start: 2024-10-30 | End: 2024-10-31

## 2024-10-29 RX ORDER — NITROFURANTOIN 25; 75 MG/1; MG/1
100 CAPSULE ORAL EVERY 12 HOURS SCHEDULED
Status: DISCONTINUED | OUTPATIENT
Start: 2024-10-29 | End: 2024-11-01 | Stop reason: HOSPADM

## 2024-10-29 RX ORDER — IPRATROPIUM BROMIDE AND ALBUTEROL SULFATE 2.5; .5 MG/3ML; MG/3ML
3 SOLUTION RESPIRATORY (INHALATION)
Status: DISCONTINUED | OUTPATIENT
Start: 2024-10-29 | End: 2024-10-29

## 2024-10-29 RX ORDER — SUCCINYLCHOLINE CHLORIDE 20 MG/ML
INJECTION INTRAMUSCULAR; INTRAVENOUS AS NEEDED
Status: DISCONTINUED | OUTPATIENT
Start: 2024-10-29 | End: 2024-10-29

## 2024-10-29 RX ORDER — MIDAZOLAM HYDROCHLORIDE 1 MG/ML
INJECTION INTRAMUSCULAR; INTRAVENOUS AS NEEDED
Status: DISCONTINUED | OUTPATIENT
Start: 2024-10-29 | End: 2024-10-29

## 2024-10-29 ASSESSMENT — PAIN - FUNCTIONAL ASSESSMENT
PAIN_FUNCTIONAL_ASSESSMENT: 0-10

## 2024-10-29 ASSESSMENT — COGNITIVE AND FUNCTIONAL STATUS - GENERAL
STANDING UP FROM CHAIR USING ARMS: A LITTLE
MOVING FROM LYING ON BACK TO SITTING ON SIDE OF FLAT BED WITH BEDRAILS: A LITTLE
MOVING TO AND FROM BED TO CHAIR: A LITTLE
CLIMB 3 TO 5 STEPS WITH RAILING: TOTAL
DRESSING REGULAR LOWER BODY CLOTHING: A LOT
TURNING FROM BACK TO SIDE WHILE IN FLAT BAD: A LITTLE
MOBILITY SCORE: 16
DAILY ACTIVITIY SCORE: 16
DRESSING REGULAR UPPER BODY CLOTHING: A LITTLE
WALKING IN HOSPITAL ROOM: A LITTLE
HELP NEEDED FOR BATHING: A LOT
PERSONAL GROOMING: A LITTLE
EATING MEALS: A LITTLE
TOILETING: A LITTLE

## 2024-10-29 ASSESSMENT — PAIN SCALES - GENERAL
PAINLEVEL_OUTOF10: 0 - NO PAIN
PAINLEVEL_OUTOF10: 7
PAINLEVEL_OUTOF10: 0 - NO PAIN
PAINLEVEL_OUTOF10: 6
PAINLEVEL_OUTOF10: 0 - NO PAIN
PAINLEVEL_OUTOF10: 3

## 2024-10-29 ASSESSMENT — ACTIVITIES OF DAILY LIVING (ADL)
ADL_ASSISTANCE: INDEPENDENT
ADL_ASSISTANCE: INDEPENDENT

## 2024-10-29 ASSESSMENT — PAIN DESCRIPTION - LOCATION: LOCATION: THROAT

## 2024-10-29 ASSESSMENT — PAIN DESCRIPTION - ORIENTATION: ORIENTATION: INNER

## 2024-10-29 NOTE — PROGRESS NOTES
"[facility]  [facility] [place]  CONSULT FOLLOW-UP     Reason For Consult  Diarrhea Abnormal CT imaging, r/o IBD vs malignancy     History Of Present Illness  Jessica Smith is a 59 y.o. female with a past medical history of HFpEF (last echo 2022), multiple abdominal surgeries (adhesion-lysis, cholecystectomy, sherita-en-Y) and recurrent pain 2/2 adhesions, and OPAL/OHS and Asthma admitted on 10/25/2024 with diarrhea and weakness. GI is consulted for CT imaging with c/f thickened small bowel colitis vs IBD, thickening at J-J anastomosis c/f infection, inflammation or malignancy.     SUBJECTIVE   Patient with 1 bowel movement yesterday reported as solid per patient and nursing     Patient s/p Enteroscopy 10/29/24 unable to reach JJ anastomosis   Impression  The esophagus appeared normal.  Previous Sherita-en-Y gastric bypass. Mucosa at the gastrojejunal anastomosis is healthy. Unable to reach J-J anastomosis.   The stomach appeared normal.  The jejunum appeared normal.        Findings  The esophagus appeared normal. Z-line is 38 cm from the incisors.  Previous Sherita-en-Y gastric bypass. The gastric jejunal anastomosis with healthy mucosa. Unable to reach Jejunal-Jejunal anastomosis with enteroscopy.  The stomach appeared normal.  The jejunum appeared normal.    EXAM     Last Recorded Vitals  Blood pressure 120/74, pulse 83, temperature 36.4 °C (97.5 °F), resp. rate 15, height 1.6 m (5' 3\"), weight 147 kg (324 lb), SpO2 93%.      Intake/Output Summary (Last 24 hours) at 10/29/2024 1656  Last data filed at 10/29/2024 1131  Gross per 24 hour   Intake 200 ml   Output 325 ml   Net -125 ml          Physical Exam  General: well-nourished, no acute distress  HEENT: PERRLA, EOM intact, no scleral icterus, moist MM  Respiratory: CTA bilaterally, normal work of breathing  Cardiovascular: RRR, no murmurs/rubs/gallops  Abdomen: Soft, tender, nondistended, bowel sounds present, no masses palpated, " no organomeagly  Extremities: no edema, no asterixis  Neuro: alert and oriented, CNII-XII grossly intact, moves all 4 extremities with no focal deficits      OBJECTIVE                                                                              Medications             Current Facility-Administered Medications:     acetaminophen (Tylenol) tablet 975 mg, 975 mg, oral, q8h PRN, Marcia Umanas, DO, 975 mg at 10/27/24 2038    albuterol 90 mcg/actuation inhaler 2 puff, 2 puff, inhalation, q6h PRN, Marcia Umanas DO    cyanocobalamin (Vitamin B-12) tablet 1,000 mcg, 1,000 mcg, oral, Daily, Marcia Umanas, DO, 1,000 mcg at 10/29/24 1256    dextromethorphan-guaifenesin (Mucinex DM)  mg per 12 hr tablet 1 tablet, 1 tablet, oral, BID PRN, Marcia Umanas DO    diclofenac sodium (Voltaren) 1 % gel 4 g, 4 g, Topical, 4x daily, Marcia Johnson, DO, 4 g at 10/29/24 1637    escitalopram (Lexapro) tablet 20 mg, 20 mg, oral, Daily, Marcai Johnson, DO, 20 mg at 10/29/24 1256    [Held by provider] ferrous sulfate (325 mg ferrous sulfate) tablet 325 mg, 1 tablet, oral, Every other day, Marcia Umanas DO    fluticasone (Flonase) nasal spray 1 spray, 1 spray, Each Nostril, Daily, Marcia Johnson DO, 1 spray at 10/29/24 1256    fluticasone furoate-vilanteroL (Breo Ellipta) 200-25 mcg/dose inhaler 1 puff, 1 puff, inhalation, Daily, Marcia Johnson DO, 1 puff at 10/29/24 0858    gabapentin (Neurontin) capsule 100 mg, 100 mg, oral, BID PRN, Marcia Umanas, DO    gabapentin (Neurontin) capsule 300 mg, 300 mg, oral, Nightly, Marcia Johnson, DO, 300 mg at 10/28/24 2207    heparin (porcine) injection 7,500 Units, 7,500 Units, subcutaneous, q8h SUNSHINE, Marcia Johnson DO, 7,500 Units at 10/29/24 1256    hydrOXYzine HCL (Atarax) tablet 25 mg, 25 mg, oral, q8h PRN, Marcia Johnson DO, 25 mg at 10/27/24 2039    ipratropium-albuteroL (Duo-Neb) 0.5-2.5 mg/3 mL nebulizer solution 3 mL, 3 mL,  nebulization, q6h, Olu Person, DO, 3 mL at 10/29/24 1033    lidocaine 4 % patch 1 patch, 1 patch, transdermal, Daily, Marcia Johnson DO, 1 patch at 10/28/24 0846    [Held by provider] lisinopril tablet 30 mg, 30 mg, oral, Daily, Marcia Umanas, DO    melatonin tablet 10 mg, 10 mg, oral, Nightly, Marcia Johnson, DO, 10 mg at 10/28/24 2207    nitrofurantoin (macrocrystal-monohydrate) (Macrobid) capsule 100 mg, 100 mg, oral, q12h SUNSHINE, Vani Gaines MD, 100 mg at 10/29/24 1635    oxygen (O2) therapy, , inhalation, Continuous, Inna Martinez MD    pantoprazole (ProtoNix) EC tablet 40 mg, 40 mg, oral, Daily, Marcia Johnson DO, 40 mg at 10/29/24 1256    piperacillin-tazobactam (Zosyn) 3.375 g in dextrose (iso) IV 50 mL, 3.375 g, intravenous, q6h, Inna Martinez MD, Stopped at 10/29/24 1327    [Held by provider] polyethylene glycol (Glycolax, Miralax) packet 17 g, 17 g, oral, BID PRN, Marcia Johnson DO    [Held by provider] sennosides (Senokot) tablet 8.6 mg, 1 tablet, oral, BID PRN, Marcia Umanas, DO    sodium bicarbonate tablet 1,300 mg, 1,300 mg, oral, BID, Marcia Umanas, DO, 1,300 mg at 10/29/24 1256                                                                            Labs     CBC RFP   Lab Results   Component Value Date    WBC 8.4 10/29/2024    HGB 9.2 (L) 10/29/2024    HCT 29.8 (L) 10/29/2024    MCV 91 10/29/2024     10/29/2024    NEUTROABS 5.61 10/26/2024    Lab Results   Component Value Date     10/29/2024    K 3.6 10/29/2024     10/29/2024    CO2 25 10/29/2024    BUN 21 10/29/2024    CREATININE 1.31 (H) 10/29/2024     Lab Results   Component Value Date    MG 2.31 09/02/2024    PHOS 3.5 10/29/2024    CALCIUM 7.9 (L) 10/29/2024    ALBUMIN 3.4 10/29/2024         Hepatic Function ABG/VBG   Lab Results   Component Value Date    ALT 8 10/26/2024    AST 12 10/26/2024    ALKPHOS 139 (H) 10/26/2024     Lab Results   Component Value Date    BILIDIR 0.0  09/19/2021      Lab Results   Component Value Date    PROTIME 11.0 09/03/2024    APTT 33 06/09/2023    INR 1.0 09/03/2024    Lab Results   Component Value Date    LACTATE 0.7 10/25/2024                                                                               Imaging     === 10/25/24 ===     CT ABDOMEN PELVIS W IV CONTRAST     - Impression -  Several loops of thickened and inflamed small bowel similar to the  prior exam.  Given the persistence of these findings compared to the  prior exam, inflammatory bowel disease is in the differential versus  an infectious enteritis.  Changes of gastric bypass again noted with presumed additional GI  tract surgeries.  The more inferior and posterior anastomosis within  the bowel shows significant wall thickening at the suture line.  This  may be infectious, inflammatory or neoplastic.  Consider further  investigation with colonoscopy after resolution of acute symptoms.  Small amount of free fluid presumably is reactive to the small bowel  process.  No free air.  No abscess or definite perforation.  Rectus diastases with post surgical changes of the abdominal midline.  No discrete hernia defect identified.                                                                         GI Procedures   Enteroscopy 10/29/24  Impression  The esophagus appeared normal.  Previous Sherita-en-Y gastric bypass. Mucosa at the gastrojejunal anastomosis is healthy. Unable to reach J-J anastomosis.   The stomach appeared normal.  The jejunum appeared normal.        Findings  The esophagus appeared normal. Z-line is 38 cm from the incisors.  Previous Sherita-en-Y gastric bypass. The gastric jejunal anastomosis with healthy mucosa. Unable to reach Jejunal-Jejunal anastomosis with enteroscopy.  The stomach appeared normal.  The jejunum appeared normal.    EGD 7/17/18 (heartburn)   Impression:            - Small hiatal hernia.                         - Sherita-en-Y gastrojejunostomy with gastrojejunal                           anastomosis characterized by healthy appearing mucosa.                          Biopsied.                         - Normal examined jejunum.   FINAL DIAGNOSIS   A.  GASTRIC, BIOPSY:    --GASTRIC OXYNTIC MUCOSA WITH NO SIGNIFICANT DIAGNOSTIC ALTERATION.   --NO MORPHOLOGIC EVIDENCE OF HELICOBACTER PYLORI-LIKE ORGANISMS.      EGD 8/2/17 (f/up hx gastric ulcer)   Impression:            - Normal esophagus.                         - Z-line regular.                         - Sherita-en-Y gastrojejunostomy with gastrojejunal                          anastomosis characterized by healthy appearing mucosa.                         - The gastric pouch was large and measured 7 cm in                          length. The mucosa appeared normal. No ulcer was seen.                          Biopsied.                         - Normal examined jejunum.  FINAL DIAGNOSIS   A.  GASTRIC POUCH, COLD BIOPSY:    --MILD CHRONIC GASTRITIS   --NEGATIVE FOR H. PYLORI      Colonoscopy 5/11/16  Impression:            - Small external hemorrhoids.                         - No specimens collected.  - Repeat colonoscopy in 10 (ten) years for screening  purposes.     Colonoscopy 4/6/16  Impression:            - Aborted colonoscopy to the transverse colon                          secondary to poor bowel preparation. This is the                          patient's third aborted colonoscopy due to poor bowel                          preparation since 2015.                         - Solid stool in the rectum, in the sigmoid colon, in                          the descending colon and in the transverse colon.                         - No specimens collected.        Colonoscopy 11/11/15  Impression:            - Aborted and incomplete colonoscopy to the hepatic                          flexure secondary to poor bowel preparation.                         - This is the patient's second attempt at screening                          colonoscopy. The  first attempted colonoscopy was                          aborted secondary to poor bowel preparation. The                          patient was instructed to begin MiraLax TID for two                          weeks before the procedure, to be followed by                          MiraLax-Gatorade bowel preparation the day before the                          procedure. However, it is uncertain whether these                          instructions were followed. The patient stated that                          she had a pork chop for breakfast the morning before                          the procedure.                         - Solid stool, found on digital rectal exam.                         - Solid stool in the rectum, in the recto-sigmoid                          colon, in the sigmoid colon, in the descending colon,                          at the splenic flexure, in the transverse colon and at                          the hepatic flexure.                         - Non-bleeding external and internal hemorrhoids.                         - No specimens collected.     Colonoscopy 10/8/15  Impression:            - Incomplete colonoscopy to the cecum due to solid                          stool in the entire examined colon.                         - Small non-bleeding external and internal hemorrhoids.                         - No specimens collected.     EGD 8/26/15  Impression:            - Red blood at the gastroesophageal junction. Biopsied.                         - Z-line regular, 41 cm from the incisors.                         - Gastric bypass with a normal-sized pouch intact                          staple line. 5 cm pouch. 7 cm afferent limb                         - Normal examined jejunum.                         - Several biopsies were obtained in the afferent                          jejunal loop.  FINAL DIAGNOSIS   A.  GASTROESOPHAGEAL JUNCTION, BIOPSY:   -- SQUAMOCOLUMNAR JUNCTIONAL MUCOSA WITH GOBLET CELLS,  SEE NOTE     Note: Differential diagnosis includes Brantley's esophagus versus intestinal   metaplasia in cardia. Clinical correlation suggested. Negative for dysplasia      EGD 12/17/14  Impression:            - Normal esophagus.                         - Z-line regular, 43 cm from the incisors.                         - Gastric bypass with a normal-sized pouch intact                          staple line.                         - Normal examined jejunum.                         - Gastric ulcer with clean base. Biopsied.  A. STOMACH, GASTRIC POUCH ULCER, BIOPSIES:   --REACTIVE GASTROPATHY, WITH PARTICULATE MATERIAL CONSISTENT WITH IRON PILL   GASTRITIS.  SEE NOTE.   Note: There is abundant particulate refractile material deposited in the   superficial areas of the eroded mucosa.  This is positive with iron stain.  The   findings are consistent with iron pill gastritis (reactive gastropathy   secondary to iron pill).  No Helicobacter organisms are seen.  There is no   intestinal metaplasia and no dysplasia.      EGD 1/20/14  Impression:            - Z-line irregular, 41 cm from the incisors. This was                          biopsied. R/o Brantley's.                         - The entire examined gastric pouch was normal except                          small area of erosions and erythema at 9'0 clock from                          anastomosis, biopsied. Anastomosis was at 47 cm, no                          ulcer, no stricture. Candy cane (blind end) at 51 cm                         - Normal examined jejunum.     A.  POUCH, COLD BIOPSY:    -- CHEMICAL GASTROPATHY.     B.  GASTROESOPHAGEAL JUNCTION, BIOPSY:    -- SQUAMOCOLUMNAR JUNCTIONAL MUCOSA WITH MILD REACTIVE CHANGES.   -- NO INTESTINAL METAPLASIA (GOBLET CELLS) ARE IDENTIFIED.       ASSESSMENT / PLAN   ASSESSMENT/PLAN:  Jessica Smith is a 59 y.o. female with a past medical history of HFpEF (last echo 2022), multiple abdominal surgeries (adhesion-lysis,  cholecystectomy, javier-en-Y) and recurrent pain 2/2 adhesions, and OPAL/OHS and Asthma admitted on 10/25/2024 with diarrhea and weakness. GI is consulted for CT imaging with c/f thickened small bowel enteritis vs IBD, thickening at J-J anastomosis c/f infection, inflammation or malignancy.      With abdominal pain and thickening at J-J anastomosis need to rule out ulceration vs infection vs malignancy. Stool pathogen and c.diff negative. Push enterography unable to reach JJ anastomosis however examined jejunum healthy appearing. Will evaluate JJ anastomosis with MR or CT enterography to assess for mass or stricture.      #Diarrhea   #Abdominal pain  #Colitis on imaging   -Ok to resume diet   -Please order MR or CT enterography, ensure discuss with radiology as to need for NPO and timing     Patient was seen and discussed with Dr. Landrum    Thank you for this interesting consult. Gastroenterology will continue to follow.  -During weekday hours of 7am-5pm please do not hesitate to contact me on [employer] Chat or page [phone] if there are any further questions between the weekday hours of 7 AM - 5 PM.   -After hours, on weekends, and on holidays, please page the on-call GI fellow at [phone]. Thank you.      Portia Real MD

## 2024-10-29 NOTE — PROGRESS NOTES
Physical Therapy    Physical Therapy Evaluation    Patient Name: Jessica Smith  MRN: [id number]  Department: [facility]  Room: [id number]-  Today's Date: 10/29/2024   Time Calculation  Start Time: 1517  Stop Time: 1534  Time Calculation (min): 17 min    Assessment/Plan   PT Assessment  PT Assessment Results: Decreased strength, Decreased endurance, Impaired balance, Decreased mobility  Rehab Prognosis: Good  Barriers to Discharge: None  End of Session Communication: Bedside nurse  End of Session Patient Position: Up in chair, Alarm off, not on at start of session, Alarm off, caregiver present  IP OR SWING BED PT PLAN  Inpatient or Swing Bed: Inpatient  PT Plan  Treatment/Interventions: Bed mobility, Transfer training, Gait training, Stair training, Balance training, Strengthening, Endurance training, Range of motion, Therapeutic exercise, Therapeutic activity  PT Plan: Ongoing PT  PT Frequency: 3 times per week  PT Discharge Recommendations: Moderate intensity level of continued care  PT Recommended Transfer Status: Assist x1, Assistive device  PT - OK to Discharge: Yes    Subjective   General Visit Information:  General  Reason for Referral: presented to the ED on 10/25 for diarrhea (liquid stools) 4x a day for the past week (non-bloody), weakness x3-4 days and dizziness (today) and admitted to MICU for further management for metabolic acidosis 2/2 GI losses  Past Medical History Relevant to Rehab: HFpEF (last echo 2022), multiple abdominal surgeries (adhesion-lysis, cholecystectomy, javier-en-Y) and recurrent pain 2/2 adhesions, and OPAL/OHS and Asthma, 2L NC at bedtime only  Missed Visit: Yes  Missed Visit Reason: Patient in a medical procedure (Pt off the floor for Enteroscopy)  Family/Caregiver Present: Yes (Dtr present)  Co-Treatment: OT  Co-Treatment Reason: Cotx with OT for pt's safety with mobility  Prior to Session Communication: Bedside nurse  Patient Position Received: Up in chair, Alarm off, not on at start  of session, Alarm off, caregiver present  Preferred Learning Style: verbal  General Comment: Pt seated in chair upon arrival, pleasant and willing to participate in Therapy session  Home Living:  Home Living  Type of Home: Apartment (3rd floor without elevator, was living at r's home)  Lives With: Adult children (r)  Home Adaptive Equipment: Walker rolling or standard, Cane (FWW, rollator, cane)  Home Layout: One level  Home Access: Stairs to enter with rails  Entrance Stairs-Number of Steps: ~30 steps to 3rd floor with handail, no elevators  Bathroom Shower/Tub: Tub/shower unit  Prior Level of Function:  Prior Function Per Pt/Caregiver Report  Level of [place]: Independent with ADLs and functional transfers, Independent with homemaking with ambulation  Receives Help From: Family  ADL Assistance: Independent  Hand Dominance: Right  Precautions:  Precautions  Medical Precautions: Fall precautions     Vital Signs (Past 2hrs)        Date/Time Vitals Session Patient Position Pulse Resp SpO2 BP MAP (mmHg)    10/29/24 16:20:15 --  --  83  15  93 %  120/74  89                         Objective   Pain:  Pain Assessment  Pain Assessment: 0-10  0-10 (Numeric) Pain Score: 0 - No pain  Cognition:  Cognition  Overall Cognitive Status: Within Functional Limits  Arousal/Alertness: Appropriate responses to stimuli  Orientation Level: Oriented X4    General Assessments:     Sensation  Light Touch: No apparent deficits    Strength  Strength Comments: BLE's 3/5 to3+/5 based on mobility      Static Sitting Balance  Static Sitting-Level of Assistance: Distant supervision  Dynamic Sitting Balance  Dynamic Sitting-Level of Assistance:  (Supervision)    Static Standing Balance  Static Standing-Level of Assistance: Contact guard (RW)  Dynamic Standing Balance  Dynamic Standing-Level of Assistance: Contact guard (with RW)  Functional Assessments:  Bed Mobility  Bed Mobility: No (pt seated in chair pre and post  session)    Transfers  Transfer: Yes  Transfer 1  Transfer From 1: Sit to, Stand to  Transfer to 1: Sit, Stand  Technique 1: Sit to stand, Stand to sit  Transfer Device 1: Walker  Transfer Level of Assistance 1: Minimum assistance, Minimal verbal cues    Ambulation/Gait Training  Ambulation/Gait Training Performed: Yes  Ambulation/Gait Training 1  Surface 1: Level tile  Device 1: Rolling walker  Assistance 1: Contact guard, Minimal verbal cues  Quality of Gait 1: Diminished heel strike, Decreased step length  Comments/Distance (ft) 1: ~40 ft with standing rest breaks in between    Stairs  Stairs: No  Outcome Measures:  [facility] Basic Mobility  Turning from your back to your side while in a flat bed without using bedrails: A little  Moving from lying on your back to sitting on the side of a flat bed without using bedrails: A little  Moving to and from bed to chair (including a wheelchair): A little  Standing up from a chair using your arms (e.g. wheelchair or bedside chair): A little  To walk in hospital room: A little  Climbing 3-5 steps with railing: Total  Basic Mobility - Total Score: 16    Encounter Problems       Encounter Problems (Active)       Balance       Pt will score >/=24/28 on Tinetti to demonstrate decreased falls risk (Progressing)       Start:  10/29/24    Expected End:  11/12/24               Mobility       Pt will be Mac ambulation 100 ft with RW (Progressing)       Start:  10/29/24    Expected End:  11/12/24            Pt will be SBA to ascend/descend 30 steps with rest breaks as needed with 1 handrail  (Progressing)       Start:  10/29/24    Expected End:  11/12/24               PT Transfers       Pt will be Mac with sit to stand and bed to chair transfers with RW (Progressing)       Start:  10/29/24    Expected End:  11/12/24            Pt will be Mac with bed mobility (Progressing)       Start:  10/29/24    Expected End:  11/12/24                     Education Documentation  Precautions,  taught by Cris Baeza PT at 10/29/2024  5:01 PM.  Learner: Patient  Readiness: Acceptance  Method: Explanation  Response: Verbalizes Understanding, Needs Reinforcement  Comment: PT POC, safe transfer techniques    Mobility Training, taught by Cris Baeza PT at 10/29/2024  5:01 PM.  Learner: Patient  Readiness: Acceptance  Method: Explanation  Response: Verbalizes Understanding, Needs Reinforcement  Comment: PT POC, safe transfer techniques    Education Comments  No comments found.

## 2024-10-29 NOTE — PROGRESS NOTES
Occupational Therapy    Evaluation    Patient Name: Jessica Smith  MRN: [id number]  Department: [facility] 60  Room: [facility]6009-  Today's Date: 10/29/2024  Time Calculation  Start Time: 1518  Stop Time: 1534  Time Calculation (min): 16 min        Assessment:  Prognosis: Good  Evaluation/Treatment Tolerance: Patient tolerated treatment well  Medical Staff Made Aware: Yes  End of Session Communication: Bedside nurse  End of Session Patient Position: Up in chair, Alarm off, not on at start of session  OT Assessment Results: Decreased ADL status, Decreased upper extremity strength, Decreased cognition, Decreased endurance, Decreased functional mobility  Prognosis: Good  Evaluation/Treatment Tolerance: Patient tolerated treatment well  Medical Staff Made Aware: Yes  Plan:  Treatment Interventions: ADL retraining, Functional transfer training, UE strengthening/ROM, Endurance training  OT Frequency: 3 times per week  OT Discharge Recommendations: Moderate intensity level of continued care  OT - OK to Discharge: Yes  Treatment Interventions: ADL retraining, Functional transfer training, UE strengthening/ROM, Endurance training    Subjective   Current Problem:  1. Renal tubular acidosis, type 4        2. Hyperkalemia        3. Metabolic acidosis        4. Cystitis        5. Abdominal pain, unspecified abdominal location  Enteroscopy    Enteroscopy      6. History of Sherita-en-Y gastric bypass  Enteroscopy    Enteroscopy        General:  General  Past Medical History Relevant to Rehab: PMH of HFpEF,  OPAL/OHS, Asthma, and multiple abdominal surgeries (adhesions-lysis, cholecystectomy, sherita-en-Y) and recurrent pain 2/2 adhesions  Family/Caregiver Present: Yes (daughter present)  Co-Treatment: PT  Prior to Session Communication: Bedside nurse  Patient Position Received: Up in chair, Alarm off, not on at start of session  Preferred Learning Style: verbal  General Comment: pt received sitting up in a chair; pleasant and cooperative and  willing to participate  Precautions:       Vital Sign (Past 2hrs)        Date/Time Vitals Session Patient Position Pulse Resp SpO2 BP MAP (mmHg)    10/29/24 16:20:15 --  --  83  15  93 %  120/74  89                         Pain:  Pain Assessment  Pain Assessment: 0-10  0-10 (Numeric) Pain Score: 0 - No pain    Objective   Cognition:  Overall Cognitive Status: Within Functional Limits  Orientation Level: Oriented X4  Insight: Within function limits           Home Living:  Type of Home: Apartment (pt was living at her daughters home)  Lives With: Adult children  Home Adaptive Equipment: Wheelchair-manual  Home Layout:  (pt reports living on the 3rd floor with no elevator)  Home Access: Stairs to enter with rails  Entrance Stairs-Number of Steps: approximately 30  Bathroom Shower/Tub: Tub/shower unit  Prior Function:  Level of [place]: Independent with ADLs and functional transfers, Independent with homemaking with ambulation  Receives Help From: Family  ADL Assistance: Independent  Homemaking Assistance: Independent  Hand Dominance: Right  IADL History:     ADL:  Eating Assistance: Independent  LE Dressing Deficit: Don/doff R sock, Don/doff L sock  Activity Tolerance:     Bed Mobility/Transfers:      Transfers  Transfer: Yes  Transfers 2  Transfer From 2: Sit to, Stand to, Chair with arms to  Transfer to 2: Sit  Transfer Level of Assistance 2: Minimum assistance      Functional Mobility:  Functional Mobility  Functional Mobility Performed: Yes  Sitting Balance:  Static Sitting Balance  Static Sitting-Balance Support: No upper extremity supported, Feet supported  Static Sitting-Level of Assistance: Close supervision  Dynamic Sitting Balance  Dynamic Sitting-Balance Support: No upper extremity supported, Feet supported  Dynamic Sitting-Level of Assistance: Close supervision  Standing Balance:  Static Standing Balance  Static Standing-Balance Support: Bilateral upper extremity supported  Static Standing-Level of  Assistance: Contact guard  Dynamic Standing Balance  Dynamic Standing-Balance Support: Bilateral upper extremity supported  Dynamic Standing-Level of Assistance: Contact guard   Modalities:     Vision:Vision - Basic Assessment  Current Vision: Wears glasses only for reading   and    Sensation:  Light Touch: No apparent deficits  Strength:  Strength Comments: BUE 3/5 to 3+/5 gross strength  Perception:     Coordination:  Movements are Fluid and Coordinated: Yes   Hand Function:  Gross Grasp: Functional  Coordination: Functional  Extremities: RUE   RUE : Within Functional Limits, LUE   LUE: Within Functional Limits,  , and    Cervical Spine    Functional Rating Scale     Observation     C-Spine Palpation/Joint Mobility Assessment      Cervical AROM     Shoulder AROM     Cervical Strength     Myotomes (MMT)     Scapular (MMT)     DTR     Dermatomes     Special Tests         , Lumbar Spine    Functional Rating Scale     Observation     Lumbar Palpation/Joint Mobility Assessment     Lumbar AROM     Hip AROM     Lumbar Myotomes     Specific Lower Extremity MMT     DTR     Dermatomes     Special Tests              , Shoulder    Functional Rating Scale     Observation     Shoulder palpation/Joint Assessment     Cervical AROM     Shoulder AROM     Shoulder PROM     Cervical Myotomes (MMT     Shoulder Strength     Scapular MMT     DTR      Shoulder Special         , Elbow    Functional Rating Scale     Observation     Elbow Palpation/Joint Mobility Assessment     Elbow AROM     Elbow PROM      Elbow Strength      Strength      DTR      Elbow Special Tests    , Wrist    Functional Rating Scale     Observation     Wrist Palpation/Joint Mobility Assessment     Wrist AROM     Wrist PROM     Wrist (MMT)      Strength     DTR     Wrist Special Tests        ,   Hand Assessment    Right Hand AROM:     Right Hand PROM:      Right Hand Strength - :     Right Hand Strength - Pinch:      Right Upper Extremity Edema -  Circumference:     Left Hand AROM:      Left Hand PROM:     Left Hand Strength - :     Left Hand Strength - Pinch:     Left Upper Extremity Edema - Circumference:     Rapid Exchange :     Hand Edema - Water Displacement:     , HIP    Functional Rating Scale     Observation     Hip Palpation/Joint Mobility      Lumbar AROM     Hip AROM     Hip PROM     Specific Lower Extremity MMT     DTR     Special Tests     Gait     Flexibility             , KNEE    Functional Rating Scale     Observation     Knee Palpation/Joint Mobility     Knee AROM     Knee PROM     Knee MMT     DTR     Special Tests     Gait     Flexibility                 , ANKLE    Functional Rating Scale     Observation     Ankle Palpation/Joint Mobility Assessment     Ankle AROM     Ankle PROM     Ankle MMT     Special Tests     Joint Mobility Testing     Gait     Flexibility     , and   Lymphedema Assessment    Lymphedema Assessments:     Right Upper Extremity:     Left Upper Extremity:     UE Skin Appearance/Condition and Girth:     Upper Extremity Evaluation:     Right Lower Extremity:     Left Lower Extremity:     LE Skin Appearance/Condition and Girth:     Lower Extremity Evaluation:     Head and Neck Measurements:     Head and Neck Appearance:     Trunk Skin Appearance/Condition and Girth:      Genital Skin Appearance/Condition and Girth:     Home Living:     ADL:  Eating Assistance: Independent  LE Dressing Deficit: Don/doff R sock, Don/doff L sock  Prior Function:  Level of [place]: Independent with ADLs and functional transfers, Independent with homemaking with ambulation  Receives Help From: Family  ADL Assistance: Independent  Homemaking Assistance: Independent  Hand Dominance: Right  Pain Assessment:  Pain Assessment: 0-10  0-10 (Numeric) Pain Score: 0 - No pain  Therapy Precautions:       Hand Assessment    Right Hand AROM:     Right Hand PROM:      Right Hand Strength - :     Right Hand Strength - Pinch:      Right Upper  Extremity Edema - Circumference:     Left Hand AROM:      Left Hand PROM:     Left Hand Strength - :     Left Hand Strength - Pinch:     Left Upper Extremity Edema - Circumference:     Rapid Exchange :     Hand Edema - Water Displacement:     ,   Lymphedema Assessment    Lymphedema Assessments:     Right Upper Extremity:     Left Upper Extremity:     UE Skin Appearance/Condition and Girth:     Upper Extremity Evaluation:     Right Lower Extremity:     Left Lower Extremity:     LE Skin Appearance/Condition and Girth:     Lower Extremity Evaluation:     Head and Neck Measurements:     Head and Neck Appearance:     Trunk Skin Appearance/Condition and Girth:      Genital Skin Appearance/Condition and Girth:     Home Living:     ADL:  Eating Assistance: Independent  LE Dressing Deficit: Don/doff R sock, Don/doff L sock  Prior Function:  Level of [place]: Independent with ADLs and functional transfers, Independent with homemaking with ambulation  Receives Help From: Family  ADL Assistance: Independent  Homemaking Assistance: Independent  Hand Dominance: Right  Pain Assessment:  Pain Assessment: 0-10  0-10 (Numeric) Pain Score: 0 - No pain  Therapy Precautions:         , and    Neuro Evaluation    Patient Name: Jessica Smith  MRN: [id number]  Department: [place] [address]  Room: [facility]6009  Today's Date: 10/29/24    Assessment:   PT Assessment Results: Decreased strength, Decreased endurance, Impaired balance, Decreased mobility  Rehab Prognosis: Good    Plan:       Current Problem:   1. Renal tubular acidosis, type 4        2. Hyperkalemia        3. Metabolic acidosis        4. Cystitis        5. Abdominal pain, unspecified abdominal location  Enteroscopy    Enteroscopy      6. History of Sherita-en-Y gastric bypass  Enteroscopy    Enteroscopy          Subjective   General Visit Information:     HPI:   Precautions:     Vital Signs:     Pain:  Pain Assessment  Pain Assessment: 0-10  0-10 (Numeric) Pain Score: 0 - No  pain  Home Living:  Type of Home: Apartment (pt was living at her daughters home)  Lives With: Adult children  Home Adaptive Equipment: Wheelchair-manual  Home Layout:  (pt reports living on the 3rd floor with no elevator)  Home Access: Stairs to enter with rails  Entrance Stairs-Number of Steps: approximately 30  Bathroom Shower/Tub: Tub/shower unit  Prior Level of Function:  Level of [place]: Independent with ADLs and functional transfers, Independent with homemaking with ambulation  Receives Help From: Family  ADL Assistance: Independent  Homemaking Assistance: Independent  Hand Dominance: Right    Objective   Cognition:  Overall Cognitive Status: Within Functional Limits  Arousal/Alertness: Appropriate responses to stimuli  Orientation Level: Oriented X4  Insight: Within function limits  Concussion VOMS:     Observation:     Palpation:     Range of Motion:     Strength:  Strength Comments: BUE 3/5 to 3+/5 gross strength  Neuro Sensation:     Perception:     Coordination:  Movements are Fluid and Coordinated: Yes  Neuro Tremor:     Neuro Tone:     Neuro Motor Control:     Neuro Isolated Movements:     Neuro Oculomotor:     Neuro Vestibular:     Positional Testing:     Special Tests:     Postural Control:     Balance:      ,      ,    , and      Transfer/Mobility Assessment:      , ,    ,    ,    ,    ,    ,    ,    ,    ,    ,    ,    ,      ,    ,    , and      Extremities Assessment: RUE   RUE : Within Functional Limits, LUE   LUE: Within Functional Limits,  , and      Outcome Measures:  [facility] Daily Activity  Putting on and taking off regular lower body clothing: A lot  Bathing (including washing, rinsing, drying): A lot  Putting on and taking off regular upper body clothing: A little  Toileting, which includes using toilet, bedpan or urinal: A little  Taking care of personal grooming such as brushing teeth: A little  Eating Meals: A little  Daily Activity - Total Score: 16                                                                                                          Education Documentation  No documentation found.  Education Comments  No comments found.      OP EDUCATION:       Goals:  Encounter Problems       Encounter Problems (Active)       ADLs       Patient with complete upper body dressing with stand by assist level of assistance donning and doffing all UE clothes with no adaptive equipment while edge of bed        Start:  10/29/24    Expected End:  11/13/24            Patient with complete lower body dressing with stand by assist level of assistance donning and doffing all LE clothes  with no adaptive equipment while edge of bed        Start:  10/29/24    Expected End:  11/13/24               BALANCE       Pt will maintain dynamic standing balance during ADL task with stand by assist level of assistance in order to demonstrate decreased risk of falling and improved postural control.       Start:  10/29/24    Expected End:  11/13/24               MOBILITY       Patient will perform Functional mobility min Household distances/Community Distances with stand by assist level of assistance and front wheeled walker in order to improve safety and functional mobility.       Start:  10/29/24    Expected End:  11/13/24               TRANSFERS       Patient will complete functional transfer to rest room with front wheeled walker with CGA level of assistance.       Start:  10/29/24    Expected End:  11/13/24                   Outcome Measures:[facility] Daily Activity  Putting on and taking off regular lower body clothing: A lot  Bathing (including washing, rinsing, drying): A lot  Putting on and taking off regular upper body clothing: A little  Toileting, which includes using toilet, bedpan or urinal: A little  Taking care of personal grooming such as brushing teeth: A little  Eating Meals: A little  Daily Activity - Total Score: 16        ,    ,    ,      ,  ,    ,      ,      ,     ,  ,      ,    ,    ,    ,    ,   ,    , and      Education Documentation  No documentation found.  Education Comments  No comments found.        OP EDUCATION:       Goals:  Encounter Problems       Encounter Problems (Active)       ADLs       Patient with complete upper body dressing with stand by assist level of assistance donning and doffing all UE clothes with no adaptive equipment while edge of bed        Start:  10/29/24    Expected End:  11/13/24            Patient with complete lower body dressing with stand by assist level of assistance donning and doffing all LE clothes  with no adaptive equipment while edge of bed        Start:  10/29/24    Expected End:  11/13/24               BALANCE       Pt will maintain dynamic standing balance during ADL task with stand by assist level of assistance in order to demonstrate decreased risk of falling and improved postural control.       Start:  10/29/24    Expected End:  11/13/24               MOBILITY       Patient will perform Functional mobility min Household distances/Community Distances with stand by assist level of assistance and front wheeled walker in order to improve safety and functional mobility.       Start:  10/29/24    Expected End:  11/13/24               TRANSFERS       Patient will complete functional transfer to rest room with front wheeled walker with CGA level of assistance.       Start:  10/29/24    Expected End:  11/13/24

## 2024-10-29 NOTE — CARE PLAN
The patient's goals for the shift include      The clinical goals for the shift include Pt will be HDS this shift    Problem: Pain - Adult  Goal: Verbalizes/displays adequate comfort level or baseline comfort level  Outcome: Progressing     Problem: Safety - Adult  Goal: Free from fall injury  Outcome: Progressing     Problem: Skin  Goal: Decreased wound size/increased tissue granulation at next dressing change  Outcome: Progressing  Flowsheets (Taken 10/29/2024 0646)  Decreased wound size/increased tissue granulation at next dressing change: Promote sleep for wound healing

## 2024-10-29 NOTE — PROGRESS NOTES
Assessment/Plan     Jessica Smith is a 59 y.o. female with PMH of HFpEF,  OPAL/OHS, Asthma, and multiple abdominal surgeries (adhesions-lysis, cholecystectomy, javier-en-Y) and recurrent pain 2/2 adhesions, who presented to the ED on 10/25 for diarrhea with liquid stool. Reports about 4x a day for about a week (non-bloody) associated with weakness x3-4 days and dizziness. Found to have severe metabolic acidosis 2/2 GI losses and admitted to MICU on a bicarbonate infusion for further management for metabolic acidosis 2/2 GI losses. . Bicarb normalized. Pt was transitioned to oral bicarbonate. CTCAP concerning for infection vs malignancy vs inflammation, GI consulted. Stool work up reviewed. UA also concerning for UTI.  Started empirically on zosyn, de-escalated later to Nitrofurantoin. C. dif neg. GI decided on push endoscopy vs Colonoscopy, had to be delayed till 10/29.     Decreased sodium bicarb, monitor RFP if able to stop at some point.   SPENSER is resolving   c/w 2L NC at night for comfort, needs pulm f/up and repeat PFTs and sleep study  Dispo SNF   GI team didn't get to the JJ , Plan for CT to asses the JJ anastomosis rule out mass or stricture             #acute diarrhea, Colitis  #UTI  - ct concerning for colitis and cystitis  - GI on board     #Javier-en-Y  #recurrent abdominal 2/2 adhesions   #SBO and diagnostic laparoscopy 8/2021 w adhesion lysis  #cholecystectomy  #GERD  - Currently was scheduled to have EGD and colonoscopy on 11/7/24. Last EGD 2018 with small Javier-en-Y gastrojejunostomy with gastrojejunal anastomosis characterized by healthy appearing mucosa. Colonoscopy 2016 normal.   - CT CAP with likely colitis and cystitis  - GI on board       #SPENSER - resolving   #Hyperkalemia  - resolved    - strict I/o, avoid nephrotoxins, renally dose as indicated  - Held ACEI      # Non-Anion Gap Metabolic acidosis - resolved   - 2/2 GI losses  - s/p bicarb infusion  - monitor I/o, wean off PO bicarb as able    "    #OPAL/OHS  #Asthma  -PFTs 9/2006: FEV1/FVC 56 / FEV1 1.5 w 10% change post-BD/ no hyperinflation in lung volumes, no DLCO. Sleep study 2006, PFTs shows obstructive pattern  -Uses 2L NC at night when sleeping for comfort  -c/w home albuterol  - c/w 2L NC at night for comfort  -needs pulm f/up and repeat PFTs and sleep study     # arthritis  #skin lesions on shoulders  - reports skin lesions 2/2 cat scratches  - knee pain d/t arthritis per patient    # Anxiety/MDD  - Home regimen: Lexapro 20mg daily, Gabapentin 300 mg at bedtime // Gabapentin 100mg PO Q12H PRN- 1st line for anxiety // Atarax 25mg Q8H PRN- 2nd line for anxiety  - denies any SI/SH      Scheduled outpatient appointments in system:   Future Appointments   Date Time Provider Department Center   11/7/2024  8:30 AM Nathalia Calloway MD [facility]     ---------------------------------------------------------------------------------------------------  Subjective   No events.         Time 50 min      ---------------------------------------------------------------------------------------------------  Objective   Last Recorded Vitals  Blood pressure 130/62, pulse 85, temperature 36 °C (96.8 °F), temperature source Skin, resp. rate 12, height 1.6 m (5' 3\"), weight 147 kg (324 lb), SpO2 94%.  Intake/Output last 3 Shifts:  I/O last 3 completed shifts:  In: - (0 mL/kg)   Out: 1525 (10.5 mL/kg) [Urine:1525 (0.3 mL/kg/hr)]  Dosing Weight: 144.8 kg     Physical Exam  Vitals and nursing note reviewed.   Constitutional:       General: She is not in acute distress.     Appearance: Normal appearance. She is not toxic-appearing.   HENT:      Head: Normocephalic and atraumatic.      Mouth/Throat:      Mouth: Mucous membranes are moist.   Eyes:      General: No scleral icterus.     Extraocular Movements: Extraocular movements intact.      Conjunctiva/sclera: Conjunctivae normal.   Cardiovascular:      Rate and Rhythm: Normal rate and regular rhythm.      Heart sounds: " S1 normal and S2 normal. No murmur heard.  Pulmonary:      Effort: Pulmonary effort is normal. No respiratory distress.      Breath sounds: No wheezing, rhonchi or rales.   Abdominal:      General: Bowel sounds are normal. There is no distension.      Palpations: Abdomen is soft.      Tenderness: There is no guarding or rebound.      Comments: Mild epigastric and lateral abdominal ttp, mostly LLQ   Musculoskeletal:         General: No swelling or deformity.      Cervical back: Neck supple.   Skin:     General: Skin is warm and dry.      Findings: No rash.   Neurological:      General: No focal deficit present.      Mental Status: She is alert. Mental status is at baseline.   Psychiatric:         Mood and Affect: Mood normal.         Relevant Results  Lab Results   Component Value Date    WBC 8.4 10/29/2024    HGB 9.2 (L) 10/29/2024    HCT 29.8 (L) 10/29/2024    MCV 91 10/29/2024     10/29/2024      Lab Results   Component Value Date    GLUCOSE 88 10/29/2024    CALCIUM 7.9 (L) 10/29/2024     10/29/2024    K 3.6 10/29/2024    CO2 25 10/29/2024     10/29/2024    BUN 21 10/29/2024    CREATININE 1.31 (H) 10/29/2024     Scheduled medications  cyanocobalamin, 1,000 mcg, oral, Daily  diclofenac sodium, 4 g, Topical, 4x daily  escitalopram, 20 mg, oral, Daily  [Held by provider] ferrous sulfate (325 mg ferrous sulfate), 1 tablet, oral, Every other day  fluticasone, 1 spray, Each Nostril, Daily  fluticasone furoate-vilanteroL, 1 puff, inhalation, Daily  gabapentin, 300 mg, oral, Nightly  heparin (porcine), 7,500 Units, subcutaneous, q8h SUNSHINE  ipratropium-albuteroL, 3 mL, nebulization, q6h  lidocaine, 1 patch, transdermal, Daily  [Held by provider] lisinopril, 30 mg, oral, Daily  melatonin, 10 mg, oral, Nightly  nitrofurantoin (macrocrystal-monohydrate), 100 mg, oral, q12h SUNSHINE  pantoprazole, 40 mg, oral, Daily  piperacillin-tazobactam, 3.375 g, intravenous, q6h  sodium bicarbonate, 1,300 mg, oral,  BID      Continuous medications  oxygen,       PRN medications  PRN medications: acetaminophen, albuterol, dextromethorphan-guaifenesin, gabapentin, hydrOXYzine HCL, [Held by provider] polyethylene glycol, [Held by provider] maxines    Vani Gaines MD

## 2024-10-29 NOTE — PROGRESS NOTES
Physical Therapy                 Therapy Communication Note    Patient Name: Jessica Smith  MRN: [id number]  Department:   Room: [facility]  Today's Date: 10/29/2024     Discipline: Physical Therapy    Missed Visit Reason: Missed Visit Reason: Patient in a medical procedure (Pt off the floor for Enteroscopy)    Missed Time:10:46 AM

## 2024-10-30 ENCOUNTER — APPOINTMENT (OUTPATIENT)
Dept: RADIOLOGY | Facility: HOSPITAL | Age: 59
End: 2024-10-30
Payer: COMMERCIAL

## 2024-10-30 LAB
ALBUMIN SERPL BCP-MCNC: 3.8 G/DL (ref 3.4–5)
ANION GAP SERPL CALC-SCNC: 14 MMOL/L (ref 10–20)
BACTERIA BLD CULT: NORMAL
BACTERIA BLD CULT: NORMAL
BUN SERPL-MCNC: 23 MG/DL (ref 6–23)
CALCIUM SERPL-MCNC: 8.2 MG/DL (ref 8.6–10.6)
CHLORIDE SERPL-SCNC: 103 MMOL/L (ref 98–107)
CO2 SERPL-SCNC: 26 MMOL/L (ref 21–32)
CREAT SERPL-MCNC: 1.82 MG/DL (ref 0.5–1.05)
EGFRCR SERPLBLD CKD-EPI 2021: 32 ML/MIN/1.73M*2
ERYTHROCYTE [DISTWIDTH] IN BLOOD BY AUTOMATED COUNT: 14.1 % (ref 11.5–14.5)
GLUCOSE SERPL-MCNC: 102 MG/DL (ref 74–99)
HCT VFR BLD AUTO: 29.5 % (ref 36–46)
HGB BLD-MCNC: 9.7 G/DL (ref 12–16)
MCH RBC QN AUTO: 27.6 PG (ref 26–34)
MCHC RBC AUTO-ENTMCNC: 32.9 G/DL (ref 32–36)
MCV RBC AUTO: 84 FL (ref 80–100)
NRBC BLD-RTO: 0 /100 WBCS (ref 0–0)
PHOSPHATE SERPL-MCNC: 3.9 MG/DL (ref 2.5–4.9)
PLATELET # BLD AUTO: 267 X10*3/UL (ref 150–450)
POTASSIUM SERPL-SCNC: 3.4 MMOL/L (ref 3.5–5.3)
RBC # BLD AUTO: 3.51 X10*6/UL (ref 4–5.2)
SODIUM SERPL-SCNC: 140 MMOL/L (ref 136–145)
WBC # BLD AUTO: 10.2 X10*3/UL (ref 4.4–11.3)

## 2024-10-30 PROCEDURE — 84100 ASSAY OF PHOSPHORUS: CPT | Performed by: STUDENT IN AN ORGANIZED HEALTH CARE EDUCATION/TRAINING PROGRAM

## 2024-10-30 PROCEDURE — 94640 AIRWAY INHALATION TREATMENT: CPT

## 2024-10-30 PROCEDURE — 1100000001 HC PRIVATE ROOM DAILY

## 2024-10-30 PROCEDURE — 2500000001 HC RX 250 WO HCPCS SELF ADMINISTERED DRUGS (ALT 637 FOR MEDICARE OP): Performed by: STUDENT IN AN ORGANIZED HEALTH CARE EDUCATION/TRAINING PROGRAM

## 2024-10-30 PROCEDURE — 2500000004 HC RX 250 GENERAL PHARMACY W/ HCPCS (ALT 636 FOR OP/ED)

## 2024-10-30 PROCEDURE — A9575 INJ GADOTERATE MEGLUMI 0.1ML: HCPCS | Performed by: STUDENT IN AN ORGANIZED HEALTH CARE EDUCATION/TRAINING PROGRAM

## 2024-10-30 PROCEDURE — 85027 COMPLETE CBC AUTOMATED: CPT | Performed by: STUDENT IN AN ORGANIZED HEALTH CARE EDUCATION/TRAINING PROGRAM

## 2024-10-30 PROCEDURE — 2500000002 HC RX 250 W HCPCS SELF ADMINISTERED DRUGS (ALT 637 FOR MEDICARE OP, ALT 636 FOR OP/ED): Performed by: STUDENT IN AN ORGANIZED HEALTH CARE EDUCATION/TRAINING PROGRAM

## 2024-10-30 PROCEDURE — 2500000004 HC RX 250 GENERAL PHARMACY W/ HCPCS (ALT 636 FOR OP/ED): Performed by: STUDENT IN AN ORGANIZED HEALTH CARE EDUCATION/TRAINING PROGRAM

## 2024-10-30 PROCEDURE — 72197 MRI PELVIS W/O & W/DYE: CPT | Performed by: RADIOLOGY

## 2024-10-30 PROCEDURE — 36415 COLL VENOUS BLD VENIPUNCTURE: CPT | Performed by: STUDENT IN AN ORGANIZED HEALTH CARE EDUCATION/TRAINING PROGRAM

## 2024-10-30 PROCEDURE — 99233 SBSQ HOSP IP/OBS HIGH 50: CPT | Performed by: STUDENT IN AN ORGANIZED HEALTH CARE EDUCATION/TRAINING PROGRAM

## 2024-10-30 PROCEDURE — 74183 MRI ABD W/O CNTR FLWD CNTR: CPT | Performed by: RADIOLOGY

## 2024-10-30 PROCEDURE — 74183 MRI ABD W/O CNTR FLWD CNTR: CPT

## 2024-10-30 PROCEDURE — 2550000001 HC RX 255 CONTRASTS: Performed by: STUDENT IN AN ORGANIZED HEALTH CARE EDUCATION/TRAINING PROGRAM

## 2024-10-30 RX ORDER — GADOTERATE MEGLUMINE 376.9 MG/ML
30 INJECTION INTRAVENOUS
Status: COMPLETED | OUTPATIENT
Start: 2024-10-30 | End: 2024-10-30

## 2024-10-30 RX ORDER — TRAMADOL HYDROCHLORIDE 50 MG/1
50 TABLET ORAL ONCE
Status: COMPLETED | OUTPATIENT
Start: 2024-10-30 | End: 2024-10-30

## 2024-10-30 RX ORDER — POTASSIUM CHLORIDE 1.5 G/1.58G
40 POWDER, FOR SOLUTION ORAL 2 TIMES DAILY
Status: COMPLETED | OUTPATIENT
Start: 2024-10-30 | End: 2024-10-30

## 2024-10-30 ASSESSMENT — COGNITIVE AND FUNCTIONAL STATUS - GENERAL
DRESSING REGULAR LOWER BODY CLOTHING: A LITTLE
CLIMB 3 TO 5 STEPS WITH RAILING: A LOT
MOVING TO AND FROM BED TO CHAIR: A LOT
MOBILITY SCORE: 14
DRESSING REGULAR UPPER BODY CLOTHING: A LITTLE
WALKING IN HOSPITAL ROOM: A LOT
HELP NEEDED FOR BATHING: A LITTLE
TOILETING: A LITTLE
DAILY ACTIVITIY SCORE: 20
STANDING UP FROM CHAIR USING ARMS: A LOT
TURNING FROM BACK TO SIDE WHILE IN FLAT BAD: A LITTLE
MOVING FROM LYING ON BACK TO SITTING ON SIDE OF FLAT BED WITH BEDRAILS: A LITTLE

## 2024-10-30 ASSESSMENT — PAIN - FUNCTIONAL ASSESSMENT
PAIN_FUNCTIONAL_ASSESSMENT: 0-10

## 2024-10-30 ASSESSMENT — PAIN DESCRIPTION - ORIENTATION: ORIENTATION: RIGHT;LEFT

## 2024-10-30 ASSESSMENT — PAIN SCALES - GENERAL
PAINLEVEL_OUTOF10: 9
PAINLEVEL_OUTOF10: 0 - NO PAIN
PAINLEVEL_OUTOF10: 10 - WORST POSSIBLE PAIN
PAINLEVEL_OUTOF10: 10 - WORST POSSIBLE PAIN
PAINLEVEL_OUTOF10: 0 - NO PAIN
PAINLEVEL_OUTOF10: 0 - NO PAIN

## 2024-10-30 ASSESSMENT — PAIN DESCRIPTION - LOCATION: LOCATION: LEG

## 2024-10-30 NOTE — CARE PLAN
The patient's goals for the shift include      The clinical goals for the shift include patient will remain free from n/v/d during shift.      Problem: Pain - Adult  Goal: Verbalizes/displays adequate comfort level or baseline comfort level  Outcome: Progressing     Problem: Safety - Adult  Goal: Free from fall injury  Outcome: Progressing     Problem: Discharge Planning  Goal: Discharge to home or other facility with appropriate resources  Outcome: Progressing     Problem: Chronic Conditions and Co-morbidities  Goal: Patient's chronic conditions and co-morbidity symptoms are monitored and maintained or improved  Outcome: Progressing     Problem: Skin  Goal: Decreased wound size/increased tissue granulation at next dressing change  Outcome: Progressing  Flowsheets (Taken 10/30/2024 1845)  Decreased wound size/increased tissue granulation at next dressing change: Promote sleep for wound healing  Goal: Participates in plan/prevention/treatment measures  Outcome: Progressing  Flowsheets (Taken 10/30/2024 1845)  Participates in plan/prevention/treatment measures:   Discuss with provider PT/OT consult   Elevate heels   Increase activity/out of bed for meals  Goal: Prevent/manage excess moisture  Outcome: Progressing  Flowsheets (Taken 10/30/2024 1845)  Prevent/manage excess moisture:   Cleanse incontinence/protect with barrier cream   Follow provider orders for dressing changes   Moisturize dry skin  Goal: Prevent/minimize sheer/friction injuries  Outcome: Progressing  Flowsheets (Taken 10/30/2024 1845)  Prevent/minimize sheer/friction injuries:   Increase activity/out of bed for meals   HOB 30 degrees or less  Goal: Promote/optimize nutrition  Outcome: Progressing  Flowsheets (Taken 10/30/2024 1845)  Promote/optimize nutrition:   Consume > 50% meals/supplements   Offer water/supplements/favorite foods  Goal: Promote skin healing  Outcome: Progressing  Flowsheets (Taken 10/30/2024 1845)  Promote skin healing:  Turn/reposition every 2 hours/use positioning/transfer devices

## 2024-10-30 NOTE — PROGRESS NOTES
Assessment/Plan     Jessica Smith is a 59 y.o. female with PMH of HFpEF,  OPAL/OHS, Asthma, and multiple abdominal surgeries (adhesions-lysis, cholecystectomy, javier-en-Y) and recurrent pain 2/2 adhesions, who presented to the ED on 10/25 for diarrhea with liquid stool. Reports about 4x a day for about a week (non-bloody) associated with weakness x3-4 days and dizziness. Found to have severe metabolic acidosis 2/2 GI losses and admitted to MICU on a bicarbonate infusion for further management for metabolic acidosis 2/2 GI losses. . Bicarb normalized. Pt was transitioned to oral bicarbonate. CTCAP concerning for infection vs malignancy vs inflammation, GI consulted. Stool work up reviewed. UA also concerning for UTI.  Started empirically on zosyn, de-escalated later to Nitrofurantoin. C. dif neg. GI decided on push endoscopy vs Colonoscopy, had to be delayed till 10/29.     Decreased sodium bicarb, monitor RFP if able to stop at some point.   SPENSER is resolving   c/w 2L NC at night for comfort, needs pulm f/up and repeat PFTs and sleep study  Dispo SNF   GI team didn't get to the JJ , Plan for MR to asses the JJ anastomosis rule out mass or stricture     10/30  -MRI is pending. Pending further recs form GI team.  -Hypokalemia - replaced   -Dispo SNF                  #acute diarrhea, Colitis  #UTI  - ct concerning for colitis and cystitis  - GI on board     #Javier-en-Y  #recurrent abdominal 2/2 adhesions   #SBO and diagnostic laparoscopy 8/2021 w adhesion lysis  #cholecystectomy  #GERD  - Currently was scheduled to have EGD and colonoscopy on 11/7/24. Last EGD 2018 with small Javier-en-Y gastrojejunostomy with gastrojejunal anastomosis characterized by healthy appearing mucosa. Colonoscopy 2016 normal.   - CT CAP with likely colitis and cystitis  - GI on board       #SPENSER - resolving   #Hyperkalemia  - resolved    - strict I/o, avoid nephrotoxins, renally dose as indicated  - Held ACEI      # Non-Anion Gap Metabolic acidosis  "- resolved   - 2/2 GI losses  - s/p bicarb infusion  - monitor I/o, wean off PO bicarb as able       #OPAL/OHS  #Asthma  -PFTs 9/2006: FEV1/FVC 56 / FEV1 1.5 w 10% change post-BD/ no hyperinflation in lung volumes, no DLCO. Sleep study 2006, PFTs shows obstructive pattern  -Uses 2L NC at night when sleeping for comfort  -c/w home albuterol  - c/w 2L NC at night for comfort  -needs pulm f/up and repeat PFTs and sleep study     # arthritis  #skin lesions on shoulders  - reports skin lesions 2/2 cat scratches  - knee pain d/t arthritis per patient    # Anxiety/MDD  - Home regimen: Lexapro 20mg daily, Gabapentin 300 mg at bedtime // Gabapentin 100mg PO Q12H PRN- 1st line for anxiety // Atarax 25mg Q8H PRN- 2nd line for anxiety  - denies any SI/SH      Scheduled outpatient appointments in system:   Future Appointments   Date Time Provider Department Center   11/7/2024  8:30 AM Nathalia Calloway MD [facility]     ---------------------------------------------------------------------------------------------------  Subjective   No events.         Time 50 min      ---------------------------------------------------------------------------------------------------  Objective   Last Recorded Vitals  Blood pressure 126/76, pulse 73, temperature 37 °C (98.6 °F), temperature source Temporal, resp. rate 16, height 1.6 m (5' 3\"), weight 147 kg (324 lb), SpO2 95%.  Intake/Output last 3 Shifts:  I/O last 3 completed shifts:  In: 200 (1.4 mL/kg) [I.V.:200 (1.4 mL/kg)]  Out: 325 (2.2 mL/kg) [Urine:325 (0.1 mL/kg/hr)]  Dosing Weight: 144.8 kg     Physical Exam  Vitals and nursing note reviewed.   Constitutional:       General: She is not in acute distress.     Appearance: Normal appearance. She is not toxic-appearing.   HENT:      Head: Normocephalic and atraumatic.      Mouth/Throat:      Mouth: Mucous membranes are moist.   Eyes:      General: No scleral icterus.     Extraocular Movements: Extraocular movements intact.      " Conjunctiva/sclera: Conjunctivae normal.   Cardiovascular:      Rate and Rhythm: Normal rate and regular rhythm.      Heart sounds: S1 normal and S2 normal. No murmur heard.  Pulmonary:      Effort: Pulmonary effort is normal. No respiratory distress.      Breath sounds: No wheezing, rhonchi or rales.   Abdominal:      General: Bowel sounds are normal. There is no distension.      Palpations: Abdomen is soft.      Tenderness: There is no guarding or rebound.      Comments: Mild epigastric and lateral abdominal ttp, mostly LLQ   Musculoskeletal:         General: No swelling or deformity.      Cervical back: Neck supple.   Skin:     General: Skin is warm and dry.      Findings: No rash.   Neurological:      General: No focal deficit present.      Mental Status: She is alert. Mental status is at baseline.   Psychiatric:         Mood and Affect: Mood normal.         Relevant Results  Lab Results   Component Value Date    WBC 10.2 10/30/2024    HGB 9.7 (L) 10/30/2024    HCT 29.5 (L) 10/30/2024    MCV 84 10/30/2024     10/30/2024      Lab Results   Component Value Date    GLUCOSE 102 (H) 10/30/2024    CALCIUM 8.2 (L) 10/30/2024     10/30/2024    K 3.4 (L) 10/30/2024    CO2 26 10/30/2024     10/30/2024    BUN 23 10/30/2024    CREATININE 1.82 (H) 10/30/2024     Scheduled medications  cyanocobalamin, 1,000 mcg, oral, Daily  diclofenac sodium, 4 g, Topical, 4x daily  escitalopram, 20 mg, oral, Daily  [Held by provider] ferrous sulfate (325 mg ferrous sulfate), 1 tablet, oral, Every other day  fluticasone, 1 spray, Each Nostril, Daily  fluticasone furoate-vilanteroL, 1 puff, inhalation, Daily  gabapentin, 300 mg, oral, Nightly  heparin (porcine), 7,500 Units, subcutaneous, q8h SUNSHINE  ipratropium-albuteroL, 3 mL, nebulization, TID  lidocaine, 1 patch, transdermal, Daily  [Held by provider] lisinopril, 30 mg, oral, Daily  melatonin, 10 mg, oral, Nightly  nitrofurantoin (macrocrystal-monohydrate), 100 mg, oral,  q12h SUNSHINE  pantoprazole, 40 mg, oral, Daily  potassium chloride, 40 mEq, oral, BID  sodium bicarbonate, 1,300 mg, oral, BID      Continuous medications  oxygen,       PRN medications  PRN medications: acetaminophen, albuterol, benzocaine-menthol, dextromethorphan-guaifenesin, gabapentin, hydrOXYzine HCL, [Held by provider] polyethylene glycol, [Held by provider] maxines    Vani Gaines MD

## 2024-10-30 NOTE — PROGRESS NOTES
10/30/24 1413 Transitional Care Coordinator Notes:    Transitional Care Coordination Progress Note:  Patient discussed during interdisciplinary rounds.   Team members present: MD and TCC  Plan per Medical/Surgical team: Team will order a MRI of the abdomen to r/o mass or stricture. PT/OT evaluated patient and recommending moderate intensity therapy. Patient agreeable to [facility], referral sent.   Payor: [facility]  Discharge disposition: SNF  Potential Barriers: none   ADOD: 1-3 days                        Assessment/Plan   Assessment & Plan  Renal tubular acidosis, type 4               Pushpa Zhu RN

## 2024-10-31 LAB
ALBUMIN SERPL BCP-MCNC: 3.5 G/DL (ref 3.4–5)
ANION GAP SERPL CALC-SCNC: 16 MMOL/L (ref 10–20)
BUN SERPL-MCNC: 21 MG/DL (ref 6–23)
CALCIUM SERPL-MCNC: 7.6 MG/DL (ref 8.6–10.6)
CHLORIDE SERPL-SCNC: 104 MMOL/L (ref 98–107)
CO2 SERPL-SCNC: 24 MMOL/L (ref 21–32)
CREAT SERPL-MCNC: 1.4 MG/DL (ref 0.5–1.05)
D-LACTATE SERPL-SCNC: 0.01 MMOL/L (ref 0–0.25)
EGFRCR SERPLBLD CKD-EPI 2021: 43 ML/MIN/1.73M*2
ERYTHROCYTE [DISTWIDTH] IN BLOOD BY AUTOMATED COUNT: 14.9 % (ref 11.5–14.5)
GLUCOSE BLD MANUAL STRIP-MCNC: 108 MG/DL (ref 74–99)
GLUCOSE SERPL-MCNC: 87 MG/DL (ref 74–99)
HCT VFR BLD AUTO: 28.6 % (ref 36–46)
HGB BLD-MCNC: 8.6 G/DL (ref 12–16)
MCH RBC QN AUTO: 27.4 PG (ref 26–34)
MCHC RBC AUTO-ENTMCNC: 30.1 G/DL (ref 32–36)
MCV RBC AUTO: 91 FL (ref 80–100)
NRBC BLD-RTO: 0 /100 WBCS (ref 0–0)
PHOSPHATE SERPL-MCNC: 3.4 MG/DL (ref 2.5–4.9)
PLATELET # BLD AUTO: 234 X10*3/UL (ref 150–450)
POTASSIUM SERPL-SCNC: 3.6 MMOL/L (ref 3.5–5.3)
RBC # BLD AUTO: 3.14 X10*6/UL (ref 4–5.2)
SODIUM SERPL-SCNC: 140 MMOL/L (ref 136–145)
WBC # BLD AUTO: 9.2 X10*3/UL (ref 4.4–11.3)

## 2024-10-31 PROCEDURE — 2500000002 HC RX 250 W HCPCS SELF ADMINISTERED DRUGS (ALT 637 FOR MEDICARE OP, ALT 636 FOR OP/ED): Performed by: STUDENT IN AN ORGANIZED HEALTH CARE EDUCATION/TRAINING PROGRAM

## 2024-10-31 PROCEDURE — 1100000001 HC PRIVATE ROOM DAILY

## 2024-10-31 PROCEDURE — 97530 THERAPEUTIC ACTIVITIES: CPT | Mod: GO

## 2024-10-31 PROCEDURE — 2500000004 HC RX 250 GENERAL PHARMACY W/ HCPCS (ALT 636 FOR OP/ED): Performed by: STUDENT IN AN ORGANIZED HEALTH CARE EDUCATION/TRAINING PROGRAM

## 2024-10-31 PROCEDURE — 2500000001 HC RX 250 WO HCPCS SELF ADMINISTERED DRUGS (ALT 637 FOR MEDICARE OP): Performed by: STUDENT IN AN ORGANIZED HEALTH CARE EDUCATION/TRAINING PROGRAM

## 2024-10-31 PROCEDURE — 99231 SBSQ HOSP IP/OBS SF/LOW 25: CPT | Performed by: STUDENT IN AN ORGANIZED HEALTH CARE EDUCATION/TRAINING PROGRAM

## 2024-10-31 PROCEDURE — 94640 AIRWAY INHALATION TREATMENT: CPT

## 2024-10-31 PROCEDURE — 84100 ASSAY OF PHOSPHORUS: CPT | Performed by: STUDENT IN AN ORGANIZED HEALTH CARE EDUCATION/TRAINING PROGRAM

## 2024-10-31 PROCEDURE — 90471 IMMUNIZATION ADMIN: CPT | Performed by: STUDENT IN AN ORGANIZED HEALTH CARE EDUCATION/TRAINING PROGRAM

## 2024-10-31 PROCEDURE — 97116 GAIT TRAINING THERAPY: CPT | Mod: GP

## 2024-10-31 PROCEDURE — 90656 IIV3 VACC NO PRSV 0.5 ML IM: CPT | Performed by: STUDENT IN AN ORGANIZED HEALTH CARE EDUCATION/TRAINING PROGRAM

## 2024-10-31 PROCEDURE — 82947 ASSAY GLUCOSE BLOOD QUANT: CPT

## 2024-10-31 PROCEDURE — 2500000005 HC RX 250 GENERAL PHARMACY W/O HCPCS: Performed by: STUDENT IN AN ORGANIZED HEALTH CARE EDUCATION/TRAINING PROGRAM

## 2024-10-31 PROCEDURE — 85027 COMPLETE CBC AUTOMATED: CPT | Performed by: STUDENT IN AN ORGANIZED HEALTH CARE EDUCATION/TRAINING PROGRAM

## 2024-10-31 PROCEDURE — 36415 COLL VENOUS BLD VENIPUNCTURE: CPT | Performed by: STUDENT IN AN ORGANIZED HEALTH CARE EDUCATION/TRAINING PROGRAM

## 2024-10-31 RX ORDER — LOPERAMIDE HYDROCHLORIDE 2 MG/1
2 CAPSULE ORAL 4 TIMES DAILY PRN
Status: DISCONTINUED | OUTPATIENT
Start: 2024-10-31 | End: 2024-11-01 | Stop reason: HOSPADM

## 2024-10-31 RX ORDER — METRONIDAZOLE 500 MG/100ML
500 INJECTION, SOLUTION INTRAVENOUS EVERY 8 HOURS
Status: DISCONTINUED | OUTPATIENT
Start: 2024-10-31 | End: 2024-11-01 | Stop reason: HOSPADM

## 2024-10-31 RX ORDER — HYDROMORPHONE HYDROCHLORIDE 1 MG/ML
0.4 INJECTION, SOLUTION INTRAMUSCULAR; INTRAVENOUS; SUBCUTANEOUS ONCE
Status: COMPLETED | OUTPATIENT
Start: 2024-10-31 | End: 2024-10-31

## 2024-10-31 RX ORDER — TRAMADOL HYDROCHLORIDE 50 MG/1
50 TABLET ORAL EVERY 12 HOURS PRN
Status: DISCONTINUED | OUTPATIENT
Start: 2024-10-31 | End: 2024-11-01 | Stop reason: HOSPADM

## 2024-10-31 RX ORDER — LISINOPRIL 10 MG/1
10 TABLET ORAL DAILY
Start: 2024-11-01

## 2024-10-31 RX ORDER — ACETAMINOPHEN 325 MG/1
975 TABLET ORAL EVERY 8 HOURS PRN
Start: 2024-10-31

## 2024-10-31 RX ORDER — IPRATROPIUM BROMIDE AND ALBUTEROL SULFATE 2.5; .5 MG/3ML; MG/3ML
3 SOLUTION RESPIRATORY (INHALATION) EVERY 6 HOURS PRN
Status: DISCONTINUED | OUTPATIENT
Start: 2024-10-31 | End: 2024-11-01 | Stop reason: HOSPADM

## 2024-10-31 RX ORDER — NITROFURANTOIN 25; 75 MG/1; MG/1
100 CAPSULE ORAL EVERY 12 HOURS SCHEDULED
Start: 2024-10-31 | End: 2024-11-03

## 2024-10-31 RX ORDER — FAMOTIDINE 20 MG/1
20 TABLET, FILM COATED ORAL 2 TIMES DAILY
Status: DISCONTINUED | OUTPATIENT
Start: 2024-10-31 | End: 2024-11-01 | Stop reason: HOSPADM

## 2024-10-31 RX ORDER — ALUMINUM HYDROXIDE, MAGNESIUM HYDROXIDE, AND SIMETHICONE 1200; 120; 1200 MG/30ML; MG/30ML; MG/30ML
10 SUSPENSION ORAL 4 TIMES DAILY PRN
Status: DISCONTINUED | OUTPATIENT
Start: 2024-10-31 | End: 2024-11-01 | Stop reason: HOSPADM

## 2024-10-31 RX ORDER — ONDANSETRON HYDROCHLORIDE 2 MG/ML
4 INJECTION, SOLUTION INTRAVENOUS EVERY 4 HOURS PRN
Status: DISCONTINUED | OUTPATIENT
Start: 2024-10-31 | End: 2024-11-01 | Stop reason: HOSPADM

## 2024-10-31 ASSESSMENT — PAIN SCALES - GENERAL
PAINLEVEL_OUTOF10: 10 - WORST POSSIBLE PAIN
PAINLEVEL_OUTOF10: 10 - WORST POSSIBLE PAIN
PAINLEVEL_OUTOF10: 0 - NO PAIN
PAINLEVEL_OUTOF10: 10 - WORST POSSIBLE PAIN
PAINLEVEL_OUTOF10: 0 - NO PAIN
PAINLEVEL_OUTOF10: 10 - WORST POSSIBLE PAIN
PAINLEVEL_OUTOF10: 0 - NO PAIN
PAINLEVEL_OUTOF10: 6
PAINLEVEL_OUTOF10: 0 - NO PAIN

## 2024-10-31 ASSESSMENT — COGNITIVE AND FUNCTIONAL STATUS - GENERAL
MOBILITY SCORE: 22
DAILY ACTIVITIY SCORE: 22
TOILETING: A LITTLE
DRESSING REGULAR LOWER BODY CLOTHING: A LITTLE
CLIMB 3 TO 5 STEPS WITH RAILING: TOTAL
WALKING IN HOSPITAL ROOM: A LITTLE
MOVING FROM LYING ON BACK TO SITTING ON SIDE OF FLAT BED WITH BEDRAILS: A LITTLE
STANDING UP FROM CHAIR USING ARMS: A LITTLE
MOBILITY SCORE: 16
EATING MEALS: A LITTLE
DRESSING REGULAR UPPER BODY CLOTHING: A LITTLE
WALKING IN HOSPITAL ROOM: A LITTLE
HELP NEEDED FOR BATHING: A LITTLE
CLIMB 3 TO 5 STEPS WITH RAILING: A LITTLE
DRESSING REGULAR LOWER BODY CLOTHING: A LOT
DAILY ACTIVITIY SCORE: 17
TURNING FROM BACK TO SIDE WHILE IN FLAT BAD: A LITTLE
HELP NEEDED FOR BATHING: A LITTLE
PERSONAL GROOMING: A LITTLE
MOVING TO AND FROM BED TO CHAIR: A LITTLE

## 2024-10-31 ASSESSMENT — PAIN DESCRIPTION - DESCRIPTORS
DESCRIPTORS: SHARP

## 2024-10-31 ASSESSMENT — PAIN - FUNCTIONAL ASSESSMENT
PAIN_FUNCTIONAL_ASSESSMENT: 0-10

## 2024-10-31 NOTE — PROGRESS NOTES
Physical Therapy    Physical Therapy Treatment    Patient Name: Jessica Smith  MRN: [id number]  Department: [facility]  Room: 6009/6009-B  Today's Date: 10/31/2024  Time Calculation  Start Time: 1442  Stop Time: 1452  Time Calculation (min): 10 min    Assessment/Plan   PT Assessment  Evaluation/Treatment Tolerance: Patient limited by fatigue, Patient limited by pain  Medical Staff Made Aware: Yes  End of Session Communication: Bedside nurse  Assessment Comment: Pt with limited tolerance to mobility 2/2 pain and fatigue. Pt was able to ambulate 40ft x2 with WW and CGA. Patient continues to benefit from skilled physical therapy to maximize functional mobility and safety. Pt remains appropriate for mod intensity therapy when medically appropriate for DC.  End of Session Patient Position: Up in chair, Alarm off, not on at start of session (CB in reach)  PT Plan  Treatment/Interventions: Bed mobility, Transfer training, Gait training, Stair training, Balance training, Strengthening, Endurance training, Range of motion, Therapeutic exercise, Therapeutic activity  PT Plan: Ongoing PT  PT Frequency: 3 times per week  PT Discharge Recommendations: Moderate intensity level of continued care  PT Recommended Transfer Status: Assist x1, Assistive device  PT - OK to Discharge: Yes  RN cleared prior to session    General Visit Information:   PT  Visit  PT Received On: 10/31/24  Response to Previous Treatment: Patient with no complaints from previous session.  General  Reason for Referral: presented to the ED on 10/25 for diarrhea (liquid stools) 4x a day for the past week (non-bloody), weakness x3-4 days and dizziness (today) and admitted to MICU for further management for metabolic acidosis 2/2 GI losses  Past Medical History Relevant to Rehab: PMH of HFpEF,  OPAL/OHS, Asthma, and multiple abdominal surgeries (adhesions-lysis, cholecystectomy, javier-en-Y) and recurrent pain 2/2 adhesions  Family/Caregiver Present: No  Co-Treatment:  OT  Co-Treatment Reason: To maximize pt's safety with mobility  Prior to Session Communication: Bedside nurse  Patient Position Received: Up in chair, Alarm off, not on at start of session  Preferred Learning Style: auditory, verbal  General Comment: Pt supine in bed upon arrival. Pleasant and agreeable to therapy    Subjective   Precautions:  Precautions  Medical Precautions: Fall precautions    Objective   Pain:  Pain Assessment  Pain Assessment: 0-10  0-10 (Numeric) Pain Score: 10 - Worst possible pain  Pain Type: Acute pain  Pain Location: Abdomen  Pain Descriptors: Sharp  Pain Interventions: Repositioned, Ambulation/increased activity  Response to Interventions: no change  Cognition:  Cognition  Overall Cognitive Status: Within Functional Limits  Arousal/Alertness: Appropriate responses to stimuli  Orientation Level: Oriented X4  Coordination:  Movements are Fluid and Coordinated: Yes  Postural Control:  Postural Control  Postural Control: Within Functional Limits    Activity Tolerance:  Activity Tolerance  Endurance: Tolerates 10 - 20 min exercise with multiple rests  Treatments:  Bed Mobility  Bed Mobility: Yes  Bed Mobility 1  Bed Mobility 1: Supine to sitting  Level of Assistance 1: Contact guard  Bed Mobility Comments 1: min vc for safety    Ambulation/Gait Training  Ambulation/Gait Training Performed: Yes  Ambulation/Gait Training 1  Surface 1: Level tile  Device 1: Rolling walker  Assistance 1: Contact guard, Minimal verbal cues  Quality of Gait 1: Diminished heel strike, Decreased step length (decreased foot clearance and xiao)  Comments/Distance (ft) 1: 40ft x 2; min vc for safety and sequencing  Transfers  Transfer: Yes  Transfer 1  Transfer From 1: Sit to, Stand to  Transfer to 1: Sit, Stand  Technique 1: Sit to stand, Stand to sit  Transfer Device 1: Walker  Transfer Level of Assistance 1: Contact guard  Trials/Comments 1: 1 trialmin vc for hand placement and safety    Outcome Measures:  [facility]  Basic Mobility  Turning from your back to your side while in a flat bed without using bedrails: A little  Moving from lying on your back to sitting on the side of a flat bed without using bedrails: A little  Moving to and from bed to chair (including a wheelchair): A little  Standing up from a chair using your arms (e.g. wheelchair or bedside chair): A little  To walk in hospital room: A little  Climbing 3-5 steps with railing: Total  Basic Mobility - Total Score: 16    Education Documentation  Precautions, taught by Becky Sam PT at 10/31/2024  3:14 PM.  Learner: Patient  Readiness: Acceptance  Method: Explanation, Demonstration  Response: Verbalizes Understanding, Needs Reinforcement    Home Exercise Program, taught by Becky Sam PT at 10/31/2024  3:14 PM.  Learner: Patient  Readiness: Acceptance  Method: Explanation, Demonstration  Response: Verbalizes Understanding, Needs Reinforcement    Mobility Training, taught by Becky Sam PT at 10/31/2024  3:14 PM.  Learner: Patient  Readiness: Acceptance  Method: Explanation, Demonstration  Response: Verbalizes Understanding, Needs Reinforcement    Education Comments  No comments found.      Encounter Problems       Encounter Problems (Active)       Balance       Pt will score >/=24/28 on Tinetti to demonstrate decreased falls risk (Progressing)       Start:  10/29/24    Expected End:  11/12/24               Mobility       Pt will be Mac ambulation 100 ft with RW (Progressing)       Start:  10/29/24    Expected End:  11/12/24            Pt will be SBA to ascend/descend 30 steps with rest breaks as needed with 1 handrail  (Progressing)       Start:  10/29/24    Expected End:  11/12/24               PT Transfers       Pt will be Mac with sit to stand and bed to chair transfers with RW (Progressing)       Start:  10/29/24    Expected End:  11/12/24            Pt will be Mac with bed mobility (Progressing)       Start:  10/29/24    Expected End:  11/12/24                Pain - Adult

## 2024-10-31 NOTE — PROGRESS NOTES
Assessment/Plan     Jessica Smith is a 59 y.o. female with PMH of HFpEF,  OPAL/OHS, Asthma, and multiple abdominal surgeries (adhesions-lysis, cholecystectomy, javier-en-Y) and recurrent pain 2/2 adhesions, who presented to the ED on 10/25 for diarrhea with liquid stool. Reports about 4x a day for about a week (non-bloody) associated with weakness x3-4 days and dizziness. Found to have severe metabolic acidosis 2/2 GI losses and admitted to MICU on a bicarbonate infusion for further management for metabolic acidosis 2/2 GI losses. . Bicarb normalized. Pt was transitioned to oral bicarbonate. CTCAP concerning for infection vs malignancy vs inflammation, GI consulted. Stool work up reviewed. UA also concerning for UTI.  Started empirically on zosyn, de-escalated later to Nitrofurantoin. C. dif neg. GI decided to scope 10/29. Her symptoms presentation are likely secondary to functional abdominal pain vs enteritis. Scope has not shown ulcers or gastritis. Added H2 blockers (famotidine) and gi Fu OP requested         Decreased sodium bicarb, monitor RFP if able to stop at some point.   SPENSER is resolving   Requested sleep medicine appt for sleep study  Dispo SNF   Hypokalemia - replaced           #Javier-en-Y  #recurrent abdominal 2/2 adhesions   #SBO and diagnostic laparoscopy 8/2021 w adhesion lysis  #cholecystectomy  #GERD  #SPENSER - resolving       # Non-Anion Gap Metabolic acidosis - resolved         #OPAL/OHS  #Asthma  -PFTs 9/2006: FEV1/FVC 56 / FEV1 1.5 w 10% change post-BD/ no hyperinflation in lung volumes, no DLCO. Sleep study 2006, PFTs shows obstructive pattern  -Uses 2L NC at night when sleeping for comfort  -c/w home albuterol  -c/w 2L NC at night for comfort  -needs pulm f/up and repeat PFTs and sleep study     #arthritis  #skin lesions on shoulders  - reports skin lesions 2/2 cat scratches  - knee pain d/t arthritis per patient    # Anxiety/MDD  - Home regimen: Lexapro 20mg daily, Gabapentin 300 mg at bedtime //  "Gabapentin 100mg PO Q12H PRN- 1st line for anxiety // Atarax 25mg Q8H PRN- 2nd line for anxiety  - denies any SI/SH      Scheduled outpatient appointments in system:   Future Appointments   Date Time Provider Department Center   11/7/2024  8:30 AM Nathalia Calloway MD [facility]     ---------------------------------------------------------------------------------------------------  Subjective   No events.         Time 30 min      ---------------------------------------------------------------------------------------------------  Objective   Last Recorded Vitals  Blood pressure 140/72, pulse 77, temperature 36.7 °C (98.1 °F), temperature source Temporal, resp. rate 16, height 1.6 m (5' 3\"), weight (!) 152 kg (335 lb 15.7 oz), SpO2 97%.  Intake/Output last 3 Shifts:  No intake/output data recorded.    Physical Exam  Vitals and nursing note reviewed.   Constitutional:       General: She is not in acute distress.     Appearance: Normal appearance. She is not toxic-appearing.   HENT:      Head: Normocephalic and atraumatic.      Mouth/Throat:      Mouth: Mucous membranes are moist.   Eyes:      General: No scleral icterus.     Extraocular Movements: Extraocular movements intact.      Conjunctiva/sclera: Conjunctivae normal.   Cardiovascular:      Rate and Rhythm: Normal rate and regular rhythm.      Heart sounds: S1 normal and S2 normal. No murmur heard.  Pulmonary:      Effort: Pulmonary effort is normal. No respiratory distress.      Breath sounds: No wheezing, rhonchi or rales.   Abdominal:      General: Bowel sounds are normal. There is no distension.      Palpations: Abdomen is soft.      Tenderness: There is no guarding or rebound.      Comments: Mild epigastric and lateral abdominal ttp, mostly LLQ   Musculoskeletal:         General: No swelling or deformity.      Cervical back: Neck supple.   Skin:     General: Skin is warm and dry.      Findings: No rash.   Neurological:      General: No focal deficit " present.      Mental Status: She is alert. Mental status is at baseline.   Psychiatric:         Mood and Affect: Mood normal.         Relevant Results  Lab Results   Component Value Date    WBC 9.2 10/31/2024    HGB 8.6 (L) 10/31/2024    HCT 28.6 (L) 10/31/2024    MCV 91 10/31/2024     10/31/2024      Lab Results   Component Value Date    GLUCOSE 87 10/31/2024    CALCIUM 7.6 (L) 10/31/2024     10/31/2024    K 3.6 10/31/2024    CO2 24 10/31/2024     10/31/2024    BUN 21 10/31/2024    CREATININE 1.40 (H) 10/31/2024     Scheduled medications  cyanocobalamin, 1,000 mcg, oral, Daily  diclofenac sodium, 4 g, Topical, 4x daily  escitalopram, 20 mg, oral, Daily  famotidine, 20 mg, oral, BID  [Held by provider] ferrous sulfate (325 mg ferrous sulfate), 1 tablet, oral, Every other day  influenza, 0.5 mL, intramuscular, During hospitalization  fluticasone, 1 spray, Each Nostril, Daily  fluticasone furoate-vilanteroL, 1 puff, inhalation, Daily  gabapentin, 300 mg, oral, Nightly  heparin (porcine), 7,500 Units, subcutaneous, q8h SUNSHINE  lidocaine, 1 patch, transdermal, Daily  [Held by provider] lisinopril, 30 mg, oral, Daily  melatonin, 10 mg, oral, Nightly  nitrofurantoin (macrocrystal-monohydrate), 100 mg, oral, q12h SUNSHINE  pantoprazole, 40 mg, oral, Daily  sodium bicarbonate, 1,300 mg, oral, BID      Continuous medications       PRN medications  PRN medications: acetaminophen, albuterol, benzocaine-menthol, dextromethorphan-guaifenesin, gabapentin, hydrOXYzine HCL, ipratropium-albuteroL, [Held by provider] polyethylene glycol, [Held by provider] sennosides, traMADol    Vani Gaines MD

## 2024-10-31 NOTE — PROGRESS NOTES
10/31/24 9315 Transitional Care Coordinator Notes:    Transitional Care Coordination Progress Note:  Patient discussed during interdisciplinary rounds.   Team members present: MD and TCC  Plan per Medical/Surgical team: Per MD, patient is medically ready. Alisa Martins will initiate precert. 7000 completed and uploaded into [employer].  Payor: [facility]  Discharge disposition: SNF  Potential Barriers: none  ADOD: 1-2 days                        Assessment/Plan   Assessment & Plan  Renal tubular acidosis, type 4               Pushpa Zhu RN

## 2024-10-31 NOTE — SIGNIFICANT EVENT
=== 10/25/24 ===    MR ENTEROGRAPHY (Preliminary)  This result has not been signed. Information might be incomplete.    - Impression -  1.  No evidence of abnormal mass or stricture at the distal  jejunojejunal anastomosis. There is trace amount of free fluid  adjacent to the anastomosis which is likely reactive in nature and  could reflect resolving enteritis. No evidence of abnormal signal  suggestive of active inflammatory bowel disease.  2. Additional incidental findings as above.      ASSESSMENT/PLAN:  Jessica Smith is a 59 y.o. female with a past medical history of HFpEF (last echo 2022), multiple abdominal surgeries (adhesion-lysis, cholecystectomy, javier-en-Y) and recurrent pain 2/2 adhesions, and OPAL/OHS and Asthma admitted on 10/25/2024 with diarrhea and weakness. GI is consulted for CT imaging with c/f thickened small bowel enteritis vs IBD, thickening at J-J anastomosis c/f infection, inflammation or malignancy.      Stool pathogen and c.diff negative. Push enterography unable to reach JJ anastomosis however examined jejunum healthy appearing. MRE without mass or stricture. Her symptoms may be 2/2 resolving enteritis vs functional abdominal pain.      #Diarrhea   #Abdominal pain  #Colitis on imaging   -Can consider H2 blocker or simethicone for functional abdominal pain   -If patient wishes to follow up for abdominal pain can order GI follow up on discharge     Thank you for the consultation.  The consulting team will sign off now.  Please do not hesitate to contact us again on by paging the consultation team again between the weekday hours of 7 AM - 5 PM.  If there is an urgent concern during the weekend, after-hours, or holidays; then please page the [place] [phone]. Thank you.

## 2024-10-31 NOTE — PROGRESS NOTES
Occupational Therapy    OT Treatment    Patient Name: Jessica Smith  MRN: [id number]  Today's Date: 10/31/2024  Time Calculation  Start Time: 1442  Stop Time: 1452  Time Calculation (min): 10 min       6009/6009-B    Assessment:  End of Session Communication: Bedside nurse  End of Session Patient Position: Up in chair, Alarm off, not on at start of session  OT Assessment Results: Decreased ADL status, Decreased upper extremity strength, Decreased cognition, Decreased endurance, Decreased functional mobility    Plan:  OT Frequency: 3 times per week  OT Discharge Recommendations: Moderate intensity level of continued care  OT - OK to Discharge: Yes     Subjective        10/31/24 1443   OT Last Visit   OT Received On 10/31/24   General   Reason for Referral presented to the ED on 10/25 for diarrhea (liquid stools) 4x a day for the past week (non-bloody), weakness x3-4 days and dizziness (today) and admitted to MICU for further management for metabolic acidosis 2/2 GI losses   Past Medical History Relevant to Rehab PMH of HFpEF,  OPAL/OHS, Asthma, and multiple abdominal surgeries (adhesions-lysis, cholecystectomy, javier-en-Y) and recurrent pain 2/2 adhesions   Family/Caregiver Present No   Co-Treatment PT   Co-Treatment Reason To maximize pt's safety with mobility   Prior to Session Communication Bedside nurse   Patient Position Received Up in chair;Alarm off, not on at start of session   General Comment Pt received in supine, agreeable to therapy.   Precautions   Medical Precautions Fall precautions   Pain Assessment   Pain Assessment 0-10   0-10 (Numeric) Pain Score 10 - Worst possible pain   Pain Type Acute pain   Pain Location Abdomen   Pain Descriptors Sharp   Pain Onset Ongoing   Pain Interventions Repositioned   Cognition   Orientation Level Oriented X4   UE Dressing   UE Dressing Level of Assistance   (Anticipate SBA in sitting after setup.)   LE Dressing   LE Dressing   (Pt declined 2' c/o abdominal pain, however,  anticipate at least mod assist to don/doff socks; CGA for clothing management while in standing.)   Toileting   Toileting Level of Assistance   (Anticipate CGA/Min assist based on pt's functional performane in session.)   Bed Mobility   Bed Mobility Yes   Bed Mobility 1   Bed Mobility 1 Supine to sitting   Level of Assistance 1 Contact guard   Functional Mobility   Functional Mobility Performed Yes   Functional Mobility 1   Surface 1 Level tile   Device 1 Rolling walker   Assistance 1 Contact guard   Quality of Functional Mobility 1   (slow paced gait into hallway with PT, refer to PT notes re: gait details.)   Transfers   Transfer Yes   Transfer 1   Transfer From 1 Sit to;Stand to   Transfer to 1 Sit;Stand   Technique 1 Sit to stand;Stand to sit   Transfer Device 1 Walker   Transfer Level of Assistance 1 Contact guard   Static Sitting Balance   Static Sitting-Level of Assistance Close supervision   Static Standing Balance   Static Standing-Level of Assistance   (Close SBA with RW)   IP OT Assessment   End of Session Communication Bedside nurse   End of Session Patient Position Up in chair;Alarm off, not on at start of session   OT Assessment   OT Assessment Results Decreased ADL status;Decreased upper extremity strength;Decreased cognition;Decreased endurance;Decreased functional mobility   Inpatient/Swing Bed or Outpatient   Inpatient/Swing Bed or Outpatient Inpatient   Inpatient Plan   OT Frequency 3 times per week   OT Discharge Recommendations Moderate intensity level of continued care   OT - OK to Discharge Yes       Outcome Measures:[facility] Daily Activity  Putting on and taking off regular lower body clothing: A lot  Bathing (including washing, rinsing, drying): A little  Putting on and taking off regular upper body clothing: A little  Toileting, which includes using toilet, bedpan or urinal: A little  Taking care of personal grooming such as brushing teeth: A little  Eating Meals: A little  Daily Activity -  Total Score: 17  Education Documentation  Body Mechanics, taught by Mayuri Massey OT at 10/31/2024  3:18 PM.  Learner: Patient  Readiness: Acceptance  Method: Explanation  Response: Needs Reinforcement    Precautions, taught by Mayuri Massey OT at 10/31/2024  3:18 PM.  Learner: Patient  Readiness: Acceptance  Method: Explanation  Response: Needs Reinforcement    ADL Training, taught by Mayuri Massey OT at 10/31/2024  3:18 PM.  Learner: Patient  Readiness: Acceptance  Method: Explanation  Response: Needs Reinforcement    Education Comments  No comments found.            Goals:  Encounter Problems       Encounter Problems (Active)       ADLs       Patient with complete upper body dressing with stand by assist level of assistance donning and doffing all UE clothes with no adaptive equipment while edge of bed  (Progressing)       Start:  10/29/24    Expected End:  11/13/24            Patient with complete lower body dressing with stand by assist level of assistance donning and doffing all LE clothes  with no adaptive equipment while edge of bed  (Progressing)       Start:  10/29/24    Expected End:  11/13/24               BALANCE       Pt will maintain dynamic standing balance during ADL task with stand by assist level of assistance in order to demonstrate decreased risk of falling and improved postural control. (Progressing)       Start:  10/29/24    Expected End:  11/13/24               MOBILITY       Patient will perform Functional mobility min Household distances/Community Distances with stand by assist level of assistance and front wheeled walker in order to improve safety and functional mobility. (Progressing)       Start:  10/29/24    Expected End:  11/13/24               TRANSFERS       Patient will complete functional transfer to rest room with front wheeled walker with CGA level of assistance. (Progressing)       Start:  10/29/24    Expected End:  11/13/24                   10/31/24 at 3:19 PM - Mayuri Massey  OT

## 2024-10-31 NOTE — CARE PLAN
Problem: Pain - Adult  Goal: Verbalizes/displays adequate comfort level or baseline comfort level  Outcome: Progressing     Problem: Skin  Goal: Decreased wound size/increased tissue granulation at next dressing change  Outcome: Progressing  Goal: Participates in plan/prevention/treatment measures  Outcome: Progressing  Goal: Prevent/manage excess moisture  Outcome: Progressing  Goal: Prevent/minimize sheer/friction injuries  Outcome: Progressing  Goal: Promote/optimize nutrition  Outcome: Progressing  Goal: Promote skin healing  Outcome: Progressing   The patient's goals for the shift include      The clinical goals for the shift include pt will remain free from n/v/d this shift

## 2024-10-31 NOTE — CARE PLAN
The patient's goals for the shift include      The clinical goals for the shift include pt will be free from fall or injury today      Problem: Pain - Adult  Goal: Verbalizes/displays adequate comfort level or baseline comfort level  Outcome: Progressing     Problem: Safety - Adult  Goal: Free from fall injury  Outcome: Progressing     Problem: Skin  Goal: Promote/optimize nutrition  Outcome: Progressing

## 2024-11-01 VITALS
OXYGEN SATURATION: 93 % | DIASTOLIC BLOOD PRESSURE: 65 MMHG | SYSTOLIC BLOOD PRESSURE: 118 MMHG | TEMPERATURE: 97.9 F | BODY MASS INDEX: 51.91 KG/M2 | HEART RATE: 67 BPM | WEIGHT: 293 LBS | HEIGHT: 63 IN | RESPIRATION RATE: 16 BRPM

## 2024-11-01 DIAGNOSIS — I10 BENIGN ESSENTIAL HYPERTENSION: Chronic | ICD-10-CM

## 2024-11-01 LAB
ALBUMIN SERPL BCP-MCNC: 3.6 G/DL (ref 3.4–5)
ANION GAP SERPL CALC-SCNC: 16 MMOL/L (ref 10–20)
BUN SERPL-MCNC: 21 MG/DL (ref 6–23)
CALCIUM SERPL-MCNC: 7.7 MG/DL (ref 8.6–10.6)
CHLORIDE SERPL-SCNC: 103 MMOL/L (ref 98–107)
CO2 SERPL-SCNC: 24 MMOL/L (ref 21–32)
CREAT SERPL-MCNC: 1.3 MG/DL (ref 0.5–1.05)
EGFRCR SERPLBLD CKD-EPI 2021: 47 ML/MIN/1.73M*2
ERYTHROCYTE [DISTWIDTH] IN BLOOD BY AUTOMATED COUNT: 14.6 % (ref 11.5–14.5)
GLUCOSE SERPL-MCNC: 91 MG/DL (ref 74–99)
HCT VFR BLD AUTO: 29.5 % (ref 36–46)
HGB BLD-MCNC: 9.2 G/DL (ref 12–16)
MCH RBC QN AUTO: 27.6 PG (ref 26–34)
MCHC RBC AUTO-ENTMCNC: 31.2 G/DL (ref 32–36)
MCV RBC AUTO: 89 FL (ref 80–100)
NRBC BLD-RTO: 0 /100 WBCS (ref 0–0)
PHOSPHATE SERPL-MCNC: 3.6 MG/DL (ref 2.5–4.9)
PLATELET # BLD AUTO: 252 X10*3/UL (ref 150–450)
POTASSIUM SERPL-SCNC: 3.9 MMOL/L (ref 3.5–5.3)
RBC # BLD AUTO: 3.33 X10*6/UL (ref 4–5.2)
SODIUM SERPL-SCNC: 139 MMOL/L (ref 136–145)
WBC # BLD AUTO: 8.7 X10*3/UL (ref 4.4–11.3)

## 2024-11-01 PROCEDURE — 2500000004 HC RX 250 GENERAL PHARMACY W/ HCPCS (ALT 636 FOR OP/ED): Performed by: STUDENT IN AN ORGANIZED HEALTH CARE EDUCATION/TRAINING PROGRAM

## 2024-11-01 PROCEDURE — 99239 HOSP IP/OBS DSCHRG MGMT >30: CPT | Performed by: STUDENT IN AN ORGANIZED HEALTH CARE EDUCATION/TRAINING PROGRAM

## 2024-11-01 PROCEDURE — 85027 COMPLETE CBC AUTOMATED: CPT | Performed by: STUDENT IN AN ORGANIZED HEALTH CARE EDUCATION/TRAINING PROGRAM

## 2024-11-01 PROCEDURE — 2500000001 HC RX 250 WO HCPCS SELF ADMINISTERED DRUGS (ALT 637 FOR MEDICARE OP): Performed by: STUDENT IN AN ORGANIZED HEALTH CARE EDUCATION/TRAINING PROGRAM

## 2024-11-01 PROCEDURE — 36415 COLL VENOUS BLD VENIPUNCTURE: CPT | Performed by: STUDENT IN AN ORGANIZED HEALTH CARE EDUCATION/TRAINING PROGRAM

## 2024-11-01 PROCEDURE — 2500000005 HC RX 250 GENERAL PHARMACY W/O HCPCS: Performed by: STUDENT IN AN ORGANIZED HEALTH CARE EDUCATION/TRAINING PROGRAM

## 2024-11-01 PROCEDURE — 80069 RENAL FUNCTION PANEL: CPT | Performed by: STUDENT IN AN ORGANIZED HEALTH CARE EDUCATION/TRAINING PROGRAM

## 2024-11-01 PROCEDURE — 94640 AIRWAY INHALATION TREATMENT: CPT

## 2024-11-01 RX ORDER — LOPERAMIDE HYDROCHLORIDE 2 MG/1
2 CAPSULE ORAL 4 TIMES DAILY PRN
Start: 2024-11-01

## 2024-11-01 RX ORDER — ONDANSETRON 4 MG/1
2 TABLET, FILM COATED ORAL EVERY 8 HOURS PRN
Start: 2024-11-01

## 2024-11-01 RX ORDER — ALUMINUM HYDROXIDE, MAGNESIUM HYDROXIDE, AND SIMETHICONE 1200; 120; 1200 MG/30ML; MG/30ML; MG/30ML
10 SUSPENSION ORAL 4 TIMES DAILY PRN
Start: 2024-11-01

## 2024-11-01 ASSESSMENT — COGNITIVE AND FUNCTIONAL STATUS - GENERAL
HELP NEEDED FOR BATHING: A LITTLE
DRESSING REGULAR LOWER BODY CLOTHING: A LITTLE
DRESSING REGULAR UPPER BODY CLOTHING: A LITTLE
DAILY ACTIVITIY SCORE: 19
WALKING IN HOSPITAL ROOM: A LITTLE
PERSONAL GROOMING: A LITTLE
MOVING TO AND FROM BED TO CHAIR: A LITTLE
CLIMB 3 TO 5 STEPS WITH RAILING: A LITTLE
MOBILITY SCORE: 20
STANDING UP FROM CHAIR USING ARMS: A LITTLE
TOILETING: A LITTLE

## 2024-11-01 ASSESSMENT — PAIN - FUNCTIONAL ASSESSMENT: PAIN_FUNCTIONAL_ASSESSMENT: 0-10

## 2024-11-01 ASSESSMENT — PAIN SCALES - GENERAL: PAINLEVEL_OUTOF10: 0 - NO PAIN

## 2024-11-01 NOTE — PROGRESS NOTES
11/01/24 1314   Discharge Planning   Home or Post Acute Services Post acute facilities (Rehab/SNF/etc)  ([facility])   Type of Post Acute Facility Services Skilled nursing   Expected Discharge Disposition SNF   Does the patient need discharge transport arranged? Yes   RoundTrip coordination needed? Yes   Has discharge transport been arranged? Yes   What day is the transport expected? 11/01/24   What time is the transport expected? 1400       Patient was approved to [facility] and will discharge today. 7000 completed yesterday and uploaded into [employer]. Final goldenrod and AVS sent to facility. Transportation is arranged for 2:00 pm via [facility]. Nurse given number for report.    Number for report: [phone]

## 2024-11-01 NOTE — PROGRESS NOTES
11/1/24 1124 Transitional Care Coordinator Notes:    Transitional Care Coordination Progress Note:  Patient discussed during interdisciplinary rounds.   Team members present: MD and TCC  Plan per Medical/Surgical team: Precert is still pending to [facility]. Will continue to follow for updates.  Payor: [employer]  Discharge disposition: SNF  Potential Barriers: none   ADOD: 1-2 days                    Assessment/Plan   Assessment & Plan  Renal tubular acidosis, type 4               Pushpa Zhu RN

## 2024-11-01 NOTE — CARE PLAN
Problem: Pain - Adult  Goal: Verbalizes/displays adequate comfort level or baseline comfort level  Outcome: Progressing     Problem: Safety - Adult  Goal: Free from fall injury  Outcome: Progressing     Problem: Discharge Planning  Goal: Discharge to home or other facility with appropriate resources  Outcome: Progressing     Problem: Chronic Conditions and Co-morbidities  Goal: Patient's chronic conditions and co-morbidity symptoms are monitored and maintained or improved  Outcome: Progressing     Problem: Skin  Goal: Decreased wound size/increased tissue granulation at next dressing change  Outcome: Progressing  Goal: Participates in plan/prevention/treatment measures  Outcome: Progressing  Goal: Prevent/manage excess moisture  Outcome: Progressing  Goal: Prevent/minimize sheer/friction injuries  Outcome: Progressing  Goal: Promote/optimize nutrition  Outcome: Progressing  Goal: Promote skin healing  Outcome: Progressing   The patient's goals for the shift include      The clinical goals for the shift include Patient will be free of falls and injuries throughout shift

## 2024-11-01 NOTE — DISCHARGE SUMMARY
Discharge Diagnosis  Renal tubular acidosis, type 4    Issues Requiring Follow-Up  GI, sleep and PCP clinics     Discharge Meds     Medication List      START taking these medications     alum-mag hydroxide-simeth 200-200-20 mg/5 mL oral suspension; Commonly   known as: Mylanta; Take 10 mL by mouth 4 times a day as needed for   indigestion or heartburn.   benzocaine-menthol lozenge; Commonly known as: Cepastat Sore Throat;   Dissolve 1 lozenge in the mouth every 2 hours if needed for sore throat.   loperamide 2 mg capsule; Commonly known as: Imodium; Take 1 capsule (2   mg) by mouth 4 times a day as needed for diarrhea.   nitrofurantoin (macrocrystal-monohydrate) 100 mg capsule; Commonly known   as: Macrobid; Take 1 capsule (100 mg) by mouth every 12 hours for 6 doses.   ondansetron 4 mg tablet; Commonly known as: Zofran; Take 0.5 tablets (2   mg) by mouth every 8 hours if needed for nausea or vomiting.     CHANGE how you take these medications     acetaminophen 325 mg tablet; Commonly known as: Tylenol; Take 3 tablets   (975 mg) by mouth every 8 hours if needed for mild pain (1 - 3), headaches   or fever (temp greater than 38.0 C).; What changed: how much to take, when   to take this, reasons to take this   lisinopril 10 mg tablet; Take 1 tablet (10 mg) by mouth once daily. Do   not fill before November 1, 2024.; What changed: medication strength, how   much to take, how to take this, when to take this, additional instructions     CONTINUE taking these medications     Advair Diskus 250-50 mcg/dose diskus inhaler; Generic drug: fluticasone   propion-salmeteroL; Inhale 1 puff 2 times a day. Rinse mouth with water   after use to reduce aftertaste and incidence of candidiasis. Do not   swallow.   albuterol 90 mcg/actuation inhaler; Inhale 2 puffs every 6 hours if   needed for wheezing or shortness of breath.   Coricidin HBP Chest Bradford-Cough  mg capsule; Generic drug:   dextromethorphan-guaifenesin; Take 1 capsule  by mouth every 4 hours if   needed (cough/congestion).   cyanocobalamin 1,000 mcg tablet; Commonly known as: Vitamin B-12; Take 1   tablet (1,000 mcg) by mouth once daily.   diclofenac sodium 1 % gel; Commonly known as: Arthritis Pain   (diclofenac); Apply 4.5 inches (4 g) topically 4 times a day.   escitalopram 20 mg tablet; Commonly known as: Lexapro; Take 1 tablet (20   mg) by mouth once daily.   famotidine 10 mg tablet; Commonly known as: Pepcid AC; Take 1 tablet (10   mg) by mouth 2 times a day.   FeroSuL tablet; Generic drug: ferrous sulfate (325 mg ferrous sulfate);   Take 1 tablet by mouth every other day.   fluticasone 50 mcg/actuation nasal spray; Commonly known as: Flonase;   Administer 1 spray into each nostril once daily.   * gabapentin 300 mg capsule; Commonly known as: Neurontin; Take 1   capsule (300 mg) by mouth once daily at bedtime.   * gabapentin 100 mg capsule; Commonly known as: Neurontin; Take 1   capsule (100 mg) by mouth 2 times a day as needed (1st line for anxiety).   hydrOXYzine HCL 25 mg tablet; Commonly known as: Atarax; Take 1 tablet   (25 mg) by mouth every 8 hours if needed for anxiety (2st line for   anxiety).   lidocaine 5 % patch; Commonly known as: Lidoderm; APPLY 1 PATCH TO THE   AFFECTED AREA AND LEAVE IN PLACE FOR 12 HOURS, THEN REMOVE AND LEAVE OFF   FOR 12 HOURS. Strength: 5 %   melatonin 3 mg tablet; Take 1 tablet (3 mg) by mouth once daily at   bedtime.   Mucinex 1,200 mg tablet extended release 12hr; Generic drug: guaiFENesin   multivit, iron, min. cmb. 5-FA 10-1 mg tablet; Take 1 mg by mouth once   daily.   pantoprazole 40 mg EC tablet; Commonly known as: ProtoNix; Take 1 tablet   (40 mg) by mouth once daily.   polyethylene glycol 17 gram packet; Commonly known as: Glycolax,   Miralax; Take 17 g by mouth 2 times a day as needed (constipation).   senna 8.6 mg tablet; Generic drug: sennosides; TAKE 1 TABLET BY MOUTH   TWICE DAILY AS NEEDED FOR CONSTIPATION   sucralfate 100  mg/mL suspension; Commonly known as: Carafate; TAKE 10ML   BY MOUTH FOUR TIMES A DAY WITH MEALS   walker misc; 1 each once daily. Use daily as needed for ambulation   assistance  * This list has 2 medication(s) that are the same as other medications   prescribed for you. Read the directions carefully, and ask your doctor or   other care provider to review them with you.     STOP taking these medications     amLODIPine 10 mg tablet; Commonly known as: Norvasc   oxygen gas therapy; Commonly known as: O2       Test Results Pending At Discharge  Pending Labs       No current pending labs.            Hospital Course    Jessica Smith is a 59 y.o. female with PMH of HFpEF,  OPAL/OHS, Asthma, and multiple abdominal surgeries (adhesions-lysis, cholecystectomy, javier-en-Y) and recurrent pain 2/2 adhesions, who presented to the ED on 10/25 for diarrhea with liquid stool. Reports about 4x a day for about a week (non-bloody) associated with weakness x3-4 days and dizziness. Found to have severe metabolic acidosis 2/2 GI losses and admitted to MICU on a bicarbonate infusion for further management for metabolic acidosis 2/2 GI losses. . Bicarb normalized. Pt was transitioned to oral bicarbonate. CTCAP concerning for infection vs malignancy vs inflammation, GI consulted. Stool work up reviewed. UA also concerning for UTI.  Started empirically on zosyn, de-escalated later to Nitrofurantoin. C. dif neg. GI decided to scope 10/29. Her symptoms presentation are likely secondary to functional abdominal pain vs enteritis. Scope has not shown ulcers or gastritis. Added H2 blockers (famotidine) and gi Fu OP requested. Requested sleep medicine appt for sleep study. Dispo SNF. Hypokalemia - replaced        40 min     Pertinent Physical Exam At Time of Discharge  Physical Exam    Outpatient Follow-Up  Future Appointments   Date Time Provider Department Center   11/7/2024  8:30 AM MD Dominique Hernandez,  MD

## 2024-11-07 ENCOUNTER — APPOINTMENT (OUTPATIENT)
Dept: GASTROENTEROLOGY | Facility: HOSPITAL | Age: 59
End: 2024-11-07
Payer: COMMERCIAL

## 2024-11-07 RX ORDER — LISINOPRIL 30 MG/1
TABLET ORAL
Qty: 30 TABLET | Refills: 10 | OUTPATIENT
Start: 2024-11-07

## 2024-11-14 DIAGNOSIS — Z12.11 COLON CANCER SCREENING: Primary | ICD-10-CM

## 2024-11-14 RX ORDER — POLYETHYLENE GLYCOL 3350, SODIUM CHLORIDE, SODIUM BICARBONATE, POTASSIUM CHLORIDE 420; 11.2; 5.72; 1.48 G/4L; G/4L; G/4L; G/4L
4000 POWDER, FOR SOLUTION ORAL ONCE
Qty: 4000 ML | Refills: 0 | Status: SHIPPED | OUTPATIENT
Start: 2024-11-14 | End: 2024-11-15

## 2024-12-04 ENCOUNTER — TELEPHONE (OUTPATIENT)
Dept: GASTROENTEROLOGY | Facility: HOSPITAL | Age: 59
End: 2024-12-04

## 2024-12-04 ENCOUNTER — APPOINTMENT (OUTPATIENT)
Dept: PRIMARY CARE | Facility: CLINIC | Age: 59
End: 2024-12-04
Payer: COMMERCIAL

## 2024-12-06 DIAGNOSIS — Z12.11 COLON CANCER SCREENING: Primary | ICD-10-CM

## 2024-12-06 RX ORDER — POLYETHYLENE GLYCOL-3350 AND ELECTROLYTES WITH FLAVOR PACK 240; 5.84; 2.98; 6.72; 22.72 G/278.26G; G/278.26G; G/278.26G; G/278.26G; G/278.26G
4000 POWDER, FOR SOLUTION ORAL ONCE
Qty: 4000 ML | Refills: 0 | Status: SHIPPED | OUTPATIENT
Start: 2024-12-06 | End: 2024-12-09

## 2025-01-24 ENCOUNTER — HOSPITAL ENCOUNTER (EMERGENCY)
Facility: HOSPITAL | Age: 60
Discharge: HOME | End: 2025-01-25
Attending: EMERGENCY MEDICINE
Payer: COMMERCIAL

## 2025-01-24 DIAGNOSIS — R10.10 PAIN OF UPPER ABDOMEN: Primary | ICD-10-CM

## 2025-01-24 LAB
ALBUMIN SERPL BCP-MCNC: 4 G/DL (ref 3.4–5)
ALP SERPL-CCNC: 142 U/L (ref 33–110)
ALT SERPL W P-5'-P-CCNC: 12 U/L (ref 7–45)
ANION GAP SERPL CALC-SCNC: 14 MMOL/L (ref 10–20)
AST SERPL W P-5'-P-CCNC: 10 U/L (ref 9–39)
BASOPHILS # BLD AUTO: 0.05 X10*3/UL (ref 0–0.1)
BASOPHILS NFR BLD AUTO: 0.3 %
BILIRUB SERPL-MCNC: 0.4 MG/DL (ref 0–1.2)
BUN SERPL-MCNC: 26 MG/DL (ref 6–23)
CALCIUM SERPL-MCNC: 8.8 MG/DL (ref 8.6–10.6)
CHLORIDE SERPL-SCNC: 111 MMOL/L (ref 98–107)
CO2 SERPL-SCNC: 21 MMOL/L (ref 21–32)
CREAT SERPL-MCNC: 1.08 MG/DL (ref 0.5–1.05)
EGFRCR SERPLBLD CKD-EPI 2021: 59 ML/MIN/1.73M*2
EOSINOPHIL # BLD AUTO: 0.01 X10*3/UL (ref 0–0.7)
EOSINOPHIL NFR BLD AUTO: 0.1 %
ERYTHROCYTE [DISTWIDTH] IN BLOOD BY AUTOMATED COUNT: 14.8 % (ref 11.5–14.5)
GLUCOSE SERPL-MCNC: 110 MG/DL (ref 74–99)
HCT VFR BLD AUTO: 33 % (ref 36–46)
HGB BLD-MCNC: 10.4 G/DL (ref 12–16)
IMM GRANULOCYTES # BLD AUTO: 0.06 X10*3/UL (ref 0–0.7)
IMM GRANULOCYTES NFR BLD AUTO: 0.4 % (ref 0–0.9)
LYMPHOCYTES # BLD AUTO: 1.59 X10*3/UL (ref 1.2–4.8)
LYMPHOCYTES NFR BLD AUTO: 10.5 %
MCH RBC QN AUTO: 27.7 PG (ref 26–34)
MCHC RBC AUTO-ENTMCNC: 31.5 G/DL (ref 32–36)
MCV RBC AUTO: 88 FL (ref 80–100)
MONOCYTES # BLD AUTO: 0.58 X10*3/UL (ref 0.1–1)
MONOCYTES NFR BLD AUTO: 3.8 %
NEUTROPHILS # BLD AUTO: 12.8 X10*3/UL (ref 1.2–7.7)
NEUTROPHILS NFR BLD AUTO: 84.9 %
NRBC BLD-RTO: 0 /100 WBCS (ref 0–0)
PLATELET # BLD AUTO: 311 X10*3/UL (ref 150–450)
POTASSIUM SERPL-SCNC: 4 MMOL/L (ref 3.5–5.3)
PROT SERPL-MCNC: 7.2 G/DL (ref 6.4–8.2)
RBC # BLD AUTO: 3.76 X10*6/UL (ref 4–5.2)
SODIUM SERPL-SCNC: 142 MMOL/L (ref 136–145)
WBC # BLD AUTO: 15.1 X10*3/UL (ref 4.4–11.3)

## 2025-01-24 PROCEDURE — 80053 COMPREHEN METABOLIC PANEL: CPT | Performed by: EMERGENCY MEDICINE

## 2025-01-24 PROCEDURE — 2500000001 HC RX 250 WO HCPCS SELF ADMINISTERED DRUGS (ALT 637 FOR MEDICARE OP): Mod: SE

## 2025-01-24 PROCEDURE — 87636 SARSCOV2 & INF A&B AMP PRB: CPT

## 2025-01-24 PROCEDURE — 99284 EMERGENCY DEPT VISIT MOD MDM: CPT

## 2025-01-24 PROCEDURE — 85025 COMPLETE CBC W/AUTO DIFF WBC: CPT | Performed by: EMERGENCY MEDICINE

## 2025-01-24 PROCEDURE — 99284 EMERGENCY DEPT VISIT MOD MDM: CPT | Performed by: EMERGENCY MEDICINE

## 2025-01-24 PROCEDURE — 36415 COLL VENOUS BLD VENIPUNCTURE: CPT | Performed by: EMERGENCY MEDICINE

## 2025-01-24 RX ORDER — FAMOTIDINE 40 MG/1
40 TABLET, FILM COATED ORAL ONCE
Status: COMPLETED | OUTPATIENT
Start: 2025-01-24 | End: 2025-01-24

## 2025-01-24 RX ORDER — ACETAMINOPHEN 325 MG/1
975 TABLET ORAL ONCE
Status: COMPLETED | OUTPATIENT
Start: 2025-01-24 | End: 2025-01-24

## 2025-01-24 RX ORDER — DICYCLOMINE HYDROCHLORIDE 10 MG/1
10 CAPSULE ORAL ONCE
Status: COMPLETED | OUTPATIENT
Start: 2025-01-24 | End: 2025-01-24

## 2025-01-24 RX ADMIN — ACETAMINOPHEN 975 MG: 325 TABLET ORAL at 21:53

## 2025-01-24 RX ADMIN — DICYCLOMINE HYDROCHLORIDE 10 MG: 10 CAPSULE ORAL at 21:53

## 2025-01-24 RX ADMIN — FAMOTIDINE 40 MG: 40 TABLET ORAL at 21:53

## 2025-01-25 ENCOUNTER — APPOINTMENT (OUTPATIENT)
Dept: RADIOLOGY | Facility: HOSPITAL | Age: 60
End: 2025-01-25
Payer: COMMERCIAL

## 2025-01-25 VITALS
WEIGHT: 293 LBS | DIASTOLIC BLOOD PRESSURE: 88 MMHG | SYSTOLIC BLOOD PRESSURE: 142 MMHG | BODY MASS INDEX: 51.91 KG/M2 | TEMPERATURE: 98.4 F | RESPIRATION RATE: 18 BRPM | HEIGHT: 63 IN | HEART RATE: 77 BPM | OXYGEN SATURATION: 98 %

## 2025-01-25 LAB
CARDIAC TROPONIN I PNL SERPL HS: 8 NG/L (ref 0–34)
FLUAV RNA RESP QL NAA+PROBE: NOT DETECTED
FLUBV RNA RESP QL NAA+PROBE: NOT DETECTED
HOLD SPECIMEN: NORMAL
LACTATE SERPL-SCNC: 0.9 MMOL/L (ref 0.4–2)
LIPASE SERPL-CCNC: 17 U/L (ref 9–82)
MAGNESIUM SERPL-MCNC: 2.13 MG/DL (ref 1.6–2.4)
SARS-COV-2 RNA RESP QL NAA+PROBE: NOT DETECTED

## 2025-01-25 PROCEDURE — 84484 ASSAY OF TROPONIN QUANT: CPT

## 2025-01-25 PROCEDURE — 83690 ASSAY OF LIPASE: CPT

## 2025-01-25 PROCEDURE — 74176 CT ABD & PELVIS W/O CONTRAST: CPT | Performed by: RADIOLOGY

## 2025-01-25 PROCEDURE — 36415 COLL VENOUS BLD VENIPUNCTURE: CPT

## 2025-01-25 PROCEDURE — 83735 ASSAY OF MAGNESIUM: CPT

## 2025-01-25 PROCEDURE — 83605 ASSAY OF LACTIC ACID: CPT | Performed by: EMERGENCY MEDICINE

## 2025-01-25 PROCEDURE — 74176 CT ABD & PELVIS W/O CONTRAST: CPT

## 2025-01-25 RX ORDER — ONDANSETRON 4 MG/1
4 TABLET, FILM COATED ORAL EVERY 6 HOURS PRN
Qty: 12 TABLET | Refills: 0 | Status: SHIPPED | OUTPATIENT
Start: 2025-01-25 | End: 2025-01-28

## 2025-01-25 RX ORDER — ACETAMINOPHEN 500 MG
1000 TABLET ORAL EVERY 6 HOURS PRN
Qty: 28 TABLET | Refills: 0 | Status: SHIPPED | OUTPATIENT
Start: 2025-01-25 | End: 2025-02-01

## 2025-01-25 RX ORDER — DICYCLOMINE HYDROCHLORIDE 20 MG/1
20 TABLET ORAL 2 TIMES DAILY PRN
Qty: 20 TABLET | Refills: 0 | Status: SHIPPED | OUTPATIENT
Start: 2025-01-25 | End: 2025-02-04

## 2025-01-25 RX ORDER — FAMOTIDINE 20 MG/1
20 TABLET, FILM COATED ORAL 2 TIMES DAILY
Qty: 30 TABLET | Refills: 0 | Status: SHIPPED | OUTPATIENT
Start: 2025-01-25 | End: 2025-02-09

## 2025-01-25 NOTE — ED PROVIDER NOTES
HPI   Chief Complaint   Patient presents with    Abdominal Pain   This is a 59 year old female with a past medical history significant for HFpEF, Anxiety/MDD, Asthma, OPAL, CKD, GERD, recurrent abdominal pain (2/2 to adhesions vs functional), h/o multiple abdominal surgeries, (Sherita-en-Y, C-sections), H. Pylori, gastric ulcer, SBO and had diagnostic laparoscopy 8/2021 with lysis of adhesions.  She presents the ED due to abdominal pain. Patient states that earlier this evening she developed sharp pains in her upper abdomen that begins on the left side and radiates into the right, rated 10/10. She denies any nausea, vomiting or diarrhea.  Her last bowel movement was yesterday, reports it was normal. She is passing gas as normal.  Denies urinary frequency, urgency dysuria or hematuria. Denies assuming any new foods or medications.  Denies any sick contacts or recent history of travel.     Limitations to history: None  Independent Historians: Patient  External Records Reviewed: None    Patient History   Past Medical History:   Diagnosis Date    Abdominal adhesions 08/08/2021    Abnormal QT interval present on electrocardiogram 03/18/2021    Formatting of this note might be different from the original. Formatting of this note might be different from the original. -EKG: NSR. QTc 500 Formatting of this note might be different from the original. -EKG: NSR. QTc 500    Acute gastric ulcer without hemorrhage or perforation 01/13/2016    Gastric ulcer, acute    Acute gastrojejunal ulcer 09/07/2007    Formatting of this note might be different from the original. 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    COVID-19  2021    Gastritis, unspecified, without bleeding 2017    Gastritis, Helicobacter pylori    High risk HPV infection 2023    Hypokalemia 2023    Intertrigo 2023    Ischemic bowel disease (Multi) 2021    Personal history of other diseases of the musculoskeletal system and connective tissue 2015    History of arthritis    Personal history of other diseases of the respiratory system 2015    History of bronchitis    Personal history of other diseases of the respiratory system 2015    History of sinusitis    Personal history of other diseases of the respiratory system 2015    History of acute bronchitis    Personal history of other infectious and parasitic diseases 2015    History of Helicobacter infection    Personal history of other specified conditions 2016    History of diarrhea    Personal history of other specified conditions 2015    History of chest pain    Personal history of other specified conditions 01/10/2014    History of chest pain    Personal history of peptic ulcer disease 2015    History of gastric ulcer    SBO (small bowel obstruction) (Multi) 2023    Sebaceous cyst 2002    Tension headache 2009    Urinary tract infection, site not specified 04/15/2015    UTI (lower urinary tract infection)    Wound dehiscence 2023     Past Surgical History:   Procedure Laterality Date    APPENDECTOMY  2014    Appendectomy     SECTION, CLASSIC  2014     Section    COLONOSCOPY  2018    Colonoscopy (Fiberoptic)    ESOPHAGOGASTRODUODENOSCOPY  2018    Diagnostic Esophagogastroduodenoscopy    GASTRIC BYPASS  2017    Gastric Surgery For Morbid Obesity Gastric Bypass    GASTRIC RESTRICTION SURGERY  2015    Gastric Surgery For Morbid Obesity    OTHER SURGICAL HISTORY  2020    Ventral hernia repair    OTHER SURGICAL HISTORY  2014    Cholecystotomy    TUBAL  LIGATION  11/05/2014    Tubal Ligation    UMBILICAL HERNIA REPAIR  11/05/2014    Umbilical Hernia Repair     No family history on file.  Social History     Tobacco Use    Smoking status: Former     Types: Cigarettes    Smokeless tobacco: Never   Substance Use Topics    Alcohol use: Never    Drug use: Never       Physical Exam   ED Triage Vitals [01/24/25 1957]   Temperature Heart Rate Respirations BP   36.9 °C (98.4 °F) 87 18 155/77      Pulse Ox Temp Source Heart Rate Source Patient Position   96 % Oral -- --      BP Location FiO2 (%)     -- --       Physical Exam  Constitutional:       General: She is not in acute distress.     Appearance: She is not ill-appearing or diaphoretic.   HENT:      Head: Normocephalic and atraumatic.   Cardiovascular:      Rate and Rhythm: Normal rate and regular rhythm.      Heart sounds: Normal heart sounds.   Pulmonary:      Effort: Pulmonary effort is normal. No respiratory distress.      Breath sounds: Normal breath sounds.   Abdominal:      General: Bowel sounds are normal.      Palpations: Abdomen is soft.      Tenderness: There is abdominal tenderness in the right upper quadrant and left upper quadrant. There is no guarding or rebound.      Comments: Abdomen is obese   Musculoskeletal:         General: No deformity or signs of injury.      Cervical back: Normal range of motion and neck supple. No tenderness.   Skin:     General: Skin is warm and dry.      Coloration: Skin is not jaundiced or pale.   Neurological:      General: No focal deficit present.      Mental Status: She is alert.      Gait: Gait normal.         ED Course & MDM   ED Course as of 01/25/25 0253   Sat Jan 25, 2025   0230 Provided crackers and ginger ale. Patient tolerated PO without any difficulty [LH]      ED Course User Index  [LH] Liana Billings PA-C         Diagnoses as of 01/25/25 0253   Pain of upper abdomen         Medical Decision Making  This is a 59 year old female with a past medical history  significant for HFpEF, Anxiety/MDD, Asthma, OPAL, CKD, GERD, recurrent abdominal pain (2/2 to adhesions vs functional), h/o multiple abdominal surgeries, (Sherita-en-Y, C-sections), H. Pylori, gastric ulcer, SBO and had diagnostic laparoscopy 8/2021 with lysis of adhesions.  She presents the ED due to abdominal pain.  Patient states that earlier this evening she developed sharp pains in her upper abdomen that begins on the left side and radiates into the right.    On physical exam, patient is non-ill-appearing, nondiaphoretic and in no acute distress.  No active vomiting or retching.  Abdomen is obese, soft with normal bowel sounds.  Patient endorses tenderness in both of her upper quadrants.  No guarding, rebound or evidence of peritonitis.    CBC shows leukocytosis at 15.1, no new onset anemia.  CKD shows slightly elevated creatinine 1.08, this appears to be better than patient's baseline as she has a known history of CKD.  No critical electrolyte disturbances.  Lipase is normal, low suspicion for acute pancreatitis.  Lactate is not elevated.  Troponin is normal, low suspicion for ACS. CT abdomen pelvis shows mesenteric edema involving several loops of small bowel in the  mid to lower abdomen, slightly decreased compared with 10/25/2024. Nonspecific finding, differential considerations include mesenteric panniculitis or recurrent enteritis    Upon reassessment, patient states that her pain is slightly improved.  She passed a PO challenge.  Counseled her to follow-up with GI, placed an outpatient referral.  Provided Tylenol, Bentyl, Pepcid and Zofran for symptom management.  Discussed ED return precautions.  Patient verbalized understanding and was agreeable to plan of care.  Discharge stable condition      Labs Reviewed   COMPREHENSIVE METABOLIC PANEL - Abnormal       Result Value    Glucose 110 (*)     Sodium 142      Potassium 4.0      Chloride 111 (*)     Bicarbonate 21      Anion Gap 14      Urea Nitrogen 26 (*)      Creatinine 1.08 (*)     eGFR 59 (*)     Calcium 8.8      Albumin 4.0      Alkaline Phosphatase 142 (*)     Total Protein 7.2      AST 10      Bilirubin, Total 0.4      ALT 12     CBC WITH AUTO DIFFERENTIAL - Abnormal    WBC 15.1 (*)     nRBC 0.0      RBC 3.76 (*)     Hemoglobin 10.4 (*)     Hematocrit 33.0 (*)     MCV 88      MCH 27.7      MCHC 31.5 (*)     RDW 14.8 (*)     Platelets 311      Neutrophils % 84.9      Immature Granulocytes %, Automated 0.4      Lymphocytes % 10.5      Monocytes % 3.8      Eosinophils % 0.1      Basophils % 0.3      Neutrophils Absolute 12.80 (*)     Immature Granulocytes Absolute, Automated 0.06      Lymphocytes Absolute 1.59      Monocytes Absolute 0.58      Eosinophils Absolute 0.01      Basophils Absolute 0.05     MAGNESIUM - Normal    Magnesium 2.13     LIPASE - Normal    Lipase 17      Narrative:     Venipuncture immediately after or during the administration of Metamizole may lead to falsely low results. Testing should be performed immediately prior to Metamizole dosing.   TROPONIN I, HIGH SENSITIVITY - Normal    Troponin I, High Sensitivity (CMC) 8      Narrative:     Less than 99th percentile of normal range cutoff-  Female and children under 18 years old <35 ng/L; Male <54 ng/L: Negative  Repeat testing should be performed if clinically indicated.     Female and children under 18 years old  ng/L; Male  ng/L:  Consistent with possible cardiac damage and possible increased clinical   risk. Serial measurements may help to assess extent of myocardial damage.     >120 ng/L: Consistent with cardiac damage, increased clinical risk and  myocardial infarction. Serial measurements may help assess extent of   myocardial damage.      NOTE: Children less than 1 year old may have higher baseline troponin   levels and results should be interpreted in conjunction with the overall   clinical context.    NOTE: Troponin I testing is performed using a different   testing  methodology at [facility] than at other   [facility]. Direct result comparisons should only   be made within the same method.     LACTATE - Normal    Lactate 0.9      Narrative:     Venipuncture immediately after or during the administration of Metamizole may lead to falsely low results. Testing should be performed immediately prior to Metamizole dosing.   SARS-COV-2 AND INFLUENZA A/B PCR - Normal    Flu A Result Not Detected      Flu B Result Not Detected      Coronavirus 2019, PCR Not Detected      Narrative:     This assay is an FDA-cleared, in vitro diagnostic nucleic acid amplification test for the qualitative detection and differentiation of SARS CoV-2/ Influenza A/B from nasopharyngeal specimens collected from individuals with signs and symptoms of respiratory tract infections, and has been validated for use at [facility]. Negative results do not preclude COVID-19/ Influenza A/B infections and should not be used as the sole basis for diagnosis, treatment, or other management decisions. Testing for SARS CoV-2 is recommended only for patients who meet current clinical and/or epidemiological criteria defined by federal, state, or local public health directives.         CT abdomen pelvis wo IV contrast   Final Result   1.  Mesenteric edema involving several loops of small bowel in the   mid to lower abdomen, slightly decreased compared with 10/25/2024.   This is a nonspecific finding, differential considerations include   mesenteric panniculitis or recurrent enteritis.   2. Postsurgical changes of gastric bypass and prior multisite bowel   resection.   3. Additional findings as discussed above.        I personally reviewed the images/study and I agree with the findings   as stated by Kalen Ruiz MD (Radiology Resident).   This study was interpreted at [facility], [place], [place].        MACRO:   None        Signed  by: Nitza Reveles 1/25/2025 3:02 AM   Dictation workstation:   [id number]            Liana Billings PA-C  01/25/25 0311

## 2025-01-29 ENCOUNTER — APPOINTMENT (OUTPATIENT)
Dept: RADIOLOGY | Facility: HOSPITAL | Age: 60
End: 2025-01-29
Payer: COMMERCIAL

## 2025-01-29 ENCOUNTER — HOSPITAL ENCOUNTER (EMERGENCY)
Facility: HOSPITAL | Age: 60
Discharge: HOME | End: 2025-01-30
Attending: EMERGENCY MEDICINE
Payer: COMMERCIAL

## 2025-01-29 ENCOUNTER — CLINICAL SUPPORT (OUTPATIENT)
Dept: EMERGENCY MEDICINE | Facility: HOSPITAL | Age: 60
End: 2025-01-29
Payer: COMMERCIAL

## 2025-01-29 DIAGNOSIS — R10.84 GENERALIZED ABDOMINAL PAIN: Primary | ICD-10-CM

## 2025-01-29 LAB
ALBUMIN SERPL BCP-MCNC: 4.4 G/DL (ref 3.4–5)
ALP SERPL-CCNC: 141 U/L (ref 33–110)
ALT SERPL W P-5'-P-CCNC: 6 U/L (ref 7–45)
ANION GAP SERPL CALC-SCNC: 14 MMOL/L (ref 10–20)
AST SERPL W P-5'-P-CCNC: 10 U/L (ref 9–39)
BASOPHILS # BLD AUTO: 0.06 X10*3/UL (ref 0–0.1)
BASOPHILS NFR BLD AUTO: 0.4 %
BILIRUB SERPL-MCNC: 0.3 MG/DL (ref 0–1.2)
BUN SERPL-MCNC: 18 MG/DL (ref 6–23)
CALCIUM SERPL-MCNC: 9 MG/DL (ref 8.6–10.6)
CARDIAC TROPONIN I PNL SERPL HS: 5 NG/L (ref 0–34)
CHLORIDE SERPL-SCNC: 107 MMOL/L (ref 98–107)
CO2 SERPL-SCNC: 20 MMOL/L (ref 21–32)
CREAT SERPL-MCNC: 1.12 MG/DL (ref 0.5–1.05)
EGFRCR SERPLBLD CKD-EPI 2021: 57 ML/MIN/1.73M*2
EOSINOPHIL # BLD AUTO: 0.17 X10*3/UL (ref 0–0.7)
EOSINOPHIL NFR BLD AUTO: 1.2 %
ERYTHROCYTE [DISTWIDTH] IN BLOOD BY AUTOMATED COUNT: 15.1 % (ref 11.5–14.5)
GLUCOSE SERPL-MCNC: 142 MG/DL (ref 74–99)
HCT VFR BLD AUTO: 33.2 % (ref 36–46)
HGB BLD-MCNC: 10.6 G/DL (ref 12–16)
IMM GRANULOCYTES # BLD AUTO: 0.05 X10*3/UL (ref 0–0.7)
IMM GRANULOCYTES NFR BLD AUTO: 0.3 % (ref 0–0.9)
LIPASE SERPL-CCNC: 22 U/L (ref 9–82)
LYMPHOCYTES # BLD AUTO: 2.16 X10*3/UL (ref 1.2–4.8)
LYMPHOCYTES NFR BLD AUTO: 14.9 %
MCH RBC QN AUTO: 27.5 PG (ref 26–34)
MCHC RBC AUTO-ENTMCNC: 31.9 G/DL (ref 32–36)
MCV RBC AUTO: 86 FL (ref 80–100)
MONOCYTES # BLD AUTO: 0.83 X10*3/UL (ref 0.1–1)
MONOCYTES NFR BLD AUTO: 5.7 %
NEUTROPHILS # BLD AUTO: 11.23 X10*3/UL (ref 1.2–7.7)
NEUTROPHILS NFR BLD AUTO: 77.5 %
NRBC BLD-RTO: 0 /100 WBCS (ref 0–0)
PLATELET # BLD AUTO: 324 X10*3/UL (ref 150–450)
POTASSIUM SERPL-SCNC: 4.3 MMOL/L (ref 3.5–5.3)
PROT SERPL-MCNC: 7.7 G/DL (ref 6.4–8.2)
RBC # BLD AUTO: 3.85 X10*6/UL (ref 4–5.2)
SODIUM SERPL-SCNC: 137 MMOL/L (ref 136–145)
WBC # BLD AUTO: 14.5 X10*3/UL (ref 4.4–11.3)

## 2025-01-29 PROCEDURE — 71260 CT THORAX DX C+: CPT | Performed by: SURGERY

## 2025-01-29 PROCEDURE — 80053 COMPREHEN METABOLIC PANEL: CPT | Performed by: EMERGENCY MEDICINE

## 2025-01-29 PROCEDURE — 83690 ASSAY OF LIPASE: CPT

## 2025-01-29 PROCEDURE — 2550000001 HC RX 255 CONTRASTS: Mod: SE | Performed by: EMERGENCY MEDICINE

## 2025-01-29 PROCEDURE — 84484 ASSAY OF TROPONIN QUANT: CPT | Performed by: EMERGENCY MEDICINE

## 2025-01-29 PROCEDURE — 96375 TX/PRO/DX INJ NEW DRUG ADDON: CPT | Mod: 59

## 2025-01-29 PROCEDURE — 96374 THER/PROPH/DIAG INJ IV PUSH: CPT | Mod: 59

## 2025-01-29 PROCEDURE — 2500000004 HC RX 250 GENERAL PHARMACY W/ HCPCS (ALT 636 FOR OP/ED): Mod: JZ,SE

## 2025-01-29 PROCEDURE — 74177 CT ABD & PELVIS W/CONTRAST: CPT | Performed by: SURGERY

## 2025-01-29 PROCEDURE — 93010 ELECTROCARDIOGRAM REPORT: CPT | Performed by: EMERGENCY MEDICINE

## 2025-01-29 PROCEDURE — 99285 EMERGENCY DEPT VISIT HI MDM: CPT | Performed by: EMERGENCY MEDICINE

## 2025-01-29 PROCEDURE — 74177 CT ABD & PELVIS W/CONTRAST: CPT

## 2025-01-29 PROCEDURE — 36415 COLL VENOUS BLD VENIPUNCTURE: CPT | Performed by: EMERGENCY MEDICINE

## 2025-01-29 PROCEDURE — 2500000004 HC RX 250 GENERAL PHARMACY W/ HCPCS (ALT 636 FOR OP/ED): Mod: SE | Performed by: EMERGENCY MEDICINE

## 2025-01-29 PROCEDURE — 99285 EMERGENCY DEPT VISIT HI MDM: CPT | Mod: 25 | Performed by: EMERGENCY MEDICINE

## 2025-01-29 PROCEDURE — 85025 COMPLETE CBC W/AUTO DIFF WBC: CPT | Performed by: EMERGENCY MEDICINE

## 2025-01-29 PROCEDURE — 93005 ELECTROCARDIOGRAM TRACING: CPT

## 2025-01-29 RX ORDER — PANTOPRAZOLE SODIUM 40 MG/10ML
40 INJECTION, POWDER, LYOPHILIZED, FOR SOLUTION INTRAVENOUS ONCE
Status: COMPLETED | OUTPATIENT
Start: 2025-01-29 | End: 2025-01-29

## 2025-01-29 RX ORDER — ONDANSETRON HYDROCHLORIDE 2 MG/ML
4 INJECTION, SOLUTION INTRAVENOUS ONCE
Status: COMPLETED | OUTPATIENT
Start: 2025-01-29 | End: 2025-01-29

## 2025-01-29 RX ORDER — FAMOTIDINE 10 MG/ML
20 INJECTION INTRAVENOUS ONCE
Status: COMPLETED | OUTPATIENT
Start: 2025-01-29 | End: 2025-01-29

## 2025-01-29 RX ADMIN — FAMOTIDINE 20 MG: 10 INJECTION INTRAVENOUS at 23:13

## 2025-01-29 RX ADMIN — ONDANSETRON 4 MG: 2 INJECTION INTRAMUSCULAR; INTRAVENOUS at 23:14

## 2025-01-29 RX ADMIN — HYDROMORPHONE HYDROCHLORIDE 0.5 MG: 0.5 INJECTION, SOLUTION INTRAMUSCULAR; INTRAVENOUS; SUBCUTANEOUS at 23:13

## 2025-01-29 RX ADMIN — IOHEXOL 75 ML: 350 INJECTION, SOLUTION INTRAVENOUS at 23:32

## 2025-01-29 RX ADMIN — PANTOPRAZOLE SODIUM 40 MG: 40 INJECTION, POWDER, FOR SOLUTION INTRAVENOUS at 23:13

## 2025-01-29 ASSESSMENT — COLUMBIA-SUICIDE SEVERITY RATING SCALE - C-SSRS
6. HAVE YOU EVER DONE ANYTHING, STARTED TO DO ANYTHING, OR PREPARED TO DO ANYTHING TO END YOUR LIFE?: NO
1. IN THE PAST MONTH, HAVE YOU WISHED YOU WERE DEAD OR WISHED YOU COULD GO TO SLEEP AND NOT WAKE UP?: NO
2. HAVE YOU ACTUALLY HAD ANY THOUGHTS OF KILLING YOURSELF?: NO

## 2025-01-29 ASSESSMENT — PAIN SCALES - GENERAL: PAINLEVEL_OUTOF10: 10 - WORST POSSIBLE PAIN

## 2025-01-30 VITALS
BODY MASS INDEX: 51.91 KG/M2 | OXYGEN SATURATION: 95 % | DIASTOLIC BLOOD PRESSURE: 82 MMHG | SYSTOLIC BLOOD PRESSURE: 140 MMHG | RESPIRATION RATE: 16 BRPM | WEIGHT: 293 LBS | HEIGHT: 63 IN | TEMPERATURE: 97.5 F | HEART RATE: 85 BPM

## 2025-01-30 LAB
ATRIAL RATE: 81 BPM
CARDIAC TROPONIN I PNL SERPL HS: 4 NG/L (ref 0–34)
HOLD SPECIMEN: NORMAL
P AXIS: 78 DEGREES
P OFFSET: 165 MS
P ONSET: 130 MS
PR INTERVAL: 176 MS
Q ONSET: 218 MS
QRS COUNT: 13 BEATS
QRS DURATION: 78 MS
QT INTERVAL: 372 MS
QTC CALCULATION(BAZETT): 432 MS
QTC FREDERICIA: 411 MS
R AXIS: 92 DEGREES
T AXIS: -26 DEGREES
T OFFSET: 404 MS
VENTRICULAR RATE: 81 BPM

## 2025-01-30 PROCEDURE — 2500000001 HC RX 250 WO HCPCS SELF ADMINISTERED DRUGS (ALT 637 FOR MEDICARE OP): Mod: SE

## 2025-01-30 PROCEDURE — 36415 COLL VENOUS BLD VENIPUNCTURE: CPT | Performed by: EMERGENCY MEDICINE

## 2025-01-30 PROCEDURE — 84484 ASSAY OF TROPONIN QUANT: CPT | Performed by: EMERGENCY MEDICINE

## 2025-01-30 RX ORDER — DICYCLOMINE HYDROCHLORIDE 10 MG/1
10 CAPSULE ORAL ONCE
Status: COMPLETED | OUTPATIENT
Start: 2025-01-30 | End: 2025-01-30

## 2025-01-30 RX ORDER — DICYCLOMINE HYDROCHLORIDE 20 MG/1
20 TABLET ORAL 2 TIMES DAILY
Qty: 20 TABLET | Refills: 0 | Status: SHIPPED | OUTPATIENT
Start: 2025-01-30 | End: 2025-02-09

## 2025-01-30 RX ADMIN — DICYCLOMINE HYDROCHLORIDE 10 MG: 10 CAPSULE ORAL at 00:55

## 2025-01-30 ASSESSMENT — PAIN - FUNCTIONAL ASSESSMENT: PAIN_FUNCTIONAL_ASSESSMENT: 0-10

## 2025-01-30 ASSESSMENT — PAIN SCALES - GENERAL: PAINLEVEL_OUTOF10: 8

## 2025-01-30 NOTE — ED PROVIDER NOTES
History of Present Illness     History provided by: Patient  Limitations to History: None  External Records Reviewed with Brief Summary: None    HPI:    Jessica Smith is a 59 y.o. female with a PMH of HFpEF, Anxiety/MDD, Asthma, OPAL, CKD, GERD, recurrent abdominal pain (2/2 to adhesions vs functional), h/o multiple abdominal surgeries, (Sherita-en-Y, C-sections), H. Pylori, gastric ulcer, SBO and had diagnostic laparoscopy 2021 with lysis of adhesions. She had a recent ED admission on  for abdominal pain, CTAP with evidence of mesenteric edema involving several loops of small bowel. She presents today with severe, generalized abdominal pain. She reports the pain is a sharp pain that started this afternoon. She also reports diffuse chest pain and back pain. She tried carafate which did not improve her symptoms.     Physical Exam   Triage vitals:  T 36.4 °C (97.5 °F)  HR 86  /83  RR 18  O2 97 % None (Room air)    General: Awake, alert, in no acute distress  Eyes: Gaze conjugate.  No scleral icterus or injection  HENT: Normo-cephalic, atraumatic. No stridor  CV: Regular rate, regular rhythm. Radial pulses 2+ bilaterally  Resp: Breathing non-labored, speaking in full sentences.  Clear to auscultation bilaterally  GI: Soft, non-distended, non-tender. No rebound or guarding.  MSK/Extremities: No gross bony deformities. Moving all extremities  Skin: Warm. Appropriate color  Neuro: Alert. Oriented. Face symmetric. Speech is fluent.  Gross strength and sensation intact in b/l UE and LEs  Psych: Appropriate mood and affect    Medical Decision Making & ED Course   Medical Decision Makin y.o. female with a PMH of HFpEF, Anxiety/MDD, Asthma, OPAL, CKD, GERD, recurrent abdominal pain (2/2 to adhesions vs functional), h/o multiple abdominal surgeries, (Sherita-en-Y, C-sections), H. Pylori, gastric ulcer, SBO and had diagnostic laparoscopy 2021 with lysis of adhesions. She had a recent ED admission on  for  abdominal pain, CTAP with evidence of mesenteric edema involving several loops of small bowel. She presents today with severe, generalized abdominal pain. CT chest/A/P was negative for any acute pathology. Lipase/troponins wnl. Her symptoms are likely functional in etiology.    ----      Differential diagnoses considered include but are not limited to: SBO, mesenteric ischemia, perforated viscus, appendicitis, peritonitis     Social Determinants of Health which Significantly Impact Care: Mental health disorder     EKG Independent Interpretation:  NSR, right axis deviation, T wave inversion in lead III    Independent Result Review and Interpretation: Relevant laboratory and radiographic results were reviewed and independently interpreted by myself.  As necessary, they are commented on in the ED Course.    Chronic conditions affecting the patient's care: As documented above in [facility]    The patient was discussed with the following consultants/services: None    Care Considerations: As documented above in [facility]    ED Course:  Diagnoses as of 01/30/25 0057   Generalized abdominal pain     Disposition   As a result of the work-up, the patient was discharged home.  she was informed of her diagnosis and instructed to come back with any concerns or worsening of condition.  she and was agreeable to the plan as discussed above.  she was given the opportunity to ask questions.  All of the patient's questions were answered.    Procedures   Procedures    None    Patient seen and discussed with ED attending physician Dr. Marlene Price, PGY1     Rafita Price MD  Resident  01/30/25 0050       Rafita Price MD  Resident  01/30/25 0057     with the documented findings with the exception/addition of the following:     Patient appeared uncomfortable but nontoxic. Given report of chest back and upper abdominal pain with nausea and vomiting ordered CT chest abdomen pelvis to exclude acute aortic, esophageal, or intestinal pathologies. Low suspicion for acute coronary syndrome based on negative troponin. Symptoms may be due to hiatal hernia and esophageal reflux.     MD Toy Sutton MD  02/05/25 8404

## 2025-01-30 NOTE — ED TRIAGE NOTES
Pt presents to the ED for chest and abdominal pain that started today. Pt states that she was previously at the hospital and was prescribed meds but she said they never came.

## 2025-02-06 ENCOUNTER — APPOINTMENT (OUTPATIENT)
Dept: OCCUPATIONAL THERAPY | Facility: CLINIC | Age: 60
End: 2025-02-06
Payer: COMMERCIAL

## 2025-02-21 ENCOUNTER — ANESTHESIA EVENT (OUTPATIENT)
Dept: GASTROENTEROLOGY | Facility: HOSPITAL | Age: 60
End: 2025-02-21

## 2025-02-21 ENCOUNTER — ANESTHESIA (OUTPATIENT)
Dept: GASTROENTEROLOGY | Facility: HOSPITAL | Age: 60
End: 2025-02-21
Payer: COMMERCIAL

## 2025-02-21 NOTE — ANESTHESIA PREPROCEDURE EVALUATION
Patient: Jessica Smith    Procedure Information       Date/Time: 02/21/25 1300    Scheduled providers: Nba Haynes MD    Procedures:       COLONOSCOPY      EGD    Location: [facility]          59F w/ PMH of HFpEF, morbid obesity, Anxiety/MDD, Ashtma, OPAL, CKD, GERD, Prior Obdulia, SBO undergoing procedure listed above    Relevant Problems   Cardiac   (+) Benign essential hypertension      Pulmonary   (+) Asthma with chronic obstructive pulmonary disease (COPD) (Multi)      Neuro   (+) Anxiety and depression      GI   (+) Gastroesophageal reflux disease with esophagitis      Hematology   (+) Iron deficiency anemia      Musculoskeletal   (+) Unspecified osteoarthritis, unspecified site      HEENT   (+) Seasonal allergies     Visit Vitals  Smoking Status Former        Lab Results   Component Value Date    ALBUMIN 4.4 01/29/2025    ALT 6 (L) 01/29/2025    AST 10 01/29/2025    BUN 18 01/29/2025    CALCIUM 9.0 01/29/2025     01/29/2025    CHOL 154 06/26/2018    CO2 20 (L) 01/29/2025    CREATININE 1.12 (H) 01/29/2025    HDL 55.5 06/26/2018    HCT 33.2 (L) 01/29/2025    HGB 10.6 (L) 01/29/2025    HGBA1C 5.2 03/13/2024    MG 2.13 01/25/2025    PHOS 3.6 11/01/2024     01/29/2025    K 4.3 01/29/2025     01/29/2025    TRIG 101 06/26/2018    WBC 14.5 (H) 01/29/2025       Scheduled medications    Continuous medications    PRN medications      Current Outpatient Medications on File Prior to Visit   Medication Sig Dispense Refill    albuterol 90 mcg/actuation inhaler Inhale 2 puffs every 6 hours if needed for wheezing or shortness of breath. 18 g 11    alum-mag hydroxide-simeth (Mylanta) 200-200-20 mg/5 mL oral suspension Take 10 mL by mouth 4 times a day as needed for indigestion or heartburn.      benzocaine-menthol (Cepastat Sore Throat) lozenge Dissolve 1 lozenge in the mouth every 2 hours if needed for sore throat.      cyanocobalamin (Vitamin B-12) 1,000 mcg tablet Take 1 tablet (1,000  mcg) by mouth once daily. 30 tablet 11    dextromethorphan-guaifenesin (Coricidin HBP Chest Bradford-Cough)  mg capsule Take 1 capsule by mouth every 4 hours if needed (cough/congestion). 28 capsule 0    diclofenac sodium (Arthritis Pain, diclofenac,) 1 % gel Apply 4.5 inches (4 g) topically 4 times a day. 200 g 11    escitalopram (Lexapro) 20 mg tablet Take 1 tablet (20 mg) by mouth once daily. 90 tablet 3    famotidine (Pepcid) 20 mg tablet Take 1 tablet (20 mg) by mouth 2 times a day for 15 days. 30 tablet 0    ferrous sulfate, 325 mg ferrous sulfate, (FeroSuL) tablet Take 1 tablet by mouth every other day. 15 tablet 3    fluticasone (Flonase) 50 mcg/actuation nasal spray Administer 1 spray into each nostril once daily. 16 g 0    fluticasone propion-salmeteroL (Advair Diskus) 250-50 mcg/dose diskus inhaler Inhale 1 puff 2 times a day. Rinse mouth with water after use to reduce aftertaste and incidence of candidiasis. Do not swallow. 120 each 0    gabapentin (Neurontin) 100 mg capsule Take 1 capsule (100 mg) by mouth 2 times a day as needed (1st line for anxiety). 30 capsule 0    gabapentin (Neurontin) 300 mg capsule Take 1 capsule (300 mg) by mouth once daily at bedtime. 30 capsule 0    guaiFENesin (Mucinex) 1,200 mg tablet extended release 12hr Take 1 tablet (1,200 mg) by mouth every 12 hours if needed (cough/congestion).      hydrOXYzine HCL (Atarax) 25 mg tablet Take 1 tablet (25 mg) by mouth every 8 hours if needed for anxiety (2st line for anxiety). 30 tablet 0    lidocaine (Lidoderm) 5 % patch APPLY 1 PATCH TO THE AFFECTED AREA AND LEAVE IN PLACE FOR 12 HOURS, THEN REMOVE AND LEAVE OFF FOR 12 HOURS. Strength: 5 % 30 patch 1    lisinopril 10 mg tablet Take 1 tablet (10 mg) by mouth once daily. Do not fill before November 1, 2024.      loperamide (Imodium) 2 mg capsule Take 1 capsule (2 mg) by mouth 4 times a day as needed for diarrhea.      melatonin 3 mg tablet Take 1 tablet (3 mg) by mouth once daily at  bedtime. 30 tablet 11    multivit, iron, min. cmb. 5-FA 10-1 mg tablet Take 1 mg by mouth once daily. 30 tablet 0    pantoprazole (ProtoNix) 40 mg EC tablet Take 1 tablet (40 mg) by mouth once daily. 30 tablet 0    polyethylene glycol (Glycolax, Miralax) 17 gram packet Take 17 g by mouth 2 times a day as needed (constipation). 30 each 0    senna 8.6 mg tablet TAKE 1 TABLET BY MOUTH TWICE DAILY AS NEEDED FOR CONSTIPATION 60 tablet 10    sucralfate (Carafate) 100 mg/mL suspension TAKE 10ML BY MOUTH FOUR TIMES A DAY WITH MEALS 828 mL 10    walker misc 1 each once daily. Use daily as needed for ambulation assistance 1 each 0     No current facility-administered medications on file prior to visit.        Clinical information reviewed:                   NPO Detail:  No data recorded     Physical Exam    Airway  Mallampati: III  TM distance: >3 FB  Neck ROM: full     Cardiovascular - normal exam     Dental    Pulmonary - normal exam     Abdominal   (+) obese             Anesthesia Plan    History of general anesthesia?: yes  History of complications of general anesthesia?: no    ASA 4     general     intravenous induction   Postoperative administration of opioids is intended.  Trial extubation is planned.

## 2025-05-14 ENCOUNTER — APPOINTMENT (OUTPATIENT)
Dept: RADIOLOGY | Facility: HOSPITAL | Age: 60
End: 2025-05-14
Payer: COMMERCIAL

## 2025-05-14 ENCOUNTER — HOSPITAL ENCOUNTER (EMERGENCY)
Facility: HOSPITAL | Age: 60
Discharge: HOME | End: 2025-05-14
Attending: EMERGENCY MEDICINE
Payer: COMMERCIAL

## 2025-05-14 ENCOUNTER — CLINICAL SUPPORT (OUTPATIENT)
Dept: EMERGENCY MEDICINE | Facility: HOSPITAL | Age: 60
End: 2025-05-14
Payer: COMMERCIAL

## 2025-05-14 VITALS
OXYGEN SATURATION: 98 % | BODY MASS INDEX: 51.91 KG/M2 | TEMPERATURE: 97.4 F | HEART RATE: 84 BPM | RESPIRATION RATE: 17 BRPM | SYSTOLIC BLOOD PRESSURE: 180 MMHG | WEIGHT: 293 LBS | DIASTOLIC BLOOD PRESSURE: 98 MMHG | HEIGHT: 63 IN

## 2025-05-14 DIAGNOSIS — R10.84 GENERALIZED ABDOMINAL PAIN: ICD-10-CM

## 2025-05-14 DIAGNOSIS — R06.02 SHORTNESS OF BREATH: Primary | ICD-10-CM

## 2025-05-14 LAB
ALBUMIN SERPL BCP-MCNC: 4.5 G/DL (ref 3.4–5)
ALP SERPL-CCNC: 163 U/L (ref 33–110)
ALT SERPL W P-5'-P-CCNC: 10 U/L (ref 7–45)
ANION GAP SERPL CALC-SCNC: 17 MMOL/L (ref 10–20)
AST SERPL W P-5'-P-CCNC: 18 U/L (ref 9–39)
ATRIAL RATE: 80 BPM
BASOPHILS # BLD AUTO: 0.06 X10*3/UL (ref 0–0.1)
BASOPHILS NFR BLD AUTO: 0.6 %
BILIRUB SERPL-MCNC: 0.3 MG/DL (ref 0–1.2)
BNP SERPL-MCNC: 84 PG/ML (ref 0–99)
BUN SERPL-MCNC: 20 MG/DL (ref 6–23)
CALCIUM SERPL-MCNC: 8.4 MG/DL (ref 8.6–10.6)
CARDIAC TROPONIN I PNL SERPL HS: 5 NG/L (ref 0–34)
CHLORIDE SERPL-SCNC: 110 MMOL/L (ref 98–107)
CO2 SERPL-SCNC: 19 MMOL/L (ref 21–32)
CREAT SERPL-MCNC: 1.27 MG/DL (ref 0.5–1.05)
EGFRCR SERPLBLD CKD-EPI 2021: 49 ML/MIN/1.73M*2
EOSINOPHIL # BLD AUTO: 0.11 X10*3/UL (ref 0–0.7)
EOSINOPHIL NFR BLD AUTO: 1.1 %
ERYTHROCYTE [DISTWIDTH] IN BLOOD BY AUTOMATED COUNT: 15.2 % (ref 11.5–14.5)
GLUCOSE SERPL-MCNC: 114 MG/DL (ref 74–99)
HCT VFR BLD AUTO: 30.9 % (ref 36–46)
HGB BLD-MCNC: 9.9 G/DL (ref 12–16)
IMM GRANULOCYTES # BLD AUTO: 0.05 X10*3/UL (ref 0–0.7)
IMM GRANULOCYTES NFR BLD AUTO: 0.5 % (ref 0–0.9)
LIPASE SERPL-CCNC: 31 U/L (ref 9–82)
LYMPHOCYTES # BLD AUTO: 1.98 X10*3/UL (ref 1.2–4.8)
LYMPHOCYTES NFR BLD AUTO: 19.2 %
MCH RBC QN AUTO: 27.5 PG (ref 26–34)
MCHC RBC AUTO-ENTMCNC: 32 G/DL (ref 32–36)
MCV RBC AUTO: 86 FL (ref 80–100)
MONOCYTES # BLD AUTO: 0.65 X10*3/UL (ref 0.1–1)
MONOCYTES NFR BLD AUTO: 6.3 %
NEUTROPHILS # BLD AUTO: 7.45 X10*3/UL (ref 1.2–7.7)
NEUTROPHILS NFR BLD AUTO: 72.3 %
NRBC BLD-RTO: 0 /100 WBCS (ref 0–0)
P AXIS: 72 DEGREES
P OFFSET: 173 MS
P ONSET: 120 MS
PLATELET # BLD AUTO: 208 X10*3/UL (ref 150–450)
POTASSIUM SERPL-SCNC: 5.2 MMOL/L (ref 3.5–5.3)
PR INTERVAL: 198 MS
PROT SERPL-MCNC: 7.9 G/DL (ref 6.4–8.2)
Q ONSET: 219 MS
QRS COUNT: 14 BEATS
QRS DURATION: 90 MS
QT INTERVAL: 418 MS
QTC CALCULATION(BAZETT): 482 MS
QTC FREDERICIA: 460 MS
R AXIS: 45 DEGREES
RBC # BLD AUTO: 3.6 X10*6/UL (ref 4–5.2)
SODIUM SERPL-SCNC: 141 MMOL/L (ref 136–145)
T AXIS: 0 DEGREES
T OFFSET: 428 MS
VENTRICULAR RATE: 80 BPM
WBC # BLD AUTO: 10.3 X10*3/UL (ref 4.4–11.3)

## 2025-05-14 PROCEDURE — 99285 EMERGENCY DEPT VISIT HI MDM: CPT | Mod: 25 | Performed by: EMERGENCY MEDICINE

## 2025-05-14 PROCEDURE — 93005 ELECTROCARDIOGRAM TRACING: CPT

## 2025-05-14 PROCEDURE — 74177 CT ABD & PELVIS W/CONTRAST: CPT

## 2025-05-14 PROCEDURE — 83880 ASSAY OF NATRIURETIC PEPTIDE: CPT | Performed by: EMERGENCY MEDICINE

## 2025-05-14 PROCEDURE — 2550000001 HC RX 255 CONTRASTS: Mod: SE

## 2025-05-14 PROCEDURE — 71045 X-RAY EXAM CHEST 1 VIEW: CPT | Performed by: RADIOLOGY

## 2025-05-14 PROCEDURE — 2500000001 HC RX 250 WO HCPCS SELF ADMINISTERED DRUGS (ALT 637 FOR MEDICARE OP): Mod: SE

## 2025-05-14 PROCEDURE — 2500000005 HC RX 250 GENERAL PHARMACY W/O HCPCS: Mod: SE

## 2025-05-14 PROCEDURE — 71045 X-RAY EXAM CHEST 1 VIEW: CPT

## 2025-05-14 PROCEDURE — 84484 ASSAY OF TROPONIN QUANT: CPT | Performed by: EMERGENCY MEDICINE

## 2025-05-14 PROCEDURE — 83690 ASSAY OF LIPASE: CPT

## 2025-05-14 PROCEDURE — 85025 COMPLETE CBC W/AUTO DIFF WBC: CPT | Performed by: EMERGENCY MEDICINE

## 2025-05-14 PROCEDURE — 80053 COMPREHEN METABOLIC PANEL: CPT | Performed by: EMERGENCY MEDICINE

## 2025-05-14 PROCEDURE — 36415 COLL VENOUS BLD VENIPUNCTURE: CPT | Performed by: EMERGENCY MEDICINE

## 2025-05-14 RX ORDER — LIDOCAINE HYDROCHLORIDE 20 MG/ML
15 SOLUTION OROPHARYNGEAL ONCE
Status: COMPLETED | OUTPATIENT
Start: 2025-05-14 | End: 2025-05-14

## 2025-05-14 RX ORDER — ALUMINUM HYDROXIDE, MAGNESIUM HYDROXIDE, AND SIMETHICONE 1200; 120; 1200 MG/30ML; MG/30ML; MG/30ML
30 SUSPENSION ORAL ONCE
Status: COMPLETED | OUTPATIENT
Start: 2025-05-14 | End: 2025-05-14

## 2025-05-14 RX ORDER — IBUPROFEN 600 MG/1
600 TABLET ORAL ONCE
Status: COMPLETED | OUTPATIENT
Start: 2025-05-14 | End: 2025-05-14

## 2025-05-14 RX ORDER — DICYCLOMINE HYDROCHLORIDE 10 MG/1
10 CAPSULE ORAL ONCE
Status: COMPLETED | OUTPATIENT
Start: 2025-05-14 | End: 2025-05-14

## 2025-05-14 RX ADMIN — ALUMINUM HYDROXIDE, MAGNESIUM HYDROXIDE, AND SIMETHICONE 30 ML: 200; 200; 20 SUSPENSION ORAL at 21:22

## 2025-05-14 RX ADMIN — DICYCLOMINE HYDROCHLORIDE 10 MG: 10 CAPSULE ORAL at 20:21

## 2025-05-14 RX ADMIN — IOHEXOL 95 ML: 350 INJECTION, SOLUTION INTRAVENOUS at 20:13

## 2025-05-14 RX ADMIN — LIDOCAINE HYDROCHLORIDE 15 ML: 20 SOLUTION ORAL at 21:22

## 2025-05-14 RX ADMIN — IBUPROFEN 600 MG: 600 TABLET, FILM COATED ORAL at 20:21

## 2025-05-14 ASSESSMENT — COLUMBIA-SUICIDE SEVERITY RATING SCALE - C-SSRS
2. HAVE YOU ACTUALLY HAD ANY THOUGHTS OF KILLING YOURSELF?: NO
6. HAVE YOU EVER DONE ANYTHING, STARTED TO DO ANYTHING, OR PREPARED TO DO ANYTHING TO END YOUR LIFE?: NO
1. IN THE PAST MONTH, HAVE YOU WISHED YOU WERE DEAD OR WISHED YOU COULD GO TO SLEEP AND NOT WAKE UP?: NO

## 2025-05-14 ASSESSMENT — LIFESTYLE VARIABLES
EVER FELT BAD OR GUILTY ABOUT YOUR DRINKING: NO
HAVE YOU EVER FELT YOU SHOULD CUT DOWN ON YOUR DRINKING: NO
EVER HAD A DRINK FIRST THING IN THE MORNING TO STEADY YOUR NERVES TO GET RID OF A HANGOVER: NO
HAVE PEOPLE ANNOYED YOU BY CRITICIZING YOUR DRINKING: NO
TOTAL SCORE: 0

## 2025-05-14 ASSESSMENT — PAIN DESCRIPTION - DESCRIPTORS: DESCRIPTORS: SHARP

## 2025-05-14 ASSESSMENT — PAIN SCALES - GENERAL: PAINLEVEL_OUTOF10: 10 - WORST POSSIBLE PAIN

## 2025-05-14 ASSESSMENT — PAIN - FUNCTIONAL ASSESSMENT: PAIN_FUNCTIONAL_ASSESSMENT: 0-10

## 2025-05-14 ASSESSMENT — PAIN DESCRIPTION - PAIN TYPE: TYPE: CHRONIC PAIN

## 2025-05-14 ASSESSMENT — PAIN DESCRIPTION - LOCATION: LOCATION: ABDOMEN

## 2025-05-14 NOTE — ED PROVIDER NOTES
EMERGENCY DEPARTMENT ENCOUNTER      Pt Name: Jessica Smith  MRN: [id number]  Birthdate 1965  Date of evaluation: 5/14/2025  Provider: Yuki Srinivasan MD    CHIEF COMPLAINT       Chief Complaint   Patient presents with    Abdominal Pain    Shortness of Breath     HISTORY OF PRESENT ILLNESS    Jessica Smith is a 59 y.o. year old female who presents to the ER for abdominal pain and shortness of breath.  The patient reports 2-3 day history abdominal pain which she describes as stabbing and in the left upper quadrant and epigastric area.  The patient reports that she was trying to take Tylenol for the pain however it did not help.  Patient denies any constipation.  She reports having a bowel movement yesterday and this morning.  Patient denies any burning in urination, foul-smelling urine or difficulty emptying her bladder.    PMH is significant for heart failure with preserved ejection fraction, anxiety, MDD, OPAL, CKD, GERD, status post Sherita-en-Y surgery, H. pylori, PUD, hyperlipidemia.     PAST MEDICAL HISTORY   Medical History[1]  CURRENT MEDICATIONS       Previous Medications    ALBUTEROL 90 MCG/ACTUATION INHALER    Inhale 2 puffs every 6 hours if needed for wheezing or shortness of breath.    ALUM-MAG HYDROXIDE-SIMETH (MYLANTA) 200-200-20 MG/5 ML ORAL SUSPENSION    Take 10 mL by mouth 4 times a day as needed for indigestion or heartburn.    BENZOCAINE-MENTHOL (CEPASTAT SORE THROAT) LOZENGE    Dissolve 1 lozenge in the mouth every 2 hours if needed for sore throat.    DEXTROMETHORPHAN-GUAIFENESIN (CORICIDIN HBP CHEST SCOTT-COUGH)  MG CAPSULE    Take 1 capsule by mouth every 4 hours if needed (cough/congestion).    DICLOFENAC SODIUM (ARTHRITIS PAIN, DICLOFENAC,) 1 % GEL    Apply 4.5 inches (4 g) topically 4 times a day.    ESCITALOPRAM (LEXAPRO) 20 MG TABLET    Take 1 tablet (20 mg) by mouth once daily.    FAMOTIDINE (PEPCID) 20 MG TABLET    Take 1 tablet (20 mg) by mouth 2 times a day for 15 days.     FERROUS SULFATE, 325 MG FERROUS SULFATE, (FEROSUL) TABLET    Take 1 tablet by mouth every other day.    FLUTICASONE (FLONASE) 50 MCG/ACTUATION NASAL SPRAY    Administer 1 spray into each nostril once daily.    FLUTICASONE PROPION-SALMETEROL (ADVAIR DISKUS) 250-50 MCG/DOSE DISKUS INHALER    Inhale 1 puff 2 times a day. Rinse mouth with water after use to reduce aftertaste and incidence of candidiasis. Do not swallow.    GABAPENTIN (NEURONTIN) 100 MG CAPSULE    Take 1 capsule (100 mg) by mouth 2 times a day as needed (1st line for anxiety).    GABAPENTIN (NEURONTIN) 300 MG CAPSULE    Take 1 capsule (300 mg) by mouth once daily at bedtime.    GUAIFENESIN (MUCINEX) 1,200 MG TABLET EXTENDED RELEASE 12HR    Take 1 tablet (1,200 mg) by mouth every 12 hours if needed (cough/congestion).    HYDROXYZINE HCL (ATARAX) 25 MG TABLET    Take 1 tablet (25 mg) by mouth every 8 hours if needed for anxiety (2st line for anxiety).    LIDOCAINE (LIDODERM) 5 % PATCH    APPLY 1 PATCH TO THE AFFECTED AREA AND LEAVE IN PLACE FOR 12 HOURS, THEN REMOVE AND LEAVE OFF FOR 12 HOURS. Strength: 5 %    LISINOPRIL 10 MG TABLET    Take 1 tablet (10 mg) by mouth once daily. Do not fill before November 1, 2024.    LOPERAMIDE (IMODIUM) 2 MG CAPSULE    Take 1 capsule (2 mg) by mouth 4 times a day as needed for diarrhea.    MELATONIN 3 MG TABLET    Take 1 tablet (3 mg) by mouth once daily at bedtime.    MULTIVIT, IRON, MIN. CMB. 5-FA 10-1 MG TABLET    Take 1 mg by mouth once daily.    PANTOPRAZOLE (PROTONIX) 40 MG EC TABLET    Take 1 tablet (40 mg) by mouth once daily.    POLYETHYLENE GLYCOL (GLYCOLAX, MIRALAX) 17 GRAM PACKET    Take 17 g by mouth 2 times a day as needed (constipation).    SENNA 8.6 MG TABLET    TAKE 1 TABLET BY MOUTH TWICE DAILY AS NEEDED FOR CONSTIPATION    SUCRALFATE (CARAFATE) 100 MG/ML SUSPENSION    TAKE 10ML BY MOUTH FOUR TIMES A DAY WITH MEALS    WALKER MISC    1 each once daily. Use daily as needed for ambulation assistance      SURGICAL HISTORY     Surgical History[2]  ALLERGIES     Aspirin  FAMILY HISTORY     Family History[3]  SOCIAL HISTORY     Social History[4]  PHYSICAL EXAM  (up to 7 for level 4, 8 or more for level 5)     ED Triage Vitals   Temperature Heart Rate Respirations BP   05/14/25 1447 05/14/25 1447 05/14/25 1447 05/14/25 1447   36.3 °C (97.4 °F) 83 18 (!) 161/93      Pulse Ox Temp Source Heart Rate Source Patient Position   05/14/25 1447 05/14/25 1447 -- 05/14/25 1900   96 % Temporal  Lying      BP Location FiO2 (%)     05/14/25 1900 --     Right arm        Physical Exam  Constitutional:       Appearance: She is obese.   HENT:      Head: Normocephalic and atraumatic.      Mouth/Throat:      Mouth: Mucous membranes are moist.   Cardiovascular:      Rate and Rhythm: Normal rate and regular rhythm.      Heart sounds: No murmur heard.  Pulmonary:      Effort: Pulmonary effort is normal. No respiratory distress.      Breath sounds: Normal breath sounds. No wheezing, rhonchi or rales.   Abdominal:      General: Abdomen is flat. Bowel sounds are normal.      Palpations: Abdomen is soft.      Tenderness: There is abdominal tenderness in the periumbilical area and left upper quadrant. There is no guarding or rebound.      Hernia: A hernia is present. Hernia is present in the umbilical area.   Skin:     General: Skin is warm and dry.      Capillary Refill: Capillary refill takes less than 2 seconds.   Neurological:      General: No focal deficit present.      Mental Status: She is alert.        DIAGNOSTIC RESULTS   LABS:  Labs Reviewed   CBC WITH AUTO DIFFERENTIAL - Abnormal       Result Value    WBC 10.3      nRBC 0.0      RBC 3.60 (*)     Hemoglobin 9.9 (*)     Hematocrit 30.9 (*)     MCV 86      MCH 27.5      MCHC 32.0      RDW 15.2 (*)     Platelets 208      Neutrophils % 72.3      Immature Granulocytes %, Automated 0.5      Lymphocytes % 19.2      Monocytes % 6.3      Eosinophils % 1.1      Basophils % 0.6      Neutrophils  Absolute 7.45      Immature Granulocytes Absolute, Automated 0.05      Lymphocytes Absolute 1.98      Monocytes Absolute 0.65      Eosinophils Absolute 0.11      Basophils Absolute 0.06     COMPREHENSIVE METABOLIC PANEL - Abnormal    Glucose 114 (*)     Sodium 141      Potassium 5.2      Chloride 110 (*)     Bicarbonate 19 (*)     Anion Gap 17      Urea Nitrogen 20      Creatinine 1.27 (*)     eGFR 49 (*)     Calcium 8.4 (*)     Albumin 4.5      Alkaline Phosphatase 163 (*)     Total Protein 7.9      AST 18      Bilirubin, Total 0.3      ALT 10     B-TYPE NATRIURETIC PEPTIDE - Normal    BNP 84      Narrative:        <100 pg/mL - Heart failure unlikely  100-299 pg/mL - Intermediate probability of acute heart                  failure exacerbation. Correlate with clinical                  context and patient history.    >=300 pg/mL - Heart Failure likely. Correlate with clinical                  context and patient history.     Biotin interference may cause falsely decreased results. Patients taking a Biotin dose of up to 5 mg/day should refrain from taking Biotin for 24 hours before sample  collection. Providers may contact their local laboratory for further information.   SERIAL TROPONIN-INITIAL - Normal    Troponin I, High Sensitivity (CMC) 5      Narrative:     Less than 99th percentile of normal range cutoff-  Female and children under 18 years old <35 ng/L; Male <54 ng/L: Negative  Repeat testing should be performed if clinically indicated.     Female and children under 18 years old  ng/L; Male  ng/L:  Consistent with possible cardiac damage and possible increased clinical   risk. Serial measurements may help to assess extent of myocardial damage.     >120 ng/L: Consistent with cardiac damage, increased clinical risk and  myocardial infarction. Serial measurements may help assess extent of   myocardial damage.      NOTE: Children less than 1 year old may have higher baseline troponin   levels and  results should be interpreted in conjunction with the overall   clinical context.    NOTE: Troponin I testing is performed using a different   testing methodology at [facility] than at other   [facility] hospitals. Direct result comparisons should only   be made within the same method.     LIPASE - Normal    Lipase 31      Narrative:     Venipuncture immediately after or during the administration of Metamizole may lead to falsely low results. Testing should be performed immediately prior to Metamizole dosing.   TROPONIN SERIES- (INITIAL, 1 HR)    Narrative:     The following orders were created for panel order Troponin Series, (0, 1 HR).  Procedure                               Abnormality         Status                     ---------                               -----------         ------                     Troponin I, High Sensiti...[[id number]]  Normal              Final result               Troponin, High Sensitivi...[[id number]]                                                   Please view results for these tests on the individual orders.   SERIAL TROPONIN, 1 HOUR     All other labs were within normal range or not returned as of this dictation.  Imaging  CT abdomen pelvis w IV contrast   Final Result   1. No acute abnormality within the abdomen or pelvis.   2. Similar tethering of the anterior mid jejunal small bowel loops to   the ventral abdominal wall which may reflect an adhesion. No evidence   of bowel obstruction.   3. Colonic diverticulosis without acute diverticulitis.   4. Renal cortical atrophy. Incidental small right renal cortical cyst.   5. Mild body wall edema.   6. Additional stable findings as above.        I personally reviewed the images/study and I agree with radiology   resident Dr. Eric Vega findings as stated. This study was   interpreted at [place], [place]        MACRO:   None        Signed by: Raymond Allen 5/14/2025 8:57 PM  "  Dictation workstation:   [id number]      XR chest 1 view   Final Result   1. No acute cardiopulmonary process.        MACRO:   None.        Signed by: Nancie Petty 5/14/2025 3:36 PM   Dictation workstation:   [id number]         Procedure  Procedures  EMERGENCY DEPARTMENT COURSE/MDM:   Medical Decision Making    Vitals:    Vitals:    05/14/25 1447 05/14/25 1900   BP: (!) 161/93 (!) 180/98   BP Location:  Right arm   Patient Position:  Lying   Pulse: 83 84   Resp: 18 17   Temp: 36.3 °C (97.4 °F)    TempSrc: Temporal    SpO2: 96% 98%   Weight: 145 kg (320 lb)    Height: 1.6 m (5' 3\")      Jessica Smith is a female 59 y.o. who presents to the ER for abdominal pain. On arrival the patients vital signs were: Afebrile, Hypertensive, Regular RR, and Normoxic on room air. History obtained from: patient.     On independent assessment of the labs, CBC was within normal limits, no evidence of infection or acute anemia.  CMP was within normal limits, no evidence of electrolyte abnormalities, SPENSER, or liver dysfunction.  Lipase is within normal limits, BNP is 84, initial troponin is 5.    X-ray of the chest did not show any acute cardiopulmonary processes.  CT of the abdomen pelvis did not show any acute abdominal processes, there is diverticulosis without diverticulitis, no obstructions, no masses, no intra-abdominal abscess.    The patient was given a GI cocktail for her abdominal pain.  On reassessment, the patient reports that her abdominal pain is somewhat controlled.  Due to the patient's history of abdominal pain for the past 2 to 3 days, GI symptoms, lack of chest pain, second troponin was deferred.  It is unlikely that this is a cardiac cause, her BNP was normal, initial troponin was negative.     The patient was discharged and given return precautions. The patient was instructed to follow up with gastroenterology and pulmonology and with their PCP in one week. The patient understood and was agreeable with the plan. "         ED Course as of 05/14/25 2206   Wed May 14, 2025   2124 LIPASE: 31 [AD]   2125 Senior resident attestation note:    This is a 59-year-old female with prior history of abdominal hernia repair presenting to the emergency department with central abdominal pain.  Still noting bowel movements, declining any dysuria or hematuria.  Vital signs are otherwise stable on arrival aside from hypertension.  Lab work was notable for chronic electrolyte changes and CKD.  She is otherwise well-appearing.  CT abdomen/pelvis was performed and was unremarkable for any acute intraabdominal pathology, no nephrolithiasis, cholecystitis, appendicitis, cystitis, pyelonephritis.  Chest x-ray was also unremarkable given noted chest pain and troponin negative.  Overall since patient has had symptoms for multiple days, unlikely cardiac cause with normal first troponin as it would be expected to be elevated.  Low suspicion for cardiac pathology with EKG that was otherwise nonischemic.  Discussed discharge home with bowel regimen including MiraLAX and fiber which she is agreeable to.  Advised to present to the emergency department if she has any additional concerns.  Will also give follow-up to GI given her history of hiatal hernia repair.  She is comfortable with this plan.  Discharged stable condition.        Geoff Sheridan DO  [facility]  [facility]   [AD]      ED Course User Index  [AD] Geoff Sheridan DO         Diagnoses as of 05/14/25 2206   Shortness of breath   Generalized abdominal pain       External Records Reviewed: I reviewed recent and relevant outside records including inpatient notes, outpatient records      Shared decision making for disposition  Patient and/or patient´s representative was counseled regarding labs, imaging, likely diagnosis. All questions were answered. Recommendation was made   for discharge home. The patient agreed and was discharged home in  stable condition with appropriate relevant educational materials. Return precautions were provided which included new or worsening abdominal pain, persistent vomiting, persistent diarrhea, black tar stools, fever of 38C (100.4) or higher, chest pain, shortness of breath, or worsening of your current symptoms..     ED Medications administered this visit:    Medications   dicyclomine (Bentyl) capsule 10 mg (10 mg oral Given 5/14/25 2021)   ibuprofen tablet 600 mg (600 mg oral Given 5/14/25 2021)   iohexol (OMNIPaque) 350 mg iodine/mL solution 95 mL (95 mL intravenous Given 5/14/25 2013)   alum-mag hydroxide-simeth (Mylanta) 200-200-20 mg/5 mL oral suspension 30 mL (30 mL oral Given 5/14/25 2122)   lidocaine (Xylocaine) 2 % mouth solution 15 mL (15 mL oral Given 5/14/25 2122)       New Prescriptions from this visit:    New Prescriptions    No medications on file       Follow-up:  No follow-up provider specified.      Final Impression:   1. Shortness of breath    2. Generalized abdominal pain          Please excuse any misspellings or unintended errors related to the Dragon speech recognition software used to dictate this note.    I reviewed the case with the attending ED physician. The attending ED physician agrees with the plan.          [1]   Past Medical History:  Diagnosis Date    Abdominal adhesions 08/08/2021    Abnormal QT interval present on electrocardiogram 03/18/2021    Formatting of this note might be different from the original. Formatting of this note might be different from the original. -EKG: NSR. QTc 500 Formatting of this note might be different from the original. -EKG: NSR. QTc 500    Acute gastric ulcer without hemorrhage or perforation 01/13/2016    Gastric ulcer, acute    Acute gastrojejunal ulcer 09/07/2007    Formatting of this note might be different from the original. 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    Hypokalemia 2023    Intertrigo 2023    Ischemic bowel disease (Multi) 2021    Personal history of other diseases of the musculoskeletal system and connective tissue 2015    History of arthritis    Personal history of other diseases of the respiratory system 2015    History of bronchitis    Personal history of other diseases of the respiratory system 2015    History of sinusitis    Personal history of other diseases of the respiratory system 2015    History of acute bronchitis    Personal history of other infectious and parasitic diseases 2015    History of Helicobacter infection    Personal history of other specified conditions 2016    History of diarrhea    Personal history of other specified conditions 2015    History of chest pain    Personal history of other specified conditions 01/10/2014    History of chest pain    Personal history of peptic ulcer disease 2015    History of gastric ulcer    SBO (small bowel obstruction) (Multi) 2023    Sebaceous cyst 2002    Tension headache 2009    Urinary tract infection, site not specified 04/15/2015    UTI (lower urinary tract infection)    Wound dehiscence 2023   [2]   Past Surgical History:  Procedure Laterality Date    APPENDECTOMY  2014    Appendectomy     SECTION, CLASSIC  2014     Section    COLONOSCOPY  2018    Colonoscopy (Fiberoptic)    ESOPHAGOGASTRODUODENOSCOPY  2018    Diagnostic Esophagogastroduodenoscopy    GASTRIC BYPASS  2017    Gastric Surgery  For Morbid Obesity Gastric Bypass    GASTRIC RESTRICTION SURGERY  06/05/2015    Gastric Surgery For Morbid Obesity    OTHER SURGICAL HISTORY  05/26/2020    Ventral hernia repair    OTHER SURGICAL HISTORY  11/05/2014    Cholecystotomy    TUBAL LIGATION  11/05/2014    Tubal Ligation    UMBILICAL HERNIA REPAIR  11/05/2014    Umbilical Hernia Repair   [3] No family history on file.  [4]   Social History  Tobacco Use    Smoking status: Former     Types: Cigarettes    Smokeless tobacco: Never   Substance Use Topics    Alcohol use: Never    Drug use: Never        Yuki Srinivasan MD  Resident  05/14/25 2388

## 2025-05-14 NOTE — ED TRIAGE NOTES
PMH of HFpEF, Anxiety/MDD, Asthma, OPAL, CKD, GERD, recurrent abdominal pain (2/2 to adhesions vs functional), h/o multiple abdominal surgeries, (Sherita-en-Y, C-sections), H. Pylori, gastric ulcer, and SBO who presents to the ED today due to abdominal pain and SOB

## 2025-05-14 NOTE — PROGRESS NOTES
Physical Therapy    Physical Therapy Treatment    Patient Name: Jessica Smith  MRN: [id number]  Today's Date: 10/17/2023  Time Calculation  Start Time: 1233  Stop Time: 1255  Time Calculation (min): 22 min       Assessment/Plan   PT Assessment  PT Assessment Results: Decreased strength, Decreased endurance, Impaired balance, Decreased mobility  Rehab Prognosis: Good  End of Session Communication: Bedside nurse  Assessment Comment: Pt demonstrated improvement in transfers throughout treatment initially requiring Min A with STS and progressing to CGA.  End of Session Patient Position: Bed, 2 rail up, Alarm off, not on at start of session     PT Plan  Treatment/Interventions: Bed mobility, Transfer training, Gait training, Stair training, Balance training, Strengthening, Endurance training, Range of motion, Therapeutic exercise, Therapeutic activity  PT Plan: Skilled PT  PT Frequency: 3 times per week  PT Discharge Recommendations: Moderate intensity level of continued care  Equipment Recommended upon Discharge: Wheeled walker  PT Recommended Transfer Status: Assist x1 (walker)  PT - OK to Discharge: Yes      General Visit Information:   PT  Visit  PT Received On: 10/17/23  General  Prior to Session Communication: Bedside nurse  Patient Position Received: Bed, 2 rail up, Alarm off, not on at start of session  General Comment: Pt supine in bed asleep upon arrival. Pt woke easily and agreeable to therapy after encouragement.    Subjective   Precautions:  Precautions  Medical Precautions: Fall precautions  Vital Signs:       Objective   Pain:  Pain Assessment  Pain Assessment: 0-10  Pain Score:  (Pt did not score pain, pt says there is only pain when standing and walking)  Pain Type: Acute pain  Pain Location: Knee  Pain Orientation: Left  Pain Descriptors: Aching  Pain Frequency: Intermittent (with standing and walking)  Pain Onset: Sudden  Clinical Progression: Not changed  Pain Interventions: Medication (See  MAR)  Cognition:     Postural Control:       Activity Tolerance:  Activity Tolerance  Endurance: Tolerates less than 10 min exercise, no significant change in vital signs  Treatments:  Bed Mobility  Bed Mobility: Yes  Bed Mobility 1  Bed Mobility 1: Supine to sitting  Level of Assistance 1: Close supervision  Bed Mobility Comments 1: with HOB elevated  Bed Mobility 2  Bed Mobility  2: Sitting to supine  Level of Assistance 2: Moderate assistance  Bed Mobility Comments 2: assist with B LE    Ambulation/Gait Training  Ambulation/Gait Training Performed: Yes  Ambulation/Gait Training 1  Surface 1: Level tile  Device 1: Rolling walker  Gait Support Devices: Gait belt  Assistance 1: Contact guard  Comments/Distance (ft) 1: 10'  Ambulation/Gait Training 2  Surface 2: Level tile  Device 2: Rolling walker  Gait Support Devices: Gait belt  Assistance 2: Contact guard  Comments/Distance (ft) 2: 5' x2 to bathroom and back to bed  Transfers  Transfer: Yes  Transfer 1  Transfer From 1: Sit to  Transfer to 1: Stand  Technique 1: Sit to stand  Transfer Device 1: Walker  Transfer Level of Assistance 1: Contact guard  Trials/Comments 1: Min A initially and progressed to CGA  Transfers 2  Transfer From 2: Stand to  Transfer to 2: Sit  Technique 2: Stand to sit  Transfer Device 2: Walker  Transfer Level of Assistance 2: Contact guard    Outcome Measures:  [facility] Basic Mobility  Turning from your back to your side while in a flat bed without using bedrails: None  Moving from lying on your back to sitting on the side of a flat bed without using bedrails: A little  Moving to and from bed to chair (including a wheelchair): A little  Standing up from a chair using your arms (e.g. wheelchair or bedside chair): A little  To walk in hospital room: A little  Climbing 3-5 steps with railing: A lot  Basic Mobility - Total Score: 18    Education Documentation  Precautions, taught by Michelle Kruger PTA at 10/17/2023  1:06 PM.  Learner:  Patient  Readiness: Acceptance  Method: Explanation  Response: Verbalizes Understanding    Home Exercise Program, taught by Michelle Kruger PTA at 10/17/2023  1:06 PM.  Learner: Patient  Readiness: Acceptance  Method: Explanation  Response: Verbalizes Understanding    Mobility Training, taught by Michelle Kruger PTA at 10/17/2023  1:06 PM.  Learner: Patient  Readiness: Acceptance  Method: Explanation  Response: Verbalizes Understanding    Education Comments  No comments found.        OP EDUCATION:       Encounter Problems       Encounter Problems (Active)       Mobility       Pt will ambulate 50 ft with SBA with LRAD (Progressing)       Start:  10/13/23    Expected End:  10/27/23            Pt will ascend/descend 15 steps with 1 handrail with Demetris (Progressing)       Start:  10/13/23    Expected End:  10/27/23               Transfers       Patient will complete sit to supine and supine to sit with SBA (Progressing)       Start:  10/13/23    Expected End:  10/27/23            Patient will transfer sit to stand/stand to sit and bed to chair/chair to bed with SBA with LRAD (Progressing)       Start:  10/13/23    Expected End:  10/27/23                    [place]ulette

## 2025-05-14 NOTE — Clinical Note
RN and PCA attempted to locate pt in lobby, bathroom and outside unable to locate pt, RN attempted to call pt primary number listed with it going straight to voicemail

## 2025-05-15 NOTE — DISCHARGE INSTRUCTIONS
You are seen in the ED for your abdominal pain.  Your labs were normal, your images were normal, you do not have any blockages or internal bleeding.    For your abdominal pain, please try Tylenol for it, take at least 1 g every 4 hours, do not take more than 4 g a day.  Please also try to take antacids such as Tums or Pepcid for your pain.    You were given a referral to the pulmonologist for your shortness of breath and a referral to the gastroenterologist for your abdominal pain.  Please make an appointment when they call.    Please return to the ED if you have fever greater than 101 °F that does not get better with Tylenol or Motrin, if the abdominal pain worsens, if you have more than 5 episodes of vomiting or diarrhea in a day, if you cannot breathe, if you have black stools, if you start throwing up blood, and if you have any other concerns.

## 2025-06-02 DIAGNOSIS — D50.9 IRON DEFICIENCY ANEMIA, UNSPECIFIED IRON DEFICIENCY ANEMIA TYPE: ICD-10-CM

## 2025-06-04 RX ORDER — SUCRALFATE 1 G/10ML
SUSPENSION ORAL
Qty: 828 ML | Refills: 11 | Status: SHIPPED | OUTPATIENT
Start: 2025-06-04

## 2025-06-11 DIAGNOSIS — G47.00 INSOMNIA, UNSPECIFIED TYPE: ICD-10-CM

## 2025-06-12 DIAGNOSIS — Z98.84 H/O GASTRIC BYPASS: ICD-10-CM

## 2025-06-13 RX ORDER — LANOLIN ALCOHOL/MO/W.PET/CERES
1000 CREAM (GRAM) TOPICAL DAILY
Qty: 30 TABLET | Refills: 11 | Status: SHIPPED | OUTPATIENT
Start: 2025-06-13

## 2025-06-16 RX ORDER — TALC
3 POWDER (GRAM) TOPICAL NIGHTLY
Qty: 30 TABLET | Refills: 11 | OUTPATIENT
Start: 2025-06-16

## 2025-07-31 DIAGNOSIS — D72.9 ABNORMAL WHITE BLOOD CELL (WBC) COUNT: ICD-10-CM

## 2025-07-31 DIAGNOSIS — R10.9 ABDOMINAL PAIN, UNSPECIFIED ABDOMINAL LOCATION: Primary | ICD-10-CM

## 2025-07-31 DIAGNOSIS — K64.9 HEMORRHOIDS, UNSPECIFIED HEMORRHOID TYPE: ICD-10-CM

## 2025-07-31 DIAGNOSIS — D64.9 ANEMIA, UNSPECIFIED TYPE: ICD-10-CM

## 2025-08-08 ENCOUNTER — HOSPITAL ENCOUNTER (EMERGENCY)
Facility: HOSPITAL | Age: 60
Discharge: HOME | End: 2025-08-09
Attending: EMERGENCY MEDICINE
Payer: COMMERCIAL

## 2025-08-08 ENCOUNTER — APPOINTMENT (OUTPATIENT)
Dept: RADIOLOGY | Facility: HOSPITAL | Age: 60
End: 2025-08-08
Payer: COMMERCIAL

## 2025-08-08 ENCOUNTER — CLINICAL SUPPORT (OUTPATIENT)
Dept: EMERGENCY MEDICINE | Facility: HOSPITAL | Age: 60
End: 2025-08-08
Payer: COMMERCIAL

## 2025-08-08 DIAGNOSIS — R10.84 GENERALIZED ABDOMINAL PAIN: Primary | ICD-10-CM

## 2025-08-08 LAB
ALBUMIN SERPL BCP-MCNC: 4.1 G/DL (ref 3.4–5)
ALP SERPL-CCNC: 137 U/L (ref 33–136)
ALT SERPL W P-5'-P-CCNC: 5 U/L (ref 7–45)
ANION GAP SERPL CALC-SCNC: 13 MMOL/L (ref 10–20)
AST SERPL W P-5'-P-CCNC: 10 U/L (ref 9–39)
ATRIAL RATE: 83 BPM
BASOPHILS # BLD AUTO: 0.05 X10*3/UL (ref 0–0.1)
BASOPHILS NFR BLD AUTO: 0.4 %
BILIRUB SERPL-MCNC: 0.4 MG/DL (ref 0–1.2)
BUN SERPL-MCNC: 26 MG/DL (ref 6–23)
CALCIUM SERPL-MCNC: 8.4 MG/DL (ref 8.6–10.6)
CARDIAC TROPONIN I PNL SERPL HS: 5 NG/L (ref 0–34)
CHLORIDE SERPL-SCNC: 108 MMOL/L (ref 98–107)
CO2 SERPL-SCNC: 22 MMOL/L (ref 21–32)
CREAT SERPL-MCNC: 0.98 MG/DL (ref 0.5–1.05)
EGFRCR SERPLBLD CKD-EPI 2021: 66 ML/MIN/1.73M*2
EOSINOPHIL # BLD AUTO: 0.18 X10*3/UL (ref 0–0.7)
EOSINOPHIL NFR BLD AUTO: 1.4 %
ERYTHROCYTE [DISTWIDTH] IN BLOOD BY AUTOMATED COUNT: 14.6 % (ref 11.5–14.5)
GLUCOSE SERPL-MCNC: 97 MG/DL (ref 74–99)
HCT VFR BLD AUTO: 33.3 % (ref 36–46)
HGB BLD-MCNC: 10.2 G/DL (ref 12–16)
IMM GRANULOCYTES # BLD AUTO: 0.05 X10*3/UL (ref 0–0.7)
IMM GRANULOCYTES NFR BLD AUTO: 0.4 % (ref 0–0.9)
LACTATE SERPL-SCNC: 0.7 MMOL/L (ref 0.4–2)
LIPASE SERPL-CCNC: 25 U/L (ref 9–82)
LYMPHOCYTES # BLD AUTO: 1.68 X10*3/UL (ref 1.2–4.8)
LYMPHOCYTES NFR BLD AUTO: 13.5 %
MCH RBC QN AUTO: 27.2 PG (ref 26–34)
MCHC RBC AUTO-ENTMCNC: 30.6 G/DL (ref 32–36)
MCV RBC AUTO: 89 FL (ref 80–100)
MONOCYTES # BLD AUTO: 0.68 X10*3/UL (ref 0.1–1)
MONOCYTES NFR BLD AUTO: 5.5 %
NEUTROPHILS # BLD AUTO: 9.78 X10*3/UL (ref 1.2–7.7)
NEUTROPHILS NFR BLD AUTO: 78.8 %
NRBC BLD-RTO: 0 /100 WBCS (ref 0–0)
P AXIS: 78 DEGREES
P OFFSET: 175 MS
P ONSET: 116 MS
PLATELET # BLD AUTO: 303 X10*3/UL (ref 150–450)
POTASSIUM SERPL-SCNC: 4.5 MMOL/L (ref 3.5–5.3)
PR INTERVAL: 204 MS
PROT SERPL-MCNC: 7.6 G/DL (ref 6.4–8.2)
Q ONSET: 218 MS
QRS COUNT: 14 BEATS
QRS DURATION: 90 MS
QT INTERVAL: 394 MS
QTC CALCULATION(BAZETT): 462 MS
QTC FREDERICIA: 439 MS
R AXIS: 55 DEGREES
RBC # BLD AUTO: 3.75 X10*6/UL (ref 4–5.2)
SODIUM SERPL-SCNC: 138 MMOL/L (ref 136–145)
T AXIS: 40 DEGREES
T OFFSET: 415 MS
VENTRICULAR RATE: 83 BPM
WBC # BLD AUTO: 12.4 X10*3/UL (ref 4.4–11.3)

## 2025-08-08 PROCEDURE — 74177 CT ABD & PELVIS W/CONTRAST: CPT | Performed by: RADIOLOGY

## 2025-08-08 PROCEDURE — 36415 COLL VENOUS BLD VENIPUNCTURE: CPT

## 2025-08-08 PROCEDURE — 71046 X-RAY EXAM CHEST 2 VIEWS: CPT | Performed by: RADIOLOGY

## 2025-08-08 PROCEDURE — 83605 ASSAY OF LACTIC ACID: CPT

## 2025-08-08 PROCEDURE — 84484 ASSAY OF TROPONIN QUANT: CPT

## 2025-08-08 PROCEDURE — 2500000001 HC RX 250 WO HCPCS SELF ADMINISTERED DRUGS (ALT 637 FOR MEDICARE OP): Mod: SE

## 2025-08-08 PROCEDURE — 80053 COMPREHEN METABOLIC PANEL: CPT

## 2025-08-08 PROCEDURE — 99285 EMERGENCY DEPT VISIT HI MDM: CPT | Mod: 25 | Performed by: EMERGENCY MEDICINE

## 2025-08-08 PROCEDURE — 2550000001 HC RX 255 CONTRASTS: Mod: SE | Performed by: EMERGENCY MEDICINE

## 2025-08-08 PROCEDURE — 2500000004 HC RX 250 GENERAL PHARMACY W/ HCPCS (ALT 636 FOR OP/ED): Mod: SE

## 2025-08-08 PROCEDURE — 85025 COMPLETE CBC W/AUTO DIFF WBC: CPT

## 2025-08-08 PROCEDURE — 83540 ASSAY OF IRON: CPT | Performed by: FAMILY MEDICINE

## 2025-08-08 PROCEDURE — 83690 ASSAY OF LIPASE: CPT

## 2025-08-08 PROCEDURE — 93005 ELECTROCARDIOGRAM TRACING: CPT

## 2025-08-08 PROCEDURE — 2500000005 HC RX 250 GENERAL PHARMACY W/O HCPCS: Mod: SE

## 2025-08-08 PROCEDURE — 93010 ELECTROCARDIOGRAM REPORT: CPT | Performed by: EMERGENCY MEDICINE

## 2025-08-08 PROCEDURE — 71046 X-RAY EXAM CHEST 2 VIEWS: CPT

## 2025-08-08 PROCEDURE — 99285 EMERGENCY DEPT VISIT HI MDM: CPT | Performed by: EMERGENCY MEDICINE

## 2025-08-08 PROCEDURE — 74177 CT ABD & PELVIS W/CONTRAST: CPT

## 2025-08-08 RX ORDER — ALUMINUM HYDROXIDE, MAGNESIUM HYDROXIDE, AND SIMETHICONE 1200; 120; 1200 MG/30ML; MG/30ML; MG/30ML
30 SUSPENSION ORAL ONCE
Status: COMPLETED | OUTPATIENT
Start: 2025-08-08 | End: 2025-08-08

## 2025-08-08 RX ORDER — LIDOCAINE HYDROCHLORIDE 20 MG/ML
15 SOLUTION OROPHARYNGEAL ONCE
Status: COMPLETED | OUTPATIENT
Start: 2025-08-08 | End: 2025-08-08

## 2025-08-08 RX ORDER — DICYCLOMINE HYDROCHLORIDE 10 MG/1
10 CAPSULE ORAL ONCE
Status: COMPLETED | OUTPATIENT
Start: 2025-08-08 | End: 2025-08-08

## 2025-08-08 RX ORDER — ONDANSETRON HYDROCHLORIDE 2 MG/ML
4 INJECTION, SOLUTION INTRAVENOUS ONCE
Status: COMPLETED | OUTPATIENT
Start: 2025-08-08 | End: 2025-08-08

## 2025-08-08 RX ADMIN — ALUMINUM HYDROXIDE, MAGNESIUM HYDROXIDE, AND SIMETHICONE 30 ML: 200; 200; 20 SUSPENSION ORAL at 21:04

## 2025-08-08 RX ADMIN — ONDANSETRON 4 MG: 2 INJECTION INTRAMUSCULAR; INTRAVENOUS at 21:04

## 2025-08-08 RX ADMIN — DICYCLOMINE HYDROCHLORIDE 10 MG: 10 CAPSULE ORAL at 21:05

## 2025-08-08 RX ADMIN — LIDOCAINE HYDROCHLORIDE 15 ML: 20 SOLUTION ORAL at 21:04

## 2025-08-08 RX ADMIN — HYDROMORPHONE HYDROCHLORIDE 0.2 MG: 0.5 INJECTION, SOLUTION INTRAMUSCULAR; INTRAVENOUS; SUBCUTANEOUS at 21:05

## 2025-08-08 RX ADMIN — IOHEXOL 100 ML: 350 INJECTION, SOLUTION INTRAVENOUS at 23:01

## 2025-08-08 ASSESSMENT — PAIN SCALES - GENERAL
PAINLEVEL_OUTOF10: 10 - WORST POSSIBLE PAIN

## 2025-08-08 ASSESSMENT — PAIN DESCRIPTION - LOCATION: LOCATION: ABDOMEN

## 2025-08-08 ASSESSMENT — PAIN - FUNCTIONAL ASSESSMENT
PAIN_FUNCTIONAL_ASSESSMENT: 0-10
PAIN_FUNCTIONAL_ASSESSMENT: 0-10

## 2025-08-08 ASSESSMENT — PAIN DESCRIPTION - PAIN TYPE: TYPE: ACUTE PAIN

## 2025-08-09 VITALS
DIASTOLIC BLOOD PRESSURE: 82 MMHG | RESPIRATION RATE: 16 BRPM | HEIGHT: 63 IN | BODY MASS INDEX: 51.91 KG/M2 | TEMPERATURE: 98.8 F | SYSTOLIC BLOOD PRESSURE: 154 MMHG | OXYGEN SATURATION: 95 % | WEIGHT: 293 LBS | HEART RATE: 82 BPM

## 2025-08-09 LAB — CARDIAC TROPONIN I PNL SERPL HS: 4 NG/L (ref 0–34)

## 2025-08-09 PROCEDURE — 2500000004 HC RX 250 GENERAL PHARMACY W/ HCPCS (ALT 636 FOR OP/ED): Mod: SE

## 2025-08-09 PROCEDURE — 2500000001 HC RX 250 WO HCPCS SELF ADMINISTERED DRUGS (ALT 637 FOR MEDICARE OP): Mod: SE

## 2025-08-09 RX ORDER — ONDANSETRON 4 MG/1
4 TABLET, ORALLY DISINTEGRATING ORAL EVERY 8 HOURS PRN
Qty: 21 TABLET | Refills: 0 | Status: SHIPPED | OUTPATIENT
Start: 2025-08-09 | End: 2025-08-16

## 2025-08-09 RX ORDER — DICYCLOMINE HYDROCHLORIDE 10 MG/1
10 CAPSULE ORAL ONCE
Status: COMPLETED | OUTPATIENT
Start: 2025-08-09 | End: 2025-08-09

## 2025-08-09 RX ORDER — KETOROLAC TROMETHAMINE 15 MG/ML
15 INJECTION, SOLUTION INTRAMUSCULAR; INTRAVENOUS ONCE
Status: COMPLETED | OUTPATIENT
Start: 2025-08-09 | End: 2025-08-09

## 2025-08-09 RX ORDER — ACETAMINOPHEN 325 MG/1
650 TABLET ORAL EVERY 6 HOURS PRN
Qty: 56 TABLET | Refills: 0 | Status: SHIPPED | OUTPATIENT
Start: 2025-08-09 | End: 2025-08-16

## 2025-08-09 RX ORDER — DICYCLOMINE HYDROCHLORIDE 20 MG/1
20 TABLET ORAL EVERY 8 HOURS PRN
Qty: 21 TABLET | Refills: 0 | Status: SHIPPED | OUTPATIENT
Start: 2025-08-09 | End: 2025-08-16

## 2025-08-09 RX ORDER — ONDANSETRON HYDROCHLORIDE 2 MG/ML
4 INJECTION, SOLUTION INTRAVENOUS ONCE
Status: COMPLETED | OUTPATIENT
Start: 2025-08-09 | End: 2025-08-09

## 2025-08-09 RX ADMIN — KETOROLAC TROMETHAMINE 15 MG: 15 INJECTION, SOLUTION INTRAMUSCULAR; INTRAVENOUS at 04:35

## 2025-08-09 RX ADMIN — ONDANSETRON 4 MG: 2 INJECTION INTRAMUSCULAR; INTRAVENOUS at 04:35

## 2025-08-09 RX ADMIN — DICYCLOMINE HYDROCHLORIDE 10 MG: 10 CAPSULE ORAL at 04:35

## 2025-08-09 ASSESSMENT — PAIN SCALES - GENERAL
PAINLEVEL_OUTOF10: 10 - WORST POSSIBLE PAIN
PAINLEVEL_OUTOF10: 8

## 2025-08-09 ASSESSMENT — PAIN - FUNCTIONAL ASSESSMENT
PAIN_FUNCTIONAL_ASSESSMENT: 0-10
PAIN_FUNCTIONAL_ASSESSMENT: 0-10

## 2025-08-09 NOTE — ED TRIAGE NOTES
Patient presents to ED via EMS from home. She states she has abdominal pain starting earlier today. She states the pain is 10/10. She denies nausea or vomiting but increased spit and some diarrhea. She was able to ambulate from the stretcher to the bed and to and from the bathroom on arrival. She is vitally stable on room air on arrival. Patient alert and oriented x4.

## 2025-08-09 NOTE — PROGRESS NOTES
Emergency Department Transition of Care Note       Signout   I received Jessica Smith in signout from Dr. Rayshawn Urbina.  Please see the ED Provider Note for all HPI, PE and MDM up to the time of signout at 7AM.  This is in addition to the primary record.    In brief Jessica Smith is an 60 y.o. female presenting for epigastric abdominal pain and vomiting that started today.     At the time of signout we were awaiting:  Final read on CT abdomen pelvis    ED Course & Medical Decision Making   Patient states that she had 4 episodes of diarrhea that started yesterday that are watery in nature denies blood in her stools    Medical Decision Making:  Under my care, patient was found to have a mild leukocytosis and CT abdomen showed possible enteritis.  In the setting of a mild leukocytosis, mesenteric congestion, and history of diarrhea without a fever I suspect this patient has viral gastroenteritis    Ischemic colitis was considered however the patient has a normal lactate and relief of symptoms with Toradol, and Bentyl, and Zofran make this less likely.  Patient also has noted adhesions however CT does not demonstrate any signs of bowel obstruction.     Patient was discharged home with appropriate medications for symptom control and given return precautions.  Patient was resting comfortably, free of symptoms, prior to discharge home.       ED Course:  ED Course as of 08/09/25 0911   Fri Aug 08, 2025   2247 XR chest 2 views  No free air under the diaphragm to suggest pneumoperitoneum [AK]   2326 CBC and Auto Differential(!) [AK]   2326 WBC(!): 12.4  Leukocytosis [AK]   Sat Aug 09, 2025   0036 Ieukocytosis and possible enteritis on CT scan [AR]      ED Course User Index  [AK] Rayshawn Urbina DO  [AR] Wanda Resendez DO         Diagnoses as of 08/09/25 0911   Generalized abdominal pain       Disposition   Discharge    Procedures       Patient seen and discussed with ED attending physician.    Wanda eRsendez  DO  Emergency Medicine

## 2025-08-09 NOTE — DISCHARGE INSTRUCTIONS
You were seen in the ER for abdominal pain and diarrhea.  I suspect you have viral gastroenteritis.  The best thing you can do is to take the medications as prescribed regularly and be sure to drink a lot of water and electrolytes like Gatorade to stay hydrated    Please return to the ER if you have worsening abdominal pain, Experience bloody stools, fevers, chills, night sweats, trouble breathing or chest pain.  Please return to the ER if you have abdominal pain persisting for more than 10 to 14 days with persistent diarrhea.

## 2025-08-11 ENCOUNTER — PATIENT OUTREACH (OUTPATIENT)
Dept: CARE COORDINATION | Facility: CLINIC | Age: 60
End: 2025-08-11
Payer: COMMERCIAL

## 2025-08-12 PROBLEM — G89.29 CHRONIC PAIN: Status: ACTIVE | Noted: 2024-11-01

## 2025-08-12 PROBLEM — B99.9 INFECTIOUS DISEASE: Status: ACTIVE | Noted: 2024-11-01

## 2025-08-12 PROBLEM — R87.612 LOW GRADE SQUAMOUS INTRAEPITHELIAL LESION (LGSIL) ON PAPANICOLAOU SMEAR OF CERVIX: Status: ACTIVE | Noted: 2025-08-12

## 2025-08-12 PROBLEM — M62.81 MUSCLE WEAKNESS: Status: ACTIVE | Noted: 2024-11-02

## 2025-08-12 PROBLEM — E66.2 EXTREME OBESITY WITH ALVEOLAR HYPOVENTILATION (MULTI): Status: ACTIVE | Noted: 2024-11-01

## 2025-08-12 PROBLEM — M25.519 SHOULDER PAIN: Status: ACTIVE | Noted: 2025-08-12

## 2025-08-12 PROBLEM — K63.9 DISORDER OF INTESTINE: Status: ACTIVE | Noted: 2024-11-01

## 2025-08-12 PROBLEM — F17.200 NICOTINE DEPENDENCE: Status: ACTIVE | Noted: 2022-03-05

## 2025-08-12 PROBLEM — N30.00 ACUTE CYSTITIS: Status: ACTIVE | Noted: 2024-11-01

## 2025-08-12 PROBLEM — R10.13 EPIGASTRIC PAIN: Status: ACTIVE | Noted: 2025-08-12

## 2025-08-12 PROBLEM — S16.1XXA STRAIN OF NECK MUSCLE: Status: ACTIVE | Noted: 2025-08-12

## 2025-08-12 PROBLEM — R22.0 FACIAL SWELLING: Status: ACTIVE | Noted: 2025-08-12

## 2025-08-12 PROBLEM — Q04.0: Status: ACTIVE | Noted: 2024-11-01

## 2025-08-12 PROBLEM — G56.00 CARPAL TUNNEL SYNDROME: Status: ACTIVE | Noted: 2024-11-01

## 2025-08-12 PROBLEM — F32.9 MAJOR DEPRESSION, SINGLE EPISODE: Status: ACTIVE | Noted: 2024-11-01

## 2025-08-12 PROBLEM — M54.50 LOW BACK PAIN: Status: ACTIVE | Noted: 2025-08-12

## 2025-08-12 PROBLEM — I26.99 PULMONARY EMBOLISM: Status: ACTIVE | Noted: 2022-03-05

## 2025-08-12 PROBLEM — K29.70 HELICOBACTER PYLORI GASTRITIS: Status: ACTIVE | Noted: 2025-08-12

## 2025-08-12 PROBLEM — N39.0 URINARY TRACT INFECTIOUS DISEASE: Status: ACTIVE | Noted: 2024-11-01

## 2025-08-12 PROBLEM — Q04.0 AGENESIS OF CORPUS CALLOSUM (MULTI): Status: ACTIVE | Noted: 2025-08-12

## 2025-08-12 PROBLEM — L98.9 DISORDER OF SKIN OR SUBCUTANEOUS TISSUE: Status: ACTIVE | Noted: 2024-11-01

## 2025-08-12 PROBLEM — B96.81 HELICOBACTER PYLORI GASTRITIS: Status: ACTIVE | Noted: 2025-08-12

## 2025-08-12 PROBLEM — R06.00 DYSPNEA: Status: ACTIVE | Noted: 2025-08-12

## 2025-08-12 PROBLEM — K21.9 GASTROESOPHAGEAL REFLUX DISEASE WITHOUT ESOPHAGITIS: Status: ACTIVE | Noted: 2022-03-05

## 2025-08-12 PROBLEM — I25.2 OLD MYOCARDIAL INFARCTION: Status: ACTIVE | Noted: 2022-04-04
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
## 2025-08-12 PROBLEM — M79.89 SWELLING OF LOWER EXTREMITY: Status: ACTIVE | Noted: 2025-08-12

## 2025-08-12 PROBLEM — I45.81 LONG QT SYNDROME: Status: ACTIVE | Noted: 2024-11-01

## 2025-08-12 PROBLEM — J11.1: Status: ACTIVE | Noted: 2025-08-12

## 2025-08-12 PROBLEM — Z98.84 HISTORY OF BARIATRIC SURGERY: Status: ACTIVE | Noted: 2022-03-05

## 2025-08-12 PROBLEM — N17.9 ACUTE KIDNEY INJURY: Status: ACTIVE | Noted: 2024-11-01

## 2025-08-12 PROBLEM — M25.569 KNEE PAIN: Status: ACTIVE | Noted: 2025-08-12

## 2025-08-12 PROBLEM — K66.0 PERITONEAL ADHESION: Status: ACTIVE | Noted: 2021-09-23

## 2025-08-12 PROBLEM — K25.9 GASTRIC ULCER WITHOUT HEMORRHAGE OR PERFORATION: Status: ACTIVE | Noted: 2024-11-01

## 2025-08-12 LAB
IRON SATN MFR SERPL: 14 % (ref 25–45)
IRON SERPL-MCNC: 48 UG/DL (ref 35–150)
TIBC SERPL-MCNC: 354 UG/DL (ref 240–445)
UIBC SERPL-MCNC: 306 UG/DL (ref 110–370)

## 2025-08-12 RX ORDER — SENNOSIDES 8.6 MG
TABLET ORAL
Qty: 60 TABLET | Refills: 0 | Status: SHIPPED | OUTPATIENT
Start: 2025-08-12 | End: 2025-08-12 | Stop reason: WASHOUT

## 2025-08-15 DIAGNOSIS — G47.00 INSOMNIA, UNSPECIFIED TYPE: ICD-10-CM

## 2025-08-15 DIAGNOSIS — R10.84 GENERALIZED ABDOMINAL PAIN: ICD-10-CM

## 2025-08-19 RX ORDER — ACETAMINOPHEN 325 MG/1
650 TABLET ORAL EVERY 6 HOURS PRN
Qty: 56 TABLET | Refills: 0 | OUTPATIENT
Start: 2025-08-19 | End: 2025-08-26

## 2025-08-19 RX ORDER — DICYCLOMINE HYDROCHLORIDE 20 MG/1
20 TABLET ORAL EVERY 8 HOURS PRN
Qty: 21 TABLET | Refills: 0 | OUTPATIENT
Start: 2025-08-19 | End: 2025-08-26

## 2025-08-19 RX ORDER — TALC
3 POWDER (GRAM) TOPICAL NIGHTLY
Qty: 30 TABLET | Refills: 11 | OUTPATIENT
Start: 2025-08-19

## 2025-08-19 RX ORDER — ONDANSETRON 4 MG/1
4 TABLET, ORALLY DISINTEGRATING ORAL EVERY 8 HOURS PRN
Qty: 21 TABLET | Refills: 0 | OUTPATIENT
Start: 2025-08-19 | End: 2025-08-26

## 2025-08-25 ENCOUNTER — HOSPITAL ENCOUNTER (EMERGENCY)
Facility: HOSPITAL | Age: 60
Discharge: HOME | End: 2025-08-25
Attending: STUDENT IN AN ORGANIZED HEALTH CARE EDUCATION/TRAINING PROGRAM
Payer: COMMERCIAL

## 2025-08-25 ENCOUNTER — CLINICAL SUPPORT (OUTPATIENT)
Dept: EMERGENCY MEDICINE | Facility: HOSPITAL | Age: 60
End: 2025-08-25
Payer: COMMERCIAL

## 2025-08-25 PROCEDURE — 93005 ELECTROCARDIOGRAM TRACING: CPT

## 2025-08-25 ASSESSMENT — PAIN - FUNCTIONAL ASSESSMENT
PAIN_FUNCTIONAL_ASSESSMENT: 0-10
PAIN_FUNCTIONAL_ASSESSMENT: 0-10

## 2025-08-25 ASSESSMENT — PAIN SCALES - GENERAL
PAINLEVEL_OUTOF10: 5 - MODERATE PAIN
PAINLEVEL_OUTOF10: 10 - WORST POSSIBLE PAIN
PAINLEVEL_OUTOF10: 0 - NO PAIN

## 2025-08-25 ASSESSMENT — PAIN DESCRIPTION - PAIN TYPE: TYPE: ACUTE PAIN

## 2025-08-25 ASSESSMENT — PAIN DESCRIPTION - LOCATION: LOCATION: ABDOMEN

## 2025-08-26 ENCOUNTER — PATIENT OUTREACH (OUTPATIENT)
Dept: CARE COORDINATION | Facility: CLINIC | Age: 60
End: 2025-08-26
Payer: COMMERCIAL

## 2025-09-02 RX ORDER — SENNOSIDES 8.6 MG
1 TABLET ORAL 2 TIMES DAILY PRN
Qty: 60 TABLET | Refills: 11 | OUTPATIENT
Start: 2025-09-02